# Patient Record
Sex: MALE | Race: WHITE | NOT HISPANIC OR LATINO | Employment: OTHER | URBAN - METROPOLITAN AREA
[De-identification: names, ages, dates, MRNs, and addresses within clinical notes are randomized per-mention and may not be internally consistent; named-entity substitution may affect disease eponyms.]

---

## 2017-02-16 PROBLEM — M79.602 LEFT ARM PAIN: Status: ACTIVE | Noted: 2017-02-16

## 2019-05-30 ENCOUNTER — HOSPITAL ENCOUNTER (EMERGENCY)
Facility: HOSPITAL | Age: 59
Discharge: HOME | End: 2019-05-30
Attending: SURGERY

## 2019-05-30 VITALS
SYSTOLIC BLOOD PRESSURE: 128 MMHG | DIASTOLIC BLOOD PRESSURE: 77 MMHG | TEMPERATURE: 99 F | BODY MASS INDEX: 20.24 KG/M2 | HEART RATE: 91 BPM | RESPIRATION RATE: 18 BRPM | WEIGHT: 141.13 LBS

## 2019-05-30 DIAGNOSIS — M54.9 BACK PAIN, UNSPECIFIED BACK LOCATION, UNSPECIFIED BACK PAIN LATERALITY, UNSPECIFIED CHRONICITY: ICD-10-CM

## 2019-05-30 DIAGNOSIS — F45.42 PAIN DISORDER ASSOCIATED WITH PSYCHOLOGICAL AND PHYSICAL FACTORS: Primary | ICD-10-CM

## 2019-05-30 PROCEDURE — 63600175 PHARM REV CODE 636 W HCPCS: Performed by: SURGERY

## 2019-05-30 PROCEDURE — 99284 EMERGENCY DEPT VISIT MOD MDM: CPT | Mod: 25

## 2019-05-30 PROCEDURE — 96372 THER/PROPH/DIAG INJ SC/IM: CPT | Mod: 59

## 2019-05-30 RX ORDER — MORPHINE SULFATE 2 MG/ML
2 INJECTION, SOLUTION INTRAMUSCULAR; INTRAVENOUS
Status: COMPLETED | OUTPATIENT
Start: 2019-05-30 | End: 2019-05-30

## 2019-05-30 RX ORDER — ONDANSETRON 2 MG/ML
4 INJECTION INTRAMUSCULAR; INTRAVENOUS
Status: COMPLETED | OUTPATIENT
Start: 2019-05-30 | End: 2019-05-30

## 2019-05-30 RX ORDER — CYCLOBENZAPRINE HCL 10 MG
10 TABLET ORAL 3 TIMES DAILY PRN
Qty: 10 TABLET | Refills: 0 | Status: SHIPPED | OUTPATIENT
Start: 2019-05-30 | End: 2019-06-04

## 2019-05-30 RX ORDER — IBUPROFEN 800 MG/1
800 TABLET ORAL EVERY 6 HOURS PRN
Qty: 20 TABLET | Refills: 0 | Status: SHIPPED | OUTPATIENT
Start: 2019-05-30 | End: 2019-09-09

## 2019-05-30 RX ADMIN — MORPHINE SULFATE 2 MG: 2 INJECTION, SOLUTION INTRAMUSCULAR; INTRAVENOUS at 09:05

## 2019-05-30 RX ADMIN — ONDANSETRON 4 MG: 2 INJECTION INTRAMUSCULAR; INTRAVENOUS at 09:05

## 2019-05-31 NOTE — ED TRIAGE NOTES
Arrives from home by private vehicle. Presents with complaints of back pain after a slip and fall on a shrimp boat 2 days ago.

## 2019-05-31 NOTE — ED PROVIDER NOTES
Chief Complaint  59 y.o. male with Back Pain    History of Present Illness  Isaiah Greene presents to the emergency room with back pain  Patient has back pain, denies any head trauma or loss of consciousness  Patient states he accidentally hit his lower back, history of lumbar fracture  Patient has normal neuro exam with no signs of cauda equina on exam  Patient has no leg weakness, patient has no actual bruising on evaluation  He has chronic pain on a daily basis, all x-rays negative for acute fracture    The history is provided by the patient   device was not used during this ER visit  Medical history: Prostate this  Surgeries: Eye surgery and facial reconstruction  No Known Allergies     I have reviewed all of this patient's past medical, surgical, family, and social   histories as well as active allergies and medications documented in the  electronic medical record    Review of Systems and Physical Exam      Review of Systems  -- Constitution - no fever, denies fatigue, no weakness, no chills  -- Eyes - no tearing or redness, no visual disturbance  -- Ear, Nose - no tinnitus or earache, no nasal congestion or discharge  -- Mouth,Throat - no sore throat, no toothache, normal voice, normal swallowing  -- Respiratory - denies cough and congestion, no shortness of breath, no CAMPBELL  -- Cardiovascular - denies chest pain, no palpitations, denies claudication  -- Gastrointestinal - denies abdominal pain, nausea, vomiting, or diarrhea  -- Genitourinary - no dysuria, denies flank pain, no hematuria, no STD risk  -- Musculoskeletal - back pain  -- Neurological - no headache, denies weakness or seizure; no LOC  -- Skin - denies pallor, rash, or changes in skin. no hives or welts noted    Vital Signs  His temperature is 98.8 °F (37.1 °C).   His blood pressure is 134/84 and his pulse is 106.   His respiration is 18.     Physical Exam  -- Nursing note and vitals reviewed  -- Constitutional:  Appears well-developed and well-nourished  -- Head: Atraumatic. Normocephalic. No obvious abnormality  -- Eyes: Pupils are equal and reactive to light. Normal conjunctiva and lids  -- Cardiac: Normal rate, regular rhythm and normal heart sounds  -- Pulmonary: Normal respiratory effort, breath sounds clear to auscultation  -- Abdominal: Soft, no tenderness. Normal bowel sounds. Normal liver edge  -- Musculoskeletal: Normal range of motion, no effusions. Joints stable   -- Neurological: No focal deficits. Showed good interaction with staff  -- Vascular: Posterior tibial, dorsalis pedis and radial pulses 2+ bilaterally      Emergency Room Course      L-spine x-ray   Degenerative change at L4-L5 and involving the left facet joint at L5-S1.     Additional Work up  -- Chest x-ray showed no infiltrate and showed no acute pathology     Medications Given  ondansetron injection 4 mg (4 mg Intramuscular Given 5/30/19 2103)   morphine injection 2 mg (2 mg Intramuscular Given 5/30/19 2103)     Diagnosis  -- Pain disorder associated with psychological and physical factors.   -- Back pain    Disposition and Plan  -- Disposition: home  -- Condition: stable  -- Follow-up: Patient to follow up with MD in 1-2 days.  -- I advised the patient that we have found no life threatening condition today  -- At this time, I believe the patient is clinically stable for discharge.   -- The patient acknowledges that close follow up with a MD is required   -- Patient agrees to comply with all instruction and direction given in the ER    This note is dictated on M*Modal word recognition program.  There are word recognition mistakes that are occasionally missed on review.         Miki Valdez MD  05/30/19 [id number]

## 2019-06-14 ENCOUNTER — HOSPITAL ENCOUNTER (EMERGENCY)
Facility: HOSPITAL | Age: 59
Discharge: HOME | End: 2019-06-14
Attending: SURGERY
Payer: MEDICAID

## 2019-06-14 VITALS
OXYGEN SATURATION: 100 % | SYSTOLIC BLOOD PRESSURE: 105 MMHG | TEMPERATURE: 97 F | DIASTOLIC BLOOD PRESSURE: 64 MMHG | BODY MASS INDEX: 21.42 KG/M2 | WEIGHT: 149.25 LBS | HEART RATE: 85 BPM | RESPIRATION RATE: 16 BRPM

## 2019-06-14 DIAGNOSIS — R10.9 ABDOMINAL PAIN: ICD-10-CM

## 2019-06-14 DIAGNOSIS — K29.70 GASTRITIS, PRESENCE OF BLEEDING UNSPECIFIED, UNSPECIFIED CHRONICITY, UNSPECIFIED GASTRITIS TYPE: Primary | ICD-10-CM

## 2019-06-14 LAB
ALBUMIN SERPL BCP-MCNC: 3.6 G/DL (ref 3.5–5.2)
ALP SERPL-CCNC: 109 U/L (ref 55–135)
ALT SERPL W/O P-5'-P-CCNC: 11 U/L (ref 10–44)
ANION GAP SERPL CALC-SCNC: 9 MMOL/L (ref 8–16)
AST SERPL-CCNC: 16 U/L (ref 10–40)
BASOPHILS # BLD AUTO: 0.06 K/UL (ref 0–0.2)
BASOPHILS NFR BLD: 0.4 % (ref 0–1.9)
BILIRUB SERPL-MCNC: 0.2 MG/DL (ref 0.1–1)
BILIRUB UR QL STRIP: NEGATIVE
BUN SERPL-MCNC: 9 MG/DL (ref 6–20)
CALCIUM SERPL-MCNC: 9.4 MG/DL (ref 8.7–10.5)
CHLORIDE SERPL-SCNC: 108 MMOL/L (ref 95–110)
CK MB SERPL-MCNC: 2.5 NG/ML (ref 0.1–6.5)
CK MB SERPL-MCNC: 3.4 NG/ML (ref 0.1–6.5)
CK MB SERPL-RTO: 3 % (ref 0–5)
CK MB SERPL-RTO: 3.5 % (ref 0–5)
CK SERPL-CCNC: 112 U/L (ref 20–200)
CK SERPL-CCNC: 112 U/L (ref 20–200)
CK SERPL-CCNC: 72 U/L (ref 20–200)
CK SERPL-CCNC: 72 U/L (ref 20–200)
CLARITY UR: CLEAR
CO2 SERPL-SCNC: 25 MMOL/L (ref 23–29)
COLOR UR: YELLOW
CREAT SERPL-MCNC: 0.7 MG/DL (ref 0.5–1.4)
DIFFERENTIAL METHOD: ABNORMAL
EOSINOPHIL # BLD AUTO: 1.4 K/UL (ref 0–0.5)
EOSINOPHIL NFR BLD: 8.4 % (ref 0–8)
ERYTHROCYTE [DISTWIDTH] IN BLOOD BY AUTOMATED COUNT: 14.9 % (ref 11.5–14.5)
EST. GFR  (AFRICAN AMERICAN): >60 ML/MIN/1.73 M^2
EST. GFR  (NON AFRICAN AMERICAN): >60 ML/MIN/1.73 M^2
GLUCOSE SERPL-MCNC: 114 MG/DL (ref 70–110)
GLUCOSE UR QL STRIP: NEGATIVE
HCT VFR BLD AUTO: 35.4 % (ref 40–54)
HGB BLD-MCNC: 11.4 G/DL (ref 14–18)
HGB UR QL STRIP: NEGATIVE
KETONES UR QL STRIP: NEGATIVE
LEUKOCYTE ESTERASE UR QL STRIP: NEGATIVE
LIPASE SERPL-CCNC: 51 U/L (ref 4–60)
LYMPHOCYTES # BLD AUTO: 7.8 K/UL (ref 1–4.8)
LYMPHOCYTES NFR BLD: 46.2 % (ref 18–48)
MCH RBC QN AUTO: 30.4 PG (ref 27–31)
MCHC RBC AUTO-ENTMCNC: 32.2 G/DL (ref 32–36)
MCV RBC AUTO: 94 FL (ref 82–98)
MONOCYTES # BLD AUTO: 1 K/UL (ref 0.3–1)
MONOCYTES NFR BLD: 5.8 % (ref 4–15)
NEUTROPHILS # BLD AUTO: 6.6 K/UL (ref 1.8–7.7)
NEUTROPHILS NFR BLD: 39.2 % (ref 38–73)
NITRITE UR QL STRIP: NEGATIVE
PH UR STRIP: 7 [PH] (ref 5–8)
PLATELET # BLD AUTO: 569 K/UL (ref 150–350)
PMV BLD AUTO: 8.7 FL (ref 9.2–12.9)
POTASSIUM SERPL-SCNC: 4.6 MMOL/L (ref 3.5–5.1)
PROT SERPL-MCNC: 6.9 G/DL (ref 6–8.4)
PROT UR QL STRIP: NEGATIVE
RBC # BLD AUTO: 3.75 M/UL (ref 4.6–6.2)
SODIUM SERPL-SCNC: 142 MMOL/L (ref 136–145)
SP GR UR STRIP: 1.02 (ref 1–1.03)
TROPONIN I SERPL DL<=0.01 NG/ML-MCNC: <0.006 NG/ML (ref 0–0.03)
TROPONIN I SERPL DL<=0.01 NG/ML-MCNC: <0.006 NG/ML (ref 0–0.03)
URN SPEC COLLECT METH UR: NORMAL
UROBILINOGEN UR STRIP-ACNC: NEGATIVE EU/DL
WBC # BLD AUTO: 16.85 K/UL (ref 3.9–12.7)

## 2019-06-14 PROCEDURE — 86677 HELICOBACTER PYLORI ANTIBODY: CPT

## 2019-06-14 PROCEDURE — 84484 ASSAY OF TROPONIN QUANT: CPT

## 2019-06-14 PROCEDURE — 80053 COMPREHEN METABOLIC PANEL: CPT

## 2019-06-14 PROCEDURE — C9113 INJ PANTOPRAZOLE SODIUM, VIA: HCPCS | Performed by: SURGERY

## 2019-06-14 PROCEDURE — 81003 URINALYSIS AUTO W/O SCOPE: CPT

## 2019-06-14 PROCEDURE — 96375 TX/PRO/DX INJ NEW DRUG ADDON: CPT

## 2019-06-14 PROCEDURE — 85025 COMPLETE CBC W/AUTO DIFF WBC: CPT

## 2019-06-14 PROCEDURE — 25500020 PHARM REV CODE 255: Performed by: SURGERY

## 2019-06-14 PROCEDURE — 82553 CREATINE MB FRACTION: CPT

## 2019-06-14 PROCEDURE — 99285 EMERGENCY DEPT VISIT HI MDM: CPT | Mod: 25

## 2019-06-14 PROCEDURE — 93010 EKG 12-LEAD: ICD-10-PCS | Mod: ,,, | Performed by: INTERNAL MEDICINE

## 2019-06-14 PROCEDURE — 93010 ELECTROCARDIOGRAM REPORT: CPT | Mod: ,,, | Performed by: INTERNAL MEDICINE

## 2019-06-14 PROCEDURE — 96376 TX/PRO/DX INJ SAME DRUG ADON: CPT

## 2019-06-14 PROCEDURE — 96361 HYDRATE IV INFUSION ADD-ON: CPT

## 2019-06-14 PROCEDURE — 36415 COLL VENOUS BLD VENIPUNCTURE: CPT

## 2019-06-14 PROCEDURE — 96374 THER/PROPH/DIAG INJ IV PUSH: CPT

## 2019-06-14 PROCEDURE — 83690 ASSAY OF LIPASE: CPT

## 2019-06-14 PROCEDURE — 82550 ASSAY OF CK (CPK): CPT

## 2019-06-14 PROCEDURE — 93005 ELECTROCARDIOGRAM TRACING: CPT

## 2019-06-14 PROCEDURE — 25000003 PHARM REV CODE 250: Performed by: SURGERY

## 2019-06-14 PROCEDURE — 63600175 PHARM REV CODE 636 W HCPCS: Performed by: SURGERY

## 2019-06-14 RX ORDER — ONDANSETRON 4 MG/1
4 TABLET, ORALLY DISINTEGRATING ORAL EVERY 8 HOURS PRN
Qty: 20 TABLET | Refills: 0 | Status: SHIPPED | OUTPATIENT
Start: 2019-06-14 | End: 2019-09-09

## 2019-06-14 RX ORDER — ONDANSETRON 2 MG/ML
4 INJECTION INTRAMUSCULAR; INTRAVENOUS
Status: COMPLETED | OUTPATIENT
Start: 2019-06-14 | End: 2019-06-14

## 2019-06-14 RX ORDER — DICYCLOMINE HYDROCHLORIDE 20 MG/1
20 TABLET ORAL 4 TIMES DAILY PRN
Qty: 15 TABLET | Refills: 0 | Status: SHIPPED | OUTPATIENT
Start: 2019-06-14 | End: 2019-07-14

## 2019-06-14 RX ORDER — PANTOPRAZOLE SODIUM 40 MG/1
40 TABLET, DELAYED RELEASE ORAL DAILY
Qty: 30 TABLET | Refills: 0 | Status: SHIPPED | OUTPATIENT
Start: 2019-06-14 | End: 2019-09-09

## 2019-06-14 RX ORDER — SODIUM CHLORIDE 9 MG/ML
1000 INJECTION, SOLUTION INTRAVENOUS
Status: COMPLETED | OUTPATIENT
Start: 2019-06-14 | End: 2019-06-14

## 2019-06-14 RX ORDER — MORPHINE SULFATE 2 MG/ML
2 INJECTION, SOLUTION INTRAMUSCULAR; INTRAVENOUS
Status: COMPLETED | OUTPATIENT
Start: 2019-06-14 | End: 2019-06-14

## 2019-06-14 RX ORDER — SUCRALFATE 1 G/1
1 TABLET ORAL 4 TIMES DAILY
Qty: 120 TABLET | Refills: 0 | Status: SHIPPED | OUTPATIENT
Start: 2019-06-14 | End: 2019-07-14

## 2019-06-14 RX ORDER — HYDROMORPHONE HYDROCHLORIDE 1 MG/ML
1 INJECTION, SOLUTION INTRAMUSCULAR; INTRAVENOUS; SUBCUTANEOUS
Status: COMPLETED | OUTPATIENT
Start: 2019-06-14 | End: 2019-06-14

## 2019-06-14 RX ORDER — KETOROLAC TROMETHAMINE 30 MG/ML
30 INJECTION, SOLUTION INTRAMUSCULAR; INTRAVENOUS ONCE
Status: COMPLETED | OUTPATIENT
Start: 2019-06-14 | End: 2019-06-14

## 2019-06-14 RX ORDER — PANTOPRAZOLE SODIUM 40 MG/10ML
40 INJECTION, POWDER, LYOPHILIZED, FOR SOLUTION INTRAVENOUS
Status: COMPLETED | OUTPATIENT
Start: 2019-06-14 | End: 2019-06-14

## 2019-06-14 RX ADMIN — LIDOCAINE HYDROCHLORIDE 50 ML: 20 SOLUTION ORAL; TOPICAL at 04:06

## 2019-06-14 RX ADMIN — ONDANSETRON 4 MG: 2 INJECTION INTRAMUSCULAR; INTRAVENOUS at 04:06

## 2019-06-14 RX ADMIN — HYDROMORPHONE HYDROCHLORIDE 1 MG: 1 INJECTION, SOLUTION INTRAMUSCULAR; INTRAVENOUS; SUBCUTANEOUS at 04:06

## 2019-06-14 RX ADMIN — PANTOPRAZOLE SODIUM 40 MG: 40 INJECTION, POWDER, LYOPHILIZED, FOR SOLUTION INTRAVENOUS at 06:06

## 2019-06-14 RX ADMIN — SODIUM CHLORIDE 1000 ML: 0.9 INJECTION, SOLUTION INTRAVENOUS at 06:06

## 2019-06-14 RX ADMIN — IOHEXOL 100 ML: 350 INJECTION, SOLUTION INTRAVENOUS at 07:06

## 2019-06-14 RX ADMIN — SODIUM CHLORIDE 1000 ML: 0.9 INJECTION, SOLUTION INTRAVENOUS at 04:06

## 2019-06-14 RX ADMIN — IOHEXOL 50 ML: 350 INJECTION, SOLUTION INTRAVENOUS at 07:06

## 2019-06-14 RX ADMIN — KETOROLAC TROMETHAMINE 30 MG: 30 INJECTION, SOLUTION INTRAMUSCULAR; INTRAVENOUS at 04:06

## 2019-06-14 RX ADMIN — MORPHINE SULFATE 2 MG: 2 INJECTION, SOLUTION INTRAMUSCULAR; INTRAVENOUS at 06:06

## 2019-06-14 RX ADMIN — ONDANSETRON 4 MG: 2 INJECTION INTRAMUSCULAR; INTRAVENOUS at 06:06

## 2019-06-14 NOTE — ED NOTES
Report taken from KAJAL Livingston. Pt resting comfortably on ED stretcher. NADN. AAOx3. Respirations even and unlabored. Bed locked in lowest position. Call bell within reach. Awaiting MD orders.   n/a

## 2019-06-14 NOTE — ED TRIAGE NOTES
Patient reports mid abd pain 10/10. He reports this problem has been on going but has come to a climax tonight.

## 2019-06-14 NOTE — ED PROVIDER NOTES
[facility]                                                 Chief Complaint  59 y.o. male with Abdominal Pain    History of Present Illness  Isaiah Greene presents to the emergency room with epigastric pain  Patient awoke this early morning with epigastric pain, denies nausea vomiting  Patient on exam has a soft flat abdomen no signs of peritonitis or rebound now  Pt has no fever, no weight loss, denies any diarrhea, no lower abdomen pain  Patient states he has had episodes of epigastric pain over last couple months  Patient currently has no signs of acute abdomen, stable vital signs in the ER    The history is provided by the patient   device was not used during this ER visit  Medical history: Prostate this  Surgeries: Eye surgery and facial reconstruction  No Known Allergies     I have reviewed all of this patient's past medical, surgical, family, and social   histories as well as active allergies and medications documented in the  electronic medical record    Review of Systems and Physical Exam      Review of Systems  -- Constitution - no fever, denies fatigue, no weakness, no chills  -- Eyes - no tearing or redness, no visual disturbance  -- Ear, Nose - no tinnitus or earache, no nasal congestion or discharge  -- Mouth,Throat - no sore throat, no toothache, normal voice, normal swallowing  -- Respiratory - denies cough and congestion, no shortness of breath, no CAMPBELL  -- Cardiovascular - denies chest pain, no palpitations, denies claudication  -- Gastrointestinal - epigastric abdominal pain, no nausea, vomiting, or diarrhea  -- Genitourinary - no dysuria, denies flank pain, no hematuria, no STD risk  -- Musculoskeletal - denies back pain, negative for trauma or injury  -- Neurological - no headache, denies weakness or seizure; no LOC  -- Skin - denies pallor, rash, or changes in skin. no hives or welts noted  -- Psychiatric - Denies SI or HI, no psychosis or  fractured thought noted     Vital Signs  His oral temperature is 97 °F (36.1 °C).   His blood pressure is 130/79 and his pulse is 81.   His respiration is 16 and oxygen saturation is 100%.     Physical Exam  -- Nursing note and vitals reviewed  -- Constitutional: Appears well-developed and well-nourished  -- Head: Atraumatic. Normocephalic. No obvious abnormality  -- Eyes: Pupils are equal and reactive to light. Normal conjunctiva and lids  -- Cardiac: Normal rate, regular rhythm and normal heart sounds  -- Pulmonary: Normal respiratory effort, breath sounds clear to auscultation  -- Abdominal: Soft, no tenderness. Normal bowel sounds. Normal liver edge  -- Musculoskeletal: Normal range of motion, no effusions. Joints stable   -- Neurological: No focal deficits. Showed good interaction with staff  -- Vascular: Posterior tibial, dorsalis pedis and radial pulses 2+ bilaterally      Emergency Room Course      Lab Results     K 4.6      CO2 25   BUN 9   CREATININE 0.7    (H)   ALKPHOS 109   AST 16   ALT 11   BILITOT 0.2   ALBUMIN 3.6   PROT 6.9   WBC 16.85 (H)   HGB 11.4 (L)   HCT 35.4 (L)    (H)   CPK 72   CPK 72   CPKMB 2.5   TROPONINI <0.006     Urinalysis  -- Urinalysis performed during this ER visit showed no signs of infection      EKG  -- The EKG findings today were without concerning findings from baseline  -- The troponin drawn in the ER today was within normal limits  -- The 2nd troponin drawn in the ER today was within normal limits      Radiology  -- The CT of the abdomen and pelvis was negative for acute pathology     Additional Work up  -- H pylori antibody titer is currently pending    Medications Given  ketorolac injection 30 mg (30 mg Intravenous Given 6/14/19 0415)   ondansetron injection 4 mg (4 mg Intravenous Given 6/14/19 0415)   0.9%  NaCl infusion (0 mLs Intravenous Stopped 6/14/19 0515)   GI cocktail (mylanta 30 mL, lidocaine 2 % viscous 10 mL, dicyclomine 10 mL) 50 mL     HYDROmorphone injection 1 mg (1 mg Intravenous Given 6/14/19 0448)   0.9%  NaCl infusion (0 mLs Intravenous Stopped 6/14/19 0700)   pantoprazole injection 40 mg (40 mg Intravenous Given 6/14/19 0606)   morphine injection 2 mg (2 mg Intravenous Given 6/14/19 0607)   ondansetron injection 4 mg (4 mg Intravenous Given 6/14/19 0607)   iohexol (OMNIPAQUE 350) injection 50 mL (50 mLs Oral Given 6/14/19 0733)   iohexol (OMNIPAQUE 350) injection 75 mL (100 mLs Intravenous Given 6/14/19 0733)     MDM  Number of Diagnoses or Management Options  Abdominal pain: new, needed workup  Gastritis, presence of bleeding unspecified, unspecified chronicity, unspecified gastritis type: new, needed workup     Amount and/or Complexity of Data Reviewed  Clinical lab tests: ordered and reviewed  Tests in the radiology section of CPT®: ordered and reviewed  Tests in the medicine section of CPT®: reviewed and ordered    Risk of Complications, Morbidity, and/or Mortality  Presenting problems: moderate  Diagnostic procedures: moderate  Management options: moderate       ED Physician Management  -- Diagnosis management comments: 59 y.o. male with abdominal pain  -- patient had epigastric abdominal pain after eating or drinking tonight  -- patient had [place] [address], CT abdomen pelvis was negative  -- as a precaution, patient is stable EKG & 2-troponin sets in the ER today  -- patient has been counseled on a bland diet, H pylori titer pending now  -- patient counseled that he needs to follow up with GI for evaluation  -- asymptomatic at this time, will return with any recurrence of symptoms    Diagnosis  -- Gastritis    Disposition and Plan  -- Disposition: home  -- Condition: stable  -- Follow-up: Patient to follow up with MD in 1-2 days.  -- I advised the patient that we have found no life threatening condition today  -- At this time, I believe the patient is clinically stable for discharge.   -- The patient acknowledges that close  follow up with a MD is required   -- Patient agrees to comply with all instruction and direction given in the ER    This note is dictated on M*Modal word recognition program.  There are word recognition mistakes that are occasionally missed on review.         Miki Valdez MD  06/14/19 0965

## 2019-06-15 LAB — H PYLORI IGG SERPL QL IA: POSITIVE

## 2019-09-09 PROBLEM — K31.1 GASTRIC OUTLET OBSTRUCTION: Status: ACTIVE | Noted: 2019-09-09

## 2019-09-10 PROBLEM — E63.9 INADEQUATE DIETARY ENERGY INTAKE: Status: ACTIVE | Noted: 2019-09-10

## 2019-09-11 PROBLEM — K25.3 ACUTE GASTRIC ULCER: Status: ACTIVE | Noted: 2019-09-11

## 2019-09-14 PROBLEM — C16.9 MALIGNANT NEOPLASM OF STOMACH: Status: ACTIVE | Noted: 2019-09-14

## 2019-09-14 PROBLEM — E43 SEVERE MALNUTRITION: Status: ACTIVE | Noted: 2019-09-14

## 2019-09-15 PROBLEM — C80.1 SIGNET RING CELL ADENOCARCINOMA: Status: ACTIVE | Noted: 2019-09-15

## 2019-09-15 PROBLEM — K25.3 ACUTE GASTRIC ULCER: Status: RESOLVED | Noted: 2019-09-11 | Resolved: 2019-09-15

## 2019-09-15 PROBLEM — R63.4 WEIGHT LOSS, UNINTENTIONAL: Status: ACTIVE | Noted: 2019-09-15

## 2019-09-16 ENCOUNTER — HOSPITAL ENCOUNTER (INPATIENT)
Facility: HOSPITAL | Age: 59
LOS: 7 days | Discharge: HOME HEALTH | DRG: 326 | End: 2019-09-23
Attending: INTERNAL MEDICINE | Admitting: INTERNAL MEDICINE
Payer: MEDICAID

## 2019-09-16 DIAGNOSIS — C80.1 SIGNET RING CELL ADENOCARCINOMA: ICD-10-CM

## 2019-09-16 DIAGNOSIS — C80.1 SIGNET RING CELL ADENOCARCINOMA: Primary | ICD-10-CM

## 2019-09-16 DIAGNOSIS — R07.9 CHEST PAIN: ICD-10-CM

## 2019-09-16 DIAGNOSIS — K31.1 GASTRIC OUTLET OBSTRUCTION: ICD-10-CM

## 2019-09-16 PROBLEM — J43.9 EMPHYSEMA LUNG: Status: ACTIVE | Noted: 2019-09-16

## 2019-09-16 PROBLEM — D72.829 LEUKOCYTOSIS: Status: ACTIVE | Noted: 2019-09-16

## 2019-09-16 PROBLEM — I74.5 ILIAC ARTERY OCCLUSION, RIGHT: Status: ACTIVE | Noted: 2019-09-16

## 2019-09-16 PROBLEM — A04.8 H. PYLORI INFECTION: Status: ACTIVE | Noted: 2019-09-16

## 2019-09-16 PROBLEM — Z71.6 TOBACCO ABUSE COUNSELING: Status: ACTIVE | Noted: 2019-09-16

## 2019-09-16 LAB
ALBUMIN SERPL BCP-MCNC: 2.3 G/DL (ref 3.5–5.2)
ALP SERPL-CCNC: 69 U/L (ref 55–135)
ALT SERPL W/O P-5'-P-CCNC: 10 U/L (ref 10–44)
ANION GAP SERPL CALC-SCNC: 5 MMOL/L (ref 8–16)
AST SERPL-CCNC: 20 U/L (ref 10–40)
BASOPHILS # BLD AUTO: 0.03 K/UL (ref 0–0.2)
BASOPHILS NFR BLD: 0.3 % (ref 0–1.9)
BILIRUB SERPL-MCNC: 0.3 MG/DL (ref 0.1–1)
BILIRUB UR QL STRIP: NEGATIVE
BUN SERPL-MCNC: 3 MG/DL (ref 6–20)
CALCIUM SERPL-MCNC: 7.7 MG/DL (ref 8.7–10.5)
CHLORIDE SERPL-SCNC: 112 MMOL/L (ref 95–110)
CLARITY UR REFRACT.AUTO: CLEAR
CO2 SERPL-SCNC: 22 MMOL/L (ref 23–29)
COLOR UR AUTO: NORMAL
CREAT SERPL-MCNC: 0.6 MG/DL (ref 0.5–1.4)
DIFFERENTIAL METHOD: ABNORMAL
EOSINOPHIL # BLD AUTO: 0.6 K/UL (ref 0–0.5)
EOSINOPHIL NFR BLD: 5.6 % (ref 0–8)
ERYTHROCYTE [DISTWIDTH] IN BLOOD BY AUTOMATED COUNT: 14 % (ref 11.5–14.5)
EST. GFR  (AFRICAN AMERICAN): >60 ML/MIN/1.73 M^2
EST. GFR  (NON AFRICAN AMERICAN): >60 ML/MIN/1.73 M^2
ESTIMATED AVG GLUCOSE: 114 MG/DL (ref 68–131)
GLUCOSE SERPL-MCNC: 84 MG/DL (ref 70–110)
GLUCOSE UR QL STRIP: NEGATIVE
HBA1C MFR BLD HPLC: 5.6 % (ref 4–5.6)
HCT VFR BLD AUTO: 27.1 % (ref 40–54)
HGB BLD-MCNC: 8.2 G/DL (ref 14–18)
HGB UR QL STRIP: NEGATIVE
IMM GRANULOCYTES # BLD AUTO: 0.04 K/UL (ref 0–0.04)
IMM GRANULOCYTES NFR BLD AUTO: 0.4 % (ref 0–0.5)
KETONES UR QL STRIP: NEGATIVE
LEUKOCYTE ESTERASE UR QL STRIP: NEGATIVE
LIPASE SERPL-CCNC: 7 U/L (ref 4–60)
LYMPHOCYTES # BLD AUTO: 5.6 K/UL (ref 1–4.8)
LYMPHOCYTES NFR BLD: 49.1 % (ref 18–48)
MAGNESIUM SERPL-MCNC: 1.7 MG/DL (ref 1.6–2.6)
MCH RBC QN AUTO: 28.5 PG (ref 27–31)
MCHC RBC AUTO-ENTMCNC: 30.3 G/DL (ref 32–36)
MCV RBC AUTO: 94 FL (ref 82–98)
MONOCYTES # BLD AUTO: 0.6 K/UL (ref 0.3–1)
MONOCYTES NFR BLD: 5.4 % (ref 4–15)
NEUTROPHILS # BLD AUTO: 4.5 K/UL (ref 1.8–7.7)
NEUTROPHILS NFR BLD: 39.2 % (ref 38–73)
NITRITE UR QL STRIP: NEGATIVE
NRBC BLD-RTO: 0 /100 WBC
PH UR STRIP: 7 [PH] (ref 5–8)
PHOSPHATE SERPL-MCNC: 1.7 MG/DL (ref 2.7–4.5)
PLATELET # BLD AUTO: 348 K/UL (ref 150–350)
PMV BLD AUTO: 10.4 FL (ref 9.2–12.9)
POTASSIUM SERPL-SCNC: 4.7 MMOL/L (ref 3.5–5.1)
PROT SERPL-MCNC: 5.6 G/DL (ref 6–8.4)
PROT UR QL STRIP: NEGATIVE
RBC # BLD AUTO: 2.88 M/UL (ref 4.6–6.2)
SODIUM SERPL-SCNC: 139 MMOL/L (ref 136–145)
SP GR UR STRIP: 1.01 (ref 1–1.03)
URN SPEC COLLECT METH UR: NORMAL
WBC # BLD AUTO: 11.35 K/UL (ref 3.9–12.7)

## 2019-09-16 PROCEDURE — 83036 HEMOGLOBIN GLYCOSYLATED A1C: CPT

## 2019-09-16 PROCEDURE — 11000001 HC ACUTE MED/SURG PRIVATE ROOM

## 2019-09-16 PROCEDURE — 25000003 PHARM REV CODE 250: Performed by: STUDENT IN AN ORGANIZED HEALTH CARE EDUCATION/TRAINING PROGRAM

## 2019-09-16 PROCEDURE — 84100 ASSAY OF PHOSPHORUS: CPT

## 2019-09-16 PROCEDURE — 99223 1ST HOSP IP/OBS HIGH 75: CPT | Mod: ,,, | Performed by: HOSPITALIST

## 2019-09-16 PROCEDURE — 99223 PR INITIAL HOSPITAL CARE,LEVL III: ICD-10-PCS | Mod: ,,, | Performed by: HOSPITALIST

## 2019-09-16 PROCEDURE — 36415 COLL VENOUS BLD VENIPUNCTURE: CPT

## 2019-09-16 PROCEDURE — 81003 URINALYSIS AUTO W/O SCOPE: CPT

## 2019-09-16 PROCEDURE — 80053 COMPREHEN METABOLIC PANEL: CPT

## 2019-09-16 PROCEDURE — 85025 COMPLETE CBC W/AUTO DIFF WBC: CPT

## 2019-09-16 PROCEDURE — C9113 INJ PANTOPRAZOLE SODIUM, VIA: HCPCS | Performed by: STUDENT IN AN ORGANIZED HEALTH CARE EDUCATION/TRAINING PROGRAM

## 2019-09-16 PROCEDURE — 83690 ASSAY OF LIPASE: CPT

## 2019-09-16 PROCEDURE — 63600175 PHARM REV CODE 636 W HCPCS: Performed by: STUDENT IN AN ORGANIZED HEALTH CARE EDUCATION/TRAINING PROGRAM

## 2019-09-16 PROCEDURE — 83735 ASSAY OF MAGNESIUM: CPT

## 2019-09-16 RX ORDER — PANTOPRAZOLE SODIUM 40 MG/10ML
40 INJECTION, POWDER, LYOPHILIZED, FOR SOLUTION INTRAVENOUS 2 TIMES DAILY
Status: DISCONTINUED | OUTPATIENT
Start: 2019-09-17 | End: 2019-09-23 | Stop reason: HOSPADM

## 2019-09-16 RX ORDER — PROMETHAZINE HYDROCHLORIDE 25 MG/1
25 TABLET ORAL EVERY 6 HOURS PRN
Status: DISCONTINUED | OUTPATIENT
Start: 2019-09-16 | End: 2019-09-19

## 2019-09-16 RX ORDER — GLUCAGON 1 MG
1 KIT INJECTION
Status: DISCONTINUED | OUTPATIENT
Start: 2019-09-16 | End: 2019-09-23 | Stop reason: HOSPADM

## 2019-09-16 RX ORDER — ACETAMINOPHEN 325 MG/1
650 TABLET ORAL EVERY 4 HOURS PRN
Status: DISCONTINUED | OUTPATIENT
Start: 2019-09-16 | End: 2019-09-19

## 2019-09-16 RX ORDER — IBUPROFEN 200 MG
1 TABLET ORAL DAILY
Status: DISCONTINUED | OUTPATIENT
Start: 2019-09-17 | End: 2019-09-17

## 2019-09-16 RX ORDER — SODIUM CHLORIDE 0.9 % (FLUSH) 0.9 %
10 SYRINGE (ML) INJECTION
Status: DISCONTINUED | OUTPATIENT
Start: 2019-09-16 | End: 2019-09-23 | Stop reason: HOSPADM

## 2019-09-16 RX ORDER — PANTOPRAZOLE SODIUM 40 MG/10ML
40 INJECTION, POWDER, LYOPHILIZED, FOR SOLUTION INTRAVENOUS DAILY
Status: DISCONTINUED | OUTPATIENT
Start: 2019-09-16 | End: 2019-09-16

## 2019-09-16 RX ORDER — IBUPROFEN 200 MG
24 TABLET ORAL
Status: DISCONTINUED | OUTPATIENT
Start: 2019-09-16 | End: 2019-09-23 | Stop reason: HOSPADM

## 2019-09-16 RX ORDER — ACETAMINOPHEN 325 MG/1
650 TABLET ORAL EVERY 8 HOURS PRN
Status: DISCONTINUED | OUTPATIENT
Start: 2019-09-16 | End: 2019-09-19

## 2019-09-16 RX ORDER — ONDANSETRON 8 MG/1
8 TABLET, ORALLY DISINTEGRATING ORAL EVERY 8 HOURS PRN
Status: DISCONTINUED | OUTPATIENT
Start: 2019-09-16 | End: 2019-09-19

## 2019-09-16 RX ORDER — IBUPROFEN 200 MG
16 TABLET ORAL
Status: DISCONTINUED | OUTPATIENT
Start: 2019-09-16 | End: 2019-09-23 | Stop reason: HOSPADM

## 2019-09-16 RX ADMIN — PANTOPRAZOLE SODIUM 40 MG: 40 INJECTION, POWDER, LYOPHILIZED, FOR SOLUTION INTRAVENOUS at 05:09

## 2019-09-16 RX ADMIN — ALUMINUM HYDROXIDE, MAGNESIUM HYDROXIDE, AND SIMETHICONE 50 ML: 200; 200; 20 SUSPENSION ORAL at 10:09

## 2019-09-16 NOTE — SUBJECTIVE & OBJECTIVE
Past Medical History:   Diagnosis Date    Chronic gastritis     Diverticulosis     Positive H. 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       Past Surgical History:   Procedure Laterality Date    EGD (ESOPHAGOGASTRODUODENOSCOPY) N/A 9/10/2019    Performed by Ok Diaz MD at [facility]    EYE SURGERY      FACIAL RECONSTRUCTION SURGERY         Review of patient's allergies indicates:  No Known Allergies    Current Facility-Administered Medications on File Prior to Encounter   Medication    [DISCONTINUED] dextrose 5 % and 0.9 % NaCl with KCl 20 mEq infusion    [DISCONTINUED] HYDROmorphone injection 0.5 mg    [DISCONTINUED] HYDROmorphone injection 1 mg    [DISCONTINUED] metoclopramide HCl injection 10 mg    [DISCONTINUED] nicotine 14 mg/24 hr 1 patch    [DISCONTINUED] ondansetron injection 8 mg    [DISCONTINUED] pantoprazole injection 40 mg    [DISCONTINUED] zolpidem tablet 10 mg     Current Outpatient Medications on File Prior to Encounter   Medication Sig    nicotine (NICODERM CQ) 14 mg/24 hr Place 1 patch onto the skin once daily.    pantoprazole (PROTONIX) 40 MG tablet Take 1 tablet (40 mg total) by mouth once daily.     Family History     Problem Relation (Age of Onset)    Cancer Father        Tobacco Use    Smoking status: Current Every Day Smoker     Packs/day: 1.00     Years: 40.00     Pack years: 40.00     Types: Cigarettes    Smokeless tobacco: Never Used   Substance and Sexual Activity    Alcohol use: Yes    Drug use: No    Sexual activity: Not on file     Review of Systems   Constitutional: Positive for unexpected weight change (40lb over 2 months). Negative for appetite change and fever.   HENT: Positive for sinus pressure. Negative for trouble swallowing.    Eyes: Negative for visual disturbance.   Respiratory: Positive for shortness of breath (With activity). Negative for cough, chest tightness, wheezing and stridor.    Cardiovascular: Negative  for chest pain, palpitations and leg swelling.   Gastrointestinal: Positive for abdominal distention, abdominal pain, diarrhea (Loose, watery) and nausea (Prior. Resolved). Negative for blood in stool and vomiting.   Genitourinary: Negative for difficulty urinating and dysuria.   Musculoskeletal: Negative for arthralgias and myalgias.   Skin: Positive for rash (On right side of lower lip).   Allergic/Immunologic: Negative for environmental allergies and food allergies.   Neurological: Negative for weakness and headaches.   Hematological: Does not bruise/bleed easily.   Psychiatric/Behavioral: Negative for confusion.     Objective:     Vital Signs (Most Recent):  Temp: 97 °F (36.1 °C) (09/16/19 1500)  Pulse: 89 (09/16/19 1500)  Resp: 16 (09/16/19 1500)  BP: (!) 142/76 (09/16/19 1500)  SpO2: 98 % (09/16/19 1500) Vital Signs (24h Range):  Temp:  [97 °F (36.1 °C)-98.5 °F (36.9 °C)] 97 °F (36.1 °C)  Pulse:  [] 89  Resp:  [16-18] 16  SpO2:  [97 %-98 %] 98 %  BP: (130-142)/(70-79) 142/76     Weight: 62.6 kg (138 lb)  Body mass index is 19.8 kg/m².    Physical Exam   Constitutional: He is oriented to person, place, and time.   Malnourished   HENT:   Head: Normocephalic and atraumatic.   Right Ear: Hearing normal.   Left Ear: Hearing normal.   Eyes: Conjunctivae, EOM and lids are normal.   Neck: Trachea normal, full passive range of motion without pain and phonation normal. No JVD present.   Cardiovascular: Normal rate, regular rhythm, S1 normal, S2 normal, normal heart sounds and normal pulses. Exam reveals no gallop, no S3, no S4 and no friction rub.   No murmur heard.  Pulses:       Radial pulses are 2+ on the right side, and 2+ on the left side.   Pulmonary/Chest: Effort normal and breath sounds normal. No stridor. No respiratory distress. He has no decreased breath sounds. He has no wheezes. He has no rhonchi. He has no rales.   Abdominal: Soft. Normal appearance and bowel sounds are normal. He exhibits distension  (Very mild). There is tenderness in the epigastric area, periumbilical area and suprapubic area.   Musculoskeletal: Normal range of motion.   Lymphadenopathy:     He has no cervical adenopathy.   Neurological: He is alert and oriented to person, place, and time. He has normal strength.   Skin: Skin is warm, dry and intact. Rash (R lower lip) noted. He is not diaphoretic. No pallor.   Psychiatric: He has a normal mood and affect. His speech is normal and behavior is normal. Judgment and thought content normal. Cognition and memory are normal. He is attentive.   Nursing note and vitals reviewed.        CRANIAL NERVES     CN III, IV, VI   Extraocular motions are normal.        Significant Labs:   Recent Lab Results       09/16/19  1615        Estimated Avg Glucose 114     Hemoglobin A1C External 5.6  Comment:  ADA Screening Guidelines:  5.7-6.4%  Consistent with prediabetes  >or=6.5%  Consistent with diabetes  High levels of fetal hemoglobin interfere with the HbA1C  assay. Heterozygous hemoglobin variants (HbS, HgC, etc)do  not significantly interfere with this assay.   However, presence of multiple variants may affect accuracy.       Lipase 7     Magnesium 1.7     Phosphorus 1.7           Significant Imaging: I have reviewed all pertinent imaging results/findings within the past 24 hours.

## 2019-09-16 NOTE — ASSESSMENT & PLAN NOTE
5d hx of constipation with nausea but no vomiting  9/10 abdominal pain. Relieved temporarily with Protonix PO  CT Abd demonstrated gastric obstruction  Pt treated with protonix and clear liquid diet, which resulted in loose stools and passing gas  No gross blood observed in stool, but fecal occult positive  EGD revealed bleeding ulcer with inflammation. Biopsy of ulcer revealed signet ring carcinoma and H pylori  CT Chest did not reveal metastatic lesions  Continued reporting of 9/10 abdominal pain, without nausea. Abdomen is tender only over midline. Very mild distension reported by patient. Abdomen soft with normoactive bowel sounds. Continue to pass flatus  [place] [facility] [place]    Plan:  - Lipase d/t leukocytosis and epigastric abdominal pain  - Trend CBC, CMP  - Consult AES for staging of tumor. Appreciate recs  - Will undergo EUS 9/17  - Consult Surgical Oncology for evaluation of surgical candidacy  - IV Protonix 40mg BID per rec from recent EGD and AES  - CT Abdomen Pelvis with oral contrast  - Diet: clear liquid following CT, but must be NPO after midnight per AES for EUS  - Can consider treatment of H pylori with triple therapy starting 9/17, but will hold until AES clears use  - Can consider treatment of abdominal pain with GI cocktail following CT Abd Pelvis  - Hold NSAIDs and Opioids

## 2019-09-16 NOTE — HPI
58 y/o man chronic tobacco abuse, chronic gastritis, diverticulosis, and recently diagnosed signet ring cell adenocarcinoma 9/10/19.    Hospitalized 9/9-12 with abd pain and weight loss as well as history of nausea/vomiting about a month ago. Associated 25 pound weight loss over the past three months. Claims he has largely been able to tolerate foods, but has had minimal appetite. CT 9/9 with marked gastric distention and proximal duodenal wall thickening, concerning for GOO but tolerating clears/ soft diet. EGD 9/10/19 with Grade D reflux esophagitis, gastric ulcer biopsied, stenosis at pylorus and normal duodenum.     Readmitted 9/14 with severe chest and epigastric pain with intolerance of diet. AES consulted for staging, plan for EUS 9/18.     New ascites on CT chest 9/14 concerning. Also severe emphysema.    Repeat CT 9/17 with improvement of gastric distention as well as small vol ascites.     (+) prior H.pylori infection, stains from EGD 9/10 (+)    Surg Onc consulted to assess oncologic and/or palliative surgical candidacy.

## 2019-09-16 NOTE — HPI
58yo M that presents from OSH with gastric outlet obstruction and signet ring carcinoma on biopsy. Over the last two months pt began experiencing abdominal pain. He was found to have an inflamed ulcer and was sent home on a 1 month course of Protonix. Following completion of Protonix regimen his symptoms quickly returned and he went back to the OSH. They once again treated him with Protonix. Prior to filling his script after discharge he experienced severe abdominal pain and returned to the OSH. At this point he received an EGD which showed a bleeding ulcer and a biopsy was taken. The biopsy returned positive for signet ring carcinoma and H pylori. He received a chest CT at this time which demonstrated emphysematic changes, but no metastatic lesions. He was transferred to [facility] for further evaluation of the signet ring carcinoma. Upon arrival pt endorsed 9/10 abdominal pain. He stated he had been having loose watery stools without blood for the past 3-4 days after having no stools for 5 days prior. He also endorsed nausea without vomiting a few days ago but none currently. He endorsed 40lb unexpected weight-loss with no change in appetite over the past two months. He attributed this to the clear liquid diet that OS had him on. He endorses 40 pack-year history and was a previous drinker of 6 beers a day, but he quit a year ago. He has no family history of cancer.

## 2019-09-16 NOTE — CONSULTS
[facility]  Advanced Endoscopy Service  Consult Note    Patient Name: Isaiah Greene  MRN: [id number]  Admission Date: 9/16/2019  Hospital Length of Stay: 0 days  Code Status: Full Code   Attending Provider: Rose Watson MD   Consulting Provider: Venkat Novak MD  Primary Care Physician: Primary Doctor No  Principal Problem:Gastric outlet obstruction    Inpatient consult to Advanced Endoscopy Service (AES)  Consult performed by: Venkat Novak MD  Consult ordered by: Elliot Bright MD        Subjective:     HPI: Isaiah Greene is a 59 y.o. 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 AES consulted for EUS for staging.    For last 3 months, patient reports worsening early satiety, vomiting and PO intolerance. Notes severe heartburn when he eats. Weight loss of 40# in last 3-4 months. Has loose stools recently 1-2X per day, no blood in stool or black stools. Has never had colonoscopy before.  Presented to [place] 9/10. EGD 9/10 with grade D esophagitis, 40mm non-bleeding gastric antral ulcer and gastric stenosis at pylorus. Ulcer bx with signet ring adenocarcinoma. CT chest 9/14/19 with no metastatic disease in chest, but notable small volume ascites.    Smokes 1ppd, last 3 wks ago. EtOH 6 pack per day until 1 year ago (quit due to stomach burning with drinking). Father had unknown cancer.      Past Medical History:   Diagnosis Date    Chronic gastritis     Diverticulosis     Positive H. 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       Past Surgical History:   Procedure Laterality Date    EGD (ESOPHAGOGASTRODUODENOSCOPY) N/A 9/10/2019    Performed by [facility] ENDO    EYE SURGERY      FACIAL RECONSTRUCTION SURGERY         Family History    Problem Relation Age of Onset    Cancer Father        Social History     Socioeconomic History    Marital status: Single     Spouse name: Not on file    Number of children: Not on file    Years of education: Not on file    Highest education level: Not on file   Occupational History    Not on file   Social Needs    Financial resource strain: Not on file    Food insecurity:     Worry: Not on file     Inability: Not on file    Transportation needs:     Medical: Not on file     Non-medical: Not on file   Tobacco Use    Smoking status: Current Every Day Smoker     Packs/day: 1.00     Years: 40.00     Pack years: 40.00     Types: Cigarettes    Smokeless tobacco: Never Used   Substance and Sexual Activity    Alcohol use: Yes    Drug use: No    Sexual activity: Not on file   Lifestyle    Physical activity:     Days per week: Not on file     Minutes per session: Not on file    Stress: Not on file   Relationships    Social connections:     Talks on phone: Not on file     Gets together: Not on file     Attends Yarsani service: Not on file     Active member of club or organization: Not on file     Attends meetings of clubs or organizations: Not on file     Relationship status: Not on file   Other Topics Concern    Not on file   Social History Narrative    Not on file       Current Facility-Administered Medications on File Prior to Encounter   Medication Dose Route Frequency Provider Last Rate Last Dose    [DISCONTINUED] dextrose 5 % and 0.9 % NaCl with KCl 20 mEq infusion   Intravenous Continuous Josse Howard MD   Stopped at 09/16/19 1242    [DISCONTINUED] HYDROmorphone injection 0.5 mg  0.5 mg Intravenous Q4H PRN David Jackson NP        [DISCONTINUED] HYDROmorphone injection 1 mg  1 mg Intravenous Q2H PRN Josse Howard MD   1 mg at 09/16/19 1245    [DISCONTINUED] metoclopramide HCl injection 10 mg  10 mg Intravenous Q6H PRN David Jackson NP        [DISCONTINUED] nicotine 14 mg/24 hr 1  patch  1 patch Transdermal Daily David Jackson NP   1 patch at 09/16/19 1046    [DISCONTINUED] ondansetron injection 8 mg  8 mg Intravenous Q8H PRN David McintoshJACQUELINE pulido        [DISCONTINUED] pantoprazole injection 40 mg  40 mg Intravenous Daily David Jackson NP   40 mg at 09/16/19 1047    [DISCONTINUED] zolpidem tablet 10 mg  10 mg Oral Nightly PRN MAEVE Euceda JACQUELINE Oconnor   10 mg at 09/15/19 1940     Current Outpatient Medications on File Prior to Encounter   Medication Sig Dispense Refill    nicotine (NICODERM CQ) 14 mg/24 hr Place 1 patch onto the skin once daily.  0    pantoprazole (PROTONIX) 40 MG tablet Take 1 tablet (40 mg total) by mouth once daily. 30 tablet 0       Review of patient's allergies indicates:  No Known Allergies    Review of Systems:   Constitutional: no fever, chills  Eyes: no visual changes   ENT: no sore throat or dysphagia  Respiratory: no cough or shortness of breath   Cardiovascular: no chest pain or palpitations   Gastrointestinal: as per HPI  Hematologic/Lymphatic: no easy bruising or lymphadenopathy   Musculoskeletal: no arthralgias or myalgias   Neurological: no change in mental status  Behavioral/Psych: no change in mood    Objective:     Vitals:    09/16/19 1500   BP: (!) 142/76   Pulse: 89   Resp: 16   Temp: 97 °F (36.1 °C)       General: Alert and Oriented, no distress  HEENT: Normocephalic, Atraumatic. No scleral icterus.  Resp: Good air entry bilaterally, no adventitious sounds  Cardiac: S1 and S2 normal  Abdomen: Normoactive bowel sounds. Non-distended. Normal tympany. Soft. Non-tender. No peritoneal signs.  Extremities: No peripheral edema.   Neurologic: No gross neurological Deficits  Psych: Calm, cooperative. Normal mood and affect.    Significant Labs:  Recent Labs   Lab 09/14/19  0149 09/14/19  0850 09/15/19  1207   HGB 9.5* 10.6* 8.9*       Lab Results   Component Value Date    WBC 15.10 (H) 09/15/2019    HGB 8.9 (L) 09/15/2019    HCT 27.5 (L) 09/15/2019    MCV 88  09/15/2019     (H) 09/15/2019       Lab Results   Component Value Date     09/15/2019    K 3.7 09/15/2019     09/15/2019    CO2 25 09/15/2019    BUN 9 09/15/2019    CREATININE 0.60 (L) 09/15/2019    CALCIUM 7.3 (L) 09/15/2019    ANIONGAP 9 06/14/2019    ESTGFRAFRICA >60 09/15/2019    EGFRNONAA >60 09/15/2019       Lab Results   Component Value Date    ALT 20 09/15/2019    AST 21 09/15/2019    ALKPHOS 62 09/15/2019    BILITOT 0.3 09/15/2019       Lab Results   Component Value Date    INR 1.1 02/16/2017       Significant Imaging:  Reviewed pertinent radiology findings.       Assessment/Plan:     Isaiah Greene is a 59 y.o. 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 Transferred from [place] for further staging and management.    Problem List:  1. Signet ring gastric adenocarcinoma  2. Gastric outlet obstruction    Plan:  1. EUS tomorrow  2. Clear liquids today (do not give more than this given known gastric outlet obstruction). NPO at midnight.  3. IV PPI bid for grade D esophagitis  4. Agree with Surg Onc consult  5. Will eventually need treatment of H pylori, but currently with poor PO tolerance    Thank you for involving us in the care of Isaiah Greene. Please call with any additional questions, concerns or changes in the patient's clinical status.    Venkat Novak MD  Gastroenterology Fellow PGY IV   [facility]

## 2019-09-17 PROBLEM — E44.0 MODERATE PROTEIN-CALORIE MALNUTRITION: Status: ACTIVE | Noted: 2019-09-10

## 2019-09-17 PROBLEM — E43 SEVERE PROTEIN-CALORIE MALNUTRITION: Status: ACTIVE | Noted: 2019-09-10

## 2019-09-17 PROBLEM — D50.9 IRON DEFICIENCY ANEMIA: Status: ACTIVE | Noted: 2019-09-17

## 2019-09-17 PROBLEM — E87.6 HYPOKALEMIA: Status: ACTIVE | Noted: 2019-09-17

## 2019-09-17 LAB
ABO + RH BLD: NORMAL
ALBUMIN SERPL BCP-MCNC: 2.3 G/DL (ref 3.5–5.2)
ALP SERPL-CCNC: 75 U/L (ref 55–135)
ALT SERPL W/O P-5'-P-CCNC: 7 U/L (ref 10–44)
ANION GAP SERPL CALC-SCNC: 7 MMOL/L (ref 8–16)
ANISOCYTOSIS BLD QL SMEAR: SLIGHT
AST SERPL-CCNC: 14 U/L (ref 10–40)
BASOPHILS # BLD AUTO: 0.03 K/UL (ref 0–0.2)
BASOPHILS NFR BLD: 0.2 % (ref 0–1.9)
BILIRUB SERPL-MCNC: 0.4 MG/DL (ref 0.1–1)
BLD GP AB SCN CELLS X3 SERPL QL: NORMAL
BUN SERPL-MCNC: 2 MG/DL (ref 6–20)
CALCIUM SERPL-MCNC: 7.8 MG/DL (ref 8.7–10.5)
CHLORIDE SERPL-SCNC: 109 MMOL/L (ref 95–110)
CO2 SERPL-SCNC: 24 MMOL/L (ref 23–29)
CREAT SERPL-MCNC: 0.6 MG/DL (ref 0.5–1.4)
DIFFERENTIAL METHOD: ABNORMAL
EOSINOPHIL # BLD AUTO: 0.4 K/UL (ref 0–0.5)
EOSINOPHIL NFR BLD: 2.8 % (ref 0–8)
ERYTHROCYTE [DISTWIDTH] IN BLOOD BY AUTOMATED COUNT: 14 % (ref 11.5–14.5)
EST. GFR  (AFRICAN AMERICAN): >60 ML/MIN/1.73 M^2
EST. GFR  (NON AFRICAN AMERICAN): >60 ML/MIN/1.73 M^2
GLUCOSE SERPL-MCNC: 96 MG/DL (ref 70–110)
HCT VFR BLD AUTO: 30.3 % (ref 40–54)
HGB BLD-MCNC: 9.2 G/DL (ref 14–18)
IMM GRANULOCYTES # BLD AUTO: 0.04 K/UL (ref 0–0.04)
IMM GRANULOCYTES NFR BLD AUTO: 0.3 % (ref 0–0.5)
LYMPHOCYTES # BLD AUTO: 7.2 K/UL (ref 1–4.8)
LYMPHOCYTES NFR BLD: 52.1 % (ref 18–48)
MCH RBC QN AUTO: 28 PG (ref 27–31)
MCHC RBC AUTO-ENTMCNC: 30.4 G/DL (ref 32–36)
MCV RBC AUTO: 92 FL (ref 82–98)
MONOCYTES # BLD AUTO: 0.7 K/UL (ref 0.3–1)
MONOCYTES NFR BLD: 5.3 % (ref 4–15)
NEUTROPHILS # BLD AUTO: 5.4 K/UL (ref 1.8–7.7)
NEUTROPHILS NFR BLD: 39.3 % (ref 38–73)
NRBC BLD-RTO: 0 /100 WBC
PLATELET # BLD AUTO: 422 K/UL (ref 150–350)
PLATELET BLD QL SMEAR: ABNORMAL
PMV BLD AUTO: 9.6 FL (ref 9.2–12.9)
POTASSIUM SERPL-SCNC: 3.4 MMOL/L (ref 3.5–5.1)
PROT SERPL-MCNC: 5.8 G/DL (ref 6–8.4)
RBC # BLD AUTO: 3.29 M/UL (ref 4.6–6.2)
SODIUM SERPL-SCNC: 140 MMOL/L (ref 136–145)
WBC # BLD AUTO: 13.77 K/UL (ref 3.9–12.7)

## 2019-09-17 PROCEDURE — 85025 COMPLETE CBC W/AUTO DIFF WBC: CPT

## 2019-09-17 PROCEDURE — 93010 EKG 12-LEAD: ICD-10-PCS | Mod: ,,, | Performed by: INTERNAL MEDICINE

## 2019-09-17 PROCEDURE — 25000003 PHARM REV CODE 250: Performed by: STUDENT IN AN ORGANIZED HEALTH CARE EDUCATION/TRAINING PROGRAM

## 2019-09-17 PROCEDURE — 63600175 PHARM REV CODE 636 W HCPCS: Performed by: STUDENT IN AN ORGANIZED HEALTH CARE EDUCATION/TRAINING PROGRAM

## 2019-09-17 PROCEDURE — 80053 COMPREHEN METABOLIC PANEL: CPT

## 2019-09-17 PROCEDURE — 93005 ELECTROCARDIOGRAM TRACING: CPT

## 2019-09-17 PROCEDURE — 36415 COLL VENOUS BLD VENIPUNCTURE: CPT

## 2019-09-17 PROCEDURE — 25500020 PHARM REV CODE 255: Performed by: HOSPITALIST

## 2019-09-17 PROCEDURE — 86850 RBC ANTIBODY SCREEN: CPT

## 2019-09-17 PROCEDURE — 11000001 HC ACUTE MED/SURG PRIVATE ROOM

## 2019-09-17 PROCEDURE — S4991 NICOTINE PATCH NONLEGEND: HCPCS | Performed by: STUDENT IN AN ORGANIZED HEALTH CARE EDUCATION/TRAINING PROGRAM

## 2019-09-17 PROCEDURE — 99232 SBSQ HOSP IP/OBS MODERATE 35: CPT | Mod: ,,, | Performed by: HOSPITALIST

## 2019-09-17 PROCEDURE — 93010 ELECTROCARDIOGRAM REPORT: CPT | Mod: ,,, | Performed by: INTERNAL MEDICINE

## 2019-09-17 PROCEDURE — C9113 INJ PANTOPRAZOLE SODIUM, VIA: HCPCS | Performed by: STUDENT IN AN ORGANIZED HEALTH CARE EDUCATION/TRAINING PROGRAM

## 2019-09-17 PROCEDURE — 99232 PR SUBSEQUENT HOSPITAL CARE,LEVL II: ICD-10-PCS | Mod: ,,, | Performed by: HOSPITALIST

## 2019-09-17 PROCEDURE — 86920 COMPATIBILITY TEST SPIN: CPT

## 2019-09-17 RX ORDER — OXYCODONE HYDROCHLORIDE 10 MG/1
10 TABLET ORAL EVERY 6 HOURS PRN
Status: DISCONTINUED | OUTPATIENT
Start: 2019-09-17 | End: 2019-09-18

## 2019-09-17 RX ORDER — POTASSIUM CHLORIDE 20 MEQ/1
40 TABLET, EXTENDED RELEASE ORAL ONCE
Status: COMPLETED | OUTPATIENT
Start: 2019-09-17 | End: 2019-09-17

## 2019-09-17 RX ORDER — HYDROCODONE BITARTRATE AND ACETAMINOPHEN 10; 325 MG/1; MG/1
1 TABLET ORAL EVERY 4 HOURS PRN
Status: DISCONTINUED | OUTPATIENT
Start: 2019-09-17 | End: 2019-09-17

## 2019-09-17 RX ORDER — IBUPROFEN 200 MG
1 TABLET ORAL DAILY
Status: DISCONTINUED | OUTPATIENT
Start: 2019-09-18 | End: 2019-09-23 | Stop reason: HOSPADM

## 2019-09-17 RX ORDER — HYDROMORPHONE HYDROCHLORIDE 1 MG/ML
0.5 INJECTION, SOLUTION INTRAMUSCULAR; INTRAVENOUS; SUBCUTANEOUS
Status: DISCONTINUED | OUTPATIENT
Start: 2019-09-17 | End: 2019-09-19

## 2019-09-17 RX ORDER — FERROUS SULFATE 325(65) MG
325 TABLET, DELAYED RELEASE (ENTERIC COATED) ORAL DAILY
Status: DISCONTINUED | OUTPATIENT
Start: 2019-09-17 | End: 2019-09-19

## 2019-09-17 RX ADMIN — IOHEXOL 75 ML: 350 INJECTION, SOLUTION INTRAVENOUS at 10:09

## 2019-09-17 RX ADMIN — HYDROMORPHONE HYDROCHLORIDE 0.5 MG: 1 INJECTION, SOLUTION INTRAMUSCULAR; INTRAVENOUS; SUBCUTANEOUS at 06:09

## 2019-09-17 RX ADMIN — HYDROCODONE BITARTRATE AND ACETAMINOPHEN 1 TABLET: 10; 325 TABLET ORAL at 02:09

## 2019-09-17 RX ADMIN — PANTOPRAZOLE SODIUM 40 MG: 40 INJECTION, POWDER, FOR SOLUTION INTRAVENOUS at 09:09

## 2019-09-17 RX ADMIN — FERROUS SULFATE TAB EC 325 MG (65 MG FE EQUIVALENT) 325 MG: 325 (65 FE) TABLET DELAYED RESPONSE at 02:09

## 2019-09-17 RX ADMIN — POTASSIUM CHLORIDE 40 MEQ: 20 TABLET, EXTENDED RELEASE ORAL at 02:09

## 2019-09-17 RX ADMIN — ONDANSETRON 8 MG: 8 TABLET, ORALLY DISINTEGRATING ORAL at 10:09

## 2019-09-17 RX ADMIN — Medication 1 PATCH: at 08:09

## 2019-09-17 RX ADMIN — OXYCODONE HYDROCHLORIDE 10 MG: 10 TABLET ORAL at 05:09

## 2019-09-17 RX ADMIN — HYDROMORPHONE HYDROCHLORIDE 0.5 MG: 1 INJECTION, SOLUTION INTRAMUSCULAR; INTRAVENOUS; SUBCUTANEOUS at 09:09

## 2019-09-17 NOTE — H&P
[facility] Hospital Medicine  History & Physical    Patient Name: Isaiah Greene  MRN: [id number]  Admission Date: 9/16/2019  Attending Physician: Rose Watson MD   Primary Care Provider: Primary Doctor [facility] Medicine Team: [facility] 4 Elliot Bright MD     Patient information was obtained from patient, past medical records and ER records.     Subjective:     Principal Problem:Gastric outlet obstruction    Chief Complaint:   Chief Complaint   Patient presents with    Abdominal Pain        HPI: 60yo M that presents from [facility] with gastric outlet obstruction and signet ring carcinoma on biopsy. Over the last two months pt began experiencing abdominal pain. He was found to have an inflamed ulcer and was sent home on a 1 month course of Protonix. Following completion of Protonix regimen his symptoms quickly returned and he went back to the OSH. They once again treated him with Protonix. Prior to filling his script after discharge he experienced severe abdominal pain and returned to the OSH. At this point he received an EGD which showed a bleeding ulcer and a biopsy was taken. The biopsy returned positive for signet ring carcinoma and H pylori. He received a chest CT at this time which demonstrated emphysematic changes, but no metastatic lesions. He was transferred to [facility] for further evaluation of the signet ring carcinoma. Upon arrival pt endorsed 9/10 abdominal pain. He stated he had been having loose watery stools without blood for the past 3-4 days after having no stools for 5 days prior. He also endorsed nausea without vomiting a few days ago but none currently. He endorsed 40lb unexpected weight-loss with no change in appetite over the past two months. He attributed this to the clear liquid diet that [facility] had him on. He endorses 40 pack-year history and was a previous drinker of 6 beers a day, but he quit a year ago. He has no family history of cancer.    Past Medical  History:   Diagnosis Date    Chronic gastritis     Diverticulosis     Positive H. 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       Past Surgical History:   Procedure Laterality Date    EGD (ESOPHAGOGASTRODUODENOSCOPY) N/A 9/10/2019    Performed by Ok Diaz MD at [facility] ENDO    EYE SURGERY      FACIAL RECONSTRUCTION SURGERY         Review of patient's allergies indicates:  No Known Allergies    Current Facility-Administered Medications on File Prior to Encounter   Medication    [DISCONTINUED] dextrose 5 % and 0.9 % NaCl with KCl 20 mEq infusion    [DISCONTINUED] HYDROmorphone injection 0.5 mg    [DISCONTINUED] HYDROmorphone injection 1 mg    [DISCONTINUED] metoclopramide HCl injection 10 mg    [DISCONTINUED] nicotine 14 mg/24 hr 1 patch    [DISCONTINUED] ondansetron injection 8 mg    [DISCONTINUED] pantoprazole injection 40 mg    [DISCONTINUED] zolpidem tablet 10 mg     Current Outpatient Medications on File Prior to Encounter   Medication Sig    nicotine (NICODERM CQ) 14 mg/24 hr Place 1 patch onto the skin once daily.    pantoprazole (PROTONIX) 40 MG tablet Take 1 tablet (40 mg total) by mouth once daily.     Family History     Problem Relation (Age of Onset)    Cancer Father        Tobacco Use    Smoking status: Current Every Day Smoker     Packs/day: 1.00     Years: 40.00     Pack years: 40.00     Types: Cigarettes    Smokeless tobacco: Never Used   Substance and Sexual Activity    Alcohol use: Yes    Drug use: No    Sexual activity: Not on file     Review of Systems   Constitutional: Positive for unexpected weight change (40lb over 2 months). Negative for appetite change and fever.   HENT: Positive for sinus pressure. Negative for trouble swallowing.    Eyes: Negative for visual disturbance.   Respiratory: Positive for shortness of breath (With activity). Negative for cough, chest tightness, wheezing and stridor.    Cardiovascular: Negative for chest  pain, palpitations and leg swelling.   Gastrointestinal: Positive for abdominal distention, abdominal pain, diarrhea (Loose, watery) and nausea (Prior. Resolved). Negative for blood in stool and vomiting.   Genitourinary: Negative for difficulty urinating and dysuria.   Musculoskeletal: Negative for arthralgias and myalgias.   Skin: Positive for rash (On right side of lower lip).   Allergic/Immunologic: Negative for environmental allergies and food allergies.   Neurological: Negative for weakness and headaches.   Hematological: Does not bruise/bleed easily.   Psychiatric/Behavioral: Negative for confusion.     Objective:     Vital Signs (Most Recent):  Temp: 97 °F (36.1 °C) (09/16/19 1500)  Pulse: 89 (09/16/19 1500)  Resp: 16 (09/16/19 1500)  BP: (!) 142/76 (09/16/19 1500)  SpO2: 98 % (09/16/19 1500) Vital Signs (24h Range):  Temp:  [97 °F (36.1 °C)-98.5 °F (36.9 °C)] 97 °F (36.1 °C)  Pulse:  [] 89  Resp:  [16-18] 16  SpO2:  [97 %-98 %] 98 %  BP: (130-142)/(70-79) 142/76     Weight: 62.6 kg (138 lb)  Body mass index is 19.8 kg/m².    Physical Exam   Constitutional: He is oriented to person, place, and time.   Malnourished   HENT:   Head: Normocephalic and atraumatic.   Right Ear: Hearing normal.   Left Ear: Hearing normal.   Eyes: Conjunctivae, EOM and lids are normal.   Neck: Trachea normal, full passive range of motion without pain and phonation normal. No JVD present.   Cardiovascular: Normal rate, regular rhythm, S1 normal, S2 normal, normal heart sounds and normal pulses. Exam reveals no gallop, no S3, no S4 and no friction rub.   No murmur heard.  Pulses:       Radial pulses are 2+ on the right side, and 2+ on the left side.   Pulmonary/Chest: Effort normal and breath sounds normal. No stridor. No respiratory distress. He has no decreased breath sounds. He has no wheezes. He has no rhonchi. He has no rales.   Abdominal: Soft. Normal appearance and bowel sounds are normal. He exhibits distension (Very  mild). There is tenderness in the epigastric area, periumbilical area and suprapubic area.   Musculoskeletal: Normal range of motion.   Lymphadenopathy:     He has no cervical adenopathy.   Neurological: He is alert and oriented to person, place, and time. He has normal strength.   Skin: Skin is warm, dry and intact. Rash (R lower lip) noted. He is not diaphoretic. No pallor.   Psychiatric: He has a normal mood and affect. His speech is normal and behavior is normal. Judgment and thought content normal. Cognition and memory are normal. He is attentive.   Nursing note and vitals reviewed.        CRANIAL NERVES     CN III, IV, VI   Extraocular motions are normal.        Significant Labs:   Recent Lab Results       09/16/19  1615        Estimated Avg Glucose 114     Hemoglobin A1C External 5.6  Comment:  ADA Screening Guidelines:  5.7-6.4%  Consistent with prediabetes  >or=6.5%  Consistent with diabetes  High levels of fetal hemoglobin interfere with the HbA1C  assay. Heterozygous hemoglobin variants (HbS, HgC, etc)do  not significantly interfere with this assay.   However, presence of multiple variants may affect accuracy.       Lipase 7     Magnesium 1.7     Phosphorus 1.7           Significant Imaging: I have reviewed all pertinent imaging results/findings within the past 24 hours.    Assessment/Plan:     * Gastric outlet obstruction  5d hx of constipation with nausea but no vomiting  9/10 abdominal pain. Relieved temporarily with Protonix PO  CT Abd demonstrated gastric obstruction  Pt treated with protonix and clear liquid diet, which resulted in loose stools and passing gas  No gross blood observed in stool, but fecal occult positive  EGD revealed bleeding ulcer with inflammation. Biopsy of ulcer revealed signet ring carcinoma and H pylori  CT Chest did not reveal metastatic lesions  Continued reporting of 9/10 abdominal pain, without nausea. Abdomen is tender only over midline. Very mild distension reported by  patient. Abdomen soft with normoactive bowel sounds. Continue to pass flatus  [place] [facility] [place]    Plan:  - Lipase d/t leukocytosis and epigastric abdominal pain  - Trend CBC, CMP  - Consult AES for staging of tumor. Appreciate recs  - Will undergo EUS 9/17  - Consult Surgical Oncology for evaluation of surgical candidacy  - IV Protonix 40mg BID per rec from recent EGD and AES  - CT Abdomen Pelvis with oral contrast  - Diet: clear liquid following CT, but must be NPO after midnight per AES for EUS  - Can consider treatment of H pylori with triple therapy starting 9/17, but will hold until AES clears use  - Can consider treatment of abdominal pain with GI cocktail following CT Abd Pelvis  - Hold NSAIDs and Opioids    Signet ring cell adenocarcinoma of stomach  Per EGD with biopsy  Family history of cancer in father, but undetermined what type or when  Hx of smoking, etoh. Previously worked laying floor tiles  Pt informed of diagnosis, but not fully accepting of it  Chest CT does not demonstrate metastatic lesions    See Gastric Outlet Obstruction    Emphysema lung  Noted on Chest Ct  Pt reports CAMPBELL, but no SOB at rest  40 pack-year    Plan:  - Nicotine patch  - Can consider nasal cannula PRN, but will hold for now given pt not reporting SOB    Iliac artery occlusion, right  Specifically, occlusion of the R common iliac as noted on abdominal CT. There appears to be reconstitution of flow to the right external iliac artery  Denies sxs of claudication  Doppler of LE was negative for VTE    Plan:  - SCD  - Progressive mobility protocol    H. pylori infection  Noted on EGD biopsy    Plan:  - Consider triple therapy following EUS on 9/17/19  - Appreciate AES recs    Leukocytosis  WBC 15.1 at OSH as of 9/15  Repeat CBC demonstrated WBC of 11.35 on 9/16 at [facility]  Given abdominal pain and leukocytosis, there is mild suspicion for pancreatitis  Afebrile  Other than leukocytosis, no other SIRS  criteria met    Plan:  - Lipase  - CT Abd Pelvis  - CXR    Tobacco abuse counseling  40 pack-year history  Pt willing to consider cessation of smoking    Plan:  - Nicotine patch  - Tobacco cessation information packets        VTE Risk Mitigation (From admission, onward)        Ordered     Place sequential compression device  Until discontinued      09/16/19 1547     IP VTE HIGH RISK PATIENT  Once      09/16/19 1547             Elliot Bright MD  Department of Hospital Medicine   [facility]

## 2019-09-17 NOTE — ASSESSMENT & PLAN NOTE
Specifically, occlusion of the R common iliac as noted on abdominal CT. There appears to be reconstitution of flow to the right external iliac artery  Denies sxs of claudication  Doppler of LE was negative for VTE    Plan:  - SCD  - Progressive mobility protocol

## 2019-09-17 NOTE — ASSESSMENT & PLAN NOTE
Noted on EGD biopsy    Plan:  - Consider triple therapy following EUS on 9/17/19  - Appreciate AES recs

## 2019-09-17 NOTE — ASSESSMENT & PLAN NOTE
60 yo M with newly diagnosed signet ring cell CA of the distal stomach associated with significant weight loss, small volume ascites, and possible partial gastric outlet obstruction.     - EUS scheduled for 9/18 - will follow up results.   - Patient currently not having intractable N/V indicative of GOO; would not pursue palliative bypass in the immediate future.   - Patient likely locally advanced and will require neoadjuvant vs definitve chemo; therefore will plan to place a port.  - Ascites concerning for peritoneal disease; will possibly complete a diagnostic laparoscopy at the time of port placement to assess for mets.  - Will discuss further with staff

## 2019-09-17 NOTE — ASSESSMENT & PLAN NOTE
40 pack-year history  Pt willing to consider cessation of smoking  Greater than 3 minutes of smoking cessation discussed with patient    Plan:  - Nicotine patch  - Tobacco cessation information packets

## 2019-09-17 NOTE — ASSESSMENT & PLAN NOTE
WBC 15.1 at OSH as of 9/15  Repeat CBC demonstrated WBC of 11.35 on 9/16 at [facility]  Given abdominal pain and leukocytosis, there is mild suspicion for pancreatitis  Afebrile  Other than leukocytosis, no other SIRS criteria met    Plan:  - Lipase  - CT Abd Pelvis  - CXR

## 2019-09-17 NOTE — ASSESSMENT & PLAN NOTE
Noted on EGD biopsy    Plan:  - Consider triple therapy when pt can complete course without interruption  - Appreciate AES recs

## 2019-09-17 NOTE — NURSING
This nurse received call from MD Dr little that EKG ordered yesterday has not been completed and to have done today.  Spoke with Kennedy in EKG and she will come up to floor to complete order.

## 2019-09-17 NOTE — PLAN OF CARE
Problem: Adult Inpatient Plan of Care  Goal: Plan of Care Review  Patient AAOX4, call light and belongings in reach, siderails up x2.  Pain complaint of pain this afternoon, hydrocodone administered.  Tolerating clear liquids and as per previous note patient did have a regular diet downstairs in cafeteria today, MD aware, no c/o of n/v.  IV site c/d/i, saline locked.  Patient remains free from falls and safety maintained this shift.

## 2019-09-17 NOTE — ASSESSMENT & PLAN NOTE
WBC 15.1 at OSH as of 9/15  Repeat CBC demonstrated WBC of 11.35 on 9/16 at [facility]  Given abdominal pain and leukocytosis, there is mild suspicion for pancreatitis  Afebrile  Other than leukocytosis, no other SIRS criteria met    Resolved

## 2019-09-17 NOTE — ASSESSMENT & PLAN NOTE
Noted on Chest Ct  Pt reports CAMPBELL, but no SOB at rest  40 pack-year    Plan:  - Nicotine patch  - Can consider nasal cannula PRN, but will hold for now given pt not reporting SOB

## 2019-09-17 NOTE — ASSESSMENT & PLAN NOTE
Per EGD with biopsy  Family history of cancer in father, but undetermined what type or when  Hx of smoking, etoh. Previously worked laying floor tiles  Chest CT does not demonstrate metastatic lesions    See Gastric Outlet Obstruction

## 2019-09-17 NOTE — ASSESSMENT & PLAN NOTE
40 pack-year history  Pt willing to consider cessation of smoking    Plan:  - Nicotine patch  - Tobacco cessation information packets

## 2019-09-17 NOTE — CONSULTS
[facility]  General Surgery  Consult Note    Patient Name: Isaiah Greene  MRN: [id number]  Code Status: Full Code  Admission Date: 9/16/2019  Hospital Length of Stay: 1 days  Attending Physician: Brandy Borges MD  Primary Care Provider: Primary Doctor No    Patient information was obtained from patient and past medical records.     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    Hospitalized 9/9-12 with abd pain and weight loss as well as history of nausea/vomiting about a month ago. Associated 25 pound weight loss over the past three months.  Claims he has largely been able to tolerate foods, but has had minimal appetite. CT 9/9 with marked gastric distention and proximal duodenal wall thickening, concerning for GOO but tolerating clears/ soft diet. EGD 9/10/19 with Grade D reflux esophagitis, gastric ulcer biopsied, stenosis at pylorus and normal duodenum.     Readmitted 9/14 with severe chest and epigastric pain with intolerance of diet. AES consulted for staging, plan for EUS 9/18.     New ascites on CT chest 9/14 concerning. Also severe emphysema.    Repeat CT 9/17 with improvement of gastric distention as well as small vol ascites.     (+) prior H.pylori infection, stains from EGD 9/10 (+)    Surg Onc consulted to assess oncologic and/or palliative surgical candidacy.         No current facility-administered medications on file prior to encounter.      Current Outpatient Medications on File Prior to Encounter   Medication Sig    nicotine (NICODERM CQ) 14 mg/24 hr Place 1 patch onto the skin once daily.    pantoprazole (PROTONIX) 40 MG tablet Take 1 tablet (40 mg total)  by mouth once daily.       Review of patient's allergies indicates:  No Known Allergies    Past Medical History:   Diagnosis Date    Chronic gastritis     Diverticulosis     Positive H. 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     Past Surgical History:   Procedure Laterality Date    EGD (ESOPHAGOGASTRODUODENOSCOPY) N/A 9/10/2019    Performed [facility] at [facility]    EYE SURGERY      FACIAL RECONSTRUCTION SURGERY       Family History     Problem Relation (Age of Onset)    Cancer Father        Tobacco Use    Smoking status: Current Every Day Smoker     Packs/day: 1.00     Years: 40.00     Pack years: 40.00     Types: Cigarettes    Smokeless tobacco: Never Used   Substance and Sexual Activity    Alcohol use: Yes    Drug use: No    Sexual activity: Not on file     Review of Systems   Constitutional: Positive for activity change, appetite change and unexpected weight change. Negative for chills and fever.   HENT: Negative.    Eyes: Negative.    Respiratory: Negative.    Cardiovascular: Negative.    Gastrointestinal: Positive for abdominal pain and diarrhea. Negative for abdominal distention, nausea and vomiting.   Genitourinary: Negative.    Musculoskeletal: Negative.    Skin: Negative.    Neurological: Positive for weakness.   Hematological: Negative.      Objective:     Vital Signs (Most Recent):  Temp: 96.1 °F (35.6 °C) (09/17/19 1500)  Pulse: 74 (09/17/19 1500)  Resp: 18 (09/17/19 1500)  BP: 137/81 (09/17/19 1500)  SpO2: (!) 92 % (09/17/19 1500) Vital Signs (24h Range):  Temp:  [96.1 °F (35.6 °C)-98.1 °F (36.7 °C)] 96.1 °F (35.6 °C)  Pulse:  [73-87] 74  Resp:  [17-18] 18  SpO2:  [92 %-100 %] 92 %  BP: (122-137)/(71-81) 137/81     Weight: 62.6 kg (138 lb)  Body mass index is 19.8 kg/m².    Physical Exam   Constitutional: He appears cachectic. He appears ill.   HENT:   Head: Normocephalic and atraumatic.   Bilateral temporal wasting    Eyes: Conjunctivae are  normal. No scleral icterus.   Neck: Normal range of motion. Neck supple.   Cardiovascular: Normal rate and regular rhythm.   Pulmonary/Chest: Effort normal. No respiratory distress.   Abdominal: Soft. He exhibits no distension. There is tenderness (epigastric). There is no rebound and no guarding.   Musculoskeletal: He exhibits no edema.   Neurological: He is alert.   Skin: Skin is warm and dry. Capillary refill takes less than 2 seconds.   Psychiatric: He has a normal mood and affect. His behavior is normal.       Significant Labs:  CBC:   Recent Labs   Lab 09/17/19  0658   WBC 13.77*   RBC 3.29*   HGB 9.2*   HCT 30.3*   *   MCV 92   MCH 28.0   MCHC 30.4*     CMP:   Recent Labs   Lab 09/17/19  0658   GLU 96   CALCIUM 7.8*   ALBUMIN 2.3*   PROT 5.8*      K 3.4*   CO2 24      BUN 2*   CREATININE 0.6   ALKPHOS 75   ALT 7*   AST 14   BILITOT 0.4     Coagulation: No results for input(s): LABPROT, INR, APTT in the last 168 hours.  Microbiology Results (last 7 days)     ** No results found for the last 168 hours. **          Significant Diagnostics:  As above.     Assessment/Plan:     Signet ring cell adenocarcinoma of stomach  60 yo M with newly diagnosed signet ring cell CA of the distal stomach, locally advanced with partial gastric outlet obstruction (tolerated CLD, no emesis) with significant weight loss, small volume ascites    - no need for EUS, locally advanced gastric cancer  - will require neoadjuvant vs definitive chemo; recommend med onc consult for inpatient discussion of this  - no evidence of metastatic disease on CT chest, ascites concerning for peritoneal disease; to OR for diagnostic laparoscopy, feeding jejunostomy tube, port placement and possible GJ bypass dependent on intra-operative findings 9/19/19.   - discussed the above plan with patient, who is in agreement. Consent to be obtained. Case requested. Type and screen from 9/17. 2u prepared.      VTE Risk Mitigation (From  admission, onward)        Ordered     Place sequential compression device  Until discontinued      09/16/19 1547     IP VTE HIGH RISK PATIENT  Once      09/16/19 1547        Thank you for your consult. I will follow-up with patient. Please contact us if you have any additional questions.    Cecilia Oden MD  General Surgery  [facility]

## 2019-09-17 NOTE — ASSESSMENT & PLAN NOTE
Per EGD with biopsy  Family history of cancer in father, but undetermined what type or when  Hx of smoking, etoh. Previously worked laying floor tiles  Pt informed of diagnosis, but not fully accepting of it  Chest CT does not demonstrate metastatic lesions    See Gastric Outlet Obstruction

## 2019-09-17 NOTE — HOSPITAL COURSE
Arrived via transfer from OSH on 9/16. Pt kept on clear liquid diet upon arrival with NPO after midnight per AES for possible EUS the next day. Pt reported abdominal pain but due to fear of exacerbating obstruction, opioids for pain management was held until CT was performed. Pain was treated with GI cocktail which provided some relief. CT AP with contrast was not performed that night and EUS was delayed pending CT AP with contrast and Surgical Onc recs. On 9/17 pt reported full, solid stool overnight. CT AP with contrast showed resolution of obstruction and pt was started back on a regular diet as tolerated. Pt found to be mildly hypokalemic on morning of 9/17 and was given KCl tabs. Started on iron tabs for iron deficiency anemia. Plan for surgery with surgical oncology 9/19/19

## 2019-09-17 NOTE — PLAN OF CARE
09/17/19 1043   Discharge Assessment   Assessment Type Discharge Planning Assessment   Confirmed/corrected address and phone number on facesheet? Yes   Assessment information obtained from? Patient   Expected Length of Stay (days) 3   Communicated expected length of stay with patient/caregiver yes   Prior to hospitilization cognitive status: Alert/Oriented   Prior to hospitalization functional status: Independent   Current cognitive status: Alert/Oriented   Current Functional Status: Independent   Facility Arrived From: [facility]    Lives With other (see comments)   Able to Return to Prior Arrangements yes   Is patient able to care for self after discharge? Yes   Who are your caregiver(s) and their phone number(s)? Chente Almanzar-[phone]   Patient's perception of discharge disposition home or selfcare   Readmission Within the Last 30 Days planned readmission   If yes, most recent facility name: [facility]    Patient currently being followed by outpatient case management? No   Patient currently receives any other outside agency services? No   Equipment Currently Used at Home none   Do you have any problems affording any of your prescribed medications? No   Is the patient taking medications as prescribed? yes   Does the patient have transportation home? Yes   Transportation Anticipated family or friend will provide   Does the patient receive services at the [facility]? No   Discharge Plan A Home with family   Discharge Plan B Home with family   DME Needed Upon Discharge  none   Patient/Family in Agreement with Plan yes   Readmission Questionnaire   At the time of your discharge, did someone talk to you about what your health problems were? Yes   At the time of discharge, did someone talk to you about what to watch out for regarding worsening of your health problem? Yes   At the time of discharge, did someone talk to you about what to do if you experienced worsening of your health  problem? Yes   At the time of discharge, did someone talk to you about which medication to take when you left the hospital and which ones to stop taking? Yes   At the time of discharge, did someone talk to you about when and where to follow up with a doctor after you left the hospital? Yes

## 2019-09-17 NOTE — NURSING
Paged Med team 4, patient asking if he can eat, had CT completed, patient aggravated as he has not had food since yesterday.     1206pm spoke with Stan with IM 4, will discuss with [facility] and call this nurse back.     1210 Dr Pierce called back VTO Clear Liquid diet.     1215pm Dr Pierce called to notify this nurse that he witnessed patient down stairs in cafeteria eating solid foods.   OC David Alford notified.

## 2019-09-17 NOTE — ASSESSMENT & PLAN NOTE
K 4.7 on admission 9/16 9/17: K 3.4  Pt asx    Plan:  - Replenish K as needed  - KCl: 10mEq needed to raise K by approximately 0.1  - Trend CMP

## 2019-09-17 NOTE — PROGRESS NOTES
[facility] Hospital Medicine  Progress Note    Patient Name: Isaiah Greene  MRN: [id number]  Patient Class: IP- Inpatient   Admission Date: 9/16/2019  Length of Stay: 1 days  Attending Physician: Brandy Borges MD  Primary Care Provider: Primary [facility] Medicine Team: 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 Over the last two months pt began experiencing abdominal pain. He was found to have an inflamed ulcer and was sent home on a 1 month course of Protonix. Following completion of Protonix regimen his symptoms quickly returned and he went back to the OSH. They once again treated him with Protonix. Prior to filling his script after discharge he experienced severe abdominal pain and returned to the OSH. At this point he received an EGD which showed a bleeding ulcer and a biopsy was taken. The biopsy returned positive for signet ring carcinoma and H pylori. He received a chest CT at this time which demonstrated emphysematic changes, but no metastatic lesions. He was transferred to [facility] for further evaluation of the signet ring carcinoma. Upon arrival pt endorsed 9/10 abdominal pain. He stated he had been having loose watery stools without blood for the past 3-4 days after having no stools for 5 days prior. He also endorsed nausea without vomiting a few days ago but none currently. He endorsed 40lb unexpected weight-loss with no change in appetite over the past two months. He attributed this to the clear liquid diet that [facility] had him on. He endorses 40 pack-year history and was a previous drinker of 6 beers a day, but he quit a year ago. He has no family history of cancer.    Overview/Hospital Course:  Arrived via transfer from [facility] on 9/16. Pt kept on clear liquid diet upon arrival with NPO after midnight  per AES for possible EUS the next day. Pt reported abdominal pain but due to fear of exacerbating obstruction, opioids for pain management was held until CT was performed. Pain was treated with GI cocktail which provided some relief. CT AP with contrast was not performed that night and EUS was delayed pending CT AP with contrast and Surgical Onc recs. On 9/17 pt reported full, solid stool overnight. CT AP with contrast showed resolution of obstruction and pt was started back on a regular diet as tolerated. Pt found to be mildly hypokalemic on morning of 9/17 and was given KCl tabs. Started on iron tabs for iron deficiency anemia.    Interval History: NAONE. Pt had solid, full stool overnight. Pt has a tendency to leave his room whenever he wants and roam the hospital campus grounds. EUS held pending Surgical Onc recs.    Past Medical History:   Diagnosis Date    Chronic gastritis     Diverticulosis     Positive H. 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      Current Outpatient Medications on File Prior to Encounter   Medication Sig    nicotine (NICODERM CQ) 14 mg/24 hr Place 1 patch onto the skin once daily.    pantoprazole (PROTONIX) 40 MG tablet Take 1 tablet (40 mg total) by mouth once daily.     Family History     Problem Relation (Age of Onset)    Cancer Father        Tobacco Use    Smoking status: Current Every Day Smoker     Packs/day: 1.00     Years: 40.00     Pack years: 40.00     Types: Cigarettes    Smokeless tobacco: Never Used   Substance and Sexual Activity    Alcohol use: Yes    Drug use: No    Sexual activity:  Not on file     Review of Systems   Constitutional: Positive for unexpected weight change (40lb over 2 months). Negative for appetite change and fever.   HENT: Positive for sinus pressure. Negative for trouble swallowing.    Eyes: Negative for visual disturbance.   Respiratory: Positive for shortness of breath (With activity). Negative for cough, chest tightness, wheezing and stridor.    Cardiovascular: Negative for chest pain, palpitations and leg swelling.   Gastrointestinal: Positive for abdominal distention, abdominal pain, diarrhea (Loose, watery. Resolved) and nausea (Prior. Resolved). Negative for blood in stool and vomiting.   Genitourinary: Negative for difficulty urinating and dysuria.   Musculoskeletal: Negative for arthralgias and myalgias.   Skin: Positive for rash (On right side of lower lip).   Allergic/Immunologic: Negative for environmental allergies and food allergies.   Neurological: Negative for weakness and headaches.   Hematological: Does not bruise/bleed easily.   Psychiatric/Behavioral: Negative for confusion.     Objective:     Vital Signs (Most Recent):  Temp: 96.1 °F (35.6 °C) (09/17/19 1500)  Pulse: 74 (09/17/19 1500)  Resp: 18 (09/17/19 1500)  BP: 137/81 (09/17/19 1500)  SpO2: (!) 92 % (09/17/19 1500) Vital Signs (24h Range):  Temp:  [96.1 °F (35.6 °C)-98.1 °F (36.7 °C)] 96.1 °F (35.6 °C)  Pulse:  [73-87] 74  Resp:  [17-18] 18  SpO2:  [92 %-100 %] 92 %  BP: (122-137)/(71-81) 137/81     Weight: 62.6 kg (138 lb)  Body mass index is 19.8 kg/m².    Physical Exam   Constitutional: He is oriented to person, place, and time.   Malnourished   HENT:   Head: Normocephalic and atraumatic.   Right Ear: Hearing normal.   Left Ear: Hearing normal.   Eyes: Conjunctivae, EOM and lids are normal.   Neck: Trachea normal, full passive range of motion without pain and phonation normal. No JVD present.   Cardiovascular: Normal rate, S1 normal, S2 normal, normal heart sounds and normal pulses. A regularly  irregular rhythm present. Exam reveals no gallop, no S3, no S4 and no friction rub.   No murmur heard.  Pulses:       Radial pulses are 2+ on the right side, and 2+ on the left side.   Extra beat heard with every third beat   Pulmonary/Chest: Effort normal and breath sounds normal. No stridor. No respiratory distress. He has no decreased breath sounds. He has no wheezes. He has no rhonchi. He has no rales.   Abdominal: Soft. Normal appearance and bowel sounds are normal. He exhibits no distension. There is tenderness in the epigastric area, periumbilical area and suprapubic area.   Musculoskeletal: Normal range of motion.   Lymphadenopathy:     He has no cervical adenopathy.   Neurological: He is alert and oriented to person, place, and time. He has normal strength.   Skin: Skin is warm, dry and intact. Rash (R lower lip) noted. He is not diaphoretic. No pallor.   Psychiatric: He has a normal mood and affect. His speech is normal and behavior is normal. Judgment and thought content normal. Cognition and memory are normal. He is attentive.   Nursing note and vitals reviewed.        CRANIAL NERVES     CN III, IV, VI   Extraocular motions are normal.        Significant Labs:   Recent Lab Results       09/17/19  0658   09/17/19  0031   09/16/19  1938   09/16/19  1615        Albumin 2.3     2.3     Alkaline Phosphatase 75     69     ALT 7     10     Anion Gap 7     5     Aniso Slight           Appearance, UA     Clear       AST 14     20     Baso # 0.03     0.03     Basophil% 0.2     0.3     Bilirubin (UA)     Negative       BILIRUBIN TOTAL 0.4  Comment:  For infants and newborns, interpretation of results should be based  on gestational age, weight and in agreement with clinical  observations.  Premature Infant recommended reference ranges:  Up to 24 hours.............<8.0 mg/dL  Up to 48 hours............<12.0 mg/dL  3-5 days..................<15.0 mg/dL  6-29 days.................<15.0 mg/dL       0.3  Comment:  For  infants and newborns, interpretation of results should be based  on gestational age, weight and in agreement with clinical  observations.  Premature Infant recommended reference ranges:  Up to 24 hours.............<8.0 mg/dL  Up to 48 hours............<12.0 mg/dL  3-5 days..................<15.0 mg/dL  6-29 days.................<15.0 mg/dL       BUN, Bld 2     3     Calcium 7.8     7.7     Chloride 109     112     CO2 24     22     Color, UA     Straw       Creatinine 0.6     0.6     Differential Method Automated     Automated     eGFR if  >60.0     >60.0     eGFR if non  >60.0  Comment:  Calculation used to obtain the estimated glomerular filtration  rate (eGFR) is the CKD-EPI equation.        >60.0  Comment:  Calculation used to obtain the estimated glomerular filtration  rate (eGFR) is the CKD-EPI equation.        Eos # 0.4     0.6     Eosinophil% 2.8     5.6     Estimated Avg Glucose       114     Glucose 96     84     Glucose, UA     Negative       Gran # (ANC) 5.4     4.5     Gran% 39.3     39.2     Group & Rh   A POS         Hematocrit 30.3     27.1     Hemoglobin 9.2     8.2     Hemoglobin A1C External       5.6  Comment:  ADA Screening Guidelines:  5.7-6.4%  Consistent with prediabetes  >or=6.5%  Consistent with diabetes  High levels of fetal hemoglobin interfere with the HbA1C  assay. Heterozygous hemoglobin variants (HbS, HgC, etc)do  not significantly interfere with this assay.   However, presence of multiple variants may affect accuracy.       Immature Grans (Abs) 0.04  Comment:  Mild elevation in immature granulocytes is non specific and   can be seen in a variety of conditions including stress response,   acute inflammation, trauma and pregnancy. Correlation with other   laboratory and clinical findings is essential.       0.04  Comment:  Mild elevation in immature granulocytes is non specific and   can be seen in a variety of conditions including stress response,    acute inflammation, trauma and pregnancy. Correlation with other   laboratory and clinical findings is essential.       Immature Granulocytes 0.3     0.4     INDIRECT ANSON   NEG         Ketones, UA     Negative       Leukocytes, UA     Negative       Lipase       7     Lymph # 7.2     5.6     Lymph% 52.1     49.1     Magnesium       1.7     MCH 28.0     28.5     MCHC 30.4     30.3     MCV 92     94     Mono # 0.7     0.6     Mono% 5.3     5.4     MPV 9.6     10.4     NITRITE UA     Negative       nRBC 0     0     Occult Blood UA     Negative       pH, UA     7.0       Phosphorus       1.7     Platelet Estimate Appears normal           Platelets 422     348     Potassium 3.4     4.7     PROTEIN TOTAL 5.8     5.6     Protein, UA     Negative  Comment:  Recommend a 24 hour urine protein or a urine   protein/creatinine ratio if globulin induced proteinuria is  clinically suspected.         RBC 3.29     2.88     RDW 14.0     14.0     Sodium 140     139     Specific [place], UA     1.010       Specimen UA     Urine, Clean Catch       WBC 13.77     11.35           Significant Imaging: I have reviewed all pertinent imaging results/findings within the past 24 hours.      Assessment/Plan:      * Gastric outlet obstruction  5d hx of constipation with nausea but no vomiting  9/10 abdominal pain. Relieved temporarily with Protonix PO  CT Abd demonstrated gastric obstruction  Pt treated with protonix and clear liquid diet, which resulted in loose stools and passing gas  No gross blood observed in stool, but fecal occult positive  EGD revealed bleeding ulcer with inflammation. Biopsy of ulcer revealed signet ring carcinoma and H pylori  CT Chest did not reveal metastatic lesions  Continued reporting of 9/10 abdominal pain, without nausea. Abdomen is tender only over midline. Very mild distension reported by patient. Abdomen soft with normoactive bowel sounds. Continue to pass flatus  Transfer to [facility] for further workup of  carcinoma  Lipase 7  CT AP with contrast: There is scattered peritoneal fluid and diffuse anasarca.  Multiple fluid-filled loops of bowel predominantly small bowel as well as a paucity of feces in colon. No convincing bowel dilatation to indicate a focal obstruction.      Plan:  - Trend CBC, CMP  - Consult AES for staging of tumor. Appreciate recs  - Will hold EUS pending Surgical Onc recs  - Consult Surgical Oncology for evaluation of surgical candidacy  - IV Protonix 40mg BID per rec from recent EGD and AES  - Diet: d/t resolution of obstruction on CT and pt pursuing full diet on his own despite clear liquid diet, will allow full diet as tolerated  - Will hold on treatment of H pylori with triple therapy. Surg Onc and AES may need pt NPO at some point during treatment and it is preferable to wait until treatment can be completed without interruption  - Pain management with GI cocktails and oxycodone PRN  - Hold NSAIDs    Signet ring cell adenocarcinoma of stomach  Per EGD with biopsy  Family history of cancer in father, but undetermined what type or when  Hx of smoking, etoh. Previously worked laying floor tiles  Chest CT does not demonstrate metastatic lesions    See Gastric Outlet Obstruction    Emphysema lung  Noted on Chest Ct  Pt reports CAMPBELL, but no SOB at rest  40 pack-year    Plan:  - Nicotine patch  - Can consider nasal cannula PRN, but will hold for now given pt not reporting SOB    Iliac artery occlusion, right  Specifically, occlusion of the R common iliac as noted on abdominal CT. There appears to be reconstitution of flow to the right external iliac artery  Denies sxs of claudication  Doppler of LE was negative for VTE    Plan:  - SCD  - Progressive mobility protocol    H. pylori infection  Noted on EGD biopsy    Plan:  - Consider triple therapy when pt can complete course without interruption  - Appreciate AES recs    Leukocytosis  WBC 15.1 at OSH as of 9/15  Repeat CBC demonstrated WBC of 11.35 [place] [facility]  Given abdominal pain and leukocytosis, there is mild suspicion for pancreatitis  Afebrile  Other than leukocytosis, no other SIRS criteria met    Resolved    Tobacco abuse counseling  40 pack-year history  Pt willing to consider cessation of smoking  Greater than 3 minutes of smoking cessation discussed with patient    Plan:  - Nicotine patch  - Tobacco cessation information packets      Hypokalemia  K 4.7 on admission 9/16 9/17: K 3.4  Pt asx    Plan:  - Replenish K as needed  - KCl: 10mEq needed to raise K by approximately 0.1  - Trend CMP    Iron deficiency anemia  Hgb 8.2 on admission 9/16  Iron studies with Iron level 10 at OSH  Likely d/t bleeding ulcer and cancer  Consent obtained for blood products  Type and screen performed    Plan:  - Trend CBC  - Maintain hgb >7  - Administer 1U pRBC as needed for hgb<7  - Iron tablets qd      VTE Risk Mitigation (From admission, onward)        Ordered     Place sequential compression device  Until discontinued      09/16/19 1547     IP VTE HIGH RISK PATIENT  Once      09/16/19 2547                Elliot Bright MD  Department of Hospital Medicine   [facility]

## 2019-09-17 NOTE — ASSESSMENT & PLAN NOTE
Hgb 8.2 on admission 9/16  Iron studies with Iron level 10 at OSH  Likely d/t bleeding ulcer and cancer  Consent obtained for blood products  Type and screen performed    Plan:  - Trend CBC  - Maintain hgb >7  - Administer 1U pRBC as needed for hgb<7  - Iron tablets qd

## 2019-09-17 NOTE — NURSING
Patient has not returned to floor yet.     1310 patient returned to floor discussed concern regarding eating and what current diet was.  Patient needs continued reinforcement.

## 2019-09-17 NOTE — SUBJECTIVE & OBJECTIVE
No current facility-administered medications on file prior to encounter.      Current Outpatient Medications on File Prior to Encounter   Medication Sig    nicotine (NICODERM CQ) 14 mg/24 hr Place 1 patch onto the skin once daily.    pantoprazole (PROTONIX) 40 MG tablet Take 1 tablet (40 mg total) by mouth once daily.       Review of patient's allergies indicates:  No Known Allergies    Past Medical History:   Diagnosis Date    Chronic gastritis     Diverticulosis     Positive H. 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     Past Surgical History:   Procedure Laterality Date    EGD (ESOPHAGOGASTRODUODENOSCOPY) N/A 9/10/2019    [facility] Ok [facility] END[facility]    EYE SURGERY      FACIAL RECONSTRUCTION SURGERY       Family History     Problem Relation (Age of Onset)    Cancer Father        Tobacco Use    Smoking status: Current Every Day Smoker     Packs/day: 1.00     Years: 40.00     Pack years: 40.00     Types: Cigarettes    Smokeless tobacco: Never Used   Substance and Sexual Activity    Alcohol use: Yes    Drug use: No    Sexual activity: Not on file     Review of Systems   Constitutional: Positive for activity change, appetite change and unexpected weight change. Negative for chills and fever.   HENT: Negative.    Eyes: Negative.    Respiratory: Negative.    Cardiovascular: Negative.    Gastrointestinal: Positive for abdominal pain and diarrhea. Negative for abdominal distention, nausea and vomiting.   Genitourinary: Negative.    Musculoskeletal: Negative.    Skin: Negative.    Neurological: Positive for weakness.   Hematological: Negative.      Objective:     Vital Signs (Most Recent):  Temp: 96.1 °F (35.6 °C) (09/17/19 1500)  Pulse: 74 (09/17/19 1500)  Resp: 18 (09/17/19 1500)  BP: 137/81 (09/17/19 1500)  SpO2: (!) 92 % (09/17/19 1500) Vital Signs (24h Range):  Temp:  [96.1 °F (35.6 °C)-98.1 °F (36.7 °C)] 96.1 °F (35.6 °C)  Pulse:  [73-87] 74  Resp:   [17-18] 18  SpO2:  [92 %-100 %] 92 %  BP: (122-137)/(71-81) 137/81     Weight: 62.6 kg (138 lb)  Body mass index is 19.8 kg/m².    Physical Exam   Constitutional: He appears cachectic. He appears ill.   HENT:   Head: Normocephalic and atraumatic.   Bilateral temporal wasting    Eyes: Conjunctivae are normal. No scleral icterus.   Neck: Normal range of motion. Neck supple.   Cardiovascular: Normal rate and regular rhythm.   Pulmonary/Chest: Effort normal. No respiratory distress.   Abdominal: Soft. He exhibits no distension. There is tenderness (epigastric). There is no rebound and no guarding.   Musculoskeletal: He exhibits no edema.   Neurological: He is alert.   Skin: Skin is warm and dry. Capillary refill takes less than 2 seconds.   Psychiatric: He has a normal mood and affect. His behavior is normal.       Significant Labs:  CBC:   Recent Labs   Lab 09/17/19  0658   WBC 13.77*   RBC 3.29*   HGB 9.2*   HCT 30.3*   *   MCV 92   MCH 28.0   MCHC 30.4*     CMP:   Recent Labs   Lab 09/17/19  0658   GLU 96   CALCIUM 7.8*   ALBUMIN 2.3*   PROT 5.8*      K 3.4*   CO2 24      BUN 2*   CREATININE 0.6   ALKPHOS 75   ALT 7*   AST 14   BILITOT 0.4     Coagulation: No results for input(s): LABPROT, INR, APTT in the last 168 hours.  Microbiology Results (last 7 days)     ** No results found for the last 168 hours. **          Significant Diagnostics:  As above.

## 2019-09-17 NOTE — SUBJECTIVE & OBJECTIVE
Interval History: NAONE. Pt had solid, full stool overnight. Pt has a tendency to leave his room whenever he wants and roam the hospital campus grounds. EUS held pending Surgical Onc recs.    Past Medical History:   Diagnosis Date    Chronic gastritis     Diverticulosis     Positive H. 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      Current Outpatient Medications on File Prior to Encounter   Medication Sig    nicotine (NICODERM CQ) 14 mg/24 hr Place 1 patch onto the skin once daily.    pantoprazole (PROTONIX) 40 MG tablet Take 1 tablet (40 mg total) by mouth once daily.     Family History     Problem Relation (Age of Onset)    Cancer Father        Tobacco Use    Smoking status: Current Every Day Smoker     Packs/day: 1.00     Years: 40.00     Pack years: 40.00     Types: Cigarettes    Smokeless tobacco: Never Used   Substance and Sexual Activity    Alcohol use: Yes    Drug use: No    Sexual activity: Not on file     Review of Systems   Constitutional: Positive for unexpected weight change (40lb over 2 months). Negative for appetite change and fever.   HENT: Positive for sinus pressure. Negative for trouble swallowing.    Eyes: Negative for visual disturbance.   Respiratory: Positive for shortness of breath (With activity). Negative for cough, chest tightness, wheezing and stridor.    Cardiovascular: Negative for chest pain, palpitations and leg swelling.   Gastrointestinal: Positive for abdominal distention, abdominal pain, diarrhea (Loose, watery. Resolved) and nausea (Prior. Resolved). Negative for blood in stool and vomiting.    Genitourinary: Negative for difficulty urinating and dysuria.   Musculoskeletal: Negative for arthralgias and myalgias.   Skin: Positive for rash (On right side of lower lip).   Allergic/Immunologic: Negative for environmental allergies and food allergies.   Neurological: Negative for weakness and headaches.   Hematological: Does not bruise/bleed easily.   Psychiatric/Behavioral: Negative for confusion.     Objective:     Vital Signs (Most Recent):  Temp: 96.1 °F (35.6 °C) (09/17/19 1500)  Pulse: 74 (09/17/19 1500)  Resp: 18 (09/17/19 1500)  BP: 137/81 (09/17/19 1500)  SpO2: (!) 92 % (09/17/19 1500) Vital Signs (24h Range):  Temp:  [96.1 °F (35.6 °C)-98.1 °F (36.7 °C)] 96.1 °F (35.6 °C)  Pulse:  [73-87] 74  Resp:  [17-18] 18  SpO2:  [92 %-100 %] 92 %  BP: (122-137)/(71-81) 137/81     Weight: 62.6 kg (138 lb)  Body mass index is 19.8 kg/m².    Physical Exam   Constitutional: He is oriented to person, place, and time.   Malnourished   HENT:   Head: Normocephalic and atraumatic.   Right Ear: Hearing normal.   Left Ear: Hearing normal.   Eyes: Conjunctivae, EOM and lids are normal.   Neck: Trachea normal, full passive range of motion without pain and phonation normal. No JVD present.   Cardiovascular: Normal rate, S1 normal, S2 normal, normal heart sounds and normal pulses. A regularly irregular rhythm present. Exam reveals no gallop, no S3, no S4 and no friction rub.   No murmur heard.  Pulses:       Radial pulses are 2+ on the right side, and 2+ on the left side.   Extra beat heard with every third beat   Pulmonary/Chest: Effort normal and breath sounds normal. No stridor. No respiratory distress. He has no decreased breath sounds. He has no wheezes. He has no rhonchi. He has no rales.   Abdominal: Soft. Normal appearance and bowel sounds are normal. He exhibits no distension. There is tenderness in the epigastric area, periumbilical area and suprapubic area.   Musculoskeletal: Normal range of motion.    Lymphadenopathy:     He has no cervical adenopathy.   Neurological: He is alert and oriented to person, place, and time. He has normal strength.   Skin: Skin is warm, dry and intact. Rash (R lower lip) noted. He is not diaphoretic. No pallor.   Psychiatric: He has a normal mood and affect. His speech is normal and behavior is normal. Judgment and thought content normal. Cognition and memory are normal. He is attentive.   Nursing note and vitals reviewed.        CRANIAL NERVES     CN III, IV, VI   Extraocular motions are normal.        Significant Labs:   Recent Lab Results       09/17/19  0658   09/17/19  0031   09/16/19  1938   09/16/19  1615        Albumin 2.3     2.3     Alkaline Phosphatase 75     69     ALT 7     10     Anion Gap 7     5     Aniso Slight           Appearance, UA     Clear       AST 14     20     Baso # 0.03     0.03     Basophil% 0.2     0.3     Bilirubin (UA)     Negative       BILIRUBIN TOTAL 0.4  Comment:  For infants and newborns, interpretation of results should be based  on gestational age, weight and in agreement with clinical  observations.  Premature Infant recommended reference ranges:  Up to 24 hours.............<8.0 mg/dL  Up to 48 hours............<12.0 mg/dL  3-5 days..................<15.0 mg/dL  6-29 days.................<15.0 mg/dL       0.3  Comment:  For infants and newborns, interpretation of results should be based  on gestational age, weight and in agreement with clinical  observations.  Premature Infant recommended reference ranges:  Up to 24 hours.............<8.0 mg/dL  Up to 48 hours............<12.0 mg/dL  3-5 days..................<15.0 mg/dL  6-29 days.................<15.0 mg/dL       BUN, Bld 2     3     Calcium 7.8     7.7     Chloride 109     112     CO2 24     22     Color, UA     Straw       Creatinine 0.6     0.6     Differential Method Automated     Automated     eGFR if  >60.0     >60.0     eGFR if non African American  >60.0  Comment:  Calculation used to obtain the estimated glomerular filtration  rate (eGFR) is the CKD-EPI equation.        >60.0  Comment:  Calculation used to obtain the estimated glomerular filtration  rate (eGFR) is the CKD-EPI equation.        Eos # 0.4     0.6     Eosinophil% 2.8     5.6     Estimated Avg Glucose       114     Glucose 96     84     Glucose, UA     Negative       Gran # (ANC) 5.4     4.5     Gran% 39.3     39.2     Group & Rh   A POS         Hematocrit 30.3     27.1     Hemoglobin 9.2     8.2     Hemoglobin A1C External       5.6  Comment:  ADA Screening Guidelines:  5.7-6.4%  Consistent with prediabetes  >or=6.5%  Consistent with diabetes  High levels of fetal hemoglobin interfere with the HbA1C  assay. Heterozygous hemoglobin variants (HbS, HgC, etc)do  not significantly interfere with this assay.   However, presence of multiple variants may affect accuracy.       Immature Grans (Abs) 0.04  Comment:  Mild elevation in immature granulocytes is non specific and   can be seen in a variety of conditions including stress response,   acute inflammation, trauma and pregnancy. Correlation with other   laboratory and clinical findings is essential.       0.04  Comment:  Mild elevation in immature granulocytes is non specific and   can be seen in a variety of conditions including stress response,   acute inflammation, trauma and pregnancy. Correlation with other   laboratory and clinical findings is essential.       Immature Granulocytes 0.3     0.4     INDIRECT ANSON   NEG         Ketones, UA     Negative       Leukocytes, UA     Negative       Lipase       7     Lymph # 7.2     5.6     Lymph% 52.1     49.1     Magnesium       1.7     MCH 28.0     28.5     MCHC 30.4     30.3     MCV 92     94     Mono # 0.7     0.6     Mono% 5.3     5.4     MPV 9.6     10.4     NITRITE UA     Negative       nRBC 0     0     Occult Blood UA     Negative       pH, UA     7.0       Phosphorus       1.7     Platelet  Estimate Appears normal           Platelets 422     348     Potassium 3.4     4.7     PROTEIN TOTAL 5.8     5.6     Protein, UA     Negative  Comment:  Recommend a 24 hour urine protein or a urine   protein/creatinine ratio if globulin induced proteinuria is  clinically suspected.         RBC 3.29     2.88     RDW 14.0     14.0     Sodium 140     139     Specific [place], UA     1.010       Specimen UA     Urine, Clean Catch       WBC 13.77     11.35           Significant Imaging: I have reviewed all pertinent imaging results/findings within the past 24 hours.

## 2019-09-17 NOTE — ASSESSMENT & PLAN NOTE
5d hx of constipation with nausea but no vomiting  9/10 abdominal pain. Relieved temporarily with Protonix PO  CT Abd demonstrated gastric obstruction  Pt treated with protonix and clear liquid diet, which resulted in loose stools and passing gas  No gross blood observed in stool, but fecal occult positive  EGD revealed bleeding ulcer with inflammation. Biopsy of ulcer revealed signet ring carcinoma and H pylori  CT Chest did not reveal metastatic lesions  Continued reporting of 9/10 abdominal pain, without nausea. Abdomen is tender only over midline. Very mild distension reported by patient. Abdomen soft with normoactive bowel sounds. Continue to pass flatus  Transfer to [facility] [place] further workup of carcinoma  Lipase 7  CT AP with contrast: There is scattered peritoneal fluid and diffuse anasarca.  Multiple fluid-filled loops of bowel predominantly small bowel as well as a paucity of feces in colon. No convincing bowel dilatation to indicate a focal obstruction.      Plan:  - Trend CBC, CMP  - Consult AES for staging of tumor. Appreciate recs  - Will hold EUS pending Surgical Onc recs  - Consult Surgical Oncology for evaluation of surgical candidacy  - IV Protonix 40mg BID per rec from recent EGD and AES  - Diet: d/t resolution of obstruction on CT and pt pursuing full diet on his own despite clear liquid diet, will allow full diet as tolerated  - Will hold on treatment of H pylori with triple therapy. Surg Onc and AES may need pt NPO at some point during treatment and it is preferable to wait until treatment can be completed without interruption  - Pain management with GI cocktails and oxycodone PRN  - Hold NSAIDs

## 2019-09-18 ENCOUNTER — ANESTHESIA EVENT (OUTPATIENT)
Dept: SURGERY | Facility: HOSPITAL | Age: 59
DRG: 326 | End: 2019-09-18
Payer: MEDICAID

## 2019-09-18 PROBLEM — D72.829 LEUKOCYTOSIS: Status: RESOLVED | Noted: 2019-09-16 | Resolved: 2019-09-18

## 2019-09-18 PROBLEM — E83.39 HYPOPHOSPHATEMIA: Status: ACTIVE | Noted: 2019-09-18

## 2019-09-18 LAB
ANION GAP SERPL CALC-SCNC: 10 MMOL/L (ref 8–16)
ANISOCYTOSIS BLD QL SMEAR: SLIGHT
BASOPHILS # BLD AUTO: 0.03 K/UL (ref 0–0.2)
BASOPHILS NFR BLD: 0.3 % (ref 0–1.9)
BNP SERPL-MCNC: 82 PG/ML (ref 0–99)
BUN SERPL-MCNC: 3 MG/DL (ref 6–20)
BURR CELLS BLD QL SMEAR: ABNORMAL
CALCIUM SERPL-MCNC: 7.8 MG/DL (ref 8.7–10.5)
CHLORIDE SERPL-SCNC: 108 MMOL/L (ref 95–110)
CO2 SERPL-SCNC: 19 MMOL/L (ref 23–29)
CREAT SERPL-MCNC: 0.6 MG/DL (ref 0.5–1.4)
DIFFERENTIAL METHOD: ABNORMAL
EOSINOPHIL # BLD AUTO: 0.5 K/UL (ref 0–0.5)
EOSINOPHIL NFR BLD: 4.2 % (ref 0–8)
ERYTHROCYTE [DISTWIDTH] IN BLOOD BY AUTOMATED COUNT: 13.9 % (ref 11.5–14.5)
EST. GFR  (AFRICAN AMERICAN): >60 ML/MIN/1.73 M^2
EST. GFR  (NON AFRICAN AMERICAN): >60 ML/MIN/1.73 M^2
GLUCOSE SERPL-MCNC: 93 MG/DL (ref 70–110)
HCT VFR BLD AUTO: 26.1 % (ref 40–54)
HGB BLD-MCNC: 8.4 G/DL (ref 14–18)
HYPOCHROMIA BLD QL SMEAR: ABNORMAL
IMM GRANULOCYTES # BLD AUTO: 0.02 K/UL (ref 0–0.04)
IMM GRANULOCYTES NFR BLD AUTO: 0.2 % (ref 0–0.5)
LYMPHOCYTES # BLD AUTO: 6.8 K/UL (ref 1–4.8)
LYMPHOCYTES NFR BLD: 58.6 % (ref 18–48)
MCH RBC QN AUTO: 27.9 PG (ref 27–31)
MCHC RBC AUTO-ENTMCNC: 32.2 G/DL (ref 32–36)
MCV RBC AUTO: 87 FL (ref 82–98)
MONOCYTES # BLD AUTO: 0.7 K/UL (ref 0.3–1)
MONOCYTES NFR BLD: 6.2 % (ref 4–15)
NEUTROPHILS # BLD AUTO: 3.5 K/UL (ref 1.8–7.7)
NEUTROPHILS NFR BLD: 30.5 % (ref 38–73)
NRBC BLD-RTO: 0 /100 WBC
OVALOCYTES BLD QL SMEAR: ABNORMAL
PHOSPHATE SERPL-MCNC: 1.9 MG/DL (ref 2.7–4.5)
PLATELET # BLD AUTO: 397 K/UL (ref 150–350)
PLATELET BLD QL SMEAR: ABNORMAL
PMV BLD AUTO: 9.5 FL (ref 9.2–12.9)
POIKILOCYTOSIS BLD QL SMEAR: SLIGHT
POLYCHROMASIA BLD QL SMEAR: ABNORMAL
POTASSIUM SERPL-SCNC: 3.9 MMOL/L (ref 3.5–5.1)
PREALB SERPL-MCNC: 12 MG/DL (ref 20–43)
RBC # BLD AUTO: 3.01 M/UL (ref 4.6–6.2)
SODIUM SERPL-SCNC: 137 MMOL/L (ref 136–145)
WBC # BLD AUTO: 11.54 K/UL (ref 3.9–12.7)

## 2019-09-18 PROCEDURE — 63600175 PHARM REV CODE 636 W HCPCS: Performed by: STUDENT IN AN ORGANIZED HEALTH CARE EDUCATION/TRAINING PROGRAM

## 2019-09-18 PROCEDURE — S4991 NICOTINE PATCH NONLEGEND: HCPCS | Performed by: STUDENT IN AN ORGANIZED HEALTH CARE EDUCATION/TRAINING PROGRAM

## 2019-09-18 PROCEDURE — 63600175 PHARM REV CODE 636 W HCPCS: Performed by: HOSPITALIST

## 2019-09-18 PROCEDURE — 99231 SBSQ HOSP IP/OBS SF/LOW 25: CPT | Mod: ,,, | Performed by: HOSPITALIST

## 2019-09-18 PROCEDURE — 99233 PR SUBSEQUENT HOSPITAL CARE,LEVL III: ICD-10-PCS | Mod: ,,, | Performed by: SURGERY

## 2019-09-18 PROCEDURE — 99233 SBSQ HOSP IP/OBS HIGH 50: CPT | Mod: ,,, | Performed by: SURGERY

## 2019-09-18 PROCEDURE — 85025 COMPLETE CBC W/AUTO DIFF WBC: CPT

## 2019-09-18 PROCEDURE — C9113 INJ PANTOPRAZOLE SODIUM, VIA: HCPCS | Performed by: STUDENT IN AN ORGANIZED HEALTH CARE EDUCATION/TRAINING PROGRAM

## 2019-09-18 PROCEDURE — 80048 BASIC METABOLIC PNL TOTAL CA: CPT

## 2019-09-18 PROCEDURE — 83880 ASSAY OF NATRIURETIC PEPTIDE: CPT

## 2019-09-18 PROCEDURE — 11000001 HC ACUTE MED/SURG PRIVATE ROOM

## 2019-09-18 PROCEDURE — 84134 ASSAY OF PREALBUMIN: CPT

## 2019-09-18 PROCEDURE — 84100 ASSAY OF PHOSPHORUS: CPT

## 2019-09-18 PROCEDURE — 25000003 PHARM REV CODE 250: Performed by: STUDENT IN AN ORGANIZED HEALTH CARE EDUCATION/TRAINING PROGRAM

## 2019-09-18 PROCEDURE — 36415 COLL VENOUS BLD VENIPUNCTURE: CPT

## 2019-09-18 PROCEDURE — 99231 PR SUBSEQUENT HOSPITAL CARE,LEVL I: ICD-10-PCS | Mod: ,,, | Performed by: HOSPITALIST

## 2019-09-18 RX ORDER — HYDROCODONE BITARTRATE AND ACETAMINOPHEN 500; 5 MG/1; MG/1
TABLET ORAL
Status: DISCONTINUED | OUTPATIENT
Start: 2019-09-18 | End: 2019-09-23 | Stop reason: HOSPADM

## 2019-09-18 RX ORDER — RAMELTEON 8 MG/1
8 TABLET ORAL NIGHTLY PRN
Status: DISCONTINUED | OUTPATIENT
Start: 2019-09-18 | End: 2019-09-19

## 2019-09-18 RX ORDER — OXYCODONE HYDROCHLORIDE 5 MG/1
15 TABLET ORAL EVERY 6 HOURS PRN
Status: DISCONTINUED | OUTPATIENT
Start: 2019-09-18 | End: 2019-09-19

## 2019-09-18 RX ORDER — SODIUM,POTASSIUM PHOSPHATES 280-250MG
2 POWDER IN PACKET (EA) ORAL EVERY 4 HOURS
Status: DISCONTINUED | OUTPATIENT
Start: 2019-09-18 | End: 2019-09-19

## 2019-09-18 RX ORDER — SODIUM CHLORIDE 9 MG/ML
INJECTION, SOLUTION INTRAVENOUS CONTINUOUS
Status: ACTIVE | OUTPATIENT
Start: 2019-09-18 | End: 2019-09-19

## 2019-09-18 RX ORDER — OXYCODONE HYDROCHLORIDE 10 MG/1
10 TABLET ORAL EVERY 6 HOURS PRN
Status: DISCONTINUED | OUTPATIENT
Start: 2019-09-18 | End: 2019-09-18

## 2019-09-18 RX ADMIN — RAMELTEON 8 MG: 8 TABLET ORAL at 10:09

## 2019-09-18 RX ADMIN — POTASSIUM & SODIUM PHOSPHATES POWDER PACK 280-160-250 MG 2 PACKET: 280-160-250 PACK at 04:09

## 2019-09-18 RX ADMIN — HYDROMORPHONE HYDROCHLORIDE 0.5 MG: 1 INJECTION, SOLUTION INTRAMUSCULAR; INTRAVENOUS; SUBCUTANEOUS at 03:09

## 2019-09-18 RX ADMIN — HYDROMORPHONE HYDROCHLORIDE 0.5 MG: 1 INJECTION, SOLUTION INTRAMUSCULAR; INTRAVENOUS; SUBCUTANEOUS at 07:09

## 2019-09-18 RX ADMIN — PANTOPRAZOLE SODIUM 40 MG: 40 INJECTION, POWDER, FOR SOLUTION INTRAVENOUS at 09:09

## 2019-09-18 RX ADMIN — OXYCODONE HYDROCHLORIDE 15 MG: 10 TABLET ORAL at 02:09

## 2019-09-18 RX ADMIN — OXYCODONE HYDROCHLORIDE 15 MG: 10 TABLET ORAL at 09:09

## 2019-09-18 RX ADMIN — HYDROMORPHONE HYDROCHLORIDE 0.5 MG: 1 INJECTION, SOLUTION INTRAMUSCULAR; INTRAVENOUS; SUBCUTANEOUS at 10:09

## 2019-09-18 RX ADMIN — PANTOPRAZOLE SODIUM 40 MG: 40 INJECTION, POWDER, FOR SOLUTION INTRAVENOUS at 10:09

## 2019-09-18 RX ADMIN — NICOTINE 1 PATCH: 21 PATCH, EXTENDED RELEASE TRANSDERMAL at 08:09

## 2019-09-18 RX ADMIN — POTASSIUM & SODIUM PHOSPHATES POWDER PACK 280-160-250 MG 2 PACKET: 280-160-250 PACK at 01:09

## 2019-09-18 RX ADMIN — SODIUM CHLORIDE: 0.9 INJECTION, SOLUTION INTRAVENOUS at 11:09

## 2019-09-18 RX ADMIN — OXYCODONE HYDROCHLORIDE 10 MG: 10 TABLET ORAL at 12:09

## 2019-09-18 RX ADMIN — HYDROMORPHONE HYDROCHLORIDE 0.5 MG: 1 INJECTION, SOLUTION INTRAMUSCULAR; INTRAVENOUS; SUBCUTANEOUS at 02:09

## 2019-09-18 RX ADMIN — POTASSIUM & SODIUM PHOSPHATES POWDER PACK 280-160-250 MG 2 PACKET: 280-160-250 PACK at 09:09

## 2019-09-18 RX ADMIN — OXYCODONE HYDROCHLORIDE 10 MG: 10 TABLET ORAL at 08:09

## 2019-09-18 RX ADMIN — SODIUM CHLORIDE: 0.9 INJECTION, SOLUTION INTRAVENOUS at 01:09

## 2019-09-18 RX ADMIN — HYDROMORPHONE HYDROCHLORIDE 0.5 MG: 1 INJECTION, SOLUTION INTRAMUSCULAR; INTRAVENOUS; SUBCUTANEOUS at 11:09

## 2019-09-18 RX ADMIN — HYDROMORPHONE HYDROCHLORIDE 0.5 MG: 1 INJECTION, SOLUTION INTRAMUSCULAR; INTRAVENOUS; SUBCUTANEOUS at 05:09

## 2019-09-18 NOTE — NURSING
[place] Med Team 4, patient states doctors came in and told him he was not having EUS today and the plan was for surgery but not today.  Patient asking about diet order.     1053am Dr Pierce called back will discuss with team and call this nurse back.    1116am spoke with Dr Pierce patient is to remain NPO with small sips of clear liquids to wet mouth only as patient is scheduled for surgery tomorrow.  This nurse had detailed conversation with patient about remaining NPO with small sips of clear liquids, patient continued to ask for boost drinks, again this nurse explained that is not a clear liquid.  Patient did verbalized understanding, will continue to discuss with patient NPO status throughout this shift.

## 2019-09-18 NOTE — PHARMACY MED REC
"Admission Medication Reconciliation - Pharmacy Consult Note    The home medication history was taken by Ramona Flaherty, Pharmacy Technician.  Based on information gathered and subsequent review by the clinical pharmacist, the items below may need attention.     You may go to "Admission" then "Reconcile Home Medications" tabs to review and/or act upon these items.     [facility]       Please address this information as you see fit.  Feel free to contact us if you have any questions or require assistance.    Anyi Tai, PharmD, BCPS, Internal Medicine Clinical Pharmacy Specialist  EXT [phone]                    .    .          "
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
## 2019-09-18 NOTE — PLAN OF CARE
Problem: Adult Inpatient Plan of Care  Goal: Plan of Care Review  Patient AAOX4, call light and belongings in reach, siderails up x2, patient ambulates in room independently.  Bilateral leg edema noted +2 this shift, legs elevated on pillows. MD ordered which resulted as BNP 82 and patient pending for bilateral lower extremity ultrasound.   Patient NPO except for medications.  IV infusing with NS and site c/d/i.  Patient states improved pain management this shift with new regimen of oxycodone 15mg and dilaudid 0.5mg.  No complaint of n/v this shift.  Patient has been educated continuously on importance of not eating foods as he is scheduled for surgery in the am, patient has verbalized understanding. Patient remains free from falls and safety maintained this shift.

## 2019-09-19 ENCOUNTER — ANESTHESIA (OUTPATIENT)
Dept: SURGERY | Facility: HOSPITAL | Age: 59
DRG: 326 | End: 2019-09-19
Payer: MEDICAID

## 2019-09-19 PROBLEM — E83.39 HYPOPHOSPHATEMIA: Status: RESOLVED | Noted: 2019-09-18 | Resolved: 2019-09-19

## 2019-09-19 PROBLEM — E87.6 HYPOKALEMIA: Status: RESOLVED | Noted: 2019-09-17 | Resolved: 2019-09-19

## 2019-09-19 LAB
ANION GAP SERPL CALC-SCNC: 6 MMOL/L (ref 8–16)
ANISOCYTOSIS BLD QL SMEAR: SLIGHT
BASOPHILS # BLD AUTO: 0.05 K/UL (ref 0–0.2)
BASOPHILS NFR BLD: 0.5 % (ref 0–1.9)
BUN SERPL-MCNC: 3 MG/DL (ref 6–20)
CALCIUM SERPL-MCNC: 7.6 MG/DL (ref 8.7–10.5)
CHLORIDE SERPL-SCNC: 107 MMOL/L (ref 95–110)
CO2 SERPL-SCNC: 24 MMOL/L (ref 23–29)
CREAT SERPL-MCNC: 0.6 MG/DL (ref 0.5–1.4)
DIFFERENTIAL METHOD: ABNORMAL
EOSINOPHIL # BLD AUTO: 0.8 K/UL (ref 0–0.5)
EOSINOPHIL NFR BLD: 7.5 % (ref 0–8)
ERYTHROCYTE [DISTWIDTH] IN BLOOD BY AUTOMATED COUNT: 14.2 % (ref 11.5–14.5)
EST. GFR  (AFRICAN AMERICAN): >60 ML/MIN/1.73 M^2
EST. GFR  (NON AFRICAN AMERICAN): >60 ML/MIN/1.73 M^2
GLUCOSE SERPL-MCNC: 90 MG/DL (ref 70–110)
HCT VFR BLD AUTO: 27.2 % (ref 40–54)
HGB BLD-MCNC: 8.6 G/DL (ref 14–18)
HYPOCHROMIA BLD QL SMEAR: ABNORMAL
IMM GRANULOCYTES # BLD AUTO: 0.02 K/UL (ref 0–0.04)
IMM GRANULOCYTES NFR BLD AUTO: 0.2 % (ref 0–0.5)
LYMPHOCYTES # BLD AUTO: 6.6 K/UL (ref 1–4.8)
LYMPHOCYTES NFR BLD: 60.3 % (ref 18–48)
MCH RBC QN AUTO: 27.5 PG (ref 27–31)
MCHC RBC AUTO-ENTMCNC: 31.6 G/DL (ref 32–36)
MCV RBC AUTO: 87 FL (ref 82–98)
MONOCYTES # BLD AUTO: 0.6 K/UL (ref 0.3–1)
MONOCYTES NFR BLD: 5.7 % (ref 4–15)
NEUTROPHILS # BLD AUTO: 2.8 K/UL (ref 1.8–7.7)
NEUTROPHILS NFR BLD: 25.8 % (ref 38–73)
NRBC BLD-RTO: 0 /100 WBC
OVALOCYTES BLD QL SMEAR: ABNORMAL
PHOSPHATE SERPL-MCNC: 3.5 MG/DL (ref 2.7–4.5)
PLATELET # BLD AUTO: 416 K/UL (ref 150–350)
PMV BLD AUTO: 8.9 FL (ref 9.2–12.9)
POIKILOCYTOSIS BLD QL SMEAR: SLIGHT
POLYCHROMASIA BLD QL SMEAR: ABNORMAL
POTASSIUM SERPL-SCNC: 3.6 MMOL/L (ref 3.5–5.1)
RBC # BLD AUTO: 3.13 M/UL (ref 4.6–6.2)
SODIUM SERPL-SCNC: 137 MMOL/L (ref 136–145)
WBC # BLD AUTO: 10.9 K/UL (ref 3.9–12.7)

## 2019-09-19 PROCEDURE — C1788 PORT, INDWELLING, IMP: HCPCS | Performed by: SURGERY

## 2019-09-19 PROCEDURE — 12000002 HC ACUTE/MED SURGE SEMI-PRIVATE ROOM

## 2019-09-19 PROCEDURE — 80048 BASIC METABOLIC PNL TOTAL CA: CPT

## 2019-09-19 PROCEDURE — 88112 CYTOLOGY SPECIMEN- MEDICAL CYTOLOGY (FLUID/WASH/BRUSH): ICD-10-PCS | Mod: 26,,, | Performed by: PATHOLOGY

## 2019-09-19 PROCEDURE — S4991 NICOTINE PATCH NONLEGEND: HCPCS | Performed by: STUDENT IN AN ORGANIZED HEALTH CARE EDUCATION/TRAINING PROGRAM

## 2019-09-19 PROCEDURE — 63600175 PHARM REV CODE 636 W HCPCS: Performed by: NURSE ANESTHETIST, CERTIFIED REGISTERED

## 2019-09-19 PROCEDURE — 63600175 PHARM REV CODE 636 W HCPCS: Performed by: ANESTHESIOLOGY

## 2019-09-19 PROCEDURE — D9220A PRA ANESTHESIA: Mod: ANES,,, | Performed by: ANESTHESIOLOGY

## 2019-09-19 PROCEDURE — C9113 INJ PANTOPRAZOLE SODIUM, VIA: HCPCS | Performed by: STUDENT IN AN ORGANIZED HEALTH CARE EDUCATION/TRAINING PROGRAM

## 2019-09-19 PROCEDURE — 25000003 PHARM REV CODE 250: Performed by: STUDENT IN AN ORGANIZED HEALTH CARE EDUCATION/TRAINING PROGRAM

## 2019-09-19 PROCEDURE — 44015 INSERT NEEDLE CATH BOWEL: CPT | Mod: ,,, | Performed by: SURGERY

## 2019-09-19 PROCEDURE — D9220A PRA ANESTHESIA: ICD-10-PCS | Mod: CRNA,,, | Performed by: NURSE ANESTHETIST, CERTIFIED REGISTERED

## 2019-09-19 PROCEDURE — 88305 TISSUE EXAM BY PATHOLOGIST: CPT | Performed by: PATHOLOGY

## 2019-09-19 PROCEDURE — S0020 INJECTION, BUPIVICAINE HYDRO: HCPCS | Performed by: SURGERY

## 2019-09-19 PROCEDURE — 88112 CYTOPATH CELL ENHANCE TECH: CPT | Mod: 26,,, | Performed by: PATHOLOGY

## 2019-09-19 PROCEDURE — 25000003 PHARM REV CODE 250: Performed by: SURGERY

## 2019-09-19 PROCEDURE — 44015 PR INSERT TUBE-BOWEL,ENTERAL ALIMENT: ICD-10-PCS | Mod: ,,, | Performed by: SURGERY

## 2019-09-19 PROCEDURE — 25000003 PHARM REV CODE 250: Performed by: NURSE ANESTHETIST, CERTIFIED REGISTERED

## 2019-09-19 PROCEDURE — 88342 IMHCHEM/IMCYTCHM 1ST ANTB: CPT | Performed by: PATHOLOGY

## 2019-09-19 PROCEDURE — 36000709 HC OR TIME LEV III EA ADD 15 MIN: Performed by: SURGERY

## 2019-09-19 PROCEDURE — 36000708 HC OR TIME LEV III 1ST 15 MIN: Performed by: SURGERY

## 2019-09-19 PROCEDURE — 63600175 PHARM REV CODE 636 W HCPCS: Performed by: STUDENT IN AN ORGANIZED HEALTH CARE EDUCATION/TRAINING PROGRAM

## 2019-09-19 PROCEDURE — 88342 CYTOLOGY SPECIMEN- MEDICAL CYTOLOGY (FLUID/WASH/BRUSH): ICD-10-PCS | Mod: 26,,, | Performed by: PATHOLOGY

## 2019-09-19 PROCEDURE — 99231 SBSQ HOSP IP/OBS SF/LOW 25: CPT | Mod: 57,,, | Performed by: SURGERY

## 2019-09-19 PROCEDURE — 43820 PR GASTROJEJUNOSTOMY: ICD-10-PCS | Mod: ,,, | Performed by: SURGERY

## 2019-09-19 PROCEDURE — 84100 ASSAY OF PHOSPHORUS: CPT

## 2019-09-19 PROCEDURE — 99231 PR SUBSEQUENT HOSPITAL CARE,LEVL I: ICD-10-PCS | Mod: 57,,, | Performed by: SURGERY

## 2019-09-19 PROCEDURE — 88305 CYTOLOGY SPECIMEN- MEDICAL CYTOLOGY (FLUID/WASH/BRUSH): ICD-10-PCS | Mod: 26,,, | Performed by: PATHOLOGY

## 2019-09-19 PROCEDURE — 88341 IMHCHEM/IMCYTCHM EA ADD ANTB: CPT | Mod: 26,,, | Performed by: PATHOLOGY

## 2019-09-19 PROCEDURE — 27201423 OPTIME MED/SURG SUP & DEVICES STERILE SUPPLY: Performed by: SURGERY

## 2019-09-19 PROCEDURE — 63600175 PHARM REV CODE 636 W HCPCS: Performed by: SURGERY

## 2019-09-19 PROCEDURE — 88341 CYTOLOGY SPECIMEN- MEDICAL CYTOLOGY (FLUID/WASH/BRUSH): ICD-10-PCS | Mod: 26,,, | Performed by: PATHOLOGY

## 2019-09-19 PROCEDURE — 43820 GASTROJEJUNOSTOMY WO VAGOTMY: CPT | Mod: ,,, | Performed by: SURGERY

## 2019-09-19 PROCEDURE — 94761 N-INVAS EAR/PLS OXIMETRY MLT: CPT

## 2019-09-19 PROCEDURE — 37000008 HC ANESTHESIA 1ST 15 MINUTES: Performed by: SURGERY

## 2019-09-19 PROCEDURE — 88342 IMHCHEM/IMCYTCHM 1ST ANTB: CPT | Mod: 26,,, | Performed by: PATHOLOGY

## 2019-09-19 PROCEDURE — 88305 TISSUE EXAM BY PATHOLOGIST: CPT | Mod: 26,,, | Performed by: PATHOLOGY

## 2019-09-19 PROCEDURE — 37000009 HC ANESTHESIA EA ADD 15 MINS: Performed by: SURGERY

## 2019-09-19 PROCEDURE — 85025 COMPLETE CBC W/AUTO DIFF WBC: CPT

## 2019-09-19 PROCEDURE — 27800903 OPTIME MED/SURG SUP & DEVICES OTHER IMPLANTS: Performed by: SURGERY

## 2019-09-19 PROCEDURE — 36415 COLL VENOUS BLD VENIPUNCTURE: CPT

## 2019-09-19 PROCEDURE — D9220A PRA ANESTHESIA: Mod: CRNA,,, | Performed by: NURSE ANESTHETIST, CERTIFIED REGISTERED

## 2019-09-19 PROCEDURE — D9220A PRA ANESTHESIA: ICD-10-PCS | Mod: ANES,,, | Performed by: ANESTHESIOLOGY

## 2019-09-19 PROCEDURE — 71000033 HC RECOVERY, INTIAL HOUR: Performed by: SURGERY

## 2019-09-19 DEVICE — TUBE BLLN MIC JENUNAL FEED 12F: Type: IMPLANTABLE DEVICE | Site: ABDOMEN | Status: FUNCTIONAL

## 2019-09-19 DEVICE — KIT POWERPORT SINGLE 8FR: Type: IMPLANTABLE DEVICE | Site: CHEST  WALL | Status: FUNCTIONAL

## 2019-09-19 RX ORDER — ROCURONIUM BROMIDE 10 MG/ML
INJECTION, SOLUTION INTRAVENOUS
Status: DISCONTINUED | OUTPATIENT
Start: 2019-09-19 | End: 2019-09-19

## 2019-09-19 RX ORDER — HYDROMORPHONE HYDROCHLORIDE 1 MG/ML
INJECTION, SOLUTION INTRAMUSCULAR; INTRAVENOUS; SUBCUTANEOUS
Status: DISPENSED
Start: 2019-09-19 | End: 2019-09-20

## 2019-09-19 RX ORDER — SODIUM CHLORIDE, SODIUM LACTATE, POTASSIUM CHLORIDE, CALCIUM CHLORIDE 600; 310; 30; 20 MG/100ML; MG/100ML; MG/100ML; MG/100ML
INJECTION, SOLUTION INTRAVENOUS CONTINUOUS
Status: DISCONTINUED | OUTPATIENT
Start: 2019-09-19 | End: 2019-09-22

## 2019-09-19 RX ORDER — ONDANSETRON 2 MG/ML
4 INJECTION INTRAMUSCULAR; INTRAVENOUS EVERY 8 HOURS PRN
Status: DISCONTINUED | OUTPATIENT
Start: 2019-09-19 | End: 2019-09-23 | Stop reason: HOSPADM

## 2019-09-19 RX ORDER — HEPARIN SODIUM (PORCINE) LOCK FLUSH IV SOLN 100 UNIT/ML 100 UNIT/ML
SOLUTION INTRAVENOUS
Status: DISCONTINUED | OUTPATIENT
Start: 2019-09-19 | End: 2019-09-19 | Stop reason: HOSPADM

## 2019-09-19 RX ORDER — HYDROMORPHONE HYDROCHLORIDE 1 MG/ML
0.2 INJECTION, SOLUTION INTRAMUSCULAR; INTRAVENOUS; SUBCUTANEOUS EVERY 5 MIN PRN
Status: DISCONTINUED | OUTPATIENT
Start: 2019-09-19 | End: 2019-09-19 | Stop reason: HOSPADM

## 2019-09-19 RX ORDER — SODIUM CHLORIDE 9 MG/ML
INJECTION, SOLUTION INTRAVENOUS CONTINUOUS
Status: DISCONTINUED | OUTPATIENT
Start: 2019-09-19 | End: 2019-09-19

## 2019-09-19 RX ORDER — ENOXAPARIN SODIUM 100 MG/ML
40 INJECTION SUBCUTANEOUS EVERY 24 HOURS
Status: DISCONTINUED | OUTPATIENT
Start: 2019-09-20 | End: 2019-09-23 | Stop reason: HOSPADM

## 2019-09-19 RX ORDER — HYDROMORPHONE HCL IN 0.9% NACL 6 MG/30 ML
PATIENT CONTROLLED ANALGESIA SYRINGE INTRAVENOUS
Status: DISPENSED
Start: 2019-09-19 | End: 2019-09-20

## 2019-09-19 RX ORDER — LEVALBUTEROL INHALATION SOLUTION 0.63 MG/3ML
0.63 SOLUTION RESPIRATORY (INHALATION)
Status: DISPENSED | OUTPATIENT
Start: 2019-09-20 | End: 2019-09-22

## 2019-09-19 RX ORDER — MIDAZOLAM HYDROCHLORIDE 1 MG/ML
INJECTION, SOLUTION INTRAMUSCULAR; INTRAVENOUS
Status: DISCONTINUED | OUTPATIENT
Start: 2019-09-19 | End: 2019-09-19

## 2019-09-19 RX ORDER — HYDROMORPHONE HYDROCHLORIDE 2 MG/ML
INJECTION, SOLUTION INTRAMUSCULAR; INTRAVENOUS; SUBCUTANEOUS
Status: DISCONTINUED | OUTPATIENT
Start: 2019-09-19 | End: 2019-09-19

## 2019-09-19 RX ORDER — SODIUM CHLORIDE 9 MG/ML
INJECTION, SOLUTION INTRAVENOUS CONTINUOUS PRN
Status: DISCONTINUED | OUTPATIENT
Start: 2019-09-19 | End: 2019-09-19

## 2019-09-19 RX ORDER — PROPOFOL 10 MG/ML
VIAL (ML) INTRAVENOUS
Status: DISCONTINUED | OUTPATIENT
Start: 2019-09-19 | End: 2019-09-19

## 2019-09-19 RX ORDER — LIDOCAINE HCL/PF 100 MG/5ML
SYRINGE (ML) INTRAVENOUS
Status: DISCONTINUED | OUTPATIENT
Start: 2019-09-19 | End: 2019-09-19

## 2019-09-19 RX ORDER — FENTANYL CITRATE 50 UG/ML
25 INJECTION, SOLUTION INTRAMUSCULAR; INTRAVENOUS EVERY 5 MIN PRN
Status: DISCONTINUED | OUTPATIENT
Start: 2019-09-19 | End: 2019-09-19 | Stop reason: HOSPADM

## 2019-09-19 RX ORDER — HYDROMORPHONE HCL IN 0.9% NACL 6 MG/30 ML
PATIENT CONTROLLED ANALGESIA SYRINGE INTRAVENOUS CONTINUOUS
Status: DISCONTINUED | OUTPATIENT
Start: 2019-09-19 | End: 2019-09-21

## 2019-09-19 RX ORDER — FENTANYL CITRATE 50 UG/ML
INJECTION, SOLUTION INTRAMUSCULAR; INTRAVENOUS
Status: DISCONTINUED | OUTPATIENT
Start: 2019-09-19 | End: 2019-09-19

## 2019-09-19 RX ORDER — ACETAMINOPHEN 10 MG/ML
INJECTION, SOLUTION INTRAVENOUS
Status: DISCONTINUED | OUTPATIENT
Start: 2019-09-19 | End: 2019-09-19

## 2019-09-19 RX ORDER — DIPHENHYDRAMINE HYDROCHLORIDE 50 MG/ML
25 INJECTION INTRAMUSCULAR; INTRAVENOUS EVERY 6 HOURS PRN
Status: DISCONTINUED | OUTPATIENT
Start: 2019-09-19 | End: 2019-09-19 | Stop reason: HOSPADM

## 2019-09-19 RX ORDER — SUCCINYLCHOLINE CHLORIDE 20 MG/ML
INJECTION INTRAMUSCULAR; INTRAVENOUS
Status: DISCONTINUED | OUTPATIENT
Start: 2019-09-19 | End: 2019-09-19

## 2019-09-19 RX ORDER — NALOXONE HCL 0.4 MG/ML
0.02 VIAL (ML) INJECTION
Status: DISCONTINUED | OUTPATIENT
Start: 2019-09-19 | End: 2019-09-21

## 2019-09-19 RX ORDER — ONDANSETRON 2 MG/ML
INJECTION INTRAMUSCULAR; INTRAVENOUS
Status: DISCONTINUED | OUTPATIENT
Start: 2019-09-19 | End: 2019-09-19

## 2019-09-19 RX ORDER — PHENYLEPHRINE HYDROCHLORIDE 10 MG/ML
INJECTION INTRAVENOUS
Status: DISCONTINUED | OUTPATIENT
Start: 2019-09-19 | End: 2019-09-19

## 2019-09-19 RX ORDER — ONDANSETRON 2 MG/ML
4 INJECTION INTRAMUSCULAR; INTRAVENOUS DAILY PRN
Status: DISCONTINUED | OUTPATIENT
Start: 2019-09-19 | End: 2019-09-19 | Stop reason: HOSPADM

## 2019-09-19 RX ORDER — SODIUM CHLORIDE 0.9 % (FLUSH) 0.9 %
10 SYRINGE (ML) INJECTION
Status: DISCONTINUED | OUTPATIENT
Start: 2019-09-19 | End: 2019-09-23 | Stop reason: HOSPADM

## 2019-09-19 RX ORDER — BUPIVACAINE HYDROCHLORIDE 5 MG/ML
INJECTION, SOLUTION EPIDURAL; INTRACAUDAL
Status: DISCONTINUED | OUTPATIENT
Start: 2019-09-19 | End: 2019-09-19 | Stop reason: HOSPADM

## 2019-09-19 RX ADMIN — HYDROMORPHONE HYDROCHLORIDE 0.5 MG: 1 INJECTION, SOLUTION INTRAMUSCULAR; INTRAVENOUS; SUBCUTANEOUS at 11:09

## 2019-09-19 RX ADMIN — FENTANYL CITRATE 50 MCG: 50 INJECTION, SOLUTION INTRAMUSCULAR; INTRAVENOUS at 05:09

## 2019-09-19 RX ADMIN — MIDAZOLAM HYDROCHLORIDE 2 MG: 1 INJECTION, SOLUTION INTRAMUSCULAR; INTRAVENOUS at 03:09

## 2019-09-19 RX ADMIN — PANTOPRAZOLE SODIUM 40 MG: 40 INJECTION, POWDER, FOR SOLUTION INTRAVENOUS at 09:09

## 2019-09-19 RX ADMIN — FENTANYL CITRATE 25 MCG: 50 INJECTION, SOLUTION INTRAMUSCULAR; INTRAVENOUS at 03:09

## 2019-09-19 RX ADMIN — HYDROMORPHONE HYDROCHLORIDE 0.5 MG: 1 INJECTION, SOLUTION INTRAMUSCULAR; INTRAVENOUS; SUBCUTANEOUS at 04:09

## 2019-09-19 RX ADMIN — SODIUM CHLORIDE, SODIUM LACTATE, POTASSIUM CHLORIDE, AND CALCIUM CHLORIDE: 600; 310; 30; 20 INJECTION, SOLUTION INTRAVENOUS at 06:09

## 2019-09-19 RX ADMIN — SUCCINYLCHOLINE CHLORIDE 200 MG: 20 INJECTION, SOLUTION INTRAMUSCULAR; INTRAVENOUS at 03:09

## 2019-09-19 RX ADMIN — HYDROMORPHONE HYDROCHLORIDE 0.2 MG: 1 INJECTION, SOLUTION INTRAMUSCULAR; INTRAVENOUS; SUBCUTANEOUS at 07:09

## 2019-09-19 RX ADMIN — FENTANYL CITRATE 50 MCG: 50 INJECTION, SOLUTION INTRAMUSCULAR; INTRAVENOUS at 04:09

## 2019-09-19 RX ADMIN — HYDROMORPHONE HYDROCHLORIDE 0.5 MG: 1 INJECTION, SOLUTION INTRAMUSCULAR; INTRAVENOUS; SUBCUTANEOUS at 07:09

## 2019-09-19 RX ADMIN — SODIUM CHLORIDE: 0.9 INJECTION, SOLUTION INTRAVENOUS at 03:09

## 2019-09-19 RX ADMIN — PHENYLEPHRINE HYDROCHLORIDE 50 MCG: 10 INJECTION INTRAVENOUS at 04:09

## 2019-09-19 RX ADMIN — OXYCODONE HYDROCHLORIDE 15 MG: 10 TABLET ORAL at 02:09

## 2019-09-19 RX ADMIN — HYDROMORPHONE HYDROCHLORIDE 0.2 MG: 1 INJECTION, SOLUTION INTRAMUSCULAR; INTRAVENOUS; SUBCUTANEOUS at 06:09

## 2019-09-19 RX ADMIN — FENTANYL CITRATE 50 MCG: 50 INJECTION, SOLUTION INTRAMUSCULAR; INTRAVENOUS at 03:09

## 2019-09-19 RX ADMIN — FERROUS SULFATE TAB EC 325 MG (65 MG FE EQUIVALENT) 325 MG: 325 (65 FE) TABLET DELAYED RESPONSE at 09:09

## 2019-09-19 RX ADMIN — PROPOFOL 200 MG: 10 INJECTION, EMULSION INTRAVENOUS at 03:09

## 2019-09-19 RX ADMIN — OXYCODONE HYDROCHLORIDE 15 MG: 10 TABLET ORAL at 09:09

## 2019-09-19 RX ADMIN — SODIUM CHLORIDE: 0.9 INJECTION, SOLUTION INTRAVENOUS at 11:09

## 2019-09-19 RX ADMIN — NICOTINE 1 PATCH: 21 PATCH, EXTENDED RELEASE TRANSDERMAL at 09:09

## 2019-09-19 RX ADMIN — ROCURONIUM BROMIDE 10 MG: 10 INJECTION, SOLUTION INTRAVENOUS at 05:09

## 2019-09-19 RX ADMIN — ONDANSETRON 4 MG: 2 INJECTION INTRAMUSCULAR; INTRAVENOUS at 06:09

## 2019-09-19 RX ADMIN — PANTOPRAZOLE SODIUM 40 MG: 40 INJECTION, POWDER, FOR SOLUTION INTRAVENOUS at 10:09

## 2019-09-19 RX ADMIN — Medication: at 06:09

## 2019-09-19 RX ADMIN — ROCURONIUM BROMIDE 10 MG: 10 INJECTION, SOLUTION INTRAVENOUS at 03:09

## 2019-09-19 RX ADMIN — HYDROMORPHONE HYDROCHLORIDE 0.5 MG: 2 INJECTION, SOLUTION INTRAMUSCULAR; INTRAVENOUS; SUBCUTANEOUS at 06:09

## 2019-09-19 RX ADMIN — LIDOCAINE HYDROCHLORIDE 80 MG: 20 INJECTION, SOLUTION INTRAVENOUS at 03:09

## 2019-09-19 RX ADMIN — FENTANYL CITRATE 25 MCG: 50 INJECTION, SOLUTION INTRAMUSCULAR; INTRAVENOUS at 04:09

## 2019-09-19 RX ADMIN — HYDROMORPHONE HYDROCHLORIDE 1 MG: 2 INJECTION, SOLUTION INTRAMUSCULAR; INTRAVENOUS; SUBCUTANEOUS at 06:09

## 2019-09-19 RX ADMIN — SUGAMMADEX 200 MG: 100 INJECTION, SOLUTION INTRAVENOUS at 06:09

## 2019-09-19 RX ADMIN — ACETAMINOPHEN 1000 MG: 10 INJECTION, SOLUTION INTRAVENOUS at 04:09

## 2019-09-19 RX ADMIN — SODIUM CHLORIDE, SODIUM GLUCONATE, SODIUM ACETATE, POTASSIUM CHLORIDE, MAGNESIUM CHLORIDE, SODIUM PHOSPHATE, DIBASIC, AND POTASSIUM PHOSPHATE: .53; .5; .37; .037; .03; .012; .00082 INJECTION, SOLUTION INTRAVENOUS at 04:09

## 2019-09-19 RX ADMIN — ROCURONIUM BROMIDE 40 MG: 10 INJECTION, SOLUTION INTRAVENOUS at 04:09

## 2019-09-19 RX ADMIN — DEXTROSE 2 G: 50 INJECTION, SOLUTION INTRAVENOUS at 04:09

## 2019-09-19 RX ADMIN — HYDROMORPHONE HYDROCHLORIDE 0.5 MG: 1 INJECTION, SOLUTION INTRAMUSCULAR; INTRAVENOUS; SUBCUTANEOUS at 03:09

## 2019-09-19 NOTE — SUBJECTIVE & OBJECTIVE
Interval History: NAONE. Surgical Onc recs for surgery 9/19/19. Pt agreeable to NPO starting 9/18    Past Medical History:   Diagnosis Date    Chronic gastritis     Diverticulosis     Positive H. 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      Current Outpatient Medications on File Prior to Encounter   Medication Sig    [DISCONTINUED] nicotine (NICODERM CQ) 14 mg/24 hr Place 1 patch onto the skin once daily.    [DISCONTINUED] pantoprazole (PROTONIX) 40 MG tablet Take 1 tablet (40 mg total) by mouth once daily.     Family History     Problem Relation (Age of Onset)    Cancer Father        Tobacco Use    Smoking status: Current Every Day Smoker     Packs/day: 1.00     Years: 40.00     Pack years: 40.00     Types: Cigarettes    Smokeless tobacco: Never Used   Substance and Sexual Activity    Alcohol use: Yes    Drug use: No    Sexual activity: Not on file     Review of Systems   Constitutional: Positive for unexpected weight change (40lb over 2 months). Negative for appetite change and fever.   HENT: Positive for sinus pressure. Negative for trouble swallowing.    Eyes: Negative for visual disturbance.   Respiratory: Positive for shortness of breath (With activity). Negative for cough, chest tightness, wheezing and stridor.    Cardiovascular: Negative for chest pain, palpitations and leg swelling.   Gastrointestinal: Positive for abdominal distention, abdominal pain, diarrhea (Loose, watery. Resolved) and nausea (Prior. Resolved). Negative for blood in stool and vomiting.   Genitourinary: Negative for difficulty urinating and  dysuria.   Musculoskeletal: Negative for arthralgias and myalgias.   Skin: Positive for rash (On right side of lower lip).   Allergic/Immunologic: Negative for environmental allergies and food allergies.   Neurological: Negative for weakness and headaches.   Hematological: Does not bruise/bleed easily.   Psychiatric/Behavioral: Negative for confusion.     Objective:     Vital Signs (Most Recent):  Temp: 98 °F (36.7 °C) (09/18/19 1559)  Pulse: 103 (09/18/19 1559)  Resp: 18 (09/18/19 1559)  BP: (!) 140/84 (09/18/19 1559)  SpO2: (!) 92 % (09/18/19 1559) Vital Signs (24h Range):  Temp:  [96.7 °F (35.9 °C)-98.7 °F (37.1 °C)] 98 °F (36.7 °C)  Pulse:  [] 103  Resp:  [16-20] 18  SpO2:  [92 %-99 %] 92 %  BP: (124-144)/(72-84) 140/84     Weight: 62.6 kg (138 lb)  Body mass index is 19.8 kg/m².    Physical Exam   Constitutional: He is oriented to person, place, and time.   Malnourished   HENT:   Head: Normocephalic and atraumatic.   Right Ear: Hearing normal.   Left Ear: Hearing normal.   Eyes: Conjunctivae, EOM and lids are normal.   Neck: Trachea normal, full passive range of motion without pain and phonation normal. No JVD present.   Cardiovascular: Normal rate, regular rhythm, S1 normal, S2 normal, normal heart sounds and normal pulses. Exam reveals no gallop, no S3, no S4 and no friction rub.   No murmur heard.  Pulses:       Radial pulses are 2+ on the right side, and 2+ on the left side.   Frequent PAC/PVC   Pulmonary/Chest: Effort normal and breath sounds normal. No stridor. No respiratory distress. He has no decreased breath sounds. He has no wheezes. He has no rhonchi. He has no rales.   Abdominal: Soft. Normal appearance and bowel sounds are normal. He exhibits no distension. There is tenderness in the epigastric area, periumbilical area and suprapubic area.   Musculoskeletal: Normal range of motion.   Lymphadenopathy:     He has no cervical adenopathy.   Neurological: He is alert and oriented to person,  place, and time. He has normal strength.   Skin: Skin is warm, dry and intact. Rash (R lower lip) noted. He is not diaphoretic. No pallor.   Psychiatric: He has a normal mood and affect. His speech is normal and behavior is normal. Judgment and thought content normal. Cognition and memory are normal. He is attentive.   Nursing note and vitals reviewed.        CRANIAL NERVES     CN III, IV, VI   Extraocular motions are normal.        Significant Labs:   Recent Lab Results       09/18/19  1539   09/18/19  1322   09/18/19  0429        Anion Gap     10     Aniso     Slight     Baso #     0.03     Basophil%     0.3     BNP 82  Comment:  Values of less than 100 pg/ml are consistent with non-CHF populations.         BUN, Bld     3     [place] Cells     Occasional     Calcium     7.8     Chloride     108     CO2     19     Creatinine     0.6     Differential Method     Automated     eGFR if      >60.0     eGFR if non      >60.0  Comment:  Calculation used to obtain the estimated glomerular filtration  rate (eGFR) is the CKD-EPI equation.        Eos #     0.5     Eosinophil%     4.2     Glucose     93     Gran # (ANC)     3.5     Gran%     30.5     Hematocrit     26.1     Hemoglobin     8.4     Hypo     Occasional     Immature Grans (Abs)     0.02  Comment:  Mild elevation in immature granulocytes is non specific and   can be seen in a variety of conditions including stress response,   acute inflammation, trauma and pregnancy. Correlation with other   laboratory and clinical findings is essential.       Immature Granulocytes     0.2     Lymph #     6.8     Lymph%     58.6     MCH     27.9     MCHC     32.2     MCV     87     Mono #     0.7     Mono%     6.2     MPV     9.5     nRBC     0     Ovalocytes     Occasional     Phosphorus     1.9     Platelet Estimate     Appears normal     Platelets     397     Poik     Slight     Poly     Occasional     Potassium     3.9     Prealbumin   12       RBC      3.01     RDW     13.9     Sodium     137     WBC     11.54           Significant Imaging: I have reviewed all pertinent imaging results/findings within the past 24 hours.

## 2019-09-19 NOTE — ASSESSMENT & PLAN NOTE
Per EGD with biopsy  Family history of cancer in father, but undetermined what type or when  Hx of smoking, etoh. Previously worked laying floor tiles  Chest CT does not demonstrate metastatic lesions  Abd CT demonstrates ascites    See Gastric Outlet Obstruction

## 2019-09-19 NOTE — NURSING
Pt c/o of 10/10 pain. PRN dilaudid given at 2347, PRN oxycodone not due until 0308. Pt states pain regime was discussed with team earlier, and is wanting to be given medication now and for nurse to call physician. On call physician for Med team 4 paged. Awaiting response.

## 2019-09-19 NOTE — ASSESSMENT & PLAN NOTE
5d hx of constipation with nausea but no vomiting  9/10 abdominal pain. Relieved temporarily with Protonix PO  CT Abd demonstrated gastric obstruction  Pt treated with protonix and clear liquid diet, which resulted in loose stools and passing gas  No gross blood observed in stool, but fecal occult positive  EGD revealed bleeding ulcer with inflammation. Biopsy of ulcer revealed signet ring carcinoma and H pylori  CT Chest did not reveal metastatic lesions  Continued reporting of 9/10 abdominal pain, without nausea. Abdomen is tender only over midline. Very mild distension reported by patient. Abdomen soft with normoactive bowel sounds. Continue to pass flatus  Transfer to [facility] for further workup of carcinoma  Lipase 7  CT AP with contrast: There is scattered peritoneal fluid and diffuse anasarca.  Multiple fluid-filled loops of bowel predominantly small bowel as well as a paucity of feces in colon. No convincing bowel dilatation to indicate a focal obstruction.      Plan:  - Trend CBC, CMP  - Consult AES for staging of tumor. Appreciate recs  - Will hold EUS per Surgical Onc recs  - Surgical Oncology recommends PORT placement tomorrow with diagnostic laparoscopy, jejunostomy feeding tube, possible gastrojejunal bypass vs decompressive gastrostomy tube  - IV Protonix 40mg BID per rec from recent EGD and AES  - Diet: NPO with okay for sips of clear liquids to wet throat.  - Will hold on treatment of H pylori with triple therapy. Surg Onc and AES may need pt NPO at some point during treatment and it is preferable to wait until treatment can be completed without interruption  - Pain management with oxycodone PRN, dilaudid for breakthrough pain  - Hold NSAIDs

## 2019-09-19 NOTE — PROGRESS NOTES
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    Hospitalized 9/9-12 with abd pain and weight loss as well as history of nausea/vomiting about a month ago. Associated 25 pound weight loss over the past three months.  Claims he has largely been able to tolerate foods, but has had minimal appetite. CT 9/9 with marked gastric distention and proximal duodenal wall thickening, concerning for GOO but tolerating clears/ soft diet. EGD 9/10/19 with Grade D reflux esophagitis, gastric ulcer biopsied, stenosis at pylorus and normal duodenum.     Readmitted 9/14 with severe chest and epigastric pain with intolerance of diet. AES consulted for staging, plan for EUS 9/18.     New ascites on CT chest 9/14 concerning. Also severe emphysema.    Repeat CT 9/17 with improvement of gastric distention as well as small vol ascites.     (+) prior H.pylori infection, stains from EGD 9/10 (+)    Surg Onc consulted to assess oncologic and/or palliative surgical candidacy.       Post-Op Info:  Procedure(s) (LRB):  ULTRASOUND, UPPER GI TRACT, ENDOSCOPIC (N/A)   2 Days Post-Op     Interval History:  No acute events, to OR.     Medications:  Continuous Infusions:  Scheduled Meds:   ferrous sulfate  325 mg Oral Daily    nicotine  1 patch Transdermal Daily    pantoprazole  40 mg Intravenous BID     PRN Meds:sodium chloride, acetaminophen, acetaminophen, Dextrose 10% Bolus, Dextrose 10% Bolus, glucagon (human recombinant), glucose, glucose, HYDROmorphone, iohexol, ondansetron, oxyCODONE, promethazine, ramelteon, sodium chloride 0.9%     Review of patient's allergies indicates:  No Known Allergies     Objective:     Vital Signs (Most Recent):  Temp: 98 °F (36.7 °C) (09/19/19 0900)  Pulse: 92 (09/19/19 0900)  Resp: 16 (09/19/19 0900)  BP: (!) 123/90  (09/19/19 0900)  SpO2: 99 % (09/19/19 0900) Vital Signs (24h Range):  Temp:  [96.7 °F (35.9 °C)-98 °F (36.7 °C)] 98 °F (36.7 °C)  Pulse:  [] 92  Resp:  [16-18] 16  SpO2:  [92 %-100 %] 99 %  BP: (115-161)/(67-94) 123/90     Weight: 62.6 kg (138 lb)  Body mass index is 19.8 kg/m².    Intake/Output - Last 3 Shifts       09/17 0700 - 09/18 0659 09/18 0700 - 09/19 0659 09/19 0700 - 09/20 0659    P.O. 0 200     I.V. (mL/kg)  443.3 (7.1)     Total Intake(mL/kg) 0 (0) 643.3 (10.3)     Urine (mL/kg/hr) 700 (0.5) 400 (0.3)     Total Output 700 400     Net -700 +243.3                Physical Exam   Constitutional: He is oriented to person, place, and time. He appears cachectic. No distress.   HENT:   Head: Normocephalic and atraumatic.   Eyes: No scleral icterus.   Cardiovascular: Normal rate and regular rhythm.   Pulmonary/Chest: Effort normal. No respiratory distress.   Abdominal: Soft. He exhibits no distension.   Musculoskeletal: He exhibits no edema.   Neurological: He is alert and oriented to person, place, and time.   Skin: Skin is warm. Capillary refill takes less than 2 seconds.     Significant Labs:  CBC:   Recent Labs   Lab 09/19/19 0433   WBC 10.90   RBC 3.13*   HGB 8.6*   HCT 27.2*   *   MCV 87   MCH 27.5   MCHC 31.6*     CMP:   Recent Labs   Lab 09/17/19  0658  09/19/19 0433   GLU 96   < > 90   CALCIUM 7.8*   < > 7.6*   ALBUMIN 2.3*  --   --    PROT 5.8*  --   --       < > 137   K 3.4*   < > 3.6   CO2 24   < > 24      < > 107   BUN 2*   < > 3*   CREATININE 0.6   < > 0.6   ALKPHOS 75  --   --    ALT 7*  --   --    AST 14  --   --    BILITOT 0.4  --   --     < > = values in this interval not displayed.     Coagulation: No results for input(s): LABPROT, INR, APTT in the last 168 hours.    Significant Diagnostics:  I have reviewed all pertinent imaging results/findings within the past 24 hours.    Assessment/Plan:     Signet ring cell adenocarcinoma of stomach  58 yo M with newly diagnosed  signet ring cell CA of the distal stomach associated with significant weight loss, small volume ascites, and possible partial gastric outlet obstruction.     - will require neoadjuvant vs definitive chemo; needs med onc consult   - no evidence of metastatic disease on CT chest, ascites concerning for peritoneal disease; to OR for diagnostic laparoscopy, feeding jejunostomy tube, port placement and possible GJ bypass dependent on intra-operative findings  - NPO        Cecilia Oden MD  General Surgery  [facility]

## 2019-09-19 NOTE — BRIEF OP NOTE
[facility]  Brief Operative Note    SUMMARY     Surgery Date: 9/19/2019     Surgeon(s) and Role:     * Josse Saravia MD - Primary     * Jon Cates MD - Resident - Assisting      Pre-op Diagnosis:  Signet ring cell adenocarcinoma [C80.1]    Post-op Diagnosis:  Post-Op Diagnosis Codes:     * Signet ring cell adenocarcinoma [C80.1]    Procedure(s) (LRB):  INSERTION, VENOUS ACCESS PORT (Right)  LAPAROSCOPY, DIAGNOSTIC (N/A)  INSERTION, JEJUNOSTOMY TUBE, LAPAROSCOPIC, possible open (N/A)  GASTROJEJUNOSTOMY, possible G-tube (N/A)    Anesthesia: General    Description of Procedure: Right IJ port placement; diagnostic laparoscopy converted to open gastrojejunostomy bypass; feeding jejunostomy placement.     Description of the findings of the procedure: Right IJ port placed; diagnostic lap revealed large gastric tumor as well as small volume ascites which was collected and sent for cytometry; otherwise no obvious metastatic disease; open gastrojejunostomy performed; feeding jejunostomy tube placed.     Estimated Blood Loss: * No values recorded between 9/19/2019  4:40 PM and 9/19/2019  6:24 PM *         Specimens:   Specimen (12h ago, onward)    None

## 2019-09-19 NOTE — NURSING
"Pt returned to unit.. Pt stated, "I was outside smoking". Pt notified to remain on unit until surgery.  "

## 2019-09-19 NOTE — OP NOTE
[facility]  Surgery Department  Operative Note       Date of Procedure: 9/19/2019     Procedure: Procedure(s) (LRB):  INSERTION, VENOUS ACCESS PORT (Right)  LAPAROSCOPY, DIAGNOSTIC (N/A)  INSERTION, JEJUNOSTOMY TUBE, LAPAROSCOPIC, possible open (N/A)  GASTROJEJUNOSTOMY, possible G-tube (N/A)     Surgeon(s) and Role:     * Josse Saravia MD - Primary     * Jon Cates MD - Resident - Assisting    Pre-Operative Diagnosis: Signet ring cell adenocarcinoma [C80.1]  1. Gastric outlet obstruction  2. Severe malnutrition (weight loss 20% over 6 months, Albumin < 2.5, temporal wasting)    Pre-Operative Variables:  Preoperative diagnosis: gastric cancer with signet ring histology  Going into surgery, the lesion was believed to be locally advanced, unresectable  Chemotherapy within 90 Days: N  Radiation Therapy within 90 Days: N  Preoperative Gastroenteric Obstruction: Y    Post-Operative Diagnosis:   1. Same    Anesthesia: General    Operative Findings:   1. No evidence of macroscopic distant intraperitoneal metastases   2. Abdominal ascites sent for cytology  3. Locally advanced mass lesion in the distal anterior stomach adherent to the anterior surface of the liver, with enlarged gastrocolic lymph nodes    Procedures:  1. Right internal jugular port placement under fluoroscopic guidance  2. Diagnostic laparoscopy with washings/cytology  3. Open gastrojejunostomy bypass  4. Open jejunostomy feeding tube placement    Estimated Blood Loss (EBL):  20 mL           Indications:  Isaiah Greene presents for the above procedures.  Risks and benefits were reviewed including bleeding, infection, damage to local structures, cardiovascular and pulmonary complications, anastomotic leak, need for additional procedures, death, and imponderables.  He understands and gave informed consent to proceed.    Details:  The patient was transported to the operating room and satisfactory anesthesia established.   Preoperative antibiotics were administered.  The patient was placed in the supine position and extremities were padded and protected throughout.  An appropriate timeout was performed.    Local anesthesia was infiltrated into the skin and an 18g needle was used to access to the right IJ vein under US guidance. A wire was fed easily and viewed to be in good position on fluoroscopy. A pocket was made inferiorly and the catheter tunneled to the wire insertion site. The port was secured with vicryl sutures into the pocket. The catheter was measured and cut. A dilator and sheet were then inserted over the wire under fluoroscopy. The dilator and wire were removed and the catheter inserted through the sheath. A final fluoro shot was performed and the catheter was viewed to be in correct position without kinks. This image was saved in the chart. The port was aspirated easily and flushed with saline and hep locked. The incision was closed in layers with vicryl and monocryl sutures and dressed with Dermabond. A post procedure chest film will be performed.    A stab incision was made in the left upper quadrant and peritoneal access was obtained with the Optiview 5mm trocar. CO2 insufflation was initiated and the abdomen was thoroughly examined. He had some murky ascites in the RUQ which was aspirated and sent for histology. There was evidence of the primary lesion, a locally advanced anterior distal gastric cancer that was adherent to the umbilical fissure of the liver. I did not see any other evidence of peritoneal disease. He did have some enlarged lymph nodes in the omentum in the gastrocolic ligament but these were clearly nodes and not peritoneal implants.    Without gross evidence of metastatic disease I decided it was best to go ahead and bypass him, place a jejunostomy tube and treat him with neoadjuvant intent therapy, pending his cytology results. A small midline incision was made, the ligament of treitz identified,  and the proximal jejunum was run about 40cm to its most mobile portion. The lesser sac was entered through the omentum and the posterior stomach was exposed. The loop of jejunum was brought up without tension and a loop gastrojejunostomy was created with a 60mm stapler, closing the common enterotomy with vicryl suture. The anastomosis was examined and was hemostatic.    The jejunum was then run about 20 cm distally and a 3-0 silk pursestring was applied. A 12 Irish jejunostomy tube was brought in through the abdomen, an enterotomy made, and the tube was fed distally.  The pursestring was tied down and a short Witzel tunnel was created with silk suture. This was then tacked to the abdominal wall also with silk suture. The tube flushed easily and was secured with a nylon stitch to the skin.    The fascia was closed using a 0 non-looped PDS in deep bite, shallow advance fashion.  The wound was irrigated and skin closed with Monocryl and dermabond.      All needle, lap, and sponge counts were reported as correct. I communicated the intraoperative findings with the family following the procedure.     Implants:   Implant Name Type Inv. Item Serial No.  Lot No. LRB No. Used   KIT POWERPORT SINGLE 8FR - [id number]  KIT POWERPORT SINGLE 8FR  [employer] [place] [id number] Right 1       Specimens:   Specimen (12h ago, onward)    None                  Condition: Good    Disposition: PACU - hemodynamically stable.    Attestation: I was present and scrubbed for the entire procedure.

## 2019-09-19 NOTE — PLAN OF CARE
Patient taken for port placement and diagnostic laparoscopy this afternoon.     Large antral tumor and ascites encountered which was aspirated and sent for flow cytometry; however no other findings consistent with metastatic/peritoneal disease.    Decision made to proceed with gastrojejunostomy bypass and jejunostomy tube placement.     Surgical Oncology service will assume primary care of patient for postoperative management.    Jon Cates MD  General Surgery PGY-4  Pager: [phone]

## 2019-09-19 NOTE — TRANSFER OF CARE
"Anesthesia Transfer of Care Note    Patient: Isaiah Greene    Procedure(s) Performed: Procedure(s) (LRB):  INSERTION, VENOUS ACCESS PORT (Right)  LAPAROSCOPY, DIAGNOSTIC (N/A)  INSERTION, JEJUNOSTOMY TUBE, LAPAROSCOPIC, possible open (N/A)  GASTROJEJUNOSTOMY, possible G-tube (N/A)    Patient location: PACU    Anesthesia Type: general    Transport from OR: Transported from OR on 6-10 L/min O2 by face mask with adequate spontaneous ventilation    Post pain: adequate analgesia    Post assessment: no apparent anesthetic complications and tolerated procedure well    Post vital signs: stable    Level of consciousness: awake and responds to stimulation    Nausea/Vomiting: no nausea/vomiting    Complications: none    Transfer of care protocol was followed      Last vitals:   Visit Vitals  /83   Pulse 100   Temp 36.6 °C (97.9 °F) (Temporal)   Resp 14   Ht 5' 10" (1.778 m)   Wt 62.6 kg (138 lb)   SpO2 100%   BMI 19.80 kg/m²     "

## 2019-09-19 NOTE — SUBJECTIVE & OBJECTIVE
Interval History:  No acute events, to OR.     Medications:  Continuous Infusions:  Scheduled Meds:   ferrous sulfate  325 mg Oral Daily    nicotine  1 patch Transdermal Daily    pantoprazole  40 mg Intravenous BID     PRN Meds:sodium chloride, acetaminophen, acetaminophen, Dextrose 10% Bolus, Dextrose 10% Bolus, glucagon (human recombinant), glucose, glucose, HYDROmorphone, iohexol, ondansetron, oxyCODONE, promethazine, ramelteon, sodium chloride 0.9%     Review of patient's allergies indicates:  No Known Allergies     Objective:     Vital Signs (Most Recent):  Temp: 98 °F (36.7 °C) (09/19/19 0900)  Pulse: 92 (09/19/19 0900)  Resp: 16 (09/19/19 0900)  BP: (!) 123/90 (09/19/19 0900)  SpO2: 99 % (09/19/19 0900) Vital Signs (24h Range):  Temp:  [96.7 °F (35.9 °C)-98 °F (36.7 °C)] 98 °F (36.7 °C)  Pulse:  [] 92  Resp:  [16-18] 16  SpO2:  [92 %-100 %] 99 %  BP: (115-161)/(67-94) 123/90     Weight: 62.6 kg (138 lb)  Body mass index is 19.8 kg/m².    Intake/Output - Last 3 Shifts       09/17 0700 - 09/18 0659 09/18 0700 - 09/19 0659 09/19 0700 - 09/20 0659    P.O. 0 200     I.V. (mL/kg)  443.3 (7.1)     Total Intake(mL/kg) 0 (0) 643.3 (10.3)     Urine (mL/kg/hr) 700 (0.5) 400 (0.3)     Total Output 700 400     Net -700 +243.3                Physical Exam   Constitutional: He is oriented to person, place, and time. He appears cachectic. No distress.   HENT:   Head: Normocephalic and atraumatic.   Eyes: No scleral icterus.   Cardiovascular: Normal rate and regular rhythm.   Pulmonary/Chest: Effort normal. No respiratory distress.   Abdominal: Soft. He exhibits no distension.   Musculoskeletal: He exhibits no edema.   Neurological: He is alert and oriented to person, place, and time.   Skin: Skin is warm. Capillary refill takes less than 2 seconds.     Significant Labs:  CBC:   Recent Labs   Lab 09/19/19  0433   WBC 10.90   RBC 3.13*   HGB 8.6*   HCT 27.2*   *   MCV 87   MCH 27.5   MCHC 31.6*     CMP:    Recent Labs   Lab 09/17/19  0658  09/19/19  0433   GLU 96   < > 90   CALCIUM 7.8*   < > 7.6*   ALBUMIN 2.3*  --   --    PROT 5.8*  --   --       < > 137   K 3.4*   < > 3.6   CO2 24   < > 24      < > 107   BUN 2*   < > 3*   CREATININE 0.6   < > 0.6   ALKPHOS 75  --   --    ALT 7*  --   --    AST 14  --   --    BILITOT 0.4  --   --     < > = values in this interval not displayed.     Coagulation: No results for input(s): LABPROT, INR, APTT in the last 168 hours.    Significant Diagnostics:  I have reviewed all pertinent imaging results/findings within the past 24 hours.

## 2019-09-19 NOTE — NURSING
"Pt has been off unit since approx 0645. Pt refused to say where he was going. Pt said he was "going for a walk" and has not returned. Hospital  notified and pt has been paged on overhead  To return to the fifth floor. Med team will be notified as well  "

## 2019-09-19 NOTE — PROGRESS NOTES
[facility] Hospital Medicine  Progress Note    Patient Name: Isaiah Greene  MRN: [id number]  Patient Class: IP- Inpatient   Admission Date: 9/16/2019  Length of Stay: 2 days  Attending Physician: Brandy Borges MD  Primary Care Provider: Primary Doctor 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 Over the last two months pt began experiencing abdominal pain. He was found to have an inflamed ulcer and was sent home on a 1 month course of Protonix. Following completion of Protonix regimen his symptoms quickly returned and he went back to the OSH. They once again treated him with Protonix. Prior to filling his script after discharge he experienced severe abdominal pain and returned to the OSH. At this point he received an EGD which showed a bleeding ulcer and a biopsy was taken. The biopsy returned positive for signet ring carcinoma and H pylori. He received a chest CT at this time which demonstrated emphysematic changes, but no metastatic lesions. He was transferred to [facility] for further evaluation of the signet ring carcinoma. Upon arrival pt endorsed 9/10 abdominal pain. He stated he had been having loose watery stools without blood for the past 3-4 days after having no stools for 5 days prior. He also endorsed nausea without vomiting a few days ago but none currently. He endorsed 40lb unexpected weight-loss with no change in appetite over the past two months. He attributed this to the clear liquid diet that [facility] had him on. He endorses 40 pack-year history and was a previous drinker of 6 beers a day, but he quit a year ago. He has no family history of cancer.    Overview/Hospital Course:  Arrived via transfer from [facility] on 9/16. Pt kept on clear liquid diet upon arrival with NPO after midnight  per AES for possible EUS the next day. Pt reported abdominal pain but due to fear of exacerbating obstruction, opioids for pain management was held until CT was performed. Pain was treated with GI cocktail which provided some relief. CT AP with contrast was not performed that night and EUS was delayed pending CT AP with contrast and Surgical Onc recs. On 9/17 pt reported full, solid stool overnight. CT AP with contrast showed resolution of obstruction and pt was started back on a regular diet as tolerated. Pt found to be mildly hypokalemic on morning of 9/17 and was given KCl tabs. Started on iron tabs for iron deficiency anemia. Plan for surgery with surgical oncology 9/19/19    Interval History: NAONE. Surgical Onc recs for surgery 9/19/19. Pt agreeable to NPO starting 9/18    Past Medical History:   Diagnosis Date    Chronic gastritis     Diverticulosis     Positive H. 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      Current Outpatient Medications on File Prior to Encounter   Medication Sig    [DISCONTINUED] nicotine (NICODERM CQ) 14 mg/24 hr Place 1 patch onto the skin once daily.    [DISCONTINUED] pantoprazole (PROTONIX) 40 MG tablet Take 1 tablet (40 mg total) by mouth once daily.     Family History     Problem Relation (Age of Onset)    Cancer Father        Tobacco Use    Smoking status: Current Every Day Smoker     Packs/day: 1.00     Years: 40.00     Pack years: 40.00     Types: Cigarettes    Smokeless tobacco: Never Used   Substance and Sexual Activity    Alcohol use: Yes    Drug use: No    Sexual activity: Not on file      Review of Systems   Constitutional: Positive for unexpected weight change (40lb over 2 months). Negative for appetite change and fever.   HENT: Positive for sinus pressure. Negative for trouble swallowing.    Eyes: Negative for visual disturbance.   Respiratory: Positive for shortness of breath (With activity). Negative for cough, chest tightness, wheezing and stridor.    Cardiovascular: Negative for chest pain, palpitations and leg swelling.   Gastrointestinal: Positive for abdominal distention, abdominal pain, diarrhea (Loose, watery. Resolved) and nausea (Prior. Resolved). Negative for blood in stool and vomiting.   Genitourinary: Negative for difficulty urinating and dysuria.   Musculoskeletal: Negative for arthralgias and myalgias.   Skin: Positive for rash (On right side of lower lip).   Allergic/Immunologic: Negative for environmental allergies and food allergies.   Neurological: Negative for weakness and headaches.   Hematological: Does not bruise/bleed easily.   Psychiatric/Behavioral: Negative for confusion.     Objective:     Vital Signs (Most Recent):  Temp: 98 °F (36.7 °C) (09/18/19 1559)  Pulse: 103 (09/18/19 1559)  Resp: 18 (09/18/19 1559)  BP: (!) 140/84 (09/18/19 1559)  SpO2: (!) 92 % (09/18/19 1559) Vital Signs (24h Range):  Temp:  [96.7 °F (35.9 °C)-98.7 °F (37.1 °C)] 98 °F (36.7 °C)  Pulse:  [] 103  Resp:  [16-20] 18  SpO2:  [92 %-99 %] 92 %  BP: (124-144)/(72-84) 140/84     Weight: 62.6 kg (138 lb)  Body mass index is 19.8 kg/m².    Physical Exam   Constitutional: He is oriented to person, place, and time.   Malnourished   HENT:   Head: Normocephalic and atraumatic.   Right Ear: Hearing normal.   Left Ear: Hearing normal.   Eyes: Conjunctivae, EOM and lids are normal.   Neck: Trachea normal, full passive range of motion without pain and phonation normal. No JVD present.   Cardiovascular: Normal rate, regular rhythm, S1 normal, S2 normal, normal heart sounds and normal pulses. Exam  reveals no gallop, no S3, no S4 and no friction rub.   No murmur heard.  Pulses:       Radial pulses are 2+ on the right side, and 2+ on the left side.   Frequent PAC/PVC   Pulmonary/Chest: Effort normal and breath sounds normal. No stridor. No respiratory distress. He has no decreased breath sounds. He has no wheezes. He has no rhonchi. He has no rales.   Abdominal: Soft. Normal appearance and bowel sounds are normal. He exhibits no distension. There is tenderness in the epigastric area, periumbilical area and suprapubic area.   Musculoskeletal: Normal range of motion.   Lymphadenopathy:     He has no cervical adenopathy.   Neurological: He is alert and oriented to person, place, and time. He has normal strength.   Skin: Skin is warm, dry and intact. Rash (R lower lip) noted. He is not diaphoretic. No pallor.   Psychiatric: He has a normal mood and affect. His speech is normal and behavior is normal. Judgment and thought content normal. Cognition and memory are normal. He is attentive.   Nursing note and vitals reviewed.        CRANIAL NERVES     CN III, IV, VI   Extraocular motions are normal.        Significant Labs:   Recent Lab Results       09/18/19  1539   09/18/19  1322   09/18/19  0429        Anion Gap     10     Aniso     Slight     Baso #     0.03     Basophil%     0.3     BNP 82  Comment:  Values of less than 100 pg/ml are consistent with non-CHF populations.         BUN, Bld     3     Elroy Cells     Occasional     Calcium     7.8     Chloride     108     CO2     19     Creatinine     0.6     Differential Method     Automated     eGFR if      >60.0     eGFR if non      >60.0  Comment:  Calculation used to obtain the estimated glomerular filtration  rate (eGFR) is the CKD-EPI equation.        Eos #     0.5     Eosinophil%     4.2     Glucose     93     Gran # (ANC)     3.5     Gran%     30.5     Hematocrit     26.1     Hemoglobin     8.4     Hypo     Occasional     Immature  Grans (Abs)     0.02  Comment:  Mild elevation in immature granulocytes is non specific and   can be seen in a variety of conditions including stress response,   acute inflammation, trauma and pregnancy. Correlation with other   laboratory and clinical findings is essential.       Immature Granulocytes     0.2     Lymph #     6.8     Lymph%     58.6     MCH     27.9     MCHC     32.2     MCV     87     Mono #     0.7     Mono%     6.2     MPV     9.5     nRBC     0     Ovalocytes     Occasional     Phosphorus     1.9     Platelet Estimate     Appears normal     Platelets     397     Poik     Slight     Poly     Occasional     Potassium     3.9     Prealbumin   12       RBC     3.01     RDW     13.9     Sodium     137     WBC     11.54           Significant Imaging: I have reviewed all pertinent imaging results/findings within the past 24 hours.      Assessment/Plan:      * Gastric outlet obstruction  5d hx of constipation with nausea but no vomiting  9/10 abdominal pain. Relieved temporarily with Protonix PO  CT Abd demonstrated gastric obstruction  Pt treated with protonix and clear liquid diet, which resulted in loose stools and passing gas  No gross blood observed in stool, but fecal occult positive  EGD revealed bleeding ulcer with inflammation. Biopsy of ulcer revealed signet ring carcinoma and H pylori  CT Chest did not reveal metastatic lesions  Continued reporting of 9/10 abdominal pain, without nausea. Abdomen is tender only over midline. Very mild distension reported by patient. Abdomen soft with normoactive bowel sounds. Continue to pass flatus  Transfer to [facility] for further workup of carcinoma  Lipase 7  CT AP with contrast: There is scattered peritoneal fluid and diffuse anasarca.  Multiple fluid-filled loops of bowel predominantly small bowel as well as a paucity of feces in colon. No convincing bowel dilatation to indicate a focal obstruction.      Plan:  - Trend CBC, CMP  - Consult AES for staging of  tumor. Appreciate recs  - Will hold EUS per Surgical Onc recs  - Surgical Oncology recommends PORT placement tomorrow with diagnostic laparoscopy, jejunostomy feeding tube, possible gastrojejunal bypass vs decompressive gastrostomy tube  - IV Protonix 40mg BID per rec from recent EGD and AES  - Diet: NPO with okay for sips of clear liquids to wet throat.  - Will hold on treatment of H pylori with triple therapy. Surg Onc and AES may need pt NPO at some point during treatment and it is preferable to wait until treatment can be completed without interruption  - Pain management with oxycodone PRN, dilaudid for breakthrough pain  - Hold NSAIDs    Signet ring cell adenocarcinoma of stomach  Per EGD with biopsy  Family history of cancer in father, but undetermined what type or when  Hx of smoking, etoh. Previously worked laying floor tiles  Chest CT does not demonstrate metastatic lesions  Abd CT demonstrates ascites    See Gastric Outlet Obstruction    Emphysema lung  Noted on Chest Ct  Pt reports CAMPBELL, but no SOB at rest  40 pack-year    Plan:  - Nicotine patch  - Can consider nasal cannula PRN, but will hold for now given pt not reporting SOB    Iliac artery occlusion, right  Specifically, occlusion of the R common iliac as noted on abdominal CT. There appears to be reconstitution of flow to the right external iliac artery  Denies sxs of claudication  Doppler of LE was negative for VTE    Plan:  - SCD  - Progressive mobility protocol    H. pylori infection  Noted on EGD biopsy    Plan:  - Consider triple therapy when pt can complete course without interruption  - Appreciate AES recs    Tobacco abuse counseling  40 pack-year history  Pt willing to consider cessation of smoking  Greater than 3 minutes of smoking cessation discussed with patient    Plan:  - Nicotine patch  - Tobacco cessation information packets      Hypokalemia  K 4.7 on admission 9/16 9/17: K 3.4  Pt asx    Plan:  - Replenish K as needed  - KCl: 10mEq  needed to raise K by approximately 0.1  - Trend CMP    Iron deficiency anemia  Hgb 8.2 on admission 9/16  Iron studies with Iron level 10 at OSH  Likely d/t bleeding ulcer and cancer  Consent obtained for blood products  Type and screen performed    Plan:  - Trend CBC  - Maintain hgb >7  - Administer 1U pRBC as needed for hgb<7  - Iron tablets qd    Hypophosphatemia  Phos 1.9 on 9/18/19  Asx    Plan:  - Replace Phos  - Trend Phos qd    Intractable generalized abdominal pain          VTE Risk Mitigation (From admission, onward)        Ordered     Place sequential compression device  Until discontinued      09/16/19 1547     IP VTE HIGH RISK PATIENT  Once      09/16/19 8198                Elliot Bright MD  Department of Hospital Medicine   [facility]

## 2019-09-19 NOTE — NURSING
"Pt extrememly agitated at the moment, with complaints of pain and being "starved to death." Pt cussing and was reported to be throwing shoes. Pain medication given. Pt requesting to be disconnected from IV fluids to "go for a walk." Nurse told pt that IVF needed to stay connected to pt to keep him hydrated since he has been NPO, pt stated "if it's not disconnected, he will rip it out." IVF disconnected. Night shift, and day shift nurse asked pt if he would be going off unit, and pt did not confirm or deny only said "i'm just going for a walk." pt was seen angrily walking in halls toward elevator.   "

## 2019-09-19 NOTE — ASSESSMENT & PLAN NOTE
60 yo M with newly diagnosed signet ring cell CA of the distal stomach associated with significant weight loss, small volume ascites, and possible partial gastric outlet obstruction.     - will require neoadjuvant vs definitive chemo; needs med onc consult   - no evidence of metastatic disease on CT chest, ascites concerning for peritoneal disease; to OR for diagnostic laparoscopy, feeding jejunostomy tube, port placement and possible GJ bypass dependent on intra-operative findings  - NPO

## 2019-09-19 NOTE — PLAN OF CARE
Problem: Adult Inpatient Plan of Care  Goal: Plan of Care Review  Outcome: Ongoing (interventions implemented as appropriate)  Pt AAOx4. VS as charted. Q2 rounding for pt care and safety. Pt c/o of 9-10/10 pain throughout night, PRN medication given as ordered. Re-educated pt on pain regime and orders. No falls/injury reported this shift. No reports of NV. SCD in place. Bilateral lower extremities elevated. Pt remained NPO, with small sips of water for meds. Safety precautions maintained - bed in low position, call light in reach, side rails up x2.

## 2019-09-20 LAB
ANION GAP SERPL CALC-SCNC: 8 MMOL/L (ref 8–16)
ANISOCYTOSIS BLD QL SMEAR: SLIGHT
BASOPHILS # BLD AUTO: 0.06 K/UL (ref 0–0.2)
BASOPHILS NFR BLD: 0.4 % (ref 0–1.9)
BUN SERPL-MCNC: 4 MG/DL (ref 6–20)
BURR CELLS BLD QL SMEAR: ABNORMAL
CALCIUM SERPL-MCNC: 8 MG/DL (ref 8.7–10.5)
CHLORIDE SERPL-SCNC: 103 MMOL/L (ref 95–110)
CO2 SERPL-SCNC: 25 MMOL/L (ref 23–29)
CREAT SERPL-MCNC: 0.6 MG/DL (ref 0.5–1.4)
DIFFERENTIAL METHOD: ABNORMAL
EOSINOPHIL # BLD AUTO: 0.2 K/UL (ref 0–0.5)
EOSINOPHIL NFR BLD: 1 % (ref 0–8)
ERYTHROCYTE [DISTWIDTH] IN BLOOD BY AUTOMATED COUNT: 14.2 % (ref 11.5–14.5)
EST. GFR  (AFRICAN AMERICAN): >60 ML/MIN/1.73 M^2
EST. GFR  (NON AFRICAN AMERICAN): >60 ML/MIN/1.73 M^2
GLUCOSE SERPL-MCNC: 83 MG/DL (ref 70–110)
HCT VFR BLD AUTO: 32.8 % (ref 40–54)
HGB BLD-MCNC: 9.8 G/DL (ref 14–18)
HYPOCHROMIA BLD QL SMEAR: ABNORMAL
IMM GRANULOCYTES # BLD AUTO: 0.05 K/UL (ref 0–0.04)
IMM GRANULOCYTES NFR BLD AUTO: 0.3 % (ref 0–0.5)
LYMPHOCYTES # BLD AUTO: 7.2 K/UL (ref 1–4.8)
LYMPHOCYTES NFR BLD: 46.3 % (ref 18–48)
MAGNESIUM SERPL-MCNC: 2 MG/DL (ref 1.6–2.6)
MCH RBC QN AUTO: 27.5 PG (ref 27–31)
MCHC RBC AUTO-ENTMCNC: 29.9 G/DL (ref 32–36)
MCV RBC AUTO: 92 FL (ref 82–98)
MONOCYTES # BLD AUTO: 0.6 K/UL (ref 0.3–1)
MONOCYTES NFR BLD: 3.6 % (ref 4–15)
NEUTROPHILS # BLD AUTO: 7.5 K/UL (ref 1.8–7.7)
NEUTROPHILS NFR BLD: 48.4 % (ref 38–73)
NRBC BLD-RTO: 0 /100 WBC
OVALOCYTES BLD QL SMEAR: ABNORMAL
PHOSPHATE SERPL-MCNC: 3 MG/DL (ref 2.7–4.5)
PLATELET # BLD AUTO: 500 K/UL (ref 150–350)
PLATELET BLD QL SMEAR: ABNORMAL
PMV BLD AUTO: 9.3 FL (ref 9.2–12.9)
POIKILOCYTOSIS BLD QL SMEAR: SLIGHT
POLYCHROMASIA BLD QL SMEAR: ABNORMAL
POTASSIUM SERPL-SCNC: 3.9 MMOL/L (ref 3.5–5.1)
PREALB SERPL-MCNC: 10 MG/DL (ref 20–43)
RBC # BLD AUTO: 3.57 M/UL (ref 4.6–6.2)
SMUDGE CELLS BLD QL SMEAR: PRESENT
SODIUM SERPL-SCNC: 136 MMOL/L (ref 136–145)
TOXIC GRANULES BLD QL SMEAR: PRESENT
WBC # BLD AUTO: 15.45 K/UL (ref 3.9–12.7)

## 2019-09-20 PROCEDURE — S4991 NICOTINE PATCH NONLEGEND: HCPCS | Performed by: STUDENT IN AN ORGANIZED HEALTH CARE EDUCATION/TRAINING PROGRAM

## 2019-09-20 PROCEDURE — 94640 AIRWAY INHALATION TREATMENT: CPT

## 2019-09-20 PROCEDURE — 63600175 PHARM REV CODE 636 W HCPCS: Performed by: SURGERY

## 2019-09-20 PROCEDURE — 85025 COMPLETE CBC W/AUTO DIFF WBC: CPT

## 2019-09-20 PROCEDURE — C9113 INJ PANTOPRAZOLE SODIUM, VIA: HCPCS | Performed by: STUDENT IN AN ORGANIZED HEALTH CARE EDUCATION/TRAINING PROGRAM

## 2019-09-20 PROCEDURE — 80048 BASIC METABOLIC PNL TOTAL CA: CPT

## 2019-09-20 PROCEDURE — 84134 ASSAY OF PREALBUMIN: CPT

## 2019-09-20 PROCEDURE — 36415 COLL VENOUS BLD VENIPUNCTURE: CPT

## 2019-09-20 PROCEDURE — 94761 N-INVAS EAR/PLS OXIMETRY MLT: CPT

## 2019-09-20 PROCEDURE — 94664 DEMO&/EVAL PT USE INHALER: CPT

## 2019-09-20 PROCEDURE — 27000646 HC AEROBIKA DEVICE

## 2019-09-20 PROCEDURE — 12000002 HC ACUTE/MED SURGE SEMI-PRIVATE ROOM

## 2019-09-20 PROCEDURE — 25000003 PHARM REV CODE 250: Performed by: STUDENT IN AN ORGANIZED HEALTH CARE EDUCATION/TRAINING PROGRAM

## 2019-09-20 PROCEDURE — 99900035 HC TECH TIME PER 15 MIN (STAT)

## 2019-09-20 PROCEDURE — 63600175 PHARM REV CODE 636 W HCPCS: Performed by: STUDENT IN AN ORGANIZED HEALTH CARE EDUCATION/TRAINING PROGRAM

## 2019-09-20 PROCEDURE — 97802 MEDICAL NUTRITION INDIV IN: CPT

## 2019-09-20 PROCEDURE — 83735 ASSAY OF MAGNESIUM: CPT

## 2019-09-20 PROCEDURE — 25000242 PHARM REV CODE 250 ALT 637 W/ HCPCS: Performed by: STUDENT IN AN ORGANIZED HEALTH CARE EDUCATION/TRAINING PROGRAM

## 2019-09-20 PROCEDURE — 63600175 PHARM REV CODE 636 W HCPCS: Performed by: NURSE PRACTITIONER

## 2019-09-20 PROCEDURE — 84100 ASSAY OF PHOSPHORUS: CPT

## 2019-09-20 PROCEDURE — 94799 UNLISTED PULMONARY SVC/PX: CPT

## 2019-09-20 RX ORDER — KETOROLAC TROMETHAMINE 15 MG/ML
15 INJECTION, SOLUTION INTRAMUSCULAR; INTRAVENOUS EVERY 8 HOURS
Status: DISPENSED | OUTPATIENT
Start: 2019-09-20 | End: 2019-09-23

## 2019-09-20 RX ORDER — POTASSIUM CHLORIDE 7.45 MG/ML
10 INJECTION INTRAVENOUS ONCE
Status: COMPLETED | OUTPATIENT
Start: 2019-09-20 | End: 2019-09-20

## 2019-09-20 RX ORDER — KETOROLAC TROMETHAMINE 15 MG/ML
15 INJECTION, SOLUTION INTRAMUSCULAR; INTRAVENOUS ONCE
Status: COMPLETED | OUTPATIENT
Start: 2019-09-20 | End: 2019-09-20

## 2019-09-20 RX ADMIN — LEVALBUTEROL HYDROCHLORIDE 0.63 MG: 0.63 SOLUTION RESPIRATORY (INHALATION) at 08:09

## 2019-09-20 RX ADMIN — KETOROLAC TROMETHAMINE 15 MG: 15 INJECTION, SOLUTION INTRAMUSCULAR; INTRAVENOUS at 07:09

## 2019-09-20 RX ADMIN — PANTOPRAZOLE SODIUM 40 MG: 40 INJECTION, POWDER, FOR SOLUTION INTRAVENOUS at 08:09

## 2019-09-20 RX ADMIN — Medication: at 03:09

## 2019-09-20 RX ADMIN — NICOTINE 1 PATCH: 21 PATCH, EXTENDED RELEASE TRANSDERMAL at 08:09

## 2019-09-20 RX ADMIN — LEVALBUTEROL HYDROCHLORIDE 0.63 MG: 0.63 SOLUTION RESPIRATORY (INHALATION) at 03:09

## 2019-09-20 RX ADMIN — Medication: at 09:09

## 2019-09-20 RX ADMIN — POTASSIUM CHLORIDE 10 MEQ: 7.46 INJECTION, SOLUTION INTRAVENOUS at 12:09

## 2019-09-20 RX ADMIN — Medication: at 04:09

## 2019-09-20 RX ADMIN — KETOROLAC TROMETHAMINE 15 MG: 15 INJECTION, SOLUTION INTRAMUSCULAR; INTRAVENOUS at 02:09

## 2019-09-20 RX ADMIN — PANTOPRAZOLE SODIUM 40 MG: 40 INJECTION, POWDER, FOR SOLUTION INTRAVENOUS at 09:09

## 2019-09-20 RX ADMIN — SODIUM CHLORIDE, SODIUM LACTATE, POTASSIUM CHLORIDE, AND CALCIUM CHLORIDE 1000 ML: .6; .31; .03; .02 INJECTION, SOLUTION INTRAVENOUS at 07:09

## 2019-09-20 RX ADMIN — ENOXAPARIN SODIUM 40 MG: 100 INJECTION SUBCUTANEOUS at 04:09

## 2019-09-20 NOTE — PLAN OF CARE
Problem: Adult Inpatient Plan of Care  Goal: Plan of Care Review  Outcome: Ongoing (interventions implemented as appropriate)  PT resting in bed. IV Clean, dry, intact.. fall precautions maintained no falls noted, call light in reach, bed locked, non skid socks on while out of bed pt instructed to call for assistance, skin integrity maintained pt independent with positioning, c/o pain managed with prn meds, no other complaints or concerns, will cont to follow careplan

## 2019-09-20 NOTE — PROGRESS NOTES
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    Hospitalized 9/9-12 with abd pain and weight loss as well as history of nausea/vomiting about a month ago. Associated 25 pound weight loss over the past three months.  Claims he has largely been able to tolerate foods, but has had minimal appetite. CT 9/9 with marked gastric distention and proximal duodenal wall thickening, concerning for GOO but tolerating clears/ soft diet. EGD 9/10/19 with Grade D reflux esophagitis, gastric ulcer biopsied, stenosis at pylorus and normal duodenum.     Readmitted 9/14 with severe chest and epigastric pain with intolerance of diet. AES consulted for staging, plan for EUS 9/18.     New ascites on CT chest 9/14 concerning. Also severe emphysema.    Repeat CT 9/17 with improvement of gastric distention as well as small vol ascites.     (+) prior H.pylori infection, stains from EGD 9/10 (+)    Surg Onc consulted to assess oncologic and/or palliative surgical candidacy.         Post-Op Info:  Procedure(s) (LRB):  INSERTION, VENOUS ACCESS PORT (Right)  LAPAROSCOPY, DIAGNOSTIC (N/A)  INSERTION, JEJUNOSTOMY TUBE, LAPAROSCOPIC, possible open (N/A)  GASTROJEJUNOSTOMY, possible G-tube (N/A)   1 Day Post-Op     Interval History:  No acute events.  Pain poorly controlled.   Otherwise doing well.     Medications:  Continuous Infusions:   hydromorphone in 0.9 % NaCl 6 mg/30 ml      lactated ringers 125 mL/hr at 09/19/19 1845     Scheduled Meds:   enoxaparin  40 mg Subcutaneous Daily    lactated ringers  1,000 mL Intravenous Once    levalbuterol  0.63 mg Nebulization Q6H WAKE    nicotine  1 patch Transdermal Daily    pantoprazole  40 mg Intravenous BID     PRN Meds:sodium chloride, Dextrose 10% Bolus, Dextrose 10% Bolus, glucagon (human  recombinant), glucose, glucose, iohexol, naloxone, ondansetron, promethazine (PHENERGAN) IVPB, sodium chloride 0.9%, sodium chloride 0.9%     Review of patient's allergies indicates:  No Known Allergies     Objective:     Vital Signs (Most Recent):  Temp: 98.1 °F (36.7 °C) (09/20/19 0820)  Pulse: 77 (09/20/19 0825)  Resp: (!) 24 (09/20/19 0825)  BP: (!) 160/84 (09/20/19 0820)  SpO2: 98 % (09/20/19 0825) Vital Signs (24h Range):  Temp:  [96.2 °F (35.7 °C)-98.5 °F (36.9 °C)] 98.1 °F (36.7 °C)  Pulse:  [] 77  Resp:  [10-24] 24  SpO2:  [95 %-100 %] 98 %  BP: (114-165)/(73-97) 160/84     Weight: 62.6 kg (138 lb)  Body mass index is 19.8 kg/m².    Intake/Output - Last 3 Shifts       09/18 0700 - 09/19 0659 09/19 0700 - 09/20 0659 09/20 0700 - 09/21 0659    P.O. 200      I.V. (mL/kg) 443.3 (7.1) 1288 (20.6)     Total Intake(mL/kg) 643.3 (10.3) 1288 (20.6)     Urine (mL/kg/hr) 400 (0.3) 2400 (1.6)     Drains  5     Total Output 400 2405     Net +243.3 -1117                Physical Exam   Constitutional: He is oriented to person, place, and time. He appears cachectic. No distress.   HENT:   Head: Normocephalic and atraumatic  NGT in place, minimal output.   Eyes: No scleral icterus.   Cardiovascular: Normal rate and regular rhythm.   Pulmonary/Chest: Effort normal. No respiratory distress.   Abdominal: Soft. He exhibits no distension. Upper midline incision, 1 port sites cdi, J tube   Musculoskeletal: He exhibits no edema.   Neurological: He is alert and oriented to person, place, and time.   Skin: Skin is warm. Capillary refill takes less than 2 seconds.     Significant Labs:   CBC:   Recent Labs   Lab 09/20/19 0414   WBC 15.45*   RBC 3.57*   HGB 9.8*   HCT 32.8*   *   MCV 92   MCH 27.5   MCHC 29.9*     CMP:   Recent Labs   Lab 09/17/19  0658  09/20/19 0414   GLU 96   < > 83   CALCIUM 7.8*   < > 8.0*   ALBUMIN 2.3*  --   --    PROT 5.8*  --   --       < > 136   K 3.4*   < > 3.9   CO2 24   < > 25   CL  109   < > 103   BUN 2*   < > 4*   CREATININE 0.6   < > 0.6   ALKPHOS 75  --   --    ALT 7*  --   --    AST 14  --   --    BILITOT 0.4  --   --     < > = values in this interval not displayed.     Coagulation: No results for input(s): LABPROT, INR, APTT in the last 168 hours.    Significant Diagnostics:  I have reviewed all pertinent imaging results/findings within the past 24 hours.    Assessment/Plan:     * Gastric outlet obstruction  60 y/o male with locally advanced gastric cancer s/p right IJ port placement; diagnostic laparoscopy converted to open gastrojejunostomy bypass; feeding jejunostomy placement 9/19.     - 1L bolus  - d/c NGT  - add toradol, increase PCA  - CLD and TF tomorrow  - encourage ambulation, IS  - daily labs    Dispo: Possibly Monday    Signet ring cell adenocarcinoma of stomach  60 yo M with newly diagnosed signet ring cell CA of the distal stomach associated with significant weight loss, small volume ascites, and possible partial gastric outlet obstruction.     - will require neoadjuvant vs definitive chemo  - no evidence of metastatic disease on CT chest and dx laparoscopy      Cecilia Oden MD  General Surgery  [facility]

## 2019-09-20 NOTE — PLAN OF CARE
09/20/19 1217   Post-Acute Status   Post-Acute Authorization Home Health/Hospice   Home Health/Hospice Status Referrals Sent     SW sent referral to You.       Floresita Bates LMSW  [facility]  [id number]

## 2019-09-20 NOTE — PT/OT/SLP PROGRESS
Physical Therapy      Patient Name:  Isaiah Greene   MRN:  [id number]    Patient not seen today secondary to pain. He stated that his pain level was too high and he could not tolerate activity. RN notified. Will follow-up again tomorrow.    Karon العراقي, PT

## 2019-09-20 NOTE — SUBJECTIVE & OBJECTIVE
Interval History:  No acute events.  Pain poorly controlled.   Otherwise doing well.     Medications:  Continuous Infusions:   hydromorphone in 0.9 % NaCl 6 mg/30 ml      lactated ringers 125 mL/hr at 09/19/19 1845     Scheduled Meds:   enoxaparin  40 mg Subcutaneous Daily    lactated ringers  1,000 mL Intravenous Once    levalbuterol  0.63 mg Nebulization Q6H WAKE    nicotine  1 patch Transdermal Daily    pantoprazole  40 mg Intravenous BID     PRN Meds:sodium chloride, Dextrose 10% Bolus, Dextrose 10% Bolus, glucagon (human recombinant), glucose, glucose, iohexol, naloxone, ondansetron, promethazine (PHENERGAN) IVPB, sodium chloride 0.9%, sodium chloride 0.9%     Review of patient's allergies indicates:  No Known Allergies     Objective:     Vital Signs (Most Recent):  Temp: 98.1 °F (36.7 °C) (09/20/19 0820)  Pulse: 77 (09/20/19 0825)  Resp: (!) 24 (09/20/19 0825)  BP: (!) 160/84 (09/20/19 0820)  SpO2: 98 % (09/20/19 0825) Vital Signs (24h Range):  Temp:  [96.2 °F (35.7 °C)-98.5 °F (36.9 °C)] 98.1 °F (36.7 °C)  Pulse:  [] 77  Resp:  [10-24] 24  SpO2:  [95 %-100 %] 98 %  BP: (114-165)/(73-97) 160/84     Weight: 62.6 kg (138 lb)  Body mass index is 19.8 kg/m².    Intake/Output - Last 3 Shifts       09/18 0700 - 09/19 0659 09/19 0700 - 09/20 0659 09/20 0700 - 09/21 0659    P.O. 200      I.V. (mL/kg) 443.3 (7.1) 1288 (20.6)     Total Intake(mL/kg) 643.3 (10.3) 1288 (20.6)     Urine (mL/kg/hr) 400 (0.3) 2400 (1.6)     Drains  5     Total Output 400 2405     Net +243.3 -1117                Physical Exam   Constitutional: He is oriented to person, place, and time. He appears cachectic. No distress.   HENT:   Head: Normocephalic and atraumatic.   Eyes: No scleral icterus.   Cardiovascular: Normal rate and regular rhythm.   Pulmonary/Chest: Effort normal. No respiratory distress.   Abdominal: Soft. He exhibits no distension.   Musculoskeletal: He exhibits no edema.   Neurological: He is alert and oriented to  person, place, and time.   Skin: Skin is warm. Capillary refill takes less than 2 seconds.     Significant Labs:  CBC:   Recent Labs   Lab 09/20/19  0414   WBC 15.45*   RBC 3.57*   HGB 9.8*   HCT 32.8*   *   MCV 92   MCH 27.5   MCHC 29.9*     CMP:   Recent Labs   Lab 09/17/19  0658  09/20/19  0414   GLU 96   < > 83   CALCIUM 7.8*   < > 8.0*   ALBUMIN 2.3*  --   --    PROT 5.8*  --   --       < > 136   K 3.4*   < > 3.9   CO2 24   < > 25      < > 103   BUN 2*   < > 4*   CREATININE 0.6   < > 0.6   ALKPHOS 75  --   --    ALT 7*  --   --    AST 14  --   --    BILITOT 0.4  --   --     < > = values in this interval not displayed.     Coagulation: No results for input(s): LABPROT, INR, APTT in the last 168 hours.    Significant Diagnostics:  I have reviewed all pertinent imaging results/findings within the past 24 hours.

## 2019-09-20 NOTE — NURSING TRANSFER
Nursing Transfer Note      9/19/2019     Transfer To: [address]    Transfer via stretcher    Transfer with iv pump, pca pump    Transported by PCT    Medicines sent: ivf, pca dilaudid    Chart send with patient: Yes    Notified: n/a    Patient reassessed at: 9/19/19 @ 3196

## 2019-09-20 NOTE — PLAN OF CARE
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 My clinical findings that support the need for the home health skilled services and home bound status are the following:  Weakness/numbness causing balance and gait disturbance due to Surgery making it taxing to leave home.    Allergies:Review of patient's allergies indicates:  No Known Allergies    Diet: clear liquids on 9/21/2019    Activities: activity as tolerated    Nursing:   SN to complete comprehensive assessment including routine vital signs. Instruct on disease process and s/s of complications to report to MD. Review/verify medication list sent home with the patient at time of discharge  and instruct patient/caregiver as needed. Frequency may be adjusted depending on start of care date.    Notify MD if SBP > 160 or < 90; DBP > 90 or < 50; HR > 120 or < 50; Temp > 101    CONSULTS:    Physical Therapy  to evaluate and treat. Evaluate for home safety and equipment needs; Establish/upgrade home exercise program. Perform / instruct on therapeutic exercises, gait training, transfer training, and Range of Motion.  Occupational Therapy to evaluate and treat. Evaluate home environment for safety and equipment needs. Perform/Instruct on transfers, ADL training, ROM, and therapeutic exercises.   to evaluate for community resources/long-range planning.  Aide to provide assistance with personal care, ADLs, and vital signs.    Medications: Review discharge medications with patient and family and provide education.      There are no discharge medications for this patient.        Disciplines requested: Nursing Services to provide:   Other: S/P right IJ port placement; diagnostic laparoscopy converted to open gastrojejunostomy bypass; feeding jejunostomy placement 9/19.     Flush jejunostomy tube with 20cc water Q 8 hours     Free water flushes of 200cc via jejunostomy tube TID while awake and free water infusion @ 30cc/hour while tube feeds are infusing.      Monitor abdomen for redness, oozing from staple site, abdominal distension, or pain not controlled by pain medication.     TUBE FEEDS via jejunostomy tube: Isosource 1.5 (or equivalent) @ 20cc/hour (9/21/2019) from 2p-8a.  Increase by 10cc every day at 2pm to a goal of 70cc/hour.    Vitals signs daily. Call MD with Temperature > 101, SBP > 180, HR < 50.   Physician to follow patient's care (the person listed here will be responsible for signing ongoing orders): Other: Dr. MAEVE Saravia, Dr. LINA Morillo [phone] office [phone] fax Requested Start of Care Date: 9/20/2019    I certify that this patient is confined to his home and needs intermittent skilled nursing care, physical therapy and occupational therapy.        Electronically Signed  _________________________________  Silke Dennison NP  09/20/2019

## 2019-09-20 NOTE — ASSESSMENT & PLAN NOTE
Malnutrition in the context of Acute Illness/Injury    Related to (etiology):  New diagnosis gastric cancer, inadequate energy intake    Signs and Symptoms (as evidenced by):  Energy Intake: <50% of estimated energy requirement for 2 months  Body Fat Depletion: moderate and severe depletion of orbitals and triceps   Muscle Mass Depletion: moderate and severe depletion of temples, clavicle region, scapular region, interosseous muscle and lower extremities   Weight Loss: 13% x 2 months    Interventions/Recommendations (treatment strategy):  Collaboration of nutrition care with other providers    Nutrition Diagnosis Status:  New

## 2019-09-20 NOTE — SUBJECTIVE & OBJECTIVE
Interval History:   Overnight has experienced pain, controlled with medications.     Review of Systems   Unable to perform ROS: Other   Patient had left room and was out of the floor, then went for Surgery     Objective:     Vital Signs (Most Recent):  Temp: 97.9 °F (36.6 °C) (09/19/19 1830)  Pulse: 100 (09/19/19 1830)  Resp: 14 (09/19/19 1830)  BP: 139/83 (09/19/19 1830)  SpO2: 100 % (09/19/19 1858) Vital Signs (24h Range):  Temp:  [96.7 °F (35.9 °C)-98 °F (36.7 °C)] 97.9 °F (36.6 °C)  Pulse:  [] 100  Resp:  [14-18] 14  SpO2:  [95 %-100 %] 100 %  BP: (114-161)/(67-94) 139/83     Weight: 62.6 kg (138 lb)  Body mass index is 19.8 kg/m².    Intake/Output Summary (Last 24 hours) at 9/19/2019 1921  Last data filed at 9/19/2019 1624  Gross per 24 hour   Intake 1000 ml   Output --   Net 1000 ml      Physical Exam   Was unable to see him as he was off the floor and then went to surgery.    Significant Labs:   CBC:   Recent Labs   Lab 09/18/19 0429 09/19/19  0433   WBC 11.54 10.90   HGB 8.4* 8.6*   HCT 26.1* 27.2*   * 416*     CMP:   Recent Labs   Lab 09/18/19 0429 09/19/19  0433    137   K 3.9 3.6    107   CO2 19* 24   GLU 93 90   BUN 3* 3*   CREATININE 0.6 0.6   CALCIUM 7.8* 7.6*   ANIONGAP 10 6*   EGFRNONAA >60.0 >60.0       Significant Imaging: I have reviewed and interpreted all pertinent imaging results/findings within the past 24 hours.

## 2019-09-20 NOTE — PROGRESS NOTES
[facility] Hospital Medicine  Progress Note    Patient Name: Isaiah Greene  MRN: [id number]  Patient Class: IP- Inpatient   Admission Date: 9/16/2019  Length of Stay: 3 days  Attending Physician: Josse Saravia MD  Primary Care Provider: Primary Doctor [facility] Medicine Team: [facility] HOSP MED 4 Heidi Landis MD    Subjective:     Principal Problem:Gastric outlet obstruction        HPI:  58yo M that presents from OSH with gastric outlet obstruction and signet ring carcinoma on biopsy. Over the last two months pt began experiencing abdominal pain. He was found to have an inflamed ulcer and was sent home on a 1 month course of Protonix. Following completion of Protonix regimen his symptoms quickly returned and he went back to the OSH. They once again treated him with Protonix. Prior to filling his script after discharge he experienced severe abdominal pain and returned to the OSH. At this point he received an EGD which showed a bleeding ulcer and a biopsy was taken. The biopsy returned positive for signet ring carcinoma and H pylori. He received a chest CT at this time which demonstrated emphysematic changes, but no metastatic lesions. He was transferred to [facility] for further evaluation of the signet ring carcinoma. Upon arrival pt endorsed 9/10 abdominal pain. He stated he had been having loose watery stools without blood for the past 3-4 days after having no stools for 5 days prior. He also endorsed nausea without vomiting a few days ago but none currently. He endorsed 40lb unexpected weight-loss with no change in appetite over the past two months. He attributed this to the clear liquid diet that [facility] had him on. He endorses 40 pack-year history and was a previous drinker of 6 beers a day, but he quit a year ago. He has no family history of cancer.    Overview/Hospital Course:  Arrived via transfer from OS on 9/16. Pt kept on clear liquid diet upon arrival with NPO after  midnight per AES for possible EUS the next day. Pt reported abdominal pain but due to fear of exacerbating obstruction, opioids for pain management was held until CT was performed. Pain was treated with GI cocktail which provided some relief. CT AP with contrast was not performed that night and EUS was delayed pending CT AP with contrast and Surgical Onc recs. On 9/17 pt reported full, solid stool overnight. CT AP with contrast showed resolution of obstruction and pt was started back on a regular diet as tolerated. Pt found to be mildly hypokalemic on morning of 9/17 and was given KCl tabs. Started on iron tabs for iron deficiency anemia. Plan for surgery with surgical oncology 9/19/19    Interval History:   Overnight has experienced pain, controlled with medications.     Review of Systems   Unable to perform ROS: Other   Patient had left room and was out of the floor, then went for Surgery     Objective:     Vital Signs (Most Recent):  Temp: 97.9 °F (36.6 °C) (09/19/19 1830)  Pulse: 100 (09/19/19 1830)  Resp: 14 (09/19/19 1830)  BP: 139/83 (09/19/19 1830)  SpO2: 100 % (09/19/19 1858) Vital Signs (24h Range):  Temp:  [96.7 °F (35.9 °C)-98 °F (36.7 °C)] 97.9 °F (36.6 °C)  Pulse:  [] 100  Resp:  [14-18] 14  SpO2:  [95 %-100 %] 100 %  BP: (114-161)/(67-94) 139/83     Weight: 62.6 kg (138 lb)  Body mass index is 19.8 kg/m².    Intake/Output Summary (Last 24 hours) at 9/19/2019 1921  Last data filed at 9/19/2019 1624  Gross per 24 hour   Intake 1000 ml   Output --   Net 1000 ml      Physical Exam   Was unable to see him as he was off the floor and then went to surgery.    Significant Labs:   CBC:   Recent Labs   Lab 09/18/19 0429 09/19/19 0433   WBC 11.54 10.90   HGB 8.4* 8.6*   HCT 26.1* 27.2*   * 416*     CMP:   Recent Labs   Lab 09/18/19 0429 09/19/19  0433    137   K 3.9 3.6    107   CO2 19* 24   GLU 93 90   BUN 3* 3*   CREATININE 0.6 0.6   CALCIUM 7.8* 7.6*   ANIONGAP 10 6*   EGFRNONAA  >60.0 >60.0       Significant Imaging: I have reviewed and interpreted all pertinent imaging results/findings within the past 24 hours.      Assessment/Plan:      * Gastric outlet obstruction  5d hx of constipation with nausea but no vomiting  9/10 abdominal pain. Relieved temporarily with Protonix PO  CT Abd demonstrated gastric obstruction  Pt treated with protonix and clear liquid diet, which resulted in loose stools and passing gas  No gross blood observed in stool, but fecal occult positive  EGD revealed bleeding ulcer with inflammation. Biopsy of ulcer revealed signet ring carcinoma and H pylori  CT Chest did not reveal metastatic lesions  Continued reporting of 9/10 abdominal pain, without nausea. Abdomen is tender only over midline. Very mild distension reported by patient. Abdomen soft with normoactive bowel sounds. Continue to pass flatus  Transfer to C for further workup of carcinoma  Lipase 7  CT AP with contrast: There is scattered peritoneal fluid and diffuse anasarca.  Multiple fluid-filled loops of bowel predominantly small bowel as well as a paucity of feces in colon. No convincing bowel dilatation to indicate a focal obstruction.      Plan:  - Consult AES for staging of tumor. Appreciate recs  - Surgical Oncology recommends PORT placement tomorrow with diagnostic laparoscopy  - IV Protonix 40mg BID per rec from recent EGD and AES  - Diet: NPO with okay for sips of clear liquids to wet throat.  - Will hold on treatment of H pylori with triple therapy. Surg Onc and AES may need pt NPO at some point during treatment and it is preferable to wait until treatment can be completed without interruption  - Pain management with oxycodone PRN, dilaudid for breakthrough pain  - Hold NSAIDs    Iron deficiency anemia  Hgb 8.2 on admission 9/16  Iron studies with Iron level 10 at OSH  Likely d/t bleeding ulcer and cancer  Consent obtained for blood products  Type and screen performed    Plan:  - Trend CBC  -  Maintain hgb >7  - Administer 1U pRBC as needed for hgb<7  - Iron tablets qd    H. pylori infection  Noted on EGD biopsy    Plan:  - Consider triple therapy when pt can complete course without interruption  - Appreciate AES recs    Tobacco abuse counseling  40 pack-year history  Pt willing to consider cessation of smoking  Greater than 3 minutes of smoking cessation discussed with patient    Plan:  - Nicotine patch  - Tobacco cessation information packets      Iliac artery occlusion, right  Specifically, occlusion of the R common iliac as noted on abdominal CT. There appears to be reconstitution of flow to the right external iliac artery  Denies sxs of claudication  Doppler of LE was negative for VTE    Plan:  - SCD  - Progressive mobility protocol    Emphysema lung  Noted on Chest Ct  Pt reports CAMPBELL, but no SOB at rest  40 pack-year    Plan:  - Levobuterol  - Nicotine patch      Signet ring cell adenocarcinoma of stomach  Per EGD with biopsy  Family history of cancer in father, but undetermined what type or when  Hx of smoking, etoh. Previously worked laying floor tiles  Chest CT does not demonstrate metastatic lesions  Abd CT demonstrates ascites    See Gastric Outlet Obstruction    Severe protein-calorie malnutrition  - Nutrition consult            VTE Risk Mitigation (From admission, onward)        Ordered     enoxaparin injection 40 mg  Daily      09/19/19 1834     IP VTE HIGH RISK PATIENT  Once      09/19/19 1834     Place sequential compression device  Until discontinued      09/16/19 8054                Heidi Landis MD  Department of Hospital Medicine   [facility]

## 2019-09-20 NOTE — CONSULTS
"  [facility]  Adult Nutrition  Consult Note    SUMMARY     Recommendations    Recommendation/Intervention:   1. Once able, initiate clear liquids and advance diet as tolerated.   2. If TF warranted, recommend Isosource 1.5 at 55mL/hr.    -Initiate at 10mL/hr and advance as tolerated by 10mL/hr q2-4hrs until goal rate.    -Will provide 1980kcal, 90g protein, and 1008mL fluid.     -If nocturnal TF desired, recommend Isosource 1.5 at 80mL/hr X 12hrs.    -Will provide 1440kcal, 65g protein, and 730mL fluid. Will monitor.     Goals: Pt to receive nutrition by RD follow-up.   Nutrition Goal Status: new  Communication of RD Recs: reviewed with RN    Reason for Assessment    Reason For Assessment: consult  Diagnosis: other (see comments)(new gastric outlet obstruction and signet ring carcinoma)  Relevant Medical History: chronic gastritis  General Information Comments: Pt NPO at this time. Pt had J-tube placed yesterday. RN reports likely to have some gut rest for a few days. Pt reports wt loss of approx 20-25lb over last 2 months. Pt reports poor PO intake, would eat and vomit or have diarrhea immediately. NFPE completed, pt with wasting noted. Pt meets criteria for acute severe malnutrition at this time.   Nutrition Discharge Planning: Unclear at this time.     Nutrition/Diet History    Spiritual, Cultural Beliefs, Catholic Practices, Values that Affect Care: no  Factors Affecting Nutritional Intake: NPO, altered gastrointestinal function    Anthropometrics    Temp: 98.1 °F (36.7 °C)  Height: 5' 10" (177.8 cm)  Height (inches): 70 in  Weight Method: Bed Scale  Weight: 62.6 kg (138 lb)  Weight (lb): 138 lb  Ideal Body Weight (IBW), Male: 166 lb  % Ideal Body Weight, Male (lb): 83.13   BMI (Calculated): 19.8  BMI Grade: 18.5-24.9 - normal  Weight Loss: unintentional  Usual Body Weight (UBW), k.8 kg(13% wt loss over 2mos)  Weight Change Amount: 20 lb  % Usual Body Weight: 87.36   "   Lab/Procedures/Meds    Pertinent Labs Reviewed: reviewed  Pertinent Labs Comments: BUN 4, Ca 8  Pertinent Medications Reviewed: reviewed    Estimated/Assessed Needs    Weight Used For Calorie Calculations: 62.6 kg (138 lb 0.1 oz)  Energy Calorie Requirements (kcal): 1984-2267  Energy Need Method: Kcal/kg(35-40)  Protein Requirements: 75-87g (1.2-1.4g/kg)  Weight Used For Protein Calculations: 62.6 kg (138 lb 0.1 oz)  Fluid Requirements (mL): Per MD  RDA Method (mL): 1984     Nutrition Prescription Ordered    Current Diet Order: NPO    Evaluation of Received Nutrient/Fluid Intake    I/O: -2.5L since admit  Comments: LBM 9/17  % Intake of Estimated Energy Needs: 0 - 25 %  % Meal Intake: NPO    Nutrition Risk    Level of Risk/Frequency of Follow-up: (2X/week)     Assessment and Plan    Severe protein-calorie malnutrition  Malnutrition in the context of Acute Illness/Injury    Related to (etiology):  New diagnosis gastric cancer, inadequate energy intake    Signs and Symptoms (as evidenced by):  Energy Intake: <50% of estimated energy requirement for 2 months  Body Fat Depletion: moderate and severe depletion of orbitals and triceps   Muscle Mass Depletion: moderate and severe depletion of temples, clavicle region, scapular region, interosseous muscle and lower extremities   Weight Loss: 13% x 2 months    Interventions/Recommendations (treatment strategy):  Collaboration of nutrition care with other providers    Nutrition Diagnosis Status:  New       Monitor and Evaluation    Food and Nutrient Intake: energy intake, enteral nutrition intake  Food and Nutrient Adminstration: diet order, enteral and parenteral nutrition administration  Anthropometric Measurements: weight, weight change  Biochemical Data, Medical Tests and Procedures: other (specify)(All labs)  Nutrition-Focused Physical Findings: overall appearance     Malnutrition Assessment    Weight Loss (Malnutrition): greater than 5% in 1 month(13% in 2  mos)  Energy Intake (Malnutrition): less than 75% for greater than or equal to 1 month   Orbital Region (Subcutaneous Fat Loss): severe depletion  Upper Arm Region (Subcutaneous Fat Loss): severe depletion   [place] Region (Muscle Loss): moderate depletion  Clavicle Bone Region (Muscle Loss): severe depletion  Clavicle and Acromion Bone Region (Muscle Loss): severe depletion  Dorsal Hand (Muscle Loss): moderate depletion  Patellar Region (Muscle Loss): moderate depletion  Anterior Thigh Region (Muscle Loss): moderate depletion     Nutrition Follow-Up    RD Follow-up?: Yes

## 2019-09-20 NOTE — ASSESSMENT & PLAN NOTE
Noted on Chest Ct  Pt reports CAMPBELL, but no SOB at rest  40 pack-year    Plan:  - Levobuterol  - Nicotine patch

## 2019-09-20 NOTE — ASSESSMENT & PLAN NOTE
60 y/o male with locally advanced gastric cancer s/p right IJ port placement; diagnostic laparoscopy converted to open gastrojejunostomy bypass; feeding jejunostomy placement 9/19.     - 1L bolus  - d/c NGT  - add toradol, increase PCA  - CLD and TF tomorrow  - encourage ambulation, IS  - daily labs    Dispo: Possibly Monday

## 2019-09-20 NOTE — ASSESSMENT & PLAN NOTE
5d hx of constipation with nausea but no vomiting  9/10 abdominal pain. Relieved temporarily with Protonix PO  CT Abd demonstrated gastric obstruction  Pt treated with protonix and clear liquid diet, which resulted in loose stools and passing gas  No gross blood observed in stool, but fecal occult positive  EGD revealed bleeding ulcer with inflammation. Biopsy of ulcer revealed signet ring carcinoma and H pylori  CT Chest did not reveal metastatic lesions  Continued reporting of 9/10 abdominal pain, without nausea. Abdomen is tender only over midline. Very mild distension reported by patient. Abdomen soft with normoactive bowel sounds. Continue to pass flatus  Transfer to [facility] for further workup of carcinoma  Lipase 7  CT AP with contrast: There is scattered peritoneal fluid and diffuse anasarca.  Multiple fluid-filled loops of bowel predominantly small bowel as well as a paucity of feces in colon. No convincing bowel dilatation to indicate a focal obstruction.      Plan:  - Consult AES for staging of tumor. Appreciate recs  - Surgical Oncology recommends PORT placement tomorrow with diagnostic laparoscopy  - IV Protonix 40mg BID per rec from recent EGD and AES  - Diet: NPO with okay for sips of clear liquids to wet throat.  - Will hold on treatment of H pylori with triple therapy. Surg Onc and AES may need pt NPO at some point during treatment and it is preferable to wait until treatment can be completed without interruption  - Pain management with oxycodone PRN, dilaudid for breakthrough pain  - Hold NSAIDs

## 2019-09-20 NOTE — PT/OT/SLP PROGRESS
Occupational Therapy      Patient Name:  Isaiah Greene   MRN:  [id number]    Patient not seen today secondary to pain. Pt stated that his pain level was too high and he could not tolerate activity. RN notified. Will follow-up again tomorrow.      Kae Jesus OT  9/20/2019

## 2019-09-20 NOTE — PLAN OF CARE
SW put in request to [place] ([facility] office) to meet with pt to discuss disability options.    Floresita Bates, CONG  [facility]  [id number]

## 2019-09-20 NOTE — ANESTHESIA POSTPROCEDURE EVALUATION
Anesthesia Post Evaluation    Patient: Isaiah Greene    Procedure(s) Performed: Procedure(s) (LRB):  INSERTION, VENOUS ACCESS PORT (Right)  LAPAROSCOPY, DIAGNOSTIC (N/A)  INSERTION, JEJUNOSTOMY TUBE, LAPAROSCOPIC, possible open (N/A)  GASTROJEJUNOSTOMY, possible G-tube (N/A)    Final Anesthesia Type: general  Patient location during evaluation: PACU  Patient participation: Yes- Able to Participate  Level of consciousness: awake and alert  Post-procedure vital signs: reviewed and stable  Pain management: adequate  Airway patency: patent  PONV status at discharge: No PONV  Anesthetic complications: no      Cardiovascular status: hemodynamically stable  Respiratory status: unassisted  Hydration status: euvolemic  Follow-up not needed.          Vitals Value Taken Time   /84 9/20/2019  8:20 AM   Temp 36.7 °C (98.1 °F) 9/20/2019  8:20 AM   Pulse 77 9/20/2019  8:25 AM   Resp 24 9/20/2019  8:25 AM   SpO2 98 % 9/20/2019  8:25 AM         Event Time     Out of Recovery 09/19/2019 19:15:00          Pain/Chris Score: Pain Rating Prior to Med Admin: 8 (9/20/2019  9:03 AM)  Chris Score: 9 (9/19/2019  7:15 PM)

## 2019-09-20 NOTE — PLAN OF CARE
POD1 J tube, ex lap and port. Referral for tube feeds sent to You. TERESA 9/23.     09/20/19 6733   Discharge Reassessment   Assessment Type Discharge Planning Reassessment   Discharge Plan A Home with family;Other  (tube feeds)   Discharge Plan B Home with family;Home Health;Other  (tube feeds)   Anticipated Discharge Disposition Home   Post-Acute Status   Post-Acute Authorization Medications   Medication Status Pending Bedside Delivery  (needs teaching and ins auth)   Discharge Delays (!) Diet Not Ready for Discharge  (POD1 Jtube - advancing feeds)

## 2019-09-20 NOTE — ASSESSMENT & PLAN NOTE
60 yo M with newly diagnosed signet ring cell CA of the distal stomach associated with significant weight loss, small volume ascites, and partial gastric outlet obstruction.     - will require neoadjuvant vs definitive chemo  - no evidence of metastatic disease on CT chest and dx laparoscopy

## 2019-09-20 NOTE — PLAN OF CARE
Problem: Adult Inpatient Plan of Care  Goal: Plan of Care Review  Outcome: Ongoing (interventions implemented as appropriate)  Recommendations    Recommendation/Intervention:   1. Once able, initiate clear liquids and advance diet as tolerated.   2. If TF warranted, recommend Isosource 1.5 at 55mL/hr.    -Initiate at 10mL/hr and advance as tolerated by 10mL/hr q2-4hrs until goal rate.    -Will provide 1980kcal, 90g protein, and 1008mL fluid.     -If nocturnal TF desired, recommend Isosource 1.5 at 80mL/hr X 12hrs.    -Will provide 1440kcal, 65g protein, and 730mL fluid. Will monitor.     Goals: Pt to receive nutrition by RD follow-up.

## 2019-09-21 LAB
ANION GAP SERPL CALC-SCNC: 8 MMOL/L (ref 8–16)
ANISOCYTOSIS BLD QL SMEAR: SLIGHT
BASOPHILS # BLD AUTO: 0.02 K/UL (ref 0–0.2)
BASOPHILS NFR BLD: 0.1 % (ref 0–1.9)
BLD PROD TYP BPU: NORMAL
BLD PROD TYP BPU: NORMAL
BLOOD UNIT EXPIRATION DATE: NORMAL
BLOOD UNIT EXPIRATION DATE: NORMAL
BLOOD UNIT TYPE CODE: 6200
BLOOD UNIT TYPE CODE: 6200
BLOOD UNIT TYPE: NORMAL
BLOOD UNIT TYPE: NORMAL
BUN SERPL-MCNC: 5 MG/DL (ref 6–20)
CALCIUM SERPL-MCNC: 7.9 MG/DL (ref 8.7–10.5)
CHLORIDE SERPL-SCNC: 104 MMOL/L (ref 95–110)
CO2 SERPL-SCNC: 29 MMOL/L (ref 23–29)
CODING SYSTEM: NORMAL
CODING SYSTEM: NORMAL
CREAT SERPL-MCNC: 0.6 MG/DL (ref 0.5–1.4)
DIFFERENTIAL METHOD: ABNORMAL
DISPENSE STATUS: NORMAL
DISPENSE STATUS: NORMAL
EOSINOPHIL # BLD AUTO: 0.6 K/UL (ref 0–0.5)
EOSINOPHIL NFR BLD: 3.9 % (ref 0–8)
ERYTHROCYTE [DISTWIDTH] IN BLOOD BY AUTOMATED COUNT: 14.3 % (ref 11.5–14.5)
EST. GFR  (AFRICAN AMERICAN): >60 ML/MIN/1.73 M^2
EST. GFR  (NON AFRICAN AMERICAN): >60 ML/MIN/1.73 M^2
GLUCOSE SERPL-MCNC: 110 MG/DL (ref 70–110)
HCT VFR BLD AUTO: 32.1 % (ref 40–54)
HGB BLD-MCNC: 9.7 G/DL (ref 14–18)
HYPOCHROMIA BLD QL SMEAR: ABNORMAL
IMM GRANULOCYTES # BLD AUTO: 0.04 K/UL (ref 0–0.04)
IMM GRANULOCYTES NFR BLD AUTO: 0.3 % (ref 0–0.5)
LYMPHOCYTES # BLD AUTO: 7.5 K/UL (ref 1–4.8)
LYMPHOCYTES NFR BLD: 52.8 % (ref 18–48)
MAGNESIUM SERPL-MCNC: 2 MG/DL (ref 1.6–2.6)
MCH RBC QN AUTO: 28 PG (ref 27–31)
MCHC RBC AUTO-ENTMCNC: 30.2 G/DL (ref 32–36)
MCV RBC AUTO: 93 FL (ref 82–98)
MONOCYTES # BLD AUTO: 0.6 K/UL (ref 0.3–1)
MONOCYTES NFR BLD: 4.4 % (ref 4–15)
NEUTROPHILS # BLD AUTO: 5.5 K/UL (ref 1.8–7.7)
NEUTROPHILS NFR BLD: 38.5 % (ref 38–73)
NRBC BLD-RTO: 0 /100 WBC
NUM UNITS TRANS PACKED RBC: NORMAL
NUM UNITS TRANS PACKED RBC: NORMAL
OVALOCYTES BLD QL SMEAR: ABNORMAL
PHOSPHATE SERPL-MCNC: 2.6 MG/DL (ref 2.7–4.5)
PLATELET # BLD AUTO: 498 K/UL (ref 150–350)
PLATELET BLD QL SMEAR: ABNORMAL
PMV BLD AUTO: 9.3 FL (ref 9.2–12.9)
POIKILOCYTOSIS BLD QL SMEAR: SLIGHT
POLYCHROMASIA BLD QL SMEAR: ABNORMAL
POTASSIUM SERPL-SCNC: 4.6 MMOL/L (ref 3.5–5.1)
RBC # BLD AUTO: 3.46 M/UL (ref 4.6–6.2)
SODIUM SERPL-SCNC: 141 MMOL/L (ref 136–145)
WBC # BLD AUTO: 14.19 K/UL (ref 3.9–12.7)

## 2019-09-21 PROCEDURE — 63600175 PHARM REV CODE 636 W HCPCS: Performed by: STUDENT IN AN ORGANIZED HEALTH CARE EDUCATION/TRAINING PROGRAM

## 2019-09-21 PROCEDURE — 25000003 PHARM REV CODE 250: Performed by: STUDENT IN AN ORGANIZED HEALTH CARE EDUCATION/TRAINING PROGRAM

## 2019-09-21 PROCEDURE — 94664 DEMO&/EVAL PT USE INHALER: CPT

## 2019-09-21 PROCEDURE — C9113 INJ PANTOPRAZOLE SODIUM, VIA: HCPCS | Performed by: STUDENT IN AN ORGANIZED HEALTH CARE EDUCATION/TRAINING PROGRAM

## 2019-09-21 PROCEDURE — 99900035 HC TECH TIME PER 15 MIN (STAT)

## 2019-09-21 PROCEDURE — 94761 N-INVAS EAR/PLS OXIMETRY MLT: CPT

## 2019-09-21 PROCEDURE — 94640 AIRWAY INHALATION TREATMENT: CPT

## 2019-09-21 PROCEDURE — 12000002 HC ACUTE/MED SURGE SEMI-PRIVATE ROOM

## 2019-09-21 PROCEDURE — 84100 ASSAY OF PHOSPHORUS: CPT

## 2019-09-21 PROCEDURE — 25000242 PHARM REV CODE 250 ALT 637 W/ HCPCS: Performed by: STUDENT IN AN ORGANIZED HEALTH CARE EDUCATION/TRAINING PROGRAM

## 2019-09-21 PROCEDURE — 85025 COMPLETE CBC W/AUTO DIFF WBC: CPT

## 2019-09-21 PROCEDURE — 80048 BASIC METABOLIC PNL TOTAL CA: CPT

## 2019-09-21 PROCEDURE — 83735 ASSAY OF MAGNESIUM: CPT

## 2019-09-21 PROCEDURE — S4991 NICOTINE PATCH NONLEGEND: HCPCS | Performed by: STUDENT IN AN ORGANIZED HEALTH CARE EDUCATION/TRAINING PROGRAM

## 2019-09-21 PROCEDURE — 36415 COLL VENOUS BLD VENIPUNCTURE: CPT

## 2019-09-21 PROCEDURE — 99900038 HC OT GENERIC THERAPY SCREENING (STAT)

## 2019-09-21 RX ORDER — HYDROCODONE BITARTRATE AND ACETAMINOPHEN 7.5; 325 MG/15ML; MG/15ML
10 SOLUTION ORAL EVERY 4 HOURS PRN
Status: DISCONTINUED | OUTPATIENT
Start: 2019-09-21 | End: 2019-09-22

## 2019-09-21 RX ORDER — HYDROMORPHONE HYDROCHLORIDE 1 MG/ML
0.5 INJECTION, SOLUTION INTRAMUSCULAR; INTRAVENOUS; SUBCUTANEOUS
Status: DISCONTINUED | OUTPATIENT
Start: 2019-09-21 | End: 2019-09-23 | Stop reason: HOSPADM

## 2019-09-21 RX ORDER — HYDROCODONE BITARTRATE AND ACETAMINOPHEN 7.5; 325 MG/15ML; MG/15ML
20 SOLUTION ORAL EVERY 4 HOURS PRN
Status: DISCONTINUED | OUTPATIENT
Start: 2019-09-21 | End: 2019-09-22

## 2019-09-21 RX ADMIN — KETOROLAC TROMETHAMINE 15 MG: 15 INJECTION, SOLUTION INTRAMUSCULAR; INTRAVENOUS at 03:09

## 2019-09-21 RX ADMIN — HYDROMORPHONE HYDROCHLORIDE 0.5 MG: 1 INJECTION, SOLUTION INTRAMUSCULAR; INTRAVENOUS; SUBCUTANEOUS at 10:09

## 2019-09-21 RX ADMIN — PANTOPRAZOLE SODIUM 40 MG: 40 INJECTION, POWDER, FOR SOLUTION INTRAVENOUS at 10:09

## 2019-09-21 RX ADMIN — HYDROCODONE BITARTRATE AND ACETAMINOPHEN 20 ML: 7.5; 325 SOLUTION ORAL at 04:09

## 2019-09-21 RX ADMIN — Medication: at 01:09

## 2019-09-21 RX ADMIN — LEVALBUTEROL HYDROCHLORIDE 0.63 MG: 0.63 SOLUTION RESPIRATORY (INHALATION) at 08:09

## 2019-09-21 RX ADMIN — HYDROMORPHONE HYDROCHLORIDE 0.5 MG: 1 INJECTION, SOLUTION INTRAMUSCULAR; INTRAVENOUS; SUBCUTANEOUS at 05:09

## 2019-09-21 RX ADMIN — KETOROLAC TROMETHAMINE 15 MG: 15 INJECTION, SOLUTION INTRAMUSCULAR; INTRAVENOUS at 06:09

## 2019-09-21 RX ADMIN — NICOTINE 1 PATCH: 21 PATCH, EXTENDED RELEASE TRANSDERMAL at 09:09

## 2019-09-21 RX ADMIN — PANTOPRAZOLE SODIUM 40 MG: 40 INJECTION, POWDER, FOR SOLUTION INTRAVENOUS at 08:09

## 2019-09-21 RX ADMIN — HYDROCODONE BITARTRATE AND ACETAMINOPHEN 20 ML: 7.5; 325 SOLUTION ORAL at 08:09

## 2019-09-21 RX ADMIN — KETOROLAC TROMETHAMINE 15 MG: 15 INJECTION, SOLUTION INTRAMUSCULAR; INTRAVENOUS at 11:09

## 2019-09-21 RX ADMIN — ENOXAPARIN SODIUM 40 MG: 100 INJECTION SUBCUTANEOUS at 04:09

## 2019-09-21 NOTE — CARE UPDATE
pt not using Ecto2, the monitor not in the room, and pt is using medication from the PCA. Nurse was notified that the pt not on ecto2.

## 2019-09-21 NOTE — ASSESSMENT & PLAN NOTE
58 y/o male with locally advanced gastric cancer s/p right IJ port placement; diagnostic laparoscopy converted to open gastrojejunostomy bypass; feeding jejunostomy placement 9/19.     - Pain: toradol, PCA  - CLD and TF today  - encourage ambulation, IS  - daily labs    Dispo: Possibly Monday

## 2019-09-21 NOTE — PT/OT/SLP PROGRESS
Physical Therapy  Screen      Patient Name:  Isaiah Greene   MRN:  [id number]    PT orders acknowledged. Pt observed walking in hallway without deficits. Spoke with pt who reported he has no concerns currently with his mobility. Orders discontinued.       Gloria Gustafson, PT, DPT  9/21/2019

## 2019-09-21 NOTE — SUBJECTIVE & OBJECTIVE
Interval History: NAEON. Pain well controlled. Ambulating without difficulty. UOP good. No other concerns.    Medications:  Continuous Infusions:   hydromorphone in 0.9 % NaCl 6 mg/30 ml      lactated ringers 125 mL/hr at 09/19/19 1845     Scheduled Meds:   enoxaparin  40 mg Subcutaneous Daily    ketorolac  15 mg Intravenous Q8H    levalbuterol  0.63 mg Nebulization Q6H WAKE    nicotine  1 patch Transdermal Daily    pantoprazole  40 mg Intravenous BID    sodium phosphate IVPB  20.01 mmol Intravenous Once     PRN Meds:sodium chloride, Dextrose 10% Bolus, Dextrose 10% Bolus, glucagon (human recombinant), glucose, glucose, iohexol, naloxone, ondansetron, promethazine (PHENERGAN) IVPB, sodium chloride 0.9%, sodium chloride 0.9%     Review of patient's allergies indicates:  No Known Allergies     Objective:     Vital Signs (Most Recent):  Temp: 97.3 °F (36.3 °C) (09/21/19 0827)  Pulse: 90 (09/21/19 0827)  Resp: 16 (09/21/19 0827)  BP: (!) 141/80 (09/21/19 0827)  SpO2: 99 % (09/21/19 0827) Vital Signs (24h Range):  Temp:  [97.1 °F (36.2 °C)-99.5 °F (37.5 °C)] 97.3 °F (36.3 °C)  Pulse:  [78-96] 90  Resp:  [16-18] 16  SpO2:  [96 %-99 %] 99 %  BP: (119-168)/(73-92) 141/80     Weight: 62.6 kg (138 lb)  Body mass index is 19.8 kg/m².    Intake/Output - Last 3 Shifts       09/19 0700 - 09/20 0659 09/20 0700 - 09/21 0659 09/21 0700 - 09/22 0659    P.O.       I.V. (mL/kg) 1288 (20.6)      Total Intake(mL/kg) 1288 (20.6)      Urine (mL/kg/hr) 2400 (1.6) 900 (0.6)     Drains 5      Total Output 2405 900     Net -1117 900                Physical Exam   Constitutional: He is oriented to person, place, and time. He appears cachectic. No distress.   HENT:   Head: Normocephalic and atraumatic.   Eyes: No scleral icterus.   Cardiovascular: Normal rate and intact distal pulses.   Pulmonary/Chest: Effort normal. No respiratory distress.   Abdominal: Soft. He exhibits no distension.   J-tube in place.   Musculoskeletal: He exhibits  no edema.   Neurological: He is alert and oriented to person, place, and time.   Skin: Skin is warm. Capillary refill takes less than 2 seconds.     Significant Labs:  CBC:   Recent Labs   Lab 09/21/19  0337   WBC 14.19*   RBC 3.46*   HGB 9.7*   HCT 32.1*   *   MCV 93   MCH 28.0   MCHC 30.2*     CMP:   Recent Labs   Lab 09/17/19  0658  09/21/19 0337   GLU 96   < > 110   CALCIUM 7.8*   < > 7.9*   ALBUMIN 2.3*  --   --    PROT 5.8*  --   --       < > 141   K 3.4*   < > 4.6   CO2 24   < > 29      < > 104   BUN 2*   < > 5*   CREATININE 0.6   < > 0.6   ALKPHOS 75  --   --    ALT 7*  --   --    AST 14  --   --    BILITOT 0.4  --   --     < > = values in this interval not displayed.     Coagulation: No results for input(s): LABPROT, INR, APTT in the last 168 hours.    Significant Diagnostics:  I have reviewed all pertinent imaging results/findings within the past 24 hours.

## 2019-09-21 NOTE — PT/OT/SLP PROGRESS
Occupational Therapy      Patient Name:  Isaiah Greene   MRN:  [id number]    Per discussion with PT, pt was seen ambulating hallways with mod (I). Pt has no concerns with fx'l mobility or ADLs at this time. Pt to be d/c at this time. Please re-consult occupational therapy services for further evaluation if warranted.    Gina Joshi OT  9/21/2019

## 2019-09-22 LAB
ANION GAP SERPL CALC-SCNC: 7 MMOL/L (ref 8–16)
ANISOCYTOSIS BLD QL SMEAR: SLIGHT
BASO STIPL BLD QL SMEAR: ABNORMAL
BASOPHILS # BLD AUTO: 0.05 K/UL (ref 0–0.2)
BASOPHILS NFR BLD: 0.4 % (ref 0–1.9)
BUN SERPL-MCNC: 4 MG/DL (ref 6–20)
CALCIUM SERPL-MCNC: 7.7 MG/DL (ref 8.7–10.5)
CHLORIDE SERPL-SCNC: 107 MMOL/L (ref 95–110)
CO2 SERPL-SCNC: 24 MMOL/L (ref 23–29)
CREAT SERPL-MCNC: 0.6 MG/DL (ref 0.5–1.4)
DIFFERENTIAL METHOD: ABNORMAL
EOSINOPHIL # BLD AUTO: 1.3 K/UL (ref 0–0.5)
EOSINOPHIL NFR BLD: 11.3 % (ref 0–8)
ERYTHROCYTE [DISTWIDTH] IN BLOOD BY AUTOMATED COUNT: 14.2 % (ref 11.5–14.5)
EST. GFR  (AFRICAN AMERICAN): >60 ML/MIN/1.73 M^2
EST. GFR  (NON AFRICAN AMERICAN): >60 ML/MIN/1.73 M^2
GLUCOSE SERPL-MCNC: 98 MG/DL (ref 70–110)
HCT VFR BLD AUTO: 26.3 % (ref 40–54)
HGB BLD-MCNC: 8 G/DL (ref 14–18)
IMM GRANULOCYTES # BLD AUTO: 0.02 K/UL (ref 0–0.04)
IMM GRANULOCYTES NFR BLD AUTO: 0.2 % (ref 0–0.5)
LYMPHOCYTES # BLD AUTO: 6.8 K/UL (ref 1–4.8)
LYMPHOCYTES NFR BLD: 57.3 % (ref 18–48)
MAGNESIUM SERPL-MCNC: 1.9 MG/DL (ref 1.6–2.6)
MCH RBC QN AUTO: 27.5 PG (ref 27–31)
MCHC RBC AUTO-ENTMCNC: 30.4 G/DL (ref 32–36)
MCV RBC AUTO: 90 FL (ref 82–98)
MONOCYTES # BLD AUTO: 0.6 K/UL (ref 0.3–1)
MONOCYTES NFR BLD: 5 % (ref 4–15)
NEUTROPHILS # BLD AUTO: 3.1 K/UL (ref 1.8–7.7)
NEUTROPHILS NFR BLD: 25.8 % (ref 38–73)
NRBC BLD-RTO: 0 /100 WBC
OVALOCYTES BLD QL SMEAR: ABNORMAL
PHOSPHATE SERPL-MCNC: 2.7 MG/DL (ref 2.7–4.5)
PLATELET # BLD AUTO: 453 K/UL (ref 150–350)
PMV BLD AUTO: 8.9 FL (ref 9.2–12.9)
POIKILOCYTOSIS BLD QL SMEAR: SLIGHT
POLYCHROMASIA BLD QL SMEAR: ABNORMAL
POTASSIUM SERPL-SCNC: 3.6 MMOL/L (ref 3.5–5.1)
RBC # BLD AUTO: 2.91 M/UL (ref 4.6–6.2)
SODIUM SERPL-SCNC: 138 MMOL/L (ref 136–145)
WBC # BLD AUTO: 11.8 K/UL (ref 3.9–12.7)

## 2019-09-22 PROCEDURE — 25000242 PHARM REV CODE 250 ALT 637 W/ HCPCS: Performed by: STUDENT IN AN ORGANIZED HEALTH CARE EDUCATION/TRAINING PROGRAM

## 2019-09-22 PROCEDURE — 63600175 PHARM REV CODE 636 W HCPCS: Performed by: STUDENT IN AN ORGANIZED HEALTH CARE EDUCATION/TRAINING PROGRAM

## 2019-09-22 PROCEDURE — 25000003 PHARM REV CODE 250: Performed by: STUDENT IN AN ORGANIZED HEALTH CARE EDUCATION/TRAINING PROGRAM

## 2019-09-22 PROCEDURE — 84100 ASSAY OF PHOSPHORUS: CPT

## 2019-09-22 PROCEDURE — 99900035 HC TECH TIME PER 15 MIN (STAT)

## 2019-09-22 PROCEDURE — 12000002 HC ACUTE/MED SURGE SEMI-PRIVATE ROOM

## 2019-09-22 PROCEDURE — 94664 DEMO&/EVAL PT USE INHALER: CPT

## 2019-09-22 PROCEDURE — 80048 BASIC METABOLIC PNL TOTAL CA: CPT

## 2019-09-22 PROCEDURE — 83735 ASSAY OF MAGNESIUM: CPT

## 2019-09-22 PROCEDURE — 94640 AIRWAY INHALATION TREATMENT: CPT

## 2019-09-22 PROCEDURE — 36415 COLL VENOUS BLD VENIPUNCTURE: CPT

## 2019-09-22 PROCEDURE — C9113 INJ PANTOPRAZOLE SODIUM, VIA: HCPCS | Performed by: STUDENT IN AN ORGANIZED HEALTH CARE EDUCATION/TRAINING PROGRAM

## 2019-09-22 PROCEDURE — 85025 COMPLETE CBC W/AUTO DIFF WBC: CPT

## 2019-09-22 PROCEDURE — S4991 NICOTINE PATCH NONLEGEND: HCPCS | Performed by: STUDENT IN AN ORGANIZED HEALTH CARE EDUCATION/TRAINING PROGRAM

## 2019-09-22 PROCEDURE — 94761 N-INVAS EAR/PLS OXIMETRY MLT: CPT

## 2019-09-22 RX ORDER — NAPROXEN SODIUM 220 MG/1
81 TABLET, FILM COATED ORAL DAILY
Status: DISCONTINUED | OUTPATIENT
Start: 2019-09-22 | End: 2019-09-23 | Stop reason: HOSPADM

## 2019-09-22 RX ORDER — HYDROCODONE BITARTRATE AND ACETAMINOPHEN 5; 325 MG/1; MG/1
1 TABLET ORAL EVERY 6 HOURS PRN
Status: DISCONTINUED | OUTPATIENT
Start: 2019-09-22 | End: 2019-09-23 | Stop reason: HOSPADM

## 2019-09-22 RX ORDER — LEVALBUTEROL INHALATION SOLUTION 0.63 MG/3ML
0.63 SOLUTION RESPIRATORY (INHALATION)
Status: COMPLETED | OUTPATIENT
Start: 2019-09-22 | End: 2019-09-22

## 2019-09-22 RX ORDER — HYDROCODONE BITARTRATE AND ACETAMINOPHEN 7.5; 325 MG/1; MG/1
1 TABLET ORAL EVERY 6 HOURS PRN
Status: DISCONTINUED | OUTPATIENT
Start: 2019-09-22 | End: 2019-09-23 | Stop reason: HOSPADM

## 2019-09-22 RX ADMIN — LEVALBUTEROL HYDROCHLORIDE 0.63 MG: 0.63 SOLUTION RESPIRATORY (INHALATION) at 07:09

## 2019-09-22 RX ADMIN — ASPIRIN 81 MG CHEWABLE TABLET 81 MG: 81 TABLET CHEWABLE at 07:09

## 2019-09-22 RX ADMIN — KETOROLAC TROMETHAMINE 15 MG: 15 INJECTION, SOLUTION INTRAMUSCULAR; INTRAVENOUS at 03:09

## 2019-09-22 RX ADMIN — HYDROMORPHONE HYDROCHLORIDE 0.5 MG: 1 INJECTION, SOLUTION INTRAMUSCULAR; INTRAVENOUS; SUBCUTANEOUS at 01:09

## 2019-09-22 RX ADMIN — HYDROCODONE BITARTRATE AND ACETAMINOPHEN 20 ML: 7.5; 325 SOLUTION ORAL at 07:09

## 2019-09-22 RX ADMIN — HYDROCODONE BITARTRATE AND ACETAMINOPHEN 20 ML: 7.5; 325 SOLUTION ORAL at 12:09

## 2019-09-22 RX ADMIN — HYDROCODONE BITARTRATE AND ACETAMINOPHEN 1 TABLET: 7.5; 325 TABLET ORAL at 08:09

## 2019-09-22 RX ADMIN — ENOXAPARIN SODIUM 40 MG: 100 INJECTION SUBCUTANEOUS at 04:09

## 2019-09-22 RX ADMIN — HYDROCODONE BITARTRATE AND ACETAMINOPHEN 20 ML: 7.5; 325 SOLUTION ORAL at 04:09

## 2019-09-22 RX ADMIN — HYDROMORPHONE HYDROCHLORIDE 0.5 MG: 1 INJECTION, SOLUTION INTRAMUSCULAR; INTRAVENOUS; SUBCUTANEOUS at 09:09

## 2019-09-22 RX ADMIN — HYDROMORPHONE HYDROCHLORIDE 0.5 MG: 1 INJECTION, SOLUTION INTRAMUSCULAR; INTRAVENOUS; SUBCUTANEOUS at 05:09

## 2019-09-22 RX ADMIN — PANTOPRAZOLE SODIUM 40 MG: 40 INJECTION, POWDER, FOR SOLUTION INTRAVENOUS at 07:09

## 2019-09-22 RX ADMIN — KETOROLAC TROMETHAMINE 15 MG: 15 INJECTION, SOLUTION INTRAMUSCULAR; INTRAVENOUS at 10:09

## 2019-09-22 RX ADMIN — PANTOPRAZOLE SODIUM 40 MG: 40 INJECTION, POWDER, FOR SOLUTION INTRAVENOUS at 08:09

## 2019-09-22 RX ADMIN — ONDANSETRON 4 MG: 2 INJECTION INTRAMUSCULAR; INTRAVENOUS at 01:09

## 2019-09-22 RX ADMIN — HYDROCODONE BITARTRATE AND ACETAMINOPHEN 20 ML: 7.5; 325 SOLUTION ORAL at 03:09

## 2019-09-22 RX ADMIN — KETOROLAC TROMETHAMINE 15 MG: 15 INJECTION, SOLUTION INTRAMUSCULAR; INTRAVENOUS at 08:09

## 2019-09-22 RX ADMIN — NICOTINE 1 PATCH: 21 PATCH, EXTENDED RELEASE TRANSDERMAL at 09:09

## 2019-09-22 NOTE — ASSESSMENT & PLAN NOTE
58 y/o male with locally advanced gastric cancer s/p right IJ port placement; diagnostic laparoscopy converted to open gastrojejunostomy bypass; feeding jejunostomy placement 9/19.     - Pain: toradol, PCA  - Diet: CLD, advance diet  - TF @ 30   - encourage ambulation, IS  - ASA 81  - daily labs    Dispo: Possibly Monday

## 2019-09-22 NOTE — SUBJECTIVE & OBJECTIVE
Interval History: NAEON. Pain well controlled. Ambulating without difficulty. UOP good.Tolerating clears without N/V, tube feeds running through J tube. No other concerns.    Medications:  Continuous Infusions:   lactated ringers 125 mL/hr at 09/19/19 1845     Scheduled Meds:   aspirin  81 mg Oral Daily    enoxaparin  40 mg Subcutaneous Daily    ketorolac  15 mg Intravenous Q8H    nicotine  1 patch Transdermal Daily    pantoprazole  40 mg Intravenous BID    sodium phosphate IVPB  20.01 mmol Intravenous Once     PRN Meds:sodium chloride, Dextrose 10% Bolus, Dextrose 10% Bolus, glucagon (human recombinant), glucose, glucose, hydrocodone-apap 7.5-325 MG/15 ML, hydrocodone-apap 7.5-325 MG/15 ML, HYDROmorphone, iohexol, ondansetron, promethazine (PHENERGAN) IVPB, sodium chloride 0.9%, sodium chloride 0.9%     Review of patient's allergies indicates:  No Known Allergies     Objective:     Vital Signs (Most Recent):  Temp: 97.5 °F (36.4 °C) (09/22/19 0744)  Pulse: 80 (09/22/19 0744)  Resp: 18 (09/22/19 0744)  BP: 129/78 (09/22/19 0744)  SpO2: 99 % (09/22/19 0744) Vital Signs (24h Range):  Temp:  [96.1 °F (35.6 °C)-98.9 °F (37.2 °C)] 97.5 °F (36.4 °C)  Pulse:  [72-87] 80  Resp:  [16-18] 18  SpO2:  [93 %-100 %] 99 %  BP: (125-149)/(75-82) 129/78     Weight: 62.6 kg (138 lb)  Body mass index is 19.8 kg/m².    Intake/Output - Last 3 Shifts       09/20 0700 - 09/21 0659 09/21 0700 - 09/22 0659 09/22 0700 - 09/23 0659    I.V. (mL/kg)       NG/GT   380    Total Intake(mL/kg)   380 (6.1)    Urine (mL/kg/hr) 900 (0.6)      Drains       Total Output 900      Net -900  +380               Physical Exam   Constitutional: He is oriented to person, place, and time. He appears cachectic. No distress.   HENT:   Head: Normocephalic and atraumatic.   Eyes: No scleral icterus.   Cardiovascular: Normal rate and intact distal pulses.   Pulmonary/Chest: Effort normal. No respiratory distress.   Abdominal: Soft. He exhibits no distension.    J-tube in place.   Musculoskeletal: He exhibits no edema.   Neurological: He is alert and oriented to person, place, and time.   Skin: Skin is warm. Capillary refill takes less than 2 seconds.     Significant Labs:  CBC:   Recent Labs   Lab 09/22/19  0343   WBC 11.80   RBC 2.91*   HGB 8.0*   HCT 26.3*   *   MCV 90   MCH 27.5   MCHC 30.4*     CMP:   Recent Labs   Lab 09/17/19  0658  09/22/19 0343   GLU 96   < > 98   CALCIUM 7.8*   < > 7.7*   ALBUMIN 2.3*  --   --    PROT 5.8*  --   --       < > 138   K 3.4*   < > 3.6   CO2 24   < > 24      < > 107   BUN 2*   < > 4*   CREATININE 0.6   < > 0.6   ALKPHOS 75  --   --    ALT 7*  --   --    AST 14  --   --    BILITOT 0.4  --   --     < > = values in this interval not displayed.     Coagulation: No results for input(s): LABPROT, INR, APTT in the last 168 hours.    Significant Diagnostics:  I have reviewed all pertinent imaging results/findings within the past 24 hours.

## 2019-09-22 NOTE — PROGRESS NOTES
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    Hospitalized 9/9-12 with abd pain and weight loss as well as history of nausea/vomiting about a month ago. Associated 25 pound weight loss over the past three months.  Claims he has largely been able to tolerate foods, but has had minimal appetite. CT 9/9 with marked gastric distention and proximal duodenal wall thickening, concerning for GOO but tolerating clears/ soft diet. EGD 9/10/19 with Grade D reflux esophagitis, gastric ulcer biopsied, stenosis at pylorus and normal duodenum.     Readmitted 9/14 with severe chest and epigastric pain with intolerance of diet. AES consulted for staging, plan for EUS 9/18.     New ascites on CT chest 9/14 concerning. Also severe emphysema.    Repeat CT 9/17 with improvement of gastric distention as well as small vol ascites.     (+) prior H.pylori infection, stains from EGD 9/10 (+)    Surg Onc consulted to assess oncologic and/or palliative surgical candidacy.         Post-Op Info:  Procedure(s) (LRB):  INSERTION, VENOUS ACCESS PORT (Right)  LAPAROSCOPY, DIAGNOSTIC (N/A)  INSERTION, JEJUNOSTOMY TUBE, LAPAROSCOPIC, possible open (N/A)  GASTROJEJUNOSTOMY, possible G-tube (N/A)   3 Days Post-Op     Interval History: NAEON. Pain well controlled. Ambulating without difficulty. UOP good.Tolerating clears without N/V, tube feeds running through J tube. No other concerns.    Medications:  Continuous Infusions:   lactated ringers 125 mL/hr at 09/19/19 5565     Scheduled Meds:   aspirin  81 mg Oral Daily    enoxaparin  40 mg Subcutaneous Daily    ketorolac  15 mg Intravenous Q8H    nicotine  1 patch Transdermal Daily    pantoprazole  40 mg Intravenous BID    sodium phosphate IVPB  20.01 mmol Intravenous Once     PRN Meds:sodium chloride,  Dextrose 10% Bolus, Dextrose 10% Bolus, glucagon (human recombinant), glucose, glucose, hydrocodone-apap 7.5-325 MG/15 ML, hydrocodone-apap 7.5-325 MG/15 ML, HYDROmorphone, iohexol, ondansetron, promethazine (PHENERGAN) IVPB, sodium chloride 0.9%, sodium chloride 0.9%     Review of patient's allergies indicates:  No Known Allergies     Objective:     Vital Signs (Most Recent):  Temp: 97.5 °F (36.4 °C) (09/22/19 0744)  Pulse: 80 (09/22/19 0744)  Resp: 18 (09/22/19 0744)  BP: 129/78 (09/22/19 0744)  SpO2: 99 % (09/22/19 0744) Vital Signs (24h Range):  Temp:  [96.1 °F (35.6 °C)-98.9 °F (37.2 °C)] 97.5 °F (36.4 °C)  Pulse:  [72-87] 80  Resp:  [16-18] 18  SpO2:  [93 %-100 %] 99 %  BP: (125-149)/(75-82) 129/78     Weight: 62.6 kg (138 lb)  Body mass index is 19.8 kg/m².    Intake/Output - Last 3 Shifts       09/20 0700 - 09/21 0659 09/21 0700 - 09/22 0659 09/22 0700 - 09/23 0659    I.V. (mL/kg)       NG/GT   380    Total Intake(mL/kg)   380 (6.1)    Urine (mL/kg/hr) 900 (0.6)      Drains       Total Output 900      Net -900  +380               Physical Exam   Constitutional: He is oriented to person, place, and time. He appears cachectic. No distress.   HENT:   Head: Normocephalic and atraumatic.   Eyes: No scleral icterus.   Cardiovascular: Normal rate and intact distal pulses.   Pulmonary/Chest: Effort normal. No respiratory distress.   Abdominal: Soft. He exhibits no distension.   J-tube in place.   Musculoskeletal: He exhibits no edema.   Neurological: He is alert and oriented to person, place, and time.   Skin: Skin is warm. Capillary refill takes less than 2 seconds.     Significant Labs:  CBC:   Recent Labs   Lab 09/22/19  0343   WBC 11.80   RBC 2.91*   HGB 8.0*   HCT 26.3*   *   MCV 90   MCH 27.5   MCHC 30.4*     CMP:   Recent Labs   Lab 09/17/19  0658  09/22/19  0343   GLU 96   < > 98   CALCIUM 7.8*   < > 7.7*   ALBUMIN 2.3*  --   --    PROT 5.8*  --   --       < > 138   K 3.4*   < > 3.6   CO2 24   <  > 24      < > 107   BUN 2*   < > 4*   CREATININE 0.6   < > 0.6   ALKPHOS 75  --   --    ALT 7*  --   --    AST 14  --   --    BILITOT 0.4  --   --     < > = values in this interval not displayed.     Coagulation: No results for input(s): LABPROT, INR, APTT in the last 168 hours.    Significant Diagnostics:  I have reviewed all pertinent imaging results/findings within the past 24 hours.    Assessment/Plan:     * Gastric outlet obstruction  58 y/o male with locally advanced gastric cancer s/p right IJ port placement; diagnostic laparoscopy converted to open gastrojejunostomy bypass; feeding jejunostomy placement 9/19.     - Pain: toradol, PCA  - Diet: CLD, advance diet  - TF @ 30   - encourage ambulation, IS  - ASA 81  - daily labs    Dispo: Possibly Monday    Signet ring cell adenocarcinoma of stomach  58 yo M with newly diagnosed signet ring cell CA of the distal stomach associated with significant weight loss, small volume ascites, and partial gastric outlet obstruction.     - will require neoadjuvant vs definitive chemo  - no evidence of metastatic disease on CT chest and dx laparoscopy          Ace Bazan MD  General Surgery  [facility]

## 2019-09-22 NOTE — PLAN OF CARE
Plan of care reviewed and updated. Pt AA+O. Pt's pain is managed with the medication ordered at this time. Pt's VS are as charted.  No falls this shift. Pt is oriented to room and call system. Will continue to montior.

## 2019-09-22 NOTE — NURSING
Patient was advanced to 40ml/hr per tube feeding last night. Patient consistently leaves floor so advancement has been made difficult.

## 2019-09-22 NOTE — NURSING
Pt received tube feeding at 30 cc/ hr from 8 am to 2 pm. Pt c/o of nausea. Requesting boost instead. He is consuming 100% of regular diet.

## 2019-09-23 ENCOUNTER — NURSE TRIAGE (OUTPATIENT)
Dept: ADMINISTRATIVE | Facility: CLINIC | Age: 59
End: 2019-09-23

## 2019-09-23 VITALS
BODY MASS INDEX: 19.76 KG/M2 | OXYGEN SATURATION: 96 % | SYSTOLIC BLOOD PRESSURE: 159 MMHG | WEIGHT: 138 LBS | RESPIRATION RATE: 18 BRPM | TEMPERATURE: 98 F | HEIGHT: 70 IN | HEART RATE: 94 BPM | DIASTOLIC BLOOD PRESSURE: 87 MMHG

## 2019-09-23 LAB
ANION GAP SERPL CALC-SCNC: 9 MMOL/L (ref 8–16)
ANISOCYTOSIS BLD QL SMEAR: SLIGHT
BASOPHILS # BLD AUTO: ABNORMAL K/UL (ref 0–0.2)
BASOPHILS NFR BLD: 0 % (ref 0–1.9)
BUN SERPL-MCNC: 8 MG/DL (ref 6–20)
CALCIUM SERPL-MCNC: 8.5 MG/DL (ref 8.7–10.5)
CHLORIDE SERPL-SCNC: 101 MMOL/L (ref 95–110)
CO2 SERPL-SCNC: 27 MMOL/L (ref 23–29)
CREAT SERPL-MCNC: 0.7 MG/DL (ref 0.5–1.4)
DIFFERENTIAL METHOD: ABNORMAL
EOSINOPHIL # BLD AUTO: ABNORMAL K/UL (ref 0–0.5)
EOSINOPHIL NFR BLD: 14 % (ref 0–8)
ERYTHROCYTE [DISTWIDTH] IN BLOOD BY AUTOMATED COUNT: 14.2 % (ref 11.5–14.5)
EST. GFR  (AFRICAN AMERICAN): >60 ML/MIN/1.73 M^2
EST. GFR  (NON AFRICAN AMERICAN): >60 ML/MIN/1.73 M^2
GLUCOSE SERPL-MCNC: 114 MG/DL (ref 70–110)
HCT VFR BLD AUTO: 32.8 % (ref 40–54)
HGB BLD-MCNC: 10.1 G/DL (ref 14–18)
HYPOCHROMIA BLD QL SMEAR: ABNORMAL
IMM GRANULOCYTES # BLD AUTO: ABNORMAL K/UL (ref 0–0.04)
IMM GRANULOCYTES NFR BLD AUTO: ABNORMAL % (ref 0–0.5)
LYMPHOCYTES # BLD AUTO: ABNORMAL K/UL (ref 1–4.8)
LYMPHOCYTES NFR BLD: 56 % (ref 18–48)
MAGNESIUM SERPL-MCNC: 2 MG/DL (ref 1.6–2.6)
MCH RBC QN AUTO: 28.1 PG (ref 27–31)
MCHC RBC AUTO-ENTMCNC: 30.8 G/DL (ref 32–36)
MCV RBC AUTO: 91 FL (ref 82–98)
MONOCYTES # BLD AUTO: ABNORMAL K/UL (ref 0.3–1)
MONOCYTES NFR BLD: 2 % (ref 4–15)
NEUTROPHILS NFR BLD: 28 % (ref 38–73)
NRBC BLD-RTO: 0 /100 WBC
PHOSPHATE SERPL-MCNC: 2.8 MG/DL (ref 2.7–4.5)
PLATELET # BLD AUTO: 575 K/UL (ref 150–350)
PLATELET BLD QL SMEAR: ABNORMAL
PMV BLD AUTO: 9.3 FL (ref 9.2–12.9)
POLYCHROMASIA BLD QL SMEAR: ABNORMAL
POTASSIUM SERPL-SCNC: 4.6 MMOL/L (ref 3.5–5.1)
RBC # BLD AUTO: 3.59 M/UL (ref 4.6–6.2)
SODIUM SERPL-SCNC: 137 MMOL/L (ref 136–145)
WBC # BLD AUTO: 14.2 K/UL (ref 3.9–12.7)

## 2019-09-23 PROCEDURE — S4991 NICOTINE PATCH NONLEGEND: HCPCS | Performed by: STUDENT IN AN ORGANIZED HEALTH CARE EDUCATION/TRAINING PROGRAM

## 2019-09-23 PROCEDURE — 63600175 PHARM REV CODE 636 W HCPCS: Performed by: STUDENT IN AN ORGANIZED HEALTH CARE EDUCATION/TRAINING PROGRAM

## 2019-09-23 PROCEDURE — 85025 COMPLETE CBC W/AUTO DIFF WBC: CPT

## 2019-09-23 PROCEDURE — 36415 COLL VENOUS BLD VENIPUNCTURE: CPT

## 2019-09-23 PROCEDURE — 25000003 PHARM REV CODE 250: Performed by: STUDENT IN AN ORGANIZED HEALTH CARE EDUCATION/TRAINING PROGRAM

## 2019-09-23 PROCEDURE — 83735 ASSAY OF MAGNESIUM: CPT

## 2019-09-23 PROCEDURE — 80048 BASIC METABOLIC PNL TOTAL CA: CPT

## 2019-09-23 PROCEDURE — 84100 ASSAY OF PHOSPHORUS: CPT

## 2019-09-23 PROCEDURE — C9113 INJ PANTOPRAZOLE SODIUM, VIA: HCPCS | Performed by: STUDENT IN AN ORGANIZED HEALTH CARE EDUCATION/TRAINING PROGRAM

## 2019-09-23 RX ORDER — IBUPROFEN 200 MG
1 TABLET ORAL DAILY
Qty: 7 PATCH | Refills: 3 | Status: ON HOLD | OUTPATIENT
Start: 2019-09-24 | End: 2019-10-04 | Stop reason: HOSPADM

## 2019-09-23 RX ORDER — NAPROXEN SODIUM 220 MG/1
81 TABLET, FILM COATED ORAL DAILY
Qty: 30 TABLET | Refills: 12 | Status: ON HOLD | OUTPATIENT
Start: 2019-09-24 | End: 2019-10-04 | Stop reason: HOSPADM

## 2019-09-23 RX ORDER — ONDANSETRON 4 MG/1
4 TABLET, FILM COATED ORAL EVERY 6 HOURS PRN
Status: DISCONTINUED | OUTPATIENT
Start: 2019-09-23 | End: 2019-09-23

## 2019-09-23 RX ORDER — ONDANSETRON 4 MG/1
4 TABLET, FILM COATED ORAL EVERY 6 HOURS PRN
Qty: 30 TABLET | Refills: 1 | Status: SHIPPED | OUTPATIENT
Start: 2019-09-23 | End: 2020-01-08 | Stop reason: SDUPTHER

## 2019-09-23 RX ORDER — ONDANSETRON 4 MG/1
4 TABLET, FILM COATED ORAL EVERY 6 HOURS PRN
Status: DISCONTINUED | OUTPATIENT
Start: 2019-09-23 | End: 2019-09-23 | Stop reason: HOSPADM

## 2019-09-23 RX ORDER — HYDROCODONE BITARTRATE AND ACETAMINOPHEN 5; 325 MG/1; MG/1
1 TABLET ORAL EVERY 6 HOURS PRN
Qty: 30 TABLET | Refills: 0 | Status: ON HOLD | OUTPATIENT
Start: 2019-09-23 | End: 2019-10-04 | Stop reason: HOSPADM

## 2019-09-23 RX ADMIN — HYDROCODONE BITARTRATE AND ACETAMINOPHEN 1 TABLET: 7.5; 325 TABLET ORAL at 05:09

## 2019-09-23 RX ADMIN — ONDANSETRON 4 MG: 2 INJECTION INTRAMUSCULAR; INTRAVENOUS at 08:09

## 2019-09-23 RX ADMIN — PANTOPRAZOLE SODIUM 40 MG: 40 INJECTION, POWDER, FOR SOLUTION INTRAVENOUS at 08:09

## 2019-09-23 RX ADMIN — ASPIRIN 81 MG CHEWABLE TABLET 81 MG: 81 TABLET CHEWABLE at 08:09

## 2019-09-23 RX ADMIN — HYDROMORPHONE HYDROCHLORIDE 0.5 MG: 1 INJECTION, SOLUTION INTRAMUSCULAR; INTRAVENOUS; SUBCUTANEOUS at 12:09

## 2019-09-23 RX ADMIN — NICOTINE 1 PATCH: 21 PATCH, EXTENDED RELEASE TRANSDERMAL at 09:09

## 2019-09-23 RX ADMIN — HYDROCODONE BITARTRATE AND ACETAMINOPHEN 1 TABLET: 7.5; 325 TABLET ORAL at 12:09

## 2019-09-23 RX ADMIN — KETOROLAC TROMETHAMINE 15 MG: 15 INJECTION, SOLUTION INTRAMUSCULAR; INTRAVENOUS at 05:09

## 2019-09-23 NOTE — SUBJECTIVE & OBJECTIVE
Interval History: Pt with N/V he associated with TF yesterday. He is otherwise tolerating PO diet. Otherwise, no changes.     Medications:  Continuous Infusions:  Scheduled Meds:   aspirin  81 mg Oral Daily    enoxaparin  40 mg Subcutaneous Daily    ketorolac  15 mg Intravenous Q8H    nicotine  1 patch Transdermal Daily    pantoprazole  40 mg Intravenous BID    sodium phosphate IVPB  20.01 mmol Intravenous Once     PRN Meds:sodium chloride, Dextrose 10% Bolus, Dextrose 10% Bolus, glucagon (human recombinant), glucose, glucose, HYDROcodone-acetaminophen, HYDROcodone-acetaminophen, HYDROmorphone, iohexol, ondansetron, promethazine (PHENERGAN) IVPB, sodium chloride 0.9%, sodium chloride 0.9%     Review of patient's allergies indicates:  No Known Allergies  Objective:     Vital Signs (Most Recent):  Temp: 97.2 °F (36.2 °C) (09/23/19 0830)  Pulse: 99 (09/23/19 0830)  Resp: 14 (09/23/19 0830)  BP: (!) 157/84 (09/23/19 0830)  SpO2: 98 % (09/23/19 0830) Vital Signs (24h Range):  Temp:  [96.6 °F (35.9 °C)-98.5 °F (36.9 °C)] 97.2 °F (36.2 °C)  Pulse:  [77-99] 99  Resp:  [14-18] 14  SpO2:  [97 %-99 %] 98 %  BP: (141-157)/(84-90) 157/84     Weight: 62.6 kg (138 lb)  Body mass index is 19.8 kg/m².    Intake/Output - Last 3 Shifts       09/21 0700 - 09/22 0659 09/22 0700 - 09/23 0659 09/23 0700 - 09/24 0659    P.O.  720     NG/GT  560     Total Intake(mL/kg)  1280 (20.4)     Urine (mL/kg/hr)  700 (0.5)     Total Output  700     Net  +580            Urine Occurrence  2 x           Physical Exam   Constitutional: He is oriented to person, place, and time. He appears cachectic. No distress.   HENT:   Head: Normocephalic and atraumatic.   Eyes: No scleral icterus.   Cardiovascular: Normal rate and intact distal pulses.   Pulmonary/Chest: Effort normal. No respiratory distress.   Abdominal: Soft. He exhibits no distension.   J-tube in place.   Musculoskeletal: He exhibits no edema.   Neurological: He is alert and oriented to  person, place, and time.   Skin: Skin is warm. Capillary refill takes less than 2 seconds.       Significant Labs:  CBC:   Recent Labs   Lab 09/23/19  0650   WBC 14.20*   RBC 3.59*   HGB 10.1*   HCT 32.8*   *   MCV 91   MCH 28.1   MCHC 30.8*     CMP:   Recent Labs   Lab 09/17/19  0658  09/23/19  0650   GLU 96   < > 114*   CALCIUM 7.8*   < > 8.5*   ALBUMIN 2.3*  --   --    PROT 5.8*  --   --       < > 137   K 3.4*   < > 4.6   CO2 24   < > 27      < > 101   BUN 2*   < > 8   CREATININE 0.6   < > 0.7   ALKPHOS 75  --   --    ALT 7*  --   --    AST 14  --   --    BILITOT 0.4  --   --     < > = values in this interval not displayed.       Significant Diagnostics:  N/A

## 2019-09-23 NOTE — ASSESSMENT & PLAN NOTE
58 y/o male with locally advanced gastric cancer s/p right IJ port placement; diagnostic laparoscopy converted to open gastrojejunostomy bypass; feeding jejunostomy placement 9/19.     - Pain: toradol, hycet  - Diet: Regular diet  - Hold TFs  - encourage ambulation, IS  - ASA 81  - daily labs    Dispo: Possibly Monday

## 2019-09-23 NOTE — PROGRESS NOTES
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    Hospitalized 9/9-12 with abd pain and weight loss as well as history of nausea/vomiting about a month ago. Associated 25 pound weight loss over the past three months.  Claims he has largely been able to tolerate foods, but has had minimal appetite. CT 9/9 with marked gastric distention and proximal duodenal wall thickening, concerning for GOO but tolerating clears/ soft diet. EGD 9/10/19 with Grade D reflux esophagitis, gastric ulcer biopsied, stenosis at pylorus and normal duodenum.     Readmitted 9/14 with severe chest and epigastric pain with intolerance of diet. AES consulted for staging, plan for EUS 9/18.     New ascites on CT chest 9/14 concerning. Also severe emphysema.    Repeat CT 9/17 with improvement of gastric distention as well as small vol ascites.     (+) prior H.pylori infection, stains from EGD 9/10 (+)    Surg Onc consulted to assess oncologic and/or palliative surgical candidacy.         Post-Op Info:  Procedure(s) (LRB):  INSERTION, VENOUS ACCESS PORT (Right)  LAPAROSCOPY, DIAGNOSTIC (N/A)  INSERTION, JEJUNOSTOMY TUBE, LAPAROSCOPIC, possible open (N/A)  GASTROJEJUNOSTOMY, possible G-tube (N/A)   4 Days Post-Op     Interval History: Pt with N/V he associated with TF yesterday. He is otherwise tolerating PO diet. Otherwise, no changes.     Medications:  Continuous Infusions:  Scheduled Meds:   aspirin  81 mg Oral Daily    enoxaparin  40 mg Subcutaneous Daily    ketorolac  15 mg Intravenous Q8H    nicotine  1 patch Transdermal Daily    pantoprazole  40 mg Intravenous BID    sodium phosphate IVPB  20.01 mmol Intravenous Once     PRN Meds:sodium chloride, Dextrose 10% Bolus, Dextrose 10% Bolus, glucagon (human recombinant), glucose, glucose,  HYDROcodone-acetaminophen, HYDROcodone-acetaminophen, HYDROmorphone, iohexol, ondansetron, promethazine (PHENERGAN) IVPB, sodium chloride 0.9%, sodium chloride 0.9%     Review of patient's allergies indicates:  No Known Allergies  Objective:     Vital Signs (Most Recent):  Temp: 97.2 °F (36.2 °C) (09/23/19 0830)  Pulse: 99 (09/23/19 0830)  Resp: 14 (09/23/19 0830)  BP: (!) 157/84 (09/23/19 0830)  SpO2: 98 % (09/23/19 0830) Vital Signs (24h Range):  Temp:  [96.6 °F (35.9 °C)-98.5 °F (36.9 °C)] 97.2 °F (36.2 °C)  Pulse:  [77-99] 99  Resp:  [14-18] 14  SpO2:  [97 %-99 %] 98 %  BP: (141-157)/(84-90) 157/84     Weight: 62.6 kg (138 lb)  Body mass index is 19.8 kg/m².    Intake/Output - Last 3 Shifts       09/21 0700 - 09/22 0659 09/22 0700 - 09/23 0659 09/23 0700 - 09/24 0659    P.O.  720     NG/GT  560     Total Intake(mL/kg)  1280 (20.4)     Urine (mL/kg/hr)  700 (0.5)     Total Output  700     Net  +580            Urine Occurrence  2 x           Physical Exam   Constitutional: He is oriented to person, place, and time. He appears cachectic. No distress.   HENT:   Head: Normocephalic and atraumatic.   Eyes: No scleral icterus.   Cardiovascular: Normal rate and intact distal pulses.   Pulmonary/Chest: Effort normal. No respiratory distress.   Abdominal: Soft. He exhibits no distension.   J-tube in place.   Musculoskeletal: He exhibits no edema.   Neurological: He is alert and oriented to person, place, and time.   Skin: Skin is warm. Capillary refill takes less than 2 seconds.       Significant Labs:  CBC:   Recent Labs   Lab 09/23/19  0650   WBC 14.20*   RBC 3.59*   HGB 10.1*   HCT 32.8*   *   MCV 91   MCH 28.1   MCHC 30.8*     CMP:   Recent Labs   Lab 09/17/19  0658  09/23/19  0650   GLU 96   < > 114*   CALCIUM 7.8*   < > 8.5*   ALBUMIN 2.3*  --   --    PROT 5.8*  --   --       < > 137   K 3.4*   < > 4.6   CO2 24   < > 27      < > 101   BUN 2*   < > 8   CREATININE 0.6   < > 0.7   ALKPHOS 75  --   --     ALT 7*  --   --    AST 14  --   --    BILITOT 0.4  --   --     < > = values in this interval not displayed.       Significant Diagnostics:  N/A    Assessment/Plan:     * Gastric outlet obstruction  58 y/o male with locally advanced gastric cancer s/p right IJ port placement; diagnostic laparoscopy converted to open gastrojejunostomy bypass; feeding jejunostomy placement 9/19.     - Pain: toradol, hycet  - Diet: Regular diet  - Hold TFs  - encourage ambulation, IS  - ASA 81  - daily labs    Dispo: Possibly Monday    Signet ring cell adenocarcinoma of stomach  58 yo M with newly diagnosed signet ring cell CA of the distal stomach associated with significant weight loss, small volume ascites, and partial gastric outlet obstruction.     - will require neoadjuvant vs definitive chemo  - no evidence of metastatic disease on CT chest and dx laparoscopy          Jon Cates MD  General Surgery  [facility]

## 2019-09-23 NOTE — DISCHARGE SUMMARY
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     Hospitalized 9/9-12 with abd pain and weight loss as well as history of nausea/vomiting about a month ago. Associated 25 pound weight loss over the past three months.  Claims he has largely been able to tolerate foods, but has had minimal appetite. CT 9/9 with marked gastric distention and proximal duodenal wall thickening, concerning for GOO but tolerating clears/ soft diet. EGD 9/10/19 with Grade D reflux esophagitis, gastric ulcer biopsied, stenosis at pylorus and normal duodenum.      Readmitted 9/14 with severe chest and epigastric pain with intolerance of diet. AES consulted for staging, plan for EUS 9/18.      New ascites on CT chest 9/14 concerning. Also severe emphysema.     Repeat CT 9/17 with improvement of gastric distention as well as small vol ascites.      (+) prior H.pylori infection, stains from EGD 9/10 (+)     Surg Onc consulted to assess oncologic and/or palliative surgical candidacy.    Procedure(s) (LRB):  INSERTION, VENOUS ACCESS PORT (Right)  LAPAROSCOPY, DIAGNOSTIC (N/A)  INSERTION, JEJUNOSTOMY TUBE, LAPAROSCOPIC, possible open (N/A)  GASTROJEJUNOSTOMY, possible G-tube (N/A)     Date of Procedure: 9/19/2019      Procedure: Procedure(s) (LRB):  INSERTION, VENOUS ACCESS PORT (Right)  LAPAROSCOPY, DIAGNOSTIC (N/A)  INSERTION, JEJUNOSTOMY TUBE, LAPAROSCOPIC, possible open (N/A)  GASTROJEJUNOSTOMY, possible G-tube (N/A)      Surgeon(s) and Role:     * Josse Saravia MD - Primary     * Jon  Igor Cates MD - Resident - Assisting     Pre-Operative Diagnosis: Signet ring cell adenocarcinoma [C80.1]  1. Gastric outlet obstruction  2. Severe malnutrition (weight loss 20% over 6 months, Albumin < 2.5, temporal wasting)     Pre-Operative Variables:  Preoperative diagnosis: gastric cancer with signet ring histology  Going into surgery, the lesion was believed to be locally advanced, unresectable  Chemotherapy within 90 Days: N  Radiation Therapy within 90 Days: N  Preoperative Gastroenteric Obstruction: Y     Post-Operative Diagnosis:   1. Same     Anesthesia: General     Operative Findings:    1. No evidence of macroscopic distant intraperitoneal metastases   2. Abdominal ascites sent for cytology  3. Locally advanced mass lesion in the distal anterior stomach adherent to the anterior surface of the liver, with enlarged gastrocolic lymph nodes     Procedures:  1. Right internal jugular port placement under fluoroscopic guidance  2. Diagnostic laparoscopy with washings/cytology  3. Open gastrojejunostomy bypass  4. Open jejunostomy feeding tube placement     Hospital Course:  Mr. Greene is a 60 y/o male with locally advanced gastric cancer s/p right IJ port placement; diagnostic laparoscopy converted to open gastrojejunostomy bypass; feeding jejunostomy placement 9/19/19.  Mr. Greene with N/V he associated with tube feeds yesterday. He is otherwise tolerating PO diet.  He is voiding and ambulating without difficulty.  Patient with no complaints of nausea, vomiting, chest pain or shortness of breath.  His vital signs stable. He is afebrile. He is positive for flatus and positive for bowel sounds.    Physical Exam   Constitutional: He is oriented to person, place, and time. He appears cachectic. No distress.   HENT:   Head: Normocephalic and atraumatic.   Eyes: No scleral icterus.   Cardiovascular: Normal rate and intact distal pulses.   Pulmonary/Chest: Effort normal. No respiratory distress.    Abdominal: Soft. He exhibits no distension.   J-tube in place.   Musculoskeletal: He exhibits no edema.   Neurological: He is alert and oriented to person, place, and time.   Skin: Skin is warm. Capillary refill takes less than 2 seconds.     Consults:   Consults (From admission, onward)        Status Ordering Provider     Inpatient consult to [facility] ([facility])  Once     Provider:  (Not yet assigned)    Completed DERIAN RODAS     Inpatient consult to Registered Dietitian/Nutritionist  Once     Provider:  (Not yet assigned)    Completed GEOVANNA FRIED     Inpatient consult to Surgical Oncology  Once     Provider:  (Not yet assigned)    Completed DERIAN RODAS          Significant Diagnostic Studies: Labs:   CMP   Recent Labs   Lab 09/22/19  0343 09/23/19  0650    137   K 3.6 4.6    101   CO2 24 27   GLU 98 114*   BUN 4* 8   CREATININE 0.6 0.7   CALCIUM 7.7* 8.5*   ANIONGAP 7* 9   ESTGFRAFRICA >60.0 >60.0   EGFRNONAA >60.0 >60.0    and CBC   Recent Labs   Lab 09/22/19  0343 09/23/19  0650   WBC 11.80 14.20*   HGB 8.0* 10.1*   HCT 26.3* 32.8*   * 575*       Pending Diagnostic Studies:     None        Final Active Diagnoses:    Diagnosis Date Noted POA    PRINCIPAL PROBLEM:  Gastric outlet obstruction [K31.1] 09/09/2019 Yes    Iron deficiency anemia [D50.9] 09/17/2019 Yes    Emphysema lung [J43.9] 09/16/2019 Yes    Iliac artery occlusion, 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 pylori infection [A04.8] 09/16/2019 Yes    Signet ring cell adenocarcinoma of stomach [C80.1] 09/15/2019 Yes    Weight loss, unintentional [R63.4] 09/15/2019 Yes    Severe protein-calorie malnutrition [E43] 09/10/2019 Yes    Intractable generalized abdominal pain [R10.84]  Yes      Problems Resolved During this Admission:    Diagnosis Date Noted Date Resolved POA    Hypophosphatemia [E83.39] 09/18/2019 09/19/2019 Yes    Hypokalemia  [E87.6] 09/17/2019 09/19/2019 Yes    Leukocytosis [D72.829] 09/16/2019 09/18/2019 Yes      Discharged Condition: good    Disposition: Home or Self Care    Follow Up:  Follow-up Information     Josse Saravia MD On 10/3/2019.    Specialties:  Surgical Oncology, General Surgery  Why:  SURGICAL FOLLOW UP with Dr Josse Saravia 10/3/2019 @ 8:30am in the [facility]  Contact information:  Tanya QUINTERO MAYE  [facility] [address]  [phone]                 Patient Instructions:      Diet Adult Regular     Lifting restrictions   Scheduling Instructions: No lifting > 10 pounds. May shower.  Flush jejunostomy tube with 20cc water every 8 hours.     No driving until:   Scheduling Instructions: No driving while on pain medication     Notify your health care provider if you experience any of the following:  temperature >100.4     Notify your health care provider if you experience any of the following:  persistent nausea and vomiting or diarrhea     Notify your health care provider if you experience any of the following:  severe uncontrolled pain     Notify your health care provider if you experience any of the following:  redness, tenderness, or signs of infection (pain, swelling, redness, odor or green/yellow discharge around incision site)     Notify your health care provider if you experience any of the following:  difficulty breathing or increased cough     Notify your health care provider if you experience any of the following:  severe persistent headache     Notify your health care provider if you experience any of the following:  worsening rash     Notify your health care provider if you experience any of the following:  persistent dizziness, light-headedness, or visual disturbances     Notify your health care provider if you experience any of the following:  increased confusion or weakness     Notify your health care provider if you experience any of the following:     No dressing needed     Activity as  tolerated     Medications:  Reconciled Home Medications:      Medication List      START taking these medications    aspirin 81 MG Chew  Chew and swallow 1 tablet (81 mg total) by mouth once daily. May take over the counter  Start taking on:  September 24, 2019     HYDROcodone-acetaminophen 5-325 mg per tablet  Commonly known as:  NORCO  Take 1 tablet by mouth every 6 (six) hours as needed for Pain.     nicotine 21 mg/24 hr  Commonly known as:  NICODERM CQ  Place 1 patch onto the skin once daily.  Start taking on:  September 24, 2019     ondansetron 4 MG tablet  Commonly known as:  ZOFRAN  Take 1 tablet (4 mg total) by mouth every 6 (six) hours as needed for Nausea.            Silke Dennison, NP  General Surgery  [facility]

## 2019-09-23 NOTE — PLAN OF CARE
"   09/23/19 1324   Post-Acute Status   Post-Acute Authorization HME   HME Status Patient/Family Changed Mind     SW was informed by You manley that pt was refusing to take equipment for tube feeds home. Pt stated, "I don't want to take that stuff."     Floresita Bates, CONG  [facility]  [phone]    "

## 2019-09-24 ENCOUNTER — TELEPHONE (OUTPATIENT)
Dept: SURGERY | Facility: CLINIC | Age: 59
End: 2019-09-24

## 2019-09-24 NOTE — PLAN OF CARE
Patient discharged home 9/23/2019 with no needs.  Patient declined tube feeds.    Future Appointments   Date Time Provider Department Center   10/3/2019  8:00 AM Josse Saravia MD [facility] Watson          09/24/19 1125   Final Note   Assessment Type Final Discharge Note   Anticipated Discharge Disposition Home   Hospital Follow Up  Appt(s) scheduled? Yes   Right Care Referral Info   Post Acute Recommendation No Care

## 2019-09-25 ENCOUNTER — HOSPITAL ENCOUNTER (INPATIENT)
Facility: HOSPITAL | Age: 59
LOS: 9 days | Discharge: LEFT BEFORE DISCHARGE | DRG: 326 | End: 2019-10-04
Attending: SURGERY | Admitting: SURGERY
Payer: MEDICAID

## 2019-09-25 DIAGNOSIS — K56.609 SMALL BOWEL OBSTRUCTION: ICD-10-CM

## 2019-09-25 DIAGNOSIS — E43 SEVERE MALNUTRITION: Primary | ICD-10-CM

## 2019-09-25 LAB
ALBUMIN SERPL BCP-MCNC: 3.3 G/DL (ref 3.5–5.2)
ALP SERPL-CCNC: 87 U/L (ref 55–135)
ALT SERPL W/O P-5'-P-CCNC: 9 U/L (ref 10–44)
ANION GAP SERPL CALC-SCNC: 16 MMOL/L (ref 8–16)
ANION GAP SERPL CALC-SCNC: 9 MMOL/L (ref 8–16)
ANISOCYTOSIS BLD QL SMEAR: SLIGHT
ANISOCYTOSIS BLD QL SMEAR: SLIGHT
AST SERPL-CCNC: 15 U/L (ref 10–40)
BASOPHILS # BLD AUTO: 0.04 K/UL (ref 0–0.2)
BASOPHILS # BLD AUTO: ABNORMAL K/UL (ref 0–0.2)
BASOPHILS NFR BLD: 0 % (ref 0–1.9)
BASOPHILS NFR BLD: 0.2 % (ref 0–1.9)
BILIRUB SERPL-MCNC: 0.6 MG/DL (ref 0.1–1)
BUN SERPL-MCNC: 25 MG/DL (ref 6–20)
BUN SERPL-MCNC: 34 MG/DL (ref 6–20)
CALCIUM SERPL-MCNC: 9.5 MG/DL (ref 8.7–10.5)
CALCIUM SERPL-MCNC: 9.6 MG/DL (ref 8.7–10.5)
CALCIUM SERPL-MCNC: 9.6 MG/DL (ref 8.7–10.5)
CHLORIDE SERPL-SCNC: 88 MMOL/L (ref 95–110)
CHLORIDE SERPL-SCNC: 89 MMOL/L (ref 95–110)
CO2 SERPL-SCNC: 33 MMOL/L (ref 23–29)
CO2 SERPL-SCNC: 39 MMOL/L (ref 23–29)
CREAT SERPL-MCNC: 0.9 MG/DL (ref 0.5–1.4)
CREAT SERPL-MCNC: 1.2 MG/DL (ref 0.5–1.4)
DACRYOCYTES BLD QL SMEAR: ABNORMAL
DIFFERENTIAL METHOD: ABNORMAL
DIFFERENTIAL METHOD: ABNORMAL
DOHLE BOD BLD QL SMEAR: PRESENT
EOSINOPHIL # BLD AUTO: 0 K/UL (ref 0–0.5)
EOSINOPHIL # BLD AUTO: ABNORMAL K/UL (ref 0–0.5)
EOSINOPHIL NFR BLD: 0 % (ref 0–8)
EOSINOPHIL NFR BLD: 0 % (ref 0–8)
ERYTHROCYTE [DISTWIDTH] IN BLOOD BY AUTOMATED COUNT: 14.4 % (ref 11.5–14.5)
ERYTHROCYTE [DISTWIDTH] IN BLOOD BY AUTOMATED COUNT: 14.5 % (ref 11.5–14.5)
EST. GFR  (AFRICAN AMERICAN): >60 ML/MIN/1.73 M^2
EST. GFR  (AFRICAN AMERICAN): >60 ML/MIN/1.73 M^2
EST. GFR  (NON AFRICAN AMERICAN): >60 ML/MIN/1.73 M^2
EST. GFR  (NON AFRICAN AMERICAN): >60 ML/MIN/1.73 M^2
GIANT PLATELETS BLD QL SMEAR: PRESENT
GLUCOSE SERPL-MCNC: 131 MG/DL (ref 70–110)
GLUCOSE SERPL-MCNC: 162 MG/DL (ref 70–110)
HCT VFR BLD AUTO: 33.4 % (ref 40–54)
HCT VFR BLD AUTO: 36.8 % (ref 40–54)
HGB BLD-MCNC: 10.3 G/DL (ref 14–18)
HGB BLD-MCNC: 11.1 G/DL (ref 14–18)
HYPOCHROMIA BLD QL SMEAR: ABNORMAL
HYPOCHROMIA BLD QL SMEAR: ABNORMAL
IMM GRANULOCYTES # BLD AUTO: 0.07 K/UL (ref 0–0.04)
IMM GRANULOCYTES # BLD AUTO: ABNORMAL K/UL (ref 0–0.04)
IMM GRANULOCYTES NFR BLD AUTO: 0.3 % (ref 0–0.5)
IMM GRANULOCYTES NFR BLD AUTO: ABNORMAL % (ref 0–0.5)
LYMPHOCYTES # BLD AUTO: 11.2 K/UL (ref 1–4.8)
LYMPHOCYTES # BLD AUTO: ABNORMAL K/UL (ref 1–4.8)
LYMPHOCYTES NFR BLD: 50.1 % (ref 18–48)
LYMPHOCYTES NFR BLD: 61 % (ref 18–48)
MAGNESIUM SERPL-MCNC: 2.1 MG/DL (ref 1.6–2.6)
MCH RBC QN AUTO: 27.1 PG (ref 27–31)
MCH RBC QN AUTO: 27.3 PG (ref 27–31)
MCHC RBC AUTO-ENTMCNC: 30.2 G/DL (ref 32–36)
MCHC RBC AUTO-ENTMCNC: 30.8 G/DL (ref 32–36)
MCV RBC AUTO: 89 FL (ref 82–98)
MCV RBC AUTO: 90 FL (ref 82–98)
MONOCYTES # BLD AUTO: 0.6 K/UL (ref 0.3–1)
MONOCYTES # BLD AUTO: ABNORMAL K/UL (ref 0.3–1)
MONOCYTES NFR BLD: 0 % (ref 4–15)
MONOCYTES NFR BLD: 2.6 % (ref 4–15)
NEUTROPHILS # BLD AUTO: 10.5 K/UL (ref 1.8–7.7)
NEUTROPHILS NFR BLD: 38 % (ref 38–73)
NEUTROPHILS NFR BLD: 46.8 % (ref 38–73)
NEUTS BAND NFR BLD MANUAL: 1 %
NRBC BLD-RTO: 0 /100 WBC
NRBC BLD-RTO: 0 /100 WBC
PHOSPHATE SERPL-MCNC: 3.8 MG/DL (ref 2.7–4.5)
PLATELET # BLD AUTO: 516 K/UL (ref 150–350)
PLATELET # BLD AUTO: 620 K/UL (ref 150–350)
PLATELET BLD QL SMEAR: ABNORMAL
PLATELET BLD QL SMEAR: ABNORMAL
PMV BLD AUTO: 8.6 FL (ref 9.2–12.9)
PMV BLD AUTO: 9.1 FL (ref 9.2–12.9)
POIKILOCYTOSIS BLD QL SMEAR: SLIGHT
POIKILOCYTOSIS BLD QL SMEAR: SLIGHT
POLYCHROMASIA BLD QL SMEAR: ABNORMAL
POTASSIUM SERPL-SCNC: 3.1 MMOL/L (ref 3.5–5.1)
POTASSIUM SERPL-SCNC: 3.7 MMOL/L (ref 3.5–5.1)
PROT SERPL-MCNC: 8.2 G/DL (ref 6–8.4)
RBC # BLD AUTO: 3.77 M/UL (ref 4.6–6.2)
RBC # BLD AUTO: 4.1 M/UL (ref 4.6–6.2)
SODIUM SERPL-SCNC: 137 MMOL/L (ref 136–145)
SODIUM SERPL-SCNC: 137 MMOL/L (ref 136–145)
TARGETS BLD QL SMEAR: ABNORMAL
TARGETS BLD QL SMEAR: ABNORMAL
TOXIC GRANULES BLD QL SMEAR: PRESENT
WBC # BLD AUTO: 18.13 K/UL (ref 3.9–12.7)
WBC # BLD AUTO: 22.33 K/UL (ref 3.9–12.7)

## 2019-09-25 PROCEDURE — 85027 COMPLETE CBC AUTOMATED: CPT

## 2019-09-25 PROCEDURE — 83735 ASSAY OF MAGNESIUM: CPT

## 2019-09-25 PROCEDURE — 85007 BL SMEAR W/DIFF WBC COUNT: CPT

## 2019-09-25 PROCEDURE — 80048 BASIC METABOLIC PNL TOTAL CA: CPT

## 2019-09-25 PROCEDURE — 36415 COLL VENOUS BLD VENIPUNCTURE: CPT

## 2019-09-25 PROCEDURE — 80053 COMPREHEN METABOLIC PANEL: CPT

## 2019-09-25 PROCEDURE — 25000003 PHARM REV CODE 250: Performed by: STUDENT IN AN ORGANIZED HEALTH CARE EDUCATION/TRAINING PROGRAM

## 2019-09-25 PROCEDURE — 85025 COMPLETE CBC W/AUTO DIFF WBC: CPT

## 2019-09-25 PROCEDURE — 63600175 PHARM REV CODE 636 W HCPCS: Performed by: NURSE PRACTITIONER

## 2019-09-25 PROCEDURE — 63600175 PHARM REV CODE 636 W HCPCS: Performed by: STUDENT IN AN ORGANIZED HEALTH CARE EDUCATION/TRAINING PROGRAM

## 2019-09-25 PROCEDURE — 97802 MEDICAL NUTRITION INDIV IN: CPT

## 2019-09-25 PROCEDURE — S4991 NICOTINE PATCH NONLEGEND: HCPCS | Performed by: STUDENT IN AN ORGANIZED HEALTH CARE EDUCATION/TRAINING PROGRAM

## 2019-09-25 PROCEDURE — 84100 ASSAY OF PHOSPHORUS: CPT

## 2019-09-25 PROCEDURE — 11000001 HC ACUTE MED/SURG PRIVATE ROOM

## 2019-09-25 RX ORDER — HYDROMORPHONE HYDROCHLORIDE 1 MG/ML
0.5 INJECTION, SOLUTION INTRAMUSCULAR; INTRAVENOUS; SUBCUTANEOUS
Status: DISCONTINUED | OUTPATIENT
Start: 2019-09-25 | End: 2019-09-30

## 2019-09-25 RX ORDER — OXYCODONE HYDROCHLORIDE 5 MG/1
5 TABLET ORAL EVERY 6 HOURS PRN
Status: DISCONTINUED | OUTPATIENT
Start: 2019-09-25 | End: 2019-09-25

## 2019-09-25 RX ORDER — LIDOCAINE HYDROCHLORIDE 10 MG/ML
1 INJECTION, SOLUTION EPIDURAL; INFILTRATION; INTRACAUDAL; PERINEURAL ONCE
Status: DISCONTINUED | OUTPATIENT
Start: 2019-09-25 | End: 2019-09-25

## 2019-09-25 RX ORDER — NAPROXEN SODIUM 220 MG/1
81 TABLET, FILM COATED ORAL DAILY
Status: DISCONTINUED | OUTPATIENT
Start: 2019-09-25 | End: 2019-09-25

## 2019-09-25 RX ORDER — HYDROMORPHONE HYDROCHLORIDE 1 MG/ML
0.5 INJECTION, SOLUTION INTRAMUSCULAR; INTRAVENOUS; SUBCUTANEOUS ONCE AS NEEDED
Status: COMPLETED | OUTPATIENT
Start: 2019-09-25 | End: 2019-09-25

## 2019-09-25 RX ORDER — OXYCODONE HYDROCHLORIDE 10 MG/1
10 TABLET ORAL EVERY 6 HOURS PRN
Status: DISCONTINUED | OUTPATIENT
Start: 2019-09-25 | End: 2019-09-25

## 2019-09-25 RX ORDER — ACETAMINOPHEN 325 MG/1
650 TABLET ORAL EVERY 8 HOURS PRN
Status: DISCONTINUED | OUTPATIENT
Start: 2019-09-25 | End: 2019-09-26

## 2019-09-25 RX ORDER — SODIUM CHLORIDE 0.9 % (FLUSH) 0.9 %
10 SYRINGE (ML) INJECTION
Status: DISCONTINUED | OUTPATIENT
Start: 2019-09-25 | End: 2019-10-04 | Stop reason: HOSPADM

## 2019-09-25 RX ORDER — IBUPROFEN 200 MG
1 TABLET ORAL DAILY
Status: DISCONTINUED | OUTPATIENT
Start: 2019-09-25 | End: 2019-10-04 | Stop reason: HOSPADM

## 2019-09-25 RX ORDER — SODIUM CHLORIDE 9 MG/ML
INJECTION, SOLUTION INTRAVENOUS CONTINUOUS
Status: DISCONTINUED | OUTPATIENT
Start: 2019-09-25 | End: 2019-09-25

## 2019-09-25 RX ORDER — POTASSIUM CHLORIDE 7.45 MG/ML
10 INJECTION INTRAVENOUS
Status: DISCONTINUED | OUTPATIENT
Start: 2019-09-25 | End: 2019-09-25

## 2019-09-25 RX ORDER — HYDROMORPHONE HYDROCHLORIDE 1 MG/ML
1 INJECTION, SOLUTION INTRAMUSCULAR; INTRAVENOUS; SUBCUTANEOUS
Status: DISCONTINUED | OUTPATIENT
Start: 2019-09-25 | End: 2019-09-30

## 2019-09-25 RX ORDER — HEPARIN SODIUM 5000 [USP'U]/ML
5000 INJECTION, SOLUTION INTRAVENOUS; SUBCUTANEOUS EVERY 8 HOURS
Status: DISCONTINUED | OUTPATIENT
Start: 2019-09-25 | End: 2019-09-26

## 2019-09-25 RX ORDER — POTASSIUM CHLORIDE 7.45 MG/ML
10 INJECTION INTRAVENOUS
Status: COMPLETED | OUTPATIENT
Start: 2019-09-25 | End: 2019-09-25

## 2019-09-25 RX ORDER — SODIUM CHLORIDE, SODIUM LACTATE, POTASSIUM CHLORIDE, CALCIUM CHLORIDE 600; 310; 30; 20 MG/100ML; MG/100ML; MG/100ML; MG/100ML
INJECTION, SOLUTION INTRAVENOUS CONTINUOUS
Status: DISCONTINUED | OUTPATIENT
Start: 2019-09-25 | End: 2019-09-30

## 2019-09-25 RX ORDER — ONDANSETRON 2 MG/ML
4 INJECTION INTRAMUSCULAR; INTRAVENOUS EVERY 6 HOURS PRN
Status: DISCONTINUED | OUTPATIENT
Start: 2019-09-25 | End: 2019-10-02

## 2019-09-25 RX ADMIN — OXYCODONE HYDROCHLORIDE 10 MG: 10 TABLET ORAL at 09:09

## 2019-09-25 RX ADMIN — POTASSIUM CHLORIDE 10 MEQ: 10 INJECTION, SOLUTION INTRAVENOUS at 02:09

## 2019-09-25 RX ADMIN — POTASSIUM CHLORIDE 10 MEQ: 10 INJECTION, SOLUTION INTRAVENOUS at 05:09

## 2019-09-25 RX ADMIN — ONDANSETRON 4 MG: 2 INJECTION INTRAMUSCULAR; INTRAVENOUS at 05:09

## 2019-09-25 RX ADMIN — POTASSIUM CHLORIDE 10 MEQ: 7.46 INJECTION, SOLUTION INTRAVENOUS at 08:09

## 2019-09-25 RX ADMIN — POTASSIUM CHLORIDE 10 MEQ: 10 INJECTION, SOLUTION INTRAVENOUS at 03:09

## 2019-09-25 RX ADMIN — ONDANSETRON 4 MG: 2 INJECTION INTRAMUSCULAR; INTRAVENOUS at 09:09

## 2019-09-25 RX ADMIN — HEPARIN SODIUM 5000 UNITS: 5000 INJECTION, SOLUTION INTRAVENOUS; SUBCUTANEOUS at 05:09

## 2019-09-25 RX ADMIN — HYDROMORPHONE HYDROCHLORIDE 0.5 MG: 1 INJECTION, SOLUTION INTRAMUSCULAR; INTRAVENOUS; SUBCUTANEOUS at 05:09

## 2019-09-25 RX ADMIN — POTASSIUM CHLORIDE 10 MEQ: 7.46 INJECTION, SOLUTION INTRAVENOUS at 10:09

## 2019-09-25 RX ADMIN — SODIUM CHLORIDE, SODIUM LACTATE, POTASSIUM CHLORIDE, AND CALCIUM CHLORIDE 1000 ML: .6; .31; .03; .02 INJECTION, SOLUTION INTRAVENOUS at 11:09

## 2019-09-25 RX ADMIN — HYDROMORPHONE HYDROCHLORIDE 1 MG: 1 INJECTION, SOLUTION INTRAMUSCULAR; INTRAVENOUS; SUBCUTANEOUS at 10:09

## 2019-09-25 RX ADMIN — HEPARIN SODIUM 5000 UNITS: 5000 INJECTION, SOLUTION INTRAVENOUS; SUBCUTANEOUS at 02:09

## 2019-09-25 RX ADMIN — HEPARIN SODIUM 5000 UNITS: 5000 INJECTION, SOLUTION INTRAVENOUS; SUBCUTANEOUS at 09:09

## 2019-09-25 RX ADMIN — NICOTINE 1 PATCH: 21 PATCH, EXTENDED RELEASE TRANSDERMAL at 09:09

## 2019-09-25 RX ADMIN — SODIUM CHLORIDE, SODIUM LACTATE, POTASSIUM CHLORIDE, AND CALCIUM CHLORIDE: 600; 310; 30; 20 INJECTION, SOLUTION INTRAVENOUS at 11:09

## 2019-09-25 RX ADMIN — SODIUM CHLORIDE, SODIUM LACTATE, POTASSIUM CHLORIDE, AND CALCIUM CHLORIDE 1000 ML: .6; .31; .03; .02 INJECTION, SOLUTION INTRAVENOUS at 01:09

## 2019-09-25 RX ADMIN — HYDROMORPHONE HYDROCHLORIDE 0.5 MG: 1 INJECTION, SOLUTION INTRAMUSCULAR; INTRAVENOUS; SUBCUTANEOUS at 02:09

## 2019-09-25 RX ADMIN — SODIUM CHLORIDE, SODIUM LACTATE, POTASSIUM CHLORIDE, AND CALCIUM CHLORIDE 1000 ML: .6; .31; .03; .02 INJECTION, SOLUTION INTRAVENOUS at 10:09

## 2019-09-25 RX ADMIN — SODIUM CHLORIDE: 0.9 INJECTION, SOLUTION INTRAVENOUS at 05:09

## 2019-09-25 RX ADMIN — POTASSIUM CHLORIDE 10 MEQ: 7.46 INJECTION, SOLUTION INTRAVENOUS at 07:09

## 2019-09-25 RX ADMIN — SODIUM CHLORIDE, SODIUM LACTATE, POTASSIUM CHLORIDE, AND CALCIUM CHLORIDE 1000 ML: .6; .31; .03; .02 INJECTION, SOLUTION INTRAVENOUS at 06:09

## 2019-09-25 RX ADMIN — HYDROMORPHONE HYDROCHLORIDE 1 MG: 1 INJECTION, SOLUTION INTRAMUSCULAR; INTRAVENOUS; SUBCUTANEOUS at 09:09

## 2019-09-25 RX ADMIN — SODIUM CHLORIDE: 0.9 INJECTION, SOLUTION INTRAVENOUS at 01:09

## 2019-09-25 NOTE — CARE UPDATE
"RAPID RESPONSE NURSE PROACTIVE ROUNDING NOTE     Time of Visit: 1750    Admit Date: 2019  LOS: 0  Code Status: Full Code   Date of Visit: 2019  : 1960  Age: 59 y.o.  Sex: male  Race: White  [facility]:   MRN: [id number]  Was the patient discharged from an ICU this admission? no   Was the patient discharged from a PACU within last 24 hours?  no  Did the patient receive conscious sedation/general anesthesia in last 24 hours?  no  Was the patient in the ED within the past 24 hours?  yes  Was the patient started on NIPPV within the past 24 hours?  no  Attending Physician: Josse Saravia MD  Primary Service: Networked reference to record PCT     ASSESSMENT     Diagnosis: <principal problem not specified>    Abnormal Vital Signs: /72   Pulse (!) 114   Temp 96.2 °F (35.7 °C)   Resp 18   Ht 5' 10" (1.778 m) Comment: per RN   Wt 56.1 kg (123 lb 10.8 oz)   SpO2 (!) 93%   BMI 17.75 kg/m²      Clinical Issues: Circulatory    Patient  has a past medical history of Chronic gastritis, Diverticulosis, Positive H. pylori titer, and Signet ring cell adenocarcinoma of stomach.    Pt laying in bed with NGT in place on low intermittent suction. Pt VSS at this time. Pt verbalized understanding of plan of care. Pt receiving fluid bolus per mar. No RRRN intervention needed at this time.      INTERVENTIONS/ RECOMMENDATIONS     Trend lab values, replace fluid and electrolytes per orders, infectious work up.      Discussed plan of care with RNEstefania.    PHYSICIAN ESCALATION     Yes/No  yes    Orders received and case discussed with Dr. Arguello.    Disposition: Remain in room 529A.    FOLLOW-UP     Call back the Rapid Response Nurse, Reji Kendrick RN at [phone] for additional questions or concerns.          "

## 2019-09-25 NOTE — CONSULTS
"  [facility]  Adult Nutrition  Consult Note    SUMMARY     Recommendations    Pt with severe malnutrition criteria.     1. As medically able, ADAT to Regular + Boost Plus with texture per SLP.  2. If TF warranted, recommend TF of Isosource 1.5 advancing as tolerated to goal rate of 55 mL/hr to provide 1980 kcal, 90 gm protein, and 1008 mL free fluid.    -If nocturnal desired: Isosource 1.5 goal rate of 75 mL/hr x 18 hrs to provide 2025 kcal, 92 gm protein, and 1031 free fluid.   3. RD following.     Goals: receive nutrition by RD follow-up  Nutrition Goal Status: new  Communication of RD Recs: (POC)    Reason for Assessment    Reason For Assessment: consult  Diagnosis: (SBO)  Relevant Medical History: hx of stomach cancer s/p J tube  per RD note from   Interdisciplinary Rounds: attended  General Information Comments: NPO, NG tube. Pt reports no TF at home and eating by mouth. Eating ice chips and drinking Boost with N/V PTA. Noted wt loss per chart review. NFPE completed (by SHIRLEY Souza). Pt severe muscle/fat wasting. Pt meets severe malnutrition criteria.   Nutrition Discharge Planning: unable to determine at this time    Nutrition Risk Screen    Nutrition Risk Screen: tube feeding or parenteral nutrition    Nutrition/Diet History    Spiritual, Cultural Beliefs, Yarsani Practices, Values that Affect Care: no  Factors Affecting Nutritional Intake: altered gastrointestinal function, NPO    Anthropometrics    Temp: 96.3 °F (35.7 °C)  Height Method: Stated  Height: 5' 10" (177.8 cm)(per RN )  Height (inches): 70 in  Weight Method: Bed Scale  Weight: 56.1 kg (123 lb 10.8 oz)  Weight (lb): 123.68 lb  Ideal Body Weight (IBW), Male: 166 lb  % Ideal Body Weight, Male (lb): 74.51 lb  BMI (Calculated): 17.8  BMI Grade: 17 - 18.4 protein-energy malnutrition grade I  Weight Loss: unintentional  Usual Body Weight (UBW), k.7 kg(per chart review 19)  % Usual Body Weight: 83.04  % " Weight Change From Usual Weight: -17.14 %     Lab/Procedures/Meds    Pertinent Labs Reviewed: reviewed  Pertinent Labs Comments: glucose 162, ALT 9  Pertinent Medications Reviewed: reviewed  Pertinent Medications Comments: IVF    Estimated/Assessed Needs    Weight Used For Calorie Calculations: 56.1 kg (123 lb 10.9 oz)  Energy Calorie Requirements (kcal): 1963 kcal/day  Energy Need Method: Kcal/kg(35)  Protein Requirements: 73-90 gm/day(1.3-1.6 gm/kg)  Weight Used For Protein Calculations: 56.1 kg (123 lb 10.9 oz)  Fluid Requirements (mL): 1 mL/kcal or per MD     RDA Method (mL): 1963     Nutrition Prescription Ordered    Current Diet Order: NPO    Evaluation of Received Nutrient/Fluid Intake    Comments: LBM 9/19  % Intake of Estimated Energy Needs: 0 - 25 %  % Meal Intake: NPO    Nutrition Risk    Level of Risk/Frequency of Follow-up: (f/u 2 x wk)     Assessment and Plan    Severe malnutrition  Malnutrition in the context of Chronic Illness/Injury    Related to (etiology):  Inadequate Energy Intake    Signs and Symptoms (as evidenced by):  Energy Intake: <50% of estimated energy requirement for > 5 days   Body Fat Depletion: mild and severe depletion of orbitals, triceps and thoracic and lumbar region   Muscle Mass Depletion: moderate and severe depletion of temples, clavicle region, interosseous muscle and lower extremities   Weight Loss: 17% x 3 months per chart review      Interventions:  Collaboration of nutrition care with other providers    Nutrition Diagnosis Status:  New             Monitor and Evaluation    Food and Nutrient Intake: energy intake  Food and Nutrient Adminstration: diet order, enteral and parenteral nutrition administration  Anthropometric Measurements: weight, weight change, body mass index  Biochemical Data, Medical Tests and Procedures: electrolyte and renal panel, gastrointestinal profile, glucose/endocrine profile, inflammatory profile, lipid profile  Nutrition-Focused Physical  Findings: overall appearance     Malnutrition Assessment  Malnutrition Type: chronic illness          Weight Loss (Malnutrition): (17% x 3 months)  Energy Intake (Malnutrition): less than or equal to 50% for greater than or equal to 5 days   Orbital Region (Subcutaneous Fat Loss): severe depletion  Upper Arm Region (Subcutaneous Fat Loss): severe depletion   Mandaeism Region (Muscle Loss): severe depletion  Clavicle Bone Region (Muscle Loss): severe depletion  Clavicle and Acromion Bone Region (Muscle Loss): moderate depletion  Dorsal Hand (Muscle Loss): moderate depletion  Patellar Region (Muscle Loss): moderate depletion  Anterior Thigh Region (Muscle Loss): severe depletion        NFPE completed by SHIRLEY Souza         Nutrition Follow-Up    RD Follow-up?: Yes

## 2019-09-25 NOTE — ASSESSMENT & PLAN NOTE
Malnutrition in the context of Chronic Illness/Injury    Related to (etiology):  Inadequate Energy Intake    Signs and Symptoms (as evidenced by):  Energy Intake: <50% of estimated energy requirement for > 5 days   Body Fat Depletion: mild and severe depletion of orbitals, triceps and thoracic and lumbar region   Muscle Mass Depletion: moderate and severe depletion of temples, clavicle region, interosseous muscle and lower extremities   Weight Loss: 17% x 3 months per chart review      Interventions:  Collaboration of nutrition care with other providers    Nutrition Diagnosis Status:  New

## 2019-09-25 NOTE — PLAN OF CARE
09/25/19 0919   Post-Acute Status   Post-Acute Authorization Other   Other Status No Post-Acute Service Needs   Discharge Delays (!) Diet Not Ready for Discharge         SW is following this Pt for DC planning needs. There are no identified needs at this time.      Floresita Bates, CONG  [facility]  [phone]

## 2019-09-25 NOTE — PLAN OF CARE
Recommendations    Pt with severe malnutrition criteria.     1. As medically able, ADAT to Regular + Boost Plus with texture per SLP.  2. If TF warranted, recommend TF of Isosource 1.5 advancing as tolerated to goal rate of 55 mL/hr to provide 1980 kcal, 90 gm protein, and 1008 mL free fluid.    -If nocturnal desired: Isosource 1.5 goal rate of 75 mL/hr x 18 hrs to provide 2025 kcal, 92 gm protein, and 1031 free fluid.   3. RD following.     Goals: receive nutrition by RD follow-up  Nutrition Goal Status: new

## 2019-09-25 NOTE — PLAN OF CARE
Patient is a 59 year old male re-admitted in transfer from [facility] with SBO.  Patient discharged home earlier in the week with no needs (patient refused tube feeds).  Patient is expected to discharge home with needs TBD +/- 9/30/2019.    Patient in room with girlfriend during visit.  Patient lives between his sister's mobile home and his girlfriend's camper, Jelly Moore, [address] [place], [place] [address] [address] ([phone]).  Jelly will drive patient home and obtain anything he needs after discharge. [facility] given to patient and/or family with understanding verbalized.  CM name and number written on white board in patient's room with request to call for any questions and concerns.  Will continue to follow for needs.    ADDENDUM TO ABOVE NOTE 9/26/2019:  Patient now states he will not go to [facility] requested this CM speak with his sister, Frances Harp ([phone]), to discuss discharge plan.  Patient stated he sometimes lives in his truck, discussed the need for patient to have housing as discharge with tube feeds can not be living in his truck.  Spoke with sister, Frances, stated she will call this CM back to discuss further discharge plans as she was at MD appointment.    Primary Doctor Misti HALLMAN - FERNANDO GALLEGOS - [address]  [address] [facility]  [place] [address]  Phone: [phone] Fax: [phone]    Extended Emergency Contact Information  Primary Emergency Contact: Frances Harp  Mobile Phone: [phone]  Relation: Sister  Secondary Emergency Contact: Jelly Moore  Mobile Phone: [phone]  Relation: Friend       09/25/19 1747   Discharge Assessment   Assessment Type Discharge Planning Assessment   Confirmed/corrected address and phone number on facesheet? Yes   Assessment information obtained from? Patient   Expected Length of Stay (days) 5   Communicated expected length of stay with patient/caregiver yes   Prior to hospitilization cognitive  status: Alert/Oriented   Prior to hospitalization functional status: Independent   Current cognitive status: Alert/Oriented   Current Functional Status: Independent   Lives With significant other   Able to Return to Prior Arrangements yes   Is patient able to care for self after discharge? Yes   Who are your caregiver(s) and their phone number(s)? girlfriend   Patient's perception of discharge disposition home or selfcare   Readmission Within the Last 30 Days other (see comments)  (SBO)   Equipment Currently Used at Home none   Do you have any problems affording any of your prescribed medications? No   Is the patient taking medications as prescribed? yes   Does the patient have transportation home? Yes   Transportation Anticipated family or friend will provide   Discharge Plan A Home with family   DME Needed Upon Discharge  feeding device   Patient/Family in Agreement with Plan yes

## 2019-09-25 NOTE — H&P
[facility]  General Surgery  History & Physical    Patient Name: Isaiah Greene  MRN: [id number]  Admission Date: 9/25/2019  Attending Physician: David Rivera MD   Primary Care Provider: Primary Doctor No    Patient information was obtained from patient and past medical records.     Subjective:     Chief Complaint/Reason for Admission: high grade small bowel obstruction    History of Present Illness:  Patient is a 59 y.o. male with history of newly diagnosed stomach cancer (9/13) s/p PEG and port 9/19 who presents as a transfer from [facility] with a small bowel obstruction. He initially presented with 3 days of nausea and vomiting which had onset shortly after discharge from his placement of the PEG on 9/19. He has not had a BM since before his surgery. At the OSH a CT was obtained which was concerning for high grade small bowel obstruction. An NGT was not placed as it as unknown exactly what surgeries were performed. His Cr was elevated to 1.8 at the OSH as well and he did receive fluid resuscitation there. He was then transferred here for higher level of care. He appeared clinically improved at the time of transfer.     No current facility-administered medications on file prior to encounter.      Current Outpatient Medications on File Prior to Encounter   Medication Sig    aspirin 81 MG Chew Chew and swallow 1 tablet (81 mg total) by mouth once daily. May take over the counter    HYDROcodone-acetaminophen (NORCO) 5-325 mg per tablet Take 1 tablet by mouth every 6 (six) hours as needed for Pain.    nicotine (NICODERM CQ) 21 mg/24 hr Place 1 patch onto the skin once daily.    ondansetron (ZOFRAN) 4 MG tablet Take 1 tablet (4 mg total) by mouth every 6 (six) hours as needed for Nausea.       Review of patient's allergies indicates:  No Known Allergies    Past Medical History:   Diagnosis Date    Chronic gastritis     Diverticulosis     Positive H. 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     Past Surgical History:   Procedure Laterality Date    DIAGNOSTIC LAPAROSCOPY N/A 9/19/2019    Procedure: LAPAROSCOPY, DIAGNOSTIC;  Surgeon: Josse Saravia MD;  Location: [facility] OR [address] [place] [address];  Service: General;  Laterality: N/A;    ENDOSCOPIC ULTRASOUND OF UPPER GASTROINTESTINAL TRACT N/A 9/17/2019    Procedure: ULTRASOUND, UPPER GI TRACT, ENDOSCOPIC;  Surgeon: Nino Randhawa MD;  Location: [address]);  Service: Endoscopy;  Laterality: N/A;    ESOPHAGOGASTRODUODENOSCOPY N/A 9/10/2019    Procedure: EGD (ESOPHAGOGASTRODUODENOSCOPY);  Surgeon: Ok Diaz MD;  Location: [facility];  Service: Endoscopy;  Laterality: N/A;    EYE SURGERY      FACIAL RECONSTRUCTION SURGERY      GASTROJEJUNOSTOMY N/A 9/19/2019    Procedure: GASTROJEJUNOSTOMY, possible G-tube;  Surgeon: Josse Saravia MD;  Location: [address];  Service: General;  Laterality: N/A;    INSERTION OF VENOUS ACCESS PORT Right 9/19/2019    Procedure: INSERTION, VENOUS ACCESS PORT;  Surgeon: Josse Saravia MD;  Location: [facility] OR [address] [place] [address];  Service: General;  Laterality: Right;    LAPAROSCOPIC INSERTION OF JEJUNOSTOMY TUBE N/A 9/19/2019    Procedure: INSERTION, JEJUNOSTOMY TUBE, LAPAROSCOPIC, possible open;  Surgeon: Josse Saravia MD;  Location: [address];  Service: General;  Laterality: N/A;     Family History     Problem Relation (Age of Onset)    Cancer Father        Tobacco Use    Smoking status: Current Every Day Smoker     Packs/day: 1.00     Years: 40.00     Pack years: 40.00     Types: Cigarettes    Smokeless tobacco: Never Used   Substance and Sexual Activity    Alcohol use: Not Currently     Comment: quit a year agao    Drug use: Yes     Types: Marijuana    Sexual activity: Not on file     Review of Systems   Constitutional: Positive for appetite change.   Eyes: Negative for pain.   Respiratory: Negative for cough and shortness of breath.     Cardiovascular: Negative for chest pain and palpitations.   Gastrointestinal: Positive for abdominal distention, nausea and vomiting. Negative for diarrhea.     Objective:     Vital Signs (Most Recent):  Temp: 97.5 °F (36.4 °C) (09/25/19 0357)  Pulse: 109 (09/25/19 0357)  Resp: 20 (09/25/19 0357)  BP: (!) 130/91 (09/25/19 0357)  SpO2: (!) 92 % (09/25/19 0357) Vital Signs (24h Range):  Temp:  [97.5 °F (36.4 °C)] 97.5 °F (36.4 °C)  Pulse:  [] 109  Resp:  [20] 20  SpO2:  [92 %-93 %] 92 %  BP: (130-138)/(90-92) 130/91     Weight: 56.1 kg (123 lb 10.8 oz)  Body mass index is 17.75 kg/m².    Physical Exam   Constitutional: No distress.   HENT:   Mouth/Throat: Oropharynx is clear and moist. No oropharyngeal exudate.   Eyes: EOM are normal. No scleral icterus.   Neck: Normal range of motion.   Cardiovascular: Normal rate and regular rhythm.   Pulmonary/Chest: Effort normal. No respiratory distress.   Abdominal: Soft. He exhibits distension (mildly). There is tenderness. There is guarding. There is no rebound.   GJ in place   Musculoskeletal: Normal range of motion.   Neurological: He is alert. No cranial nerve deficit. He exhibits normal muscle tone.   Skin: Skin is warm and dry. He is not diaphoretic.   Psychiatric: He has a normal mood and affect.       Significant Labs:  CBC:   Recent Labs   Lab 09/23/19  0650   WBC 14.20*   RBC 3.59*   HGB 10.1*   HCT 32.8*   *   MCV 91   MCH 28.1   MCHC 30.8*     CMP:   Recent Labs   Lab 09/23/19  0650   *   CALCIUM 8.5*      K 4.6   CO2 27      BUN 8   CREATININE 0.7     Coagulation: No results for input(s): LABPROT, INR, APTT in the last 168 hours.    Significant Diagnostics:  I have reviewed all pertinent imaging results/findings within the past 24 hours.     Assessment/Plan:     Active Diagnoses:    Diagnosis Date Noted POA    Small bowel obstruction [K56.609] 09/25/2019 Yes      Problems Resolved During this Admission:     VTE Risk Mitigation  (From admission, onward)    None        59 y.o. male with hx of stomach cancer s/p PEG now with small bowel obstruction.     - patient seen and examined, labs and imaging reviewed  - admit to general surgery under Dr. Rivera  - PERE[facility], [facility]  - AM labs  - PRN pain medication  - J tube to gravity  - home meds reconciled  - Attestation to follow      Nora Hudson MD  General Surgery  [facility]

## 2019-09-25 NOTE — NURSING
Patient arrived to the 5th floor via stretcher @ 03:43 and placed into room 529 B. Orientated to room, white board updated, call bell within reach and instructed on how to use, bed lowered to lowest position, side rails up x 2, IS and hibiclens @ bedside, SCDs applied and turned on, VS taken and stable, admit questions asked.

## 2019-09-26 LAB
ALBUMIN SERPL BCP-MCNC: 2.7 G/DL (ref 3.5–5.2)
ALP SERPL-CCNC: 78 U/L (ref 55–135)
ALT SERPL W/O P-5'-P-CCNC: 10 U/L (ref 10–44)
ANION GAP SERPL CALC-SCNC: 10 MMOL/L (ref 8–16)
ANION GAP SERPL CALC-SCNC: 11 MMOL/L (ref 8–16)
ANION GAP SERPL CALC-SCNC: 12 MMOL/L (ref 8–16)
ANION GAP SERPL CALC-SCNC: 14 MMOL/L (ref 8–16)
ANISOCYTOSIS BLD QL SMEAR: SLIGHT
ANISOCYTOSIS BLD QL SMEAR: SLIGHT
AST SERPL-CCNC: 22 U/L (ref 10–40)
BASO STIPL BLD QL SMEAR: ABNORMAL
BASOPHILS # BLD AUTO: 0.02 K/UL (ref 0–0.2)
BASOPHILS NFR BLD: 0 % (ref 0–1.9)
BASOPHILS NFR BLD: 0.1 % (ref 0–1.9)
BILIRUB SERPL-MCNC: 0.3 MG/DL (ref 0.1–1)
BUN SERPL-MCNC: 20 MG/DL (ref 6–20)
BUN SERPL-MCNC: 20 MG/DL (ref 6–20)
BUN SERPL-MCNC: 21 MG/DL (ref 6–20)
BUN SERPL-MCNC: 22 MG/DL (ref 6–20)
CALCIUM SERPL-MCNC: 9.2 MG/DL (ref 8.7–10.5)
CALCIUM SERPL-MCNC: 9.3 MG/DL (ref 8.7–10.5)
CALCIUM SERPL-MCNC: 9.4 MG/DL (ref 8.7–10.5)
CALCIUM SERPL-MCNC: 9.4 MG/DL (ref 8.7–10.5)
CHLORIDE SERPL-SCNC: 88 MMOL/L (ref 95–110)
CHLORIDE SERPL-SCNC: 90 MMOL/L (ref 95–110)
CHLORIDE SERPL-SCNC: 90 MMOL/L (ref 95–110)
CHLORIDE SERPL-SCNC: 92 MMOL/L (ref 95–110)
CO2 SERPL-SCNC: 32 MMOL/L (ref 23–29)
CO2 SERPL-SCNC: 35 MMOL/L (ref 23–29)
CO2 SERPL-SCNC: 36 MMOL/L (ref 23–29)
CO2 SERPL-SCNC: 38 MMOL/L (ref 23–29)
CREAT SERPL-MCNC: 0.7 MG/DL (ref 0.5–1.4)
CREAT SERPL-MCNC: 0.7 MG/DL (ref 0.5–1.4)
CREAT SERPL-MCNC: 0.8 MG/DL (ref 0.5–1.4)
CREAT SERPL-MCNC: 0.8 MG/DL (ref 0.5–1.4)
DIFFERENTIAL METHOD: ABNORMAL
DIFFERENTIAL METHOD: ABNORMAL
EOSINOPHIL # BLD AUTO: 0 K/UL (ref 0–0.5)
EOSINOPHIL NFR BLD: 0 % (ref 0–8)
EOSINOPHIL NFR BLD: 0 % (ref 0–8)
ERYTHROCYTE [DISTWIDTH] IN BLOOD BY AUTOMATED COUNT: 14.4 % (ref 11.5–14.5)
ERYTHROCYTE [DISTWIDTH] IN BLOOD BY AUTOMATED COUNT: 14.5 % (ref 11.5–14.5)
EST. GFR  (AFRICAN AMERICAN): >60 ML/MIN/1.73 M^2
EST. GFR  (NON AFRICAN AMERICAN): >60 ML/MIN/1.73 M^2
GLUCOSE SERPL-MCNC: 104 MG/DL (ref 70–110)
GLUCOSE SERPL-MCNC: 112 MG/DL (ref 70–110)
GLUCOSE SERPL-MCNC: 117 MG/DL (ref 70–110)
GLUCOSE SERPL-MCNC: 97 MG/DL (ref 70–110)
HCT VFR BLD AUTO: 30.5 % (ref 40–54)
HCT VFR BLD AUTO: 31.9 % (ref 40–54)
HGB BLD-MCNC: 9.5 G/DL (ref 14–18)
HGB BLD-MCNC: 9.9 G/DL (ref 14–18)
IMM GRANULOCYTES # BLD AUTO: 0.1 K/UL (ref 0–0.04)
IMM GRANULOCYTES # BLD AUTO: ABNORMAL K/UL (ref 0–0.04)
IMM GRANULOCYTES NFR BLD AUTO: 0.5 % (ref 0–0.5)
IMM GRANULOCYTES NFR BLD AUTO: ABNORMAL % (ref 0–0.5)
LYMPHOCYTES # BLD AUTO: 10.2 K/UL (ref 1–4.8)
LYMPHOCYTES NFR BLD: 51.7 % (ref 18–48)
LYMPHOCYTES NFR BLD: 53 % (ref 18–48)
MAGNESIUM SERPL-MCNC: 2.2 MG/DL (ref 1.6–2.6)
MCH RBC QN AUTO: 27.1 PG (ref 27–31)
MCH RBC QN AUTO: 27.5 PG (ref 27–31)
MCHC RBC AUTO-ENTMCNC: 31 G/DL (ref 32–36)
MCHC RBC AUTO-ENTMCNC: 31.1 G/DL (ref 32–36)
MCV RBC AUTO: 87 FL (ref 82–98)
MCV RBC AUTO: 89 FL (ref 82–98)
MONOCYTES # BLD AUTO: 0.6 K/UL (ref 0.3–1)
MONOCYTES NFR BLD: 3.2 % (ref 4–15)
MONOCYTES NFR BLD: 4 % (ref 4–15)
NEUTROPHILS # BLD AUTO: 8.8 K/UL (ref 1.8–7.7)
NEUTROPHILS NFR BLD: 43 % (ref 38–73)
NEUTROPHILS NFR BLD: 44.5 % (ref 38–73)
NRBC BLD-RTO: 0 /100 WBC
NRBC BLD-RTO: 0 /100 WBC
PHOSPHATE SERPL-MCNC: 2 MG/DL (ref 2.7–4.5)
PLATELET # BLD AUTO: 487 K/UL (ref 150–350)
PLATELET # BLD AUTO: 497 K/UL (ref 150–350)
PLATELET BLD QL SMEAR: ABNORMAL
PLATELET BLD QL SMEAR: ABNORMAL
PMV BLD AUTO: 8.5 FL (ref 9.2–12.9)
PMV BLD AUTO: 9.3 FL (ref 9.2–12.9)
POTASSIUM SERPL-SCNC: 2.7 MMOL/L (ref 3.5–5.1)
POTASSIUM SERPL-SCNC: 3.3 MMOL/L (ref 3.5–5.1)
POTASSIUM SERPL-SCNC: 3.5 MMOL/L (ref 3.5–5.1)
POTASSIUM SERPL-SCNC: 3.6 MMOL/L (ref 3.5–5.1)
PROT SERPL-MCNC: 7.2 G/DL (ref 6–8.4)
RBC # BLD AUTO: 3.51 M/UL (ref 4.6–6.2)
RBC # BLD AUTO: 3.6 M/UL (ref 4.6–6.2)
SODIUM SERPL-SCNC: 134 MMOL/L (ref 136–145)
SODIUM SERPL-SCNC: 135 MMOL/L (ref 136–145)
SODIUM SERPL-SCNC: 138 MMOL/L (ref 136–145)
SODIUM SERPL-SCNC: 141 MMOL/L (ref 136–145)
WBC # BLD AUTO: 18.71 K/UL (ref 3.9–12.7)
WBC # BLD AUTO: 19.68 K/UL (ref 3.9–12.7)

## 2019-09-26 PROCEDURE — 84100 ASSAY OF PHOSPHORUS: CPT

## 2019-09-26 PROCEDURE — 80048 BASIC METABOLIC PNL TOTAL CA: CPT

## 2019-09-26 PROCEDURE — 80048 BASIC METABOLIC PNL TOTAL CA: CPT | Mod: 91

## 2019-09-26 PROCEDURE — 63600175 PHARM REV CODE 636 W HCPCS: Performed by: NURSE PRACTITIONER

## 2019-09-26 PROCEDURE — 63600175 PHARM REV CODE 636 W HCPCS: Performed by: STUDENT IN AN ORGANIZED HEALTH CARE EDUCATION/TRAINING PROGRAM

## 2019-09-26 PROCEDURE — 25000003 PHARM REV CODE 250: Performed by: STUDENT IN AN ORGANIZED HEALTH CARE EDUCATION/TRAINING PROGRAM

## 2019-09-26 PROCEDURE — 85027 COMPLETE CBC AUTOMATED: CPT

## 2019-09-26 PROCEDURE — 83735 ASSAY OF MAGNESIUM: CPT

## 2019-09-26 PROCEDURE — 25000003 PHARM REV CODE 250: Performed by: NURSE PRACTITIONER

## 2019-09-26 PROCEDURE — 85025 COMPLETE CBC W/AUTO DIFF WBC: CPT

## 2019-09-26 PROCEDURE — 20600001 HC STEP DOWN PRIVATE ROOM

## 2019-09-26 PROCEDURE — 85007 BL SMEAR W/DIFF WBC COUNT: CPT

## 2019-09-26 PROCEDURE — 80053 COMPREHEN METABOLIC PANEL: CPT

## 2019-09-26 PROCEDURE — 36415 COLL VENOUS BLD VENIPUNCTURE: CPT

## 2019-09-26 PROCEDURE — S4991 NICOTINE PATCH NONLEGEND: HCPCS | Performed by: STUDENT IN AN ORGANIZED HEALTH CARE EDUCATION/TRAINING PROGRAM

## 2019-09-26 RX ORDER — POTASSIUM CHLORIDE 7.45 MG/ML
10 INJECTION INTRAVENOUS
Status: COMPLETED | OUTPATIENT
Start: 2019-09-26 | End: 2019-09-26

## 2019-09-26 RX ORDER — DIPHENHYDRAMINE HYDROCHLORIDE 50 MG/ML
6.25 INJECTION INTRAMUSCULAR; INTRAVENOUS ONCE AS NEEDED
Status: COMPLETED | OUTPATIENT
Start: 2019-09-26 | End: 2019-09-26

## 2019-09-26 RX ORDER — ENOXAPARIN SODIUM 100 MG/ML
40 INJECTION SUBCUTANEOUS EVERY 24 HOURS
Status: DISCONTINUED | OUTPATIENT
Start: 2019-09-26 | End: 2019-10-04 | Stop reason: HOSPADM

## 2019-09-26 RX ADMIN — SODIUM CHLORIDE, SODIUM LACTATE, POTASSIUM CHLORIDE, AND CALCIUM CHLORIDE: 600; 310; 30; 20 INJECTION, SOLUTION INTRAVENOUS at 04:09

## 2019-09-26 RX ADMIN — HYDROMORPHONE HYDROCHLORIDE 1 MG: 1 INJECTION, SOLUTION INTRAMUSCULAR; INTRAVENOUS; SUBCUTANEOUS at 06:09

## 2019-09-26 RX ADMIN — POTASSIUM CHLORIDE 10 MEQ: 7.46 INJECTION, SOLUTION INTRAVENOUS at 11:09

## 2019-09-26 RX ADMIN — HYDROMORPHONE HYDROCHLORIDE 1 MG: 1 INJECTION, SOLUTION INTRAMUSCULAR; INTRAVENOUS; SUBCUTANEOUS at 12:09

## 2019-09-26 RX ADMIN — HEPARIN SODIUM 5000 UNITS: 5000 INJECTION, SOLUTION INTRAVENOUS; SUBCUTANEOUS at 05:09

## 2019-09-26 RX ADMIN — POTASSIUM CHLORIDE 10 MEQ: 7.46 INJECTION, SOLUTION INTRAVENOUS at 08:09

## 2019-09-26 RX ADMIN — POTASSIUM CHLORIDE 10 MEQ: 7.46 INJECTION, SOLUTION INTRAVENOUS at 12:09

## 2019-09-26 RX ADMIN — POTASSIUM CHLORIDE 10 MEQ: 7.46 INJECTION, SOLUTION INTRAVENOUS at 02:09

## 2019-09-26 RX ADMIN — ENOXAPARIN SODIUM 40 MG: 100 INJECTION SUBCUTANEOUS at 04:09

## 2019-09-26 RX ADMIN — SODIUM CHLORIDE, SODIUM LACTATE, POTASSIUM CHLORIDE, AND CALCIUM CHLORIDE 1000 ML: .6; .31; .03; .02 INJECTION, SOLUTION INTRAVENOUS at 11:09

## 2019-09-26 RX ADMIN — DIPHENHYDRAMINE HYDROCHLORIDE 6.25 MG: 50 INJECTION, SOLUTION INTRAMUSCULAR; INTRAVENOUS at 09:09

## 2019-09-26 RX ADMIN — POTASSIUM CHLORIDE 10 MEQ: 7.46 INJECTION, SOLUTION INTRAVENOUS at 07:09

## 2019-09-26 RX ADMIN — NICOTINE 1 PATCH: 21 PATCH, EXTENDED RELEASE TRANSDERMAL at 08:09

## 2019-09-26 RX ADMIN — HYDROMORPHONE HYDROCHLORIDE 1 MG: 1 INJECTION, SOLUTION INTRAMUSCULAR; INTRAVENOUS; SUBCUTANEOUS at 09:09

## 2019-09-26 RX ADMIN — POTASSIUM CHLORIDE 10 MEQ: 7.46 INJECTION, SOLUTION INTRAVENOUS at 01:09

## 2019-09-26 RX ADMIN — HYDROMORPHONE HYDROCHLORIDE 1 MG: 1 INJECTION, SOLUTION INTRAMUSCULAR; INTRAVENOUS; SUBCUTANEOUS at 03:09

## 2019-09-26 RX ADMIN — SODIUM PHOSPHATE, MONOBASIC, MONOHYDRATE 30 MMOL: 276; 142 INJECTION, SOLUTION INTRAVENOUS at 01:09

## 2019-09-26 RX ADMIN — HYDROMORPHONE HYDROCHLORIDE 1 MG: 1 INJECTION, SOLUTION INTRAMUSCULAR; INTRAVENOUS; SUBCUTANEOUS at 04:09

## 2019-09-26 RX ADMIN — SODIUM CHLORIDE, SODIUM LACTATE, POTASSIUM CHLORIDE, AND CALCIUM CHLORIDE: 600; 310; 30; 20 INJECTION, SOLUTION INTRAVENOUS at 08:09

## 2019-09-26 NOTE — NURSING
Pt requesting ice chips. Spoke with md on call. No new orders received. Pt remains npo per md order.

## 2019-09-26 NOTE — PROGRESS NOTES
[facility]  General Surgery  Progress Note    Subjective:       Post-Op Info:  * No surgery found *         Interval History:   7.8 L, drinking water with large amount of ice chips.   Pain well controlled. No nausea or vomiting since NG tube placement.   Not passing gas, no bowel movements.     Medications:  Continuous Infusions:   lactated ringers 125 mL/hr at 09/25/19 2338     Scheduled Meds:   enoxaparin  40 mg Subcutaneous Daily    lactated ringers  1,000 mL Intravenous Once    nicotine  1 patch Transdermal Daily    potassium chloride in water  10 mEq Intravenous Q1H    sodium phosphate IVPB  30 mmol Intravenous Once     PRN Meds:HYDROmorphone, HYDROmorphone, ondansetron, promethazine (PHENERGAN) IVPB, sodium chloride 0.9%     Review of patient's allergies indicates:  No Known Allergies  Objective:     Vital Signs (Most Recent):  Temp: 98.8 °F (37.1 °C) (09/26/19 1140)  Pulse: 110 (09/26/19 1140)  Resp: 16 (09/26/19 0747)  BP: 131/81 (09/26/19 1140)  SpO2: 96 % (09/26/19 1140) Vital Signs (24h Range):  Temp:  [96.2 °F (35.7 °C)-98.8 °F (37.1 °C)] 98.8 °F (37.1 °C)  Pulse:  [] 110  Resp:  [16-20] 16  SpO2:  [93 %-97 %] 96 %  BP: (109-137)/(55-81) 131/81     Weight: 56.1 kg (123 lb 10.8 oz)  Body mass index is 17.75 kg/m².    Intake/Output - Last 3 Shifts       09/24 0700 - 09/25 0659 09/25 0700 - 09/26 0659 09/26 0700 - 09/27 0659    P.O.  150 0    I.V. (mL/kg)  400 (7.1) 1420.8 (25.3)    NG/GT  0     IV Piggyback  3300     Total Intake(mL/kg)  3850 (68.6) 1420.8 (25.3)    Urine (mL/kg/hr)  1750 (1.3)     Emesis/NG output  500 0    Drains  7950 250    Stool  0 0    Total Output  34718 250    Net  -6350 +1170.8           Stool Occurrence  0 x           Physical Exam   Constitutional: He is oriented to person, place, and time. No distress.   Cachectic appearing.     HENT:   Head: Normocephalic and atraumatic.   NG tube in place on LIWS with bilious output.   Eyes: Conjunctivae and  EOM are normal.   Neck: Normal range of motion. No tracheal deviation present.   Cardiovascular: Normal rate and intact distal pulses.   Pulmonary/Chest: Effort normal. No respiratory distress.   Abdominal: Soft. He exhibits no distension. There is tenderness (mildly tender to palpation). There is no guarding.   Musculoskeletal: Normal range of motion. He exhibits no edema.   Neurological: He is alert and oriented to person, place, and time.   Skin: He is not diaphoretic.   Vitals reviewed.      Significant Labs:  CBC:   Recent Labs   Lab 09/26/19  0453   WBC 18.71*   RBC 3.51*   HGB 9.5*   HCT 30.5*   *   MCV 87   MCH 27.1   MCHC 31.1*     CMP:   Recent Labs   Lab 09/26/19  0453   *   CALCIUM 9.2   ALBUMIN 2.7*   PROT 7.2      K 2.7*   CO2 36*   CL 90*   BUN 21*   CREATININE 0.8   ALKPHOS 78   ALT 10   AST 22   BILITOT 0.3       Significant Diagnostics:  I have reviewed all pertinent imaging results/findings within the past 24 hours.    Assessment/Plan:     Small bowel obstruction  58 yo M with gastric signet ring cell adenocarcinoma s/p GJ bypass with J tube and port-a-cath placement on 9/19/19. Readmitted for small bowel obstruction and intractable nausea and vomiting.     - Continue NGT to LIWS  - NPO, okay for 3-5 ice chips per hour  - Aggressive fluid resuscitation with LR, bolus as needed and continue mIVF  - Close monitoring of electrolytes given high output NGT, replace as needed. Check TID.   - Anti-emetics PRN  - Pain control          Cecilia Oden MD  General Surgery  [facility]

## 2019-09-26 NOTE — SUBJECTIVE & OBJECTIVE
Interval History: NAEON. VSS. Patient continues to have high output from NG tube. Non-compliant with NPO orders, difficult to assess true output volume. Pain well controlled. No nausea or vomiting since NG tube placement. Not passing gas, no bowel movements.     Medications:  Continuous Infusions:   lactated ringers 125 mL/hr at 09/25/19 2338     Scheduled Meds:   enoxaparin  40 mg Subcutaneous Daily    lactated ringers  1,000 mL Intravenous Once    nicotine  1 patch Transdermal Daily    potassium chloride in water  10 mEq Intravenous Q1H     PRN Meds:HYDROmorphone, HYDROmorphone, ondansetron, promethazine (PHENERGAN) IVPB, sodium chloride 0.9%     Review of patient's allergies indicates:  No Known Allergies  Objective:     Vital Signs (Most Recent):  Temp: 98.8 °F (37.1 °C) (09/26/19 1140)  Pulse: 110 (09/26/19 1140)  Resp: 16 (09/26/19 0747)  BP: 131/81 (09/26/19 1140)  SpO2: 96 % (09/26/19 1140) Vital Signs (24h Range):  Temp:  [96.2 °F (35.7 °C)-98.8 °F (37.1 °C)] 98.8 °F (37.1 °C)  Pulse:  [] 110  Resp:  [16-20] 16  SpO2:  [93 %-97 %] 96 %  BP: (109-137)/(55-81) 131/81     Weight: 56.1 kg (123 lb 10.8 oz)  Body mass index is 17.75 kg/m².    Intake/Output - Last 3 Shifts       09/24 0700 - 09/25 0659 09/25 0700 - 09/26 0659 09/26 0700 - 09/27 0659    P.O.  150 0    I.V. (mL/kg)  400 (7.1) 1420.8 (25.3)    NG/GT  0     IV Piggyback  3300     Total Intake(mL/kg)  3850 (68.6) 1420.8 (25.3)    Urine (mL/kg/hr)  1750 (1.3)     Emesis/NG output  500 0    Drains  7950 250    Stool  0 0    Total Output  09584 250    Net  -6350 +1170.8           Stool Occurrence  0 x           Physical Exam   Constitutional: He is oriented to person, place, and time. No distress.   Cachectic appearing.     HENT:   Head: Normocephalic and atraumatic.   NG tube in place on LIWS with bilious output.   Eyes: Conjunctivae and EOM are normal.   Neck: Normal range of motion. No tracheal deviation present.   Cardiovascular: Normal  rate and intact distal pulses.   Pulmonary/Chest: Effort normal. No respiratory distress.   Abdominal: Soft. He exhibits no distension. There is tenderness (mildly tender to palpation). There is no guarding.   Musculoskeletal: Normal range of motion. He exhibits no edema.   Neurological: He is alert and oriented to person, place, and time.   Skin: He is not diaphoretic.   Vitals reviewed.      Significant Labs:  CBC:   Recent Labs   Lab 09/26/19  0453   WBC 18.71*   RBC 3.51*   HGB 9.5*   HCT 30.5*   *   MCV 87   MCH 27.1   MCHC 31.1*     CMP:   Recent Labs   Lab 09/26/19  0453   *   CALCIUM 9.2   ALBUMIN 2.7*   PROT 7.2      K 2.7*   CO2 36*   CL 90*   BUN 21*   CREATININE 0.8   ALKPHOS 78   ALT 10   AST 22   BILITOT 0.3       Significant Diagnostics:  I have reviewed all pertinent imaging results/findings within the past 24 hours.

## 2019-09-26 NOTE — PROGRESS NOTES
[facility]  General Surgery  Progress Note    Subjective:       Post-Op Info:  * No surgery found *         Interval History: NAEON. VSS. Patient continues to have high output from NG tube. Non-compliant with NPO orders, difficult to assess true output volume. Pain well controlled. No nausea or vomiting since NG tube placement. Not passing gas, no bowel movements.     Medications:  Continuous Infusions:   lactated ringers 125 mL/hr at 09/25/19 2338     Scheduled Meds:   enoxaparin  40 mg Subcutaneous Daily    lactated ringers  1,000 mL Intravenous Once    nicotine  1 patch Transdermal Daily    potassium chloride in water  10 mEq Intravenous Q1H     PRN Meds:HYDROmorphone, HYDROmorphone, ondansetron, promethazine (PHENERGAN) IVPB, sodium chloride 0.9%     Review of patient's allergies indicates:  No Known Allergies  Objective:     Vital Signs (Most Recent):  Temp: 98.8 °F (37.1 °C) (09/26/19 1140)  Pulse: 110 (09/26/19 1140)  Resp: 16 (09/26/19 0747)  BP: 131/81 (09/26/19 1140)  SpO2: 96 % (09/26/19 1140) Vital Signs (24h Range):  Temp:  [96.2 °F (35.7 °C)-98.8 °F (37.1 °C)] 98.8 °F (37.1 °C)  Pulse:  [] 110  Resp:  [16-20] 16  SpO2:  [93 %-97 %] 96 %  BP: (109-137)/(55-81) 131/81     Weight: 56.1 kg (123 lb 10.8 oz)  Body mass index is 17.75 kg/m².    Intake/Output - Last 3 Shifts       09/24 0700 - 09/25 0659 09/25 0700 - 09/26 0659 09/26 0700 - 09/27 0659    P.O.  150 0    I.V. (mL/kg)  400 (7.1) 1420.8 (25.3)    NG/GT  0     IV Piggyback  3300     Total Intake(mL/kg)  3850 (68.6) 1420.8 (25.3)    Urine (mL/kg/hr)  1750 (1.3)     Emesis/NG output  500 0    Drains  7950 250    Stool  0 0    Total Output  16324 250    Net  -6350 +1170.8           Stool Occurrence  0 x           Physical Exam   Constitutional: He is oriented to person, place, and time. No distress.   Cachectic appearing.     HENT:   Head: Normocephalic and atraumatic.   NG tube in place on LIWS with bilious output.    Eyes: Conjunctivae and EOM are normal.   Neck: Normal range of motion. No tracheal deviation present.   Cardiovascular: Normal rate and intact distal pulses.   Pulmonary/Chest: Effort normal. No respiratory distress.   Abdominal: Soft. He exhibits no distension. There is tenderness (mildly tender to palpation). There is no guarding.   Musculoskeletal: Normal range of motion. He exhibits no edema.   Neurological: He is alert and oriented to person, place, and time.   Skin: He is not diaphoretic.   Vitals reviewed.      Significant Labs:  CBC:   Recent Labs   Lab 09/26/19  0453   WBC 18.71*   RBC 3.51*   HGB 9.5*   HCT 30.5*   *   MCV 87   MCH 27.1   MCHC 31.1*     CMP:   Recent Labs   Lab 09/26/19  0453   *   CALCIUM 9.2   ALBUMIN 2.7*   PROT 7.2      K 2.7*   CO2 36*   CL 90*   BUN 21*   CREATININE 0.8   ALKPHOS 78   ALT 10   AST 22   BILITOT 0.3       Significant Diagnostics:  I have reviewed all pertinent imaging results/findings within the past 24 hours.    Assessment/Plan:     Small bowel obstruction  58 yo M with gastric signet ring cell adenocarcinoma s/p GJ bypass with J tube and port-a-cath placement on 9/19/19. Readmitted for small bowel obstruction and intractable nausea and vomiting.     - Continue NGT to LIWS  - NPO, okay for 3-5 ice chips per hour  - Aggressive fluid resuscitation with LR, bolus as needed and continue mIVF  - Close monitoring of electrolytes given high output NGT, replace as needed  - Anti-emetics PRN  - Pain control          Lynda Arguello MD  General Surgery  [facility]

## 2019-09-26 NOTE — ASSESSMENT & PLAN NOTE
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 Readmitted for small bowel obstruction and intractable nausea and vomiting.     - Continue NGT to LIWS  - NPO, okay for 3-5 ice chips per hour  - Aggressive fluid resuscitation with LR, bolus as needed and continue mIVF  - Close monitoring of electrolytes given high output NGT, replace as needed. Check TID.   - Anti-emetics PRN  - Pain control

## 2019-09-26 NOTE — ASSESSMENT & PLAN NOTE
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 Readmitted for small bowel obstruction and intractable nausea and vomiting.     - Continue NGT to LIWS  - NPO, okay for 3-5 ice chips per hour  - Aggressive fluid resuscitation with LR, bolus as needed and continue mIVF  - Close monitoring of electrolytes given high output NGT, replace as needed  - Anti-emetics PRN  - Pain control

## 2019-09-26 NOTE — SUBJECTIVE & OBJECTIVE
Interval History:   7.8 L, drinking water with large amount of ice chips.   Pain well controlled. No nausea or vomiting since NG tube placement.   Not passing gas, no bowel movements.     Medications:  Continuous Infusions:   lactated ringers 125 mL/hr at 09/25/19 2338     Scheduled Meds:   enoxaparin  40 mg Subcutaneous Daily    lactated ringers  1,000 mL Intravenous Once    nicotine  1 patch Transdermal Daily    potassium chloride in water  10 mEq Intravenous Q1H    sodium phosphate IVPB  30 mmol Intravenous Once     PRN Meds:HYDROmorphone, HYDROmorphone, ondansetron, promethazine (PHENERGAN) IVPB, sodium chloride 0.9%     Review of patient's allergies indicates:  No Known Allergies  Objective:     Vital Signs (Most Recent):  Temp: 98.8 °F (37.1 °C) (09/26/19 1140)  Pulse: 110 (09/26/19 1140)  Resp: 16 (09/26/19 0747)  BP: 131/81 (09/26/19 1140)  SpO2: 96 % (09/26/19 1140) Vital Signs (24h Range):  Temp:  [96.2 °F (35.7 °C)-98.8 °F (37.1 °C)] 98.8 °F (37.1 °C)  Pulse:  [] 110  Resp:  [16-20] 16  SpO2:  [93 %-97 %] 96 %  BP: (109-137)/(55-81) 131/81     Weight: 56.1 kg (123 lb 10.8 oz)  Body mass index is 17.75 kg/m².    Intake/Output - Last 3 Shifts       09/24 0700 - 09/25 0659 09/25 0700 - 09/26 0659 09/26 0700 - 09/27 0659    P.O.  150 0    I.V. (mL/kg)  400 (7.1) 1420.8 (25.3)    NG/GT  0     IV Piggyback  3300     Total Intake(mL/kg)  3850 (68.6) 1420.8 (25.3)    Urine (mL/kg/hr)  1750 (1.3)     Emesis/NG output  500 0    Drains  7950 250    Stool  0 0    Total Output  66222 250    Net  -6350 +1170.8           Stool Occurrence  0 x           Physical Exam   Constitutional: He is oriented to person, place, and time. No distress.   Cachectic appearing.     HENT:   Head: Normocephalic and atraumatic.   NG tube in place on LIWS with bilious output.   Eyes: Conjunctivae and EOM are normal.   Neck: Normal range of motion. No tracheal deviation present.   Cardiovascular: Normal rate and intact distal  pulses.   Pulmonary/Chest: Effort normal. No respiratory distress.   Abdominal: Soft. He exhibits no distension. There is tenderness (mildly tender to palpation). There is no guarding.   Musculoskeletal: Normal range of motion. He exhibits no edema.   Neurological: He is alert and oriented to person, place, and time.   Skin: He is not diaphoretic.   Vitals reviewed.      Significant Labs:  CBC:   Recent Labs   Lab 09/26/19  0453   WBC 18.71*   RBC 3.51*   HGB 9.5*   HCT 30.5*   *   MCV 87   MCH 27.1   MCHC 31.1*     CMP:   Recent Labs   Lab 09/26/19  0453   *   CALCIUM 9.2   ALBUMIN 2.7*   PROT 7.2      K 2.7*   CO2 36*   CL 90*   BUN 21*   CREATININE 0.8   ALKPHOS 78   ALT 10   AST 22   BILITOT 0.3       Significant Diagnostics:  I have reviewed all pertinent imaging results/findings within the past 24 hours.

## 2019-09-26 NOTE — PROGRESS NOTES
NGT to LIWS intact, draining large amts greenish liquid --see flow sheet. LR bolus 2L administered. K riders in progress. Pt states he is feeling much better. Will continue to monitor.

## 2019-09-26 NOTE — NURSING
Pt upset requesting ice chips. Throwing items in room. Teaching provided. MD aware dr. Agruello at bedside. Notified md pt critical potassium level. Will continue to monitor.

## 2019-09-27 LAB
ANION GAP SERPL CALC-SCNC: 11 MMOL/L (ref 8–16)
ANION GAP SERPL CALC-SCNC: 13 MMOL/L (ref 8–16)
ANION GAP SERPL CALC-SCNC: 14 MMOL/L (ref 8–16)
ANION GAP SERPL CALC-SCNC: 14 MMOL/L (ref 8–16)
ANION GAP SERPL CALC-SCNC: 16 MMOL/L (ref 8–16)
ANISOCYTOSIS BLD QL SMEAR: SLIGHT
BASO STIPL BLD QL SMEAR: ABNORMAL
BASOPHILS NFR BLD: 0 % (ref 0–1.9)
BUN SERPL-MCNC: 16 MG/DL (ref 6–20)
BUN SERPL-MCNC: 18 MG/DL (ref 6–20)
BUN SERPL-MCNC: 19 MG/DL (ref 6–20)
BUN SERPL-MCNC: 19 MG/DL (ref 6–20)
BUN SERPL-MCNC: 20 MG/DL (ref 6–20)
CALCIUM SERPL-MCNC: 9.1 MG/DL (ref 8.7–10.5)
CALCIUM SERPL-MCNC: 9.2 MG/DL (ref 8.7–10.5)
CALCIUM SERPL-MCNC: 9.2 MG/DL (ref 8.7–10.5)
CALCIUM SERPL-MCNC: 9.7 MG/DL (ref 8.7–10.5)
CALCIUM SERPL-MCNC: 9.8 MG/DL (ref 8.7–10.5)
CHLORIDE SERPL-SCNC: 87 MMOL/L (ref 95–110)
CHLORIDE SERPL-SCNC: 90 MMOL/L (ref 95–110)
CHLORIDE SERPL-SCNC: 91 MMOL/L (ref 95–110)
CO2 SERPL-SCNC: 28 MMOL/L (ref 23–29)
CO2 SERPL-SCNC: 32 MMOL/L (ref 23–29)
CO2 SERPL-SCNC: 34 MMOL/L (ref 23–29)
CO2 SERPL-SCNC: 35 MMOL/L (ref 23–29)
CO2 SERPL-SCNC: 36 MMOL/L (ref 23–29)
CREAT SERPL-MCNC: 0.7 MG/DL (ref 0.5–1.4)
CREAT SERPL-MCNC: 0.8 MG/DL (ref 0.5–1.4)
DIFFERENTIAL METHOD: ABNORMAL
DOHLE BOD BLD QL SMEAR: PRESENT
EOSINOPHIL NFR BLD: 1 % (ref 0–8)
ERYTHROCYTE [DISTWIDTH] IN BLOOD BY AUTOMATED COUNT: 14.1 % (ref 11.5–14.5)
EST. GFR  (AFRICAN AMERICAN): >60 ML/MIN/1.73 M^2
EST. GFR  (NON AFRICAN AMERICAN): >60 ML/MIN/1.73 M^2
GIANT PLATELETS BLD QL SMEAR: PRESENT
GLUCOSE SERPL-MCNC: 102 MG/DL (ref 70–110)
GLUCOSE SERPL-MCNC: 90 MG/DL (ref 70–110)
GLUCOSE SERPL-MCNC: 92 MG/DL (ref 70–110)
GLUCOSE SERPL-MCNC: 95 MG/DL (ref 70–110)
GLUCOSE SERPL-MCNC: 98 MG/DL (ref 70–110)
HCT VFR BLD AUTO: 34.1 % (ref 40–54)
HGB BLD-MCNC: 10.4 G/DL (ref 14–18)
IMM GRANULOCYTES # BLD AUTO: ABNORMAL K/UL (ref 0–0.04)
IMM GRANULOCYTES NFR BLD AUTO: ABNORMAL % (ref 0–0.5)
LYMPHOCYTES NFR BLD: 58 % (ref 18–48)
MAGNESIUM SERPL-MCNC: 2.3 MG/DL (ref 1.6–2.6)
MCH RBC QN AUTO: 27.2 PG (ref 27–31)
MCHC RBC AUTO-ENTMCNC: 30.5 G/DL (ref 32–36)
MCV RBC AUTO: 89 FL (ref 82–98)
MONOCYTES NFR BLD: 2 % (ref 4–15)
NEUTROPHILS NFR BLD: 39 % (ref 38–73)
NRBC BLD-RTO: 0 /100 WBC
PHOSPHATE SERPL-MCNC: 2.6 MG/DL (ref 2.7–4.5)
PLATELET # BLD AUTO: 610 K/UL (ref 150–350)
PLATELET BLD QL SMEAR: ABNORMAL
PMV BLD AUTO: 9.4 FL (ref 9.2–12.9)
POLYCHROMASIA BLD QL SMEAR: ABNORMAL
POTASSIUM SERPL-SCNC: 3.1 MMOL/L (ref 3.5–5.1)
POTASSIUM SERPL-SCNC: 3.4 MMOL/L (ref 3.5–5.1)
POTASSIUM SERPL-SCNC: 3.7 MMOL/L (ref 3.5–5.1)
RBC # BLD AUTO: 3.82 M/UL (ref 4.6–6.2)
SMUDGE CELLS BLD QL SMEAR: PRESENT
SODIUM SERPL-SCNC: 134 MMOL/L (ref 136–145)
SODIUM SERPL-SCNC: 135 MMOL/L (ref 136–145)
SODIUM SERPL-SCNC: 137 MMOL/L (ref 136–145)
SODIUM SERPL-SCNC: 137 MMOL/L (ref 136–145)
SODIUM SERPL-SCNC: 138 MMOL/L (ref 136–145)
TOXIC GRANULES BLD QL SMEAR: PRESENT
WBC # BLD AUTO: 19.78 K/UL (ref 3.9–12.7)

## 2019-09-27 PROCEDURE — 63600175 PHARM REV CODE 636 W HCPCS: Performed by: NURSE PRACTITIONER

## 2019-09-27 PROCEDURE — 83735 ASSAY OF MAGNESIUM: CPT

## 2019-09-27 PROCEDURE — 80048 BASIC METABOLIC PNL TOTAL CA: CPT | Mod: 91

## 2019-09-27 PROCEDURE — 63600175 PHARM REV CODE 636 W HCPCS: Performed by: STUDENT IN AN ORGANIZED HEALTH CARE EDUCATION/TRAINING PROGRAM

## 2019-09-27 PROCEDURE — 84100 ASSAY OF PHOSPHORUS: CPT

## 2019-09-27 PROCEDURE — 25000003 PHARM REV CODE 250: Performed by: STUDENT IN AN ORGANIZED HEALTH CARE EDUCATION/TRAINING PROGRAM

## 2019-09-27 PROCEDURE — 20600001 HC STEP DOWN PRIVATE ROOM

## 2019-09-27 PROCEDURE — 85007 BL SMEAR W/DIFF WBC COUNT: CPT

## 2019-09-27 PROCEDURE — 94761 N-INVAS EAR/PLS OXIMETRY MLT: CPT

## 2019-09-27 PROCEDURE — 25500020 PHARM REV CODE 255: Performed by: SURGERY

## 2019-09-27 PROCEDURE — S4991 NICOTINE PATCH NONLEGEND: HCPCS | Performed by: STUDENT IN AN ORGANIZED HEALTH CARE EDUCATION/TRAINING PROGRAM

## 2019-09-27 PROCEDURE — 80048 BASIC METABOLIC PNL TOTAL CA: CPT

## 2019-09-27 PROCEDURE — 85027 COMPLETE CBC AUTOMATED: CPT

## 2019-09-27 PROCEDURE — 36415 COLL VENOUS BLD VENIPUNCTURE: CPT

## 2019-09-27 RX ORDER — POTASSIUM CHLORIDE 7.45 MG/ML
10 INJECTION INTRAVENOUS
Status: DISPENSED | OUTPATIENT
Start: 2019-09-27 | End: 2019-09-27

## 2019-09-27 RX ORDER — POTASSIUM CHLORIDE 7.45 MG/ML
10 INJECTION INTRAVENOUS
Status: DISCONTINUED | OUTPATIENT
Start: 2019-09-27 | End: 2019-09-27

## 2019-09-27 RX ORDER — DIPHENHYDRAMINE HYDROCHLORIDE 50 MG/ML
6.25 INJECTION INTRAMUSCULAR; INTRAVENOUS ONCE
Status: COMPLETED | OUTPATIENT
Start: 2019-09-28 | End: 2019-09-28

## 2019-09-27 RX ADMIN — SODIUM PHOSPHATE, MONOBASIC, MONOHYDRATE AND SODIUM PHOSPHATE, DIBASIC, ANHYDROUS 20.01 MMOL: 276; 142 INJECTION, SOLUTION INTRAVENOUS at 11:09

## 2019-09-27 RX ADMIN — ENOXAPARIN SODIUM 40 MG: 100 INJECTION SUBCUTANEOUS at 05:09

## 2019-09-27 RX ADMIN — POTASSIUM CHLORIDE 10 MEQ: 7.46 INJECTION, SOLUTION INTRAVENOUS at 11:09

## 2019-09-27 RX ADMIN — HYDROMORPHONE HYDROCHLORIDE 1 MG: 1 INJECTION, SOLUTION INTRAMUSCULAR; INTRAVENOUS; SUBCUTANEOUS at 11:09

## 2019-09-27 RX ADMIN — HYDROMORPHONE HYDROCHLORIDE 1 MG: 1 INJECTION, SOLUTION INTRAMUSCULAR; INTRAVENOUS; SUBCUTANEOUS at 08:09

## 2019-09-27 RX ADMIN — NICOTINE 1 PATCH: 21 PATCH, EXTENDED RELEASE TRANSDERMAL at 09:09

## 2019-09-27 RX ADMIN — SODIUM CHLORIDE, SODIUM LACTATE, POTASSIUM CHLORIDE, AND CALCIUM CHLORIDE: 600; 310; 30; 20 INJECTION, SOLUTION INTRAVENOUS at 05:09

## 2019-09-27 RX ADMIN — HYDROMORPHONE HYDROCHLORIDE 1 MG: 1 INJECTION, SOLUTION INTRAMUSCULAR; INTRAVENOUS; SUBCUTANEOUS at 05:09

## 2019-09-27 RX ADMIN — SODIUM CHLORIDE, SODIUM LACTATE, POTASSIUM CHLORIDE, AND CALCIUM CHLORIDE 1000 ML: .6; .31; .03; .02 INJECTION, SOLUTION INTRAVENOUS at 11:09

## 2019-09-27 RX ADMIN — HYDROMORPHONE HYDROCHLORIDE 1 MG: 1 INJECTION, SOLUTION INTRAMUSCULAR; INTRAVENOUS; SUBCUTANEOUS at 01:09

## 2019-09-27 RX ADMIN — POTASSIUM CHLORIDE 10 MEQ: 7.46 INJECTION, SOLUTION INTRAVENOUS at 03:09

## 2019-09-27 RX ADMIN — SODIUM CHLORIDE, SODIUM LACTATE, POTASSIUM CHLORIDE, AND CALCIUM CHLORIDE: 600; 310; 30; 20 INJECTION, SOLUTION INTRAVENOUS at 03:09

## 2019-09-27 RX ADMIN — POTASSIUM CHLORIDE 10 MEQ: 7.46 INJECTION, SOLUTION INTRAVENOUS at 12:09

## 2019-09-27 RX ADMIN — IOHEXOL 240 ML: 350 INJECTION, SOLUTION INTRAVENOUS at 01:09

## 2019-09-27 RX ADMIN — ONDANSETRON 4 MG: 2 INJECTION INTRAMUSCULAR; INTRAVENOUS at 05:09

## 2019-09-27 RX ADMIN — POTASSIUM CHLORIDE 10 MEQ: 7.46 INJECTION, SOLUTION INTRAVENOUS at 05:09

## 2019-09-27 RX ADMIN — HYDROMORPHONE HYDROCHLORIDE 1 MG: 1 INJECTION, SOLUTION INTRAMUSCULAR; INTRAVENOUS; SUBCUTANEOUS at 03:09

## 2019-09-27 RX ADMIN — HYDROMORPHONE HYDROCHLORIDE 1 MG: 1 INJECTION, SOLUTION INTRAMUSCULAR; INTRAVENOUS; SUBCUTANEOUS at 06:09

## 2019-09-27 RX ADMIN — HYDROMORPHONE HYDROCHLORIDE 1 MG: 1 INJECTION, SOLUTION INTRAMUSCULAR; INTRAVENOUS; SUBCUTANEOUS at 09:09

## 2019-09-27 NOTE — PLAN OF CARE
09/27/19 1413   Post-Acute Status   Post-Acute Authorization Other   Other Status No Post-Acute Service Needs       SW is following this Pt for DC planning needs. There are no identified needs at this time.      Floresita Bates LMSW  [facility]  [id number]

## 2019-09-27 NOTE — SUBJECTIVE & OBJECTIVE
Interval History:   NAEON. VSS. NGT remains in place to LIWS with continued high output. Appears more compliant with NPO status today. No nausea or vomiting, pain well controlled. Not yet passing flatus, no bowel movements. Ambulating without difficulty. Adequate UOP. No complaints.    Medications:  Continuous Infusions:   lactated ringers 125 mL/hr at 09/27/19 0505     Scheduled Meds:   enoxaparin  40 mg Subcutaneous Daily    lactated ringers  1,000 mL Intravenous Once    nicotine  1 patch Transdermal Daily    potassium chloride in water  10 mEq Intravenous Q1H    sodium phosphate IVPB  20.01 mmol Intravenous Once     PRN Meds:HYDROmorphone, HYDROmorphone, ondansetron, promethazine (PHENERGAN) IVPB, sodium chloride 0.9%     Review of patient's allergies indicates:  No Known Allergies  Objective:     Vital Signs (Most Recent):  Temp: 98.6 °F (37 °C) (09/27/19 0800)  Pulse: 105 (09/27/19 0800)  Resp: 18 (09/27/19 0800)  BP: 125/82 (09/27/19 0800)  SpO2: 98 % (09/27/19 0928) Vital Signs (24h Range):  Temp:  [97.5 °F (36.4 °C)-98.8 °F (37.1 °C)] 98.6 °F (37 °C)  Pulse:  [] 105  Resp:  [16-18] 18  SpO2:  [93 %-98 %] 98 %  BP: (114-131)/(76-82) 125/82     Weight: 56.1 kg (123 lb 10.8 oz)  Body mass index is 17.75 kg/m².    Intake/Output - Last 3 Shifts       09/25 0700 - 09/26 0659 09/26 0700 - 09/27 0659 09/27 0700 - 09/28 0659    P.O. 150 400     I.V. (mL/kg) 400 (7.1) 3400 (60.6)     NG/GT 0      IV Piggyback 3300 1000     Total Intake(mL/kg) 3850 (68.6) 4800 (85.6)     Urine (mL/kg/hr) 1750 (1.3) 1450 (1.1)     Emesis/NG output 500 0     Drains 7950 2650     Stool 0 0     Total Output 22551 4100     Net -6350 +700            Stool Occurrence 0 x            Physical Exam   Constitutional: He is oriented to person, place, and time. No distress.   Cachectic appearing.     HENT:   Head: Normocephalic and atraumatic.   NG tube in place on LIWS with bilious output.   Eyes: Conjunctivae and EOM are normal.    Neck: Normal range of motion. No tracheal deviation present.   Cardiovascular: Normal rate and intact distal pulses.   Pulmonary/Chest: Effort normal. No respiratory distress.   Abdominal: Soft. He exhibits no distension. There is tenderness (mildly tender to palpation). There is no guarding.   Musculoskeletal: Normal range of motion. He exhibits no edema.   Neurological: He is alert and oriented to person, place, and time.   Skin: He is not diaphoretic.   Vitals reviewed.      Significant Labs:  CBC:   Recent Labs   Lab 09/27/19  0555   WBC 19.78*   RBC 3.82*   HGB 10.4*   HCT 34.1*   *   MCV 89   MCH 27.2   MCHC 30.5*     CMP:   Recent Labs   Lab 09/26/19  0453  09/27/19  0556   *   < > 98   CALCIUM 9.2   < > 9.7   ALBUMIN 2.7*  --   --    PROT 7.2  --   --       < > 138   K 2.7*   < > 3.1*   CO2 36*   < > 34*   CL 90*   < > 90*   BUN 21*   < > 19   CREATININE 0.8   < > 0.7   ALKPHOS 78  --   --    ALT 10  --   --    AST 22  --   --    BILITOT 0.3  --   --     < > = values in this interval not displayed.       Significant Diagnostics:  I have reviewed all pertinent imaging results/findings within the past 24 hours.

## 2019-09-27 NOTE — PLAN OF CARE
"Patient  is awake, alert, oriented. VSS, J-tube remains clamped. NGT in place to LIWS, draining large amount of green, thick secretions. LR at 125ml/hr. Explained patient the reason not to have not too many ice chips. Patient is un-compliant with ice chips consumption. Patient states, "I know what I'm doing!, I don't need a lecture!". Patient keeps requesting ice chips, only a few ice chips were given at a time. Patient states he's been eating ice chips when he was on the 5th floor. C/o abdominal pain, relieved with IVP Dilaudid. Void in the urinal. No other complaints, will continue to monitor.    "

## 2019-09-27 NOTE — PHYSICIAN QUERY
PT Name: Isaiah Greene  MR #: [id number]    Physician Query Form - Consultant Diagnosis Clarification     CDS/: Natty Vazquez RN, CDS               Contact information: [email or web address]                                                                                                                          This form is a permanent document in the medical record.     Query Date: September 27, 2019      By submitting this query, we are merely seeking further clarification of documentation.  Please utilize your independent clinical judgment when addressing the question(s) below.      The Medical record contains the following:       Diagnosis Supporting Clinical Information Location in Medical Record   Severe malnutrition  Malnutrition in the context of Chronic Illness/Injury         59 y.o. male with history of newly diagnosed stomach cancer (9/13) s/p PEG and port 9/19 who presents as a transfer from [facility] with a small bowel obstruction.    <50% of estimated energy requirement for > 5 days     Weight Loss: 17% x 3 months per chart review     Muscle Mass Depletion: moderate and severe depletion of temples, clavicle region, interosseous muscle and lower extremities      Body Fat Depletion: mild and severe depletion of orbitals, triceps and thoracic and lumbar region     BMI (Calculated): 17.8    NPO, NG tube. Pt reports no TF at home and eating by mouth. Eating ice chips and drinking Boost with N/V PTA. Noted wt loss per chart review. NFPE completed (by SHIRLEY Souza). Pt severe muscle/fat wasting. Pt meets severe malnutrition criteria.     As medically able, ADAT to Regular + Boost Plus  If TF warranted, recommend TF of Isosource 1.5  Nutrition Consult 9/25        H&P 9/25          Nutrition Consult 9/25    Nutrition Consult 9/25    Nutrition Consult 9/25        Nutrition Consult 9/25      Nutrition Consult 9/25    Nutrition Consult 9/25            Nutrition Consult  9/25             Do you agree with the Consultants diagnosis of ____severe malnutrition____?    [X  ] Yes   [  ] No   [  ] Other/Clarification of findings:   [  ] Clinically undetermined

## 2019-09-27 NOTE — PROGRESS NOTES
[facility]  General Surgery  Progress Note    Subjective:       Post-Op Info:  * No surgery found *         Interval History:   NAEON. VSS. NGT remains in place to LIWS with continued high output. Appears more compliant with NPO status today. No nausea or vomiting, pain well controlled. Not yet passing flatus, no bowel movements. Ambulating without difficulty. Adequate UOP. No complaints.    Medications:  Continuous Infusions:   lactated ringers 125 mL/hr at 09/27/19 0505     Scheduled Meds:   enoxaparin  40 mg Subcutaneous Daily    lactated ringers  1,000 mL Intravenous Once    nicotine  1 patch Transdermal Daily    potassium chloride in water  10 mEq Intravenous Q1H    sodium phosphate IVPB  20.01 mmol Intravenous Once     PRN Meds:HYDROmorphone, HYDROmorphone, ondansetron, promethazine (PHENERGAN) IVPB, sodium chloride 0.9%     Review of patient's allergies indicates:  No Known Allergies  Objective:     Vital Signs (Most Recent):  Temp: 98.6 °F (37 °C) (09/27/19 0800)  Pulse: 105 (09/27/19 0800)  Resp: 18 (09/27/19 0800)  BP: 125/82 (09/27/19 0800)  SpO2: 98 % (09/27/19 0928) Vital Signs (24h Range):  Temp:  [97.5 °F (36.4 °C)-98.8 °F (37.1 °C)] 98.6 °F (37 °C)  Pulse:  [] 105  Resp:  [16-18] 18  SpO2:  [93 %-98 %] 98 %  BP: (114-131)/(76-82) 125/82     Weight: 56.1 kg (123 lb 10.8 oz)  Body mass index is 17.75 kg/m².    Intake/Output - Last 3 Shifts       09/25 0700 - 09/26 0659 09/26 0700 - 09/27 0659 09/27 0700 - 09/28 0659    P.O. 150 400     I.V. (mL/kg) 400 (7.1) 3400 (60.6)     NG/GT 0      IV Piggyback 3300 1000     Total Intake(mL/kg) 3850 (68.6) 4800 (85.6)     Urine (mL/kg/hr) 1750 (1.3) 1450 (1.1)     Emesis/NG output 500 0     Drains 7950 2650     Stool 0 0     Total Output 39050 4100     Net -6350 +700            Stool Occurrence 0 x            Physical Exam   Constitutional: He is oriented to person, place, and time. No distress.   Cachectic appearing.     HENT:    Head: Normocephalic and atraumatic.   NG tube in place on LIWS with bilious output.   Eyes: Conjunctivae and EOM are normal.   Neck: Normal range of motion. No tracheal deviation present.   Cardiovascular: Normal rate and intact distal pulses.   Pulmonary/Chest: Effort normal. No respiratory distress.   Abdominal: Soft. He exhibits no distension. There is tenderness (mildly tender to palpation). There is no guarding.   Musculoskeletal: Normal range of motion. He exhibits no edema.   Neurological: He is alert and oriented to person, place, and time.   Skin: He is not diaphoretic.   Vitals reviewed.      Significant Labs:  CBC:   Recent Labs   Lab 09/27/19  0555   WBC 19.78*   RBC 3.82*   HGB 10.4*   HCT 34.1*   *   MCV 89   MCH 27.2   MCHC 30.5*     CMP:   Recent Labs   Lab 09/26/19  0453  09/27/19  0556   *   < > 98   CALCIUM 9.2   < > 9.7   ALBUMIN 2.7*  --   --    PROT 7.2  --   --       < > 138   K 2.7*   < > 3.1*   CO2 36*   < > 34*   CL 90*   < > 90*   BUN 21*   < > 19   CREATININE 0.8   < > 0.7   ALKPHOS 78  --   --    ALT 10  --   --    AST 22  --   --    BILITOT 0.3  --   --     < > = values in this interval not displayed.       Significant Diagnostics:  I have reviewed all pertinent imaging results/findings within the past 24 hours.    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 Readmitted for small bowel obstruction and intractable nausea and vomiting.     - UGI with small bowel follow through today  - Continue NGT to LIWS  - NPO, okay for 3-5 ice chips per hour  - Aggressive fluid resuscitation with LR, bolus as needed and continue mIVF   - Close monitoring of electrolytes given high output NGT, replace as needed. Check TID.   - Anti-emetics PRN  - Pain control  - Encourage continued ambulation  - SCDs, Lovenox daily          Lynda Arguello MD  General  Surgery  [facility]

## 2019-09-27 NOTE — PHYSICIAN QUERY
PT Name: Isaiah Greene  MR #: [id number]     Physician Query Form - Bowel Obstruction Clarification     CDS/: Natty Vazquez RN, CDS               Contact information: [email or web address]                                                                                                                          This form is a permanent document in the medical record.     Query Date: September 27, 2019    By submitting this query, we are merely seeking further clarification of documentation to reflect the severity of illness of your patient. Please utilize your independent clinical judgment when addressing the question(s) below.    The Medical record reflects the following:     Indicators   Supporting Clinical Findings Location in Medical Record   x Bowel obstruction, intestinal obstruction, LBO or SBO documented Returns with post operative small bowel obstruction H&P 9/25   x Radiology findings At the OSH a CT was obtained which was concerning for high grade small bowel obstruction.    I believe he transitions at the J tube site.   H&P 9/25      H&P 9/25   x Treatment/Medication NPO  NGT to [facility] General Surgery PN 9/26    Procedure/Surgery     x Other 59 y.o. male with history of newly diagnosed stomach cancer (9/13) s/p PEG and port 9/19 who presents as a transfer from [facility] with a small bowel obstruction. He initially presented with 3 days of nausea and vomiting which had onset shortly after discharge from his placement of the PEG on 9/19. He has not had a BM since before his surgery H&P 9/25     Provider, please further specify the diagnosis of __post operative small bowel obstruction__  [   ] Postoperative partial or incomplete intestinal obstruction, a complication of procedure   [ X  ] Postoperative partial or incomplete intestinal obstruction, not a complication of procedure   [   ] Postoperative complete intestinal obstruction, a complication of procedure   [    ] Postoperative complete intestinal obstruction, not a complication of procedure   [   ] Other intestinal condition (please specify): _____________________   [   ]  Clinically Undetermined     Please document in your progress notes daily for the duration of treatment until resolved, and include in your discharge summary.

## 2019-09-27 NOTE — ASSESSMENT & PLAN NOTE
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 Readmitted for small bowel obstruction and intractable nausea and vomiting.     - UGI with small bowel follow through today  - Continue NGT to LIWS  - NPO, okay for 3-5 ice chips per hour  - Aggressive fluid resuscitation with LR, bolus as needed and continue mIVF   - Close monitoring of electrolytes given high output NGT, replace as needed. Check TID.   - Anti-emetics PRN  - Pain control  - Encourage continued ambulation  - SCDs, Lovenox daily

## 2019-09-27 NOTE — NURSING
NGT clamped per order for Upper GI FL with oral contrast, multiple series xrays. Orders to remain clamped for the entire series at bedside. Pt vomited 425 ml thin green fluid with NGT still clamped. All scheduled IV KCl infused as ordered. IV Dilaudid administered Q3hr PRN as ordered for pain control. No acute distress or needs at this time. TM

## 2019-09-28 LAB
ANION GAP SERPL CALC-SCNC: 11 MMOL/L (ref 8–16)
ANION GAP SERPL CALC-SCNC: 15 MMOL/L (ref 8–16)
ANISOCYTOSIS BLD QL SMEAR: SLIGHT
BASOPHILS NFR BLD: 0 % (ref 0–1.9)
BUN SERPL-MCNC: 18 MG/DL (ref 6–20)
BUN SERPL-MCNC: 18 MG/DL (ref 6–20)
CALCIUM SERPL-MCNC: 9.3 MG/DL (ref 8.7–10.5)
CALCIUM SERPL-MCNC: 9.4 MG/DL (ref 8.7–10.5)
CHLORIDE SERPL-SCNC: 86 MMOL/L (ref 95–110)
CHLORIDE SERPL-SCNC: 90 MMOL/L (ref 95–110)
CO2 SERPL-SCNC: 34 MMOL/L (ref 23–29)
CO2 SERPL-SCNC: 35 MMOL/L (ref 23–29)
CREAT SERPL-MCNC: 0.8 MG/DL (ref 0.5–1.4)
CREAT SERPL-MCNC: 0.8 MG/DL (ref 0.5–1.4)
DIFFERENTIAL METHOD: ABNORMAL
EOSINOPHIL NFR BLD: 1 % (ref 0–8)
ERYTHROCYTE [DISTWIDTH] IN BLOOD BY AUTOMATED COUNT: 14 % (ref 11.5–14.5)
EST. GFR  (AFRICAN AMERICAN): >60 ML/MIN/1.73 M^2
EST. GFR  (AFRICAN AMERICAN): >60 ML/MIN/1.73 M^2
EST. GFR  (NON AFRICAN AMERICAN): >60 ML/MIN/1.73 M^2
EST. GFR  (NON AFRICAN AMERICAN): >60 ML/MIN/1.73 M^2
GLUCOSE SERPL-MCNC: 104 MG/DL (ref 70–110)
GLUCOSE SERPL-MCNC: 96 MG/DL (ref 70–110)
HCT VFR BLD AUTO: 35.6 % (ref 40–54)
HGB BLD-MCNC: 10.8 G/DL (ref 14–18)
IMM GRANULOCYTES # BLD AUTO: ABNORMAL K/UL (ref 0–0.04)
IMM GRANULOCYTES NFR BLD AUTO: ABNORMAL % (ref 0–0.5)
LYMPHOCYTES NFR BLD: 48 % (ref 18–48)
MAGNESIUM SERPL-MCNC: 2 MG/DL (ref 1.6–2.6)
MCH RBC QN AUTO: 27.1 PG (ref 27–31)
MCHC RBC AUTO-ENTMCNC: 30.3 G/DL (ref 32–36)
MCV RBC AUTO: 89 FL (ref 82–98)
MONOCYTES NFR BLD: 9 % (ref 4–15)
NEUTROPHILS NFR BLD: 42 % (ref 38–73)
NRBC BLD-RTO: 0 /100 WBC
PHOSPHATE SERPL-MCNC: 2.9 MG/DL (ref 2.7–4.5)
PLATELET # BLD AUTO: 577 K/UL (ref 150–350)
PMV BLD AUTO: 9.1 FL (ref 9.2–12.9)
POIKILOCYTOSIS BLD QL SMEAR: SLIGHT
POTASSIUM SERPL-SCNC: 3.2 MMOL/L (ref 3.5–5.1)
POTASSIUM SERPL-SCNC: 4.2 MMOL/L (ref 3.5–5.1)
RBC # BLD AUTO: 3.98 M/UL (ref 4.6–6.2)
SMUDGE CELLS BLD QL SMEAR: PRESENT
SODIUM SERPL-SCNC: 135 MMOL/L (ref 136–145)
SODIUM SERPL-SCNC: 136 MMOL/L (ref 136–145)
WBC # BLD AUTO: 18.39 K/UL (ref 3.9–12.7)

## 2019-09-28 PROCEDURE — 80048 BASIC METABOLIC PNL TOTAL CA: CPT | Mod: 91

## 2019-09-28 PROCEDURE — 85007 BL SMEAR W/DIFF WBC COUNT: CPT

## 2019-09-28 PROCEDURE — 63600175 PHARM REV CODE 636 W HCPCS: Performed by: NURSE PRACTITIONER

## 2019-09-28 PROCEDURE — 63600175 PHARM REV CODE 636 W HCPCS: Performed by: STUDENT IN AN ORGANIZED HEALTH CARE EDUCATION/TRAINING PROGRAM

## 2019-09-28 PROCEDURE — 20600001 HC STEP DOWN PRIVATE ROOM

## 2019-09-28 PROCEDURE — 25000003 PHARM REV CODE 250: Performed by: STUDENT IN AN ORGANIZED HEALTH CARE EDUCATION/TRAINING PROGRAM

## 2019-09-28 PROCEDURE — 84100 ASSAY OF PHOSPHORUS: CPT

## 2019-09-28 PROCEDURE — 85027 COMPLETE CBC AUTOMATED: CPT

## 2019-09-28 PROCEDURE — 83735 ASSAY OF MAGNESIUM: CPT

## 2019-09-28 PROCEDURE — 80048 BASIC METABOLIC PNL TOTAL CA: CPT

## 2019-09-28 PROCEDURE — S4991 NICOTINE PATCH NONLEGEND: HCPCS | Performed by: STUDENT IN AN ORGANIZED HEALTH CARE EDUCATION/TRAINING PROGRAM

## 2019-09-28 PROCEDURE — 36415 COLL VENOUS BLD VENIPUNCTURE: CPT

## 2019-09-28 RX ORDER — POTASSIUM CHLORIDE 7.45 MG/ML
10 INJECTION INTRAVENOUS
Status: COMPLETED | OUTPATIENT
Start: 2019-09-28 | End: 2019-09-28

## 2019-09-28 RX ORDER — DIPHENHYDRAMINE HYDROCHLORIDE 50 MG/ML
6.25 INJECTION INTRAMUSCULAR; INTRAVENOUS NIGHTLY PRN
Status: DISCONTINUED | OUTPATIENT
Start: 2019-09-28 | End: 2019-10-02

## 2019-09-28 RX ORDER — DIPHENHYDRAMINE HYDROCHLORIDE 50 MG/ML
6.25 INJECTION INTRAMUSCULAR; INTRAVENOUS NIGHTLY PRN
Status: DISCONTINUED | OUTPATIENT
Start: 2019-09-28 | End: 2019-09-28

## 2019-09-28 RX ADMIN — POTASSIUM CHLORIDE 10 MEQ: 10 INJECTION, SOLUTION INTRAVENOUS at 01:09

## 2019-09-28 RX ADMIN — SODIUM CHLORIDE, SODIUM LACTATE, POTASSIUM CHLORIDE, AND CALCIUM CHLORIDE: 600; 310; 30; 20 INJECTION, SOLUTION INTRAVENOUS at 06:09

## 2019-09-28 RX ADMIN — POTASSIUM CHLORIDE 10 MEQ: 10 INJECTION, SOLUTION INTRAVENOUS at 10:09

## 2019-09-28 RX ADMIN — HYDROMORPHONE HYDROCHLORIDE 1 MG: 1 INJECTION, SOLUTION INTRAMUSCULAR; INTRAVENOUS; SUBCUTANEOUS at 12:09

## 2019-09-28 RX ADMIN — DIPHENHYDRAMINE HYDROCHLORIDE 6.25 MG: 50 INJECTION, SOLUTION INTRAMUSCULAR; INTRAVENOUS at 10:09

## 2019-09-28 RX ADMIN — HYDROMORPHONE HYDROCHLORIDE 1 MG: 1 INJECTION, SOLUTION INTRAMUSCULAR; INTRAVENOUS; SUBCUTANEOUS at 09:09

## 2019-09-28 RX ADMIN — POTASSIUM CHLORIDE 10 MEQ: 10 INJECTION, SOLUTION INTRAVENOUS at 09:09

## 2019-09-28 RX ADMIN — DIPHENHYDRAMINE HYDROCHLORIDE 6.25 MG: 50 INJECTION, SOLUTION INTRAMUSCULAR; INTRAVENOUS at 12:09

## 2019-09-28 RX ADMIN — HYDROMORPHONE HYDROCHLORIDE 1 MG: 1 INJECTION, SOLUTION INTRAMUSCULAR; INTRAVENOUS; SUBCUTANEOUS at 04:09

## 2019-09-28 RX ADMIN — SODIUM CHLORIDE, SODIUM LACTATE, POTASSIUM CHLORIDE, AND CALCIUM CHLORIDE: 600; 310; 30; 20 INJECTION, SOLUTION INTRAVENOUS at 09:09

## 2019-09-28 RX ADMIN — POTASSIUM CHLORIDE 10 MEQ: 10 INJECTION, SOLUTION INTRAVENOUS at 02:09

## 2019-09-28 RX ADMIN — NICOTINE 1 PATCH: 21 PATCH, EXTENDED RELEASE TRANSDERMAL at 08:09

## 2019-09-28 RX ADMIN — HYDROMORPHONE HYDROCHLORIDE 1 MG: 1 INJECTION, SOLUTION INTRAMUSCULAR; INTRAVENOUS; SUBCUTANEOUS at 10:09

## 2019-09-28 RX ADMIN — POTASSIUM CHLORIDE 10 MEQ: 10 INJECTION, SOLUTION INTRAVENOUS at 04:09

## 2019-09-28 RX ADMIN — HYDROMORPHONE HYDROCHLORIDE 1 MG: 1 INJECTION, SOLUTION INTRAMUSCULAR; INTRAVENOUS; SUBCUTANEOUS at 03:09

## 2019-09-28 RX ADMIN — HYDROMORPHONE HYDROCHLORIDE 1 MG: 1 INJECTION, SOLUTION INTRAMUSCULAR; INTRAVENOUS; SUBCUTANEOUS at 06:09

## 2019-09-28 RX ADMIN — ONDANSETRON 4 MG: 2 INJECTION INTRAMUSCULAR; INTRAVENOUS at 12:09

## 2019-09-28 RX ADMIN — POTASSIUM CHLORIDE 10 MEQ: 10 INJECTION, SOLUTION INTRAVENOUS at 11:09

## 2019-09-28 RX ADMIN — SODIUM CHLORIDE, SODIUM LACTATE, POTASSIUM CHLORIDE, AND CALCIUM CHLORIDE 1000 ML: .6; .31; .03; .02 INJECTION, SOLUTION INTRAVENOUS at 09:09

## 2019-09-28 RX ADMIN — ENOXAPARIN SODIUM 40 MG: 100 INJECTION SUBCUTANEOUS at 04:09

## 2019-09-28 NOTE — NURSING
SX on call at BS, reconnected NG to MILAN, immediate 1600 thin green liq out. SX on call reinforced to PT the amount of ice per hour allowed. PT stated understanding. Will cont to manage POC.

## 2019-09-28 NOTE — SUBJECTIVE & OBJECTIVE
Interval History:   No acute events.   UGI yesterday showed contrast moves past jejunostomy, but marked dilation proximal, partial obstruction.   Some nausea with contrast.   NGT 2.3L, bilious.  Otherwise without complaint.     Medications:  Continuous Infusions:   lactated ringers 125 mL/hr at 09/28/19 0637     Scheduled Meds:   enoxaparin  40 mg Subcutaneous Daily    nicotine  1 patch Transdermal Daily     PRN Meds:HYDROmorphone, HYDROmorphone, ondansetron, promethazine (PHENERGAN) IVPB, sodium chloride 0.9%     Review of patient's allergies indicates:  No Known Allergies  Objective:     Vital Signs (Most Recent):  Temp: 98 °F (36.7 °C) (09/28/19 0701)  Pulse: 104 (09/28/19 0701)  Resp: 18 (09/28/19 0701)  BP: 115/83 (09/28/19 0701)  SpO2: 99 % (09/28/19 0701) Vital Signs (24h Range):  Temp:  [97.5 °F (36.4 °C)-98.2 °F (36.8 °C)] 98 °F (36.7 °C)  Pulse:  [] 104  Resp:  [16-18] 18  SpO2:  [94 %-99 %] 99 %  BP: (115-139)/(73-88) 115/83     Weight: 56.1 kg (123 lb 10.8 oz)  Body mass index is 17.75 kg/m².    Intake/Output - Last 3 Shifts       09/26 0700 - 09/27 0659 09/27 0700 - 09/28 0659 09/28 0700 - 09/29 0659    P.O. 400 200     I.V. (mL/kg) 3400 (60.6) 1879.2 (33.5)     NG/GT  120 60    IV Piggyback 1000 1500     Total Intake(mL/kg) 4800 (85.6) 3699.2 (65.9) 60 (1.1)    Urine (mL/kg/hr) 1450 (1.1) 475 (0.4)     Emesis/NG output 0 550     Drains 2650 2350 150    Stool 0 0     Total Output 4100 3375 150    Net +700 +324.2 -90           Urine Occurrence  500 x     Stool Occurrence  0 x     Emesis Occurrence  2 x         Physical Exam   Constitutional: He is oriented to person, place, and time. No distress.   Cachectic appearing.     HENT:   Head: Normocephalic and atraumatic.   NG tube in place on LIWS with bilious output.   Eyes: Conjunctivae and EOM are normal.   Neck: Normal range of motion. No tracheal deviation present.   Cardiovascular: Normal rate and intact distal pulses.   Pulmonary/Chest: Effort  normal. No respiratory distress.   Abdominal: Soft. He exhibits no distension. There is no tenderness. There is no guarding.   Musculoskeletal: Normal range of motion. He exhibits no edema.   Neurological: He is alert and oriented to person, place, and time.   Skin: He is not diaphoretic.   Vitals reviewed.    Significant Labs:  CBC:   Recent Labs   Lab 09/28/19  0558   WBC 18.39*   RBC 3.98*   HGB 10.8*   HCT 35.6*   *   MCV 89   MCH 27.1   MCHC 30.3*     CMP:   Recent Labs   Lab 09/26/19  0453  09/28/19  0555   *   < > 96   CALCIUM 9.2   < > 9.4   ALBUMIN 2.7*  --   --    PROT 7.2  --   --       < > 135*   K 2.7*   < > 3.2*   CO2 36*   < > 34*   CL 90*   < > 86*   BUN 21*   < > 18   CREATININE 0.8   < > 0.8   ALKPHOS 78  --   --    ALT 10  --   --    AST 22  --   --    BILITOT 0.3  --   --     < > = values in this interval not displayed.       Significant Diagnostics:  I have reviewed all pertinent imaging results/findings within the past 24 hours.

## 2019-09-28 NOTE — ASSESSMENT & PLAN NOTE
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 Readmitted for small bowel obstruction and intractable nausea and vomiting.     - to OR for J tube revision Monday 9/30  - Continue NGT to LIWS  - NPO, okay for 3-5 ice chips per hour  - Aggressive fluid resuscitation with LR, bolus as needed (2:1) and continue mIVF   - Close monitoring of electrolytes given high output NGT, replace as needed. Check TID.   - Anti-emetics PRN  - Pain control  - Encourage continued ambulation  - SCDs, Lovenox daily

## 2019-09-28 NOTE — NURSING
Pt aggressively demands 3-5 ice chips per hour when awake. IV KCL replacement as ordered, labs in am. High NGT output possible from ice intake. VSS on RA. NGT ok to clamp while Ambulates independently. Adequate UOP with no BM >10 days. IV Dilaudid administered Q3hrs as ordered and pt requested. No acute distress or needs at this time. WCTM

## 2019-09-28 NOTE — PLAN OF CARE
POC reviewed with PT, stated understanding, AOX4, VSS, NG to R nare WDL. J tube to LLQ WDL.  ABD IX WDL. PIV infusing per orders.  PT voids IND per urinal.  NO BM reported this shift. May have eaten ice chips from ICE pack requested for neck pain relief, other wise appears to have maintained min ice chip intake this shift.  Pain meds requested around the clock, 8-9/10 at all times. No adverse events this shift, bed low, call light in reach, will cont to manage POC.

## 2019-09-28 NOTE — NURSING
SX on call paged for instruction to be attached back to LIWS. FL UGI still pending, Per on call await FL upper GI results. SX on call made aware of PT noncompliance with ice chips, and demanding more ice than ordered, with the potential need for him to come and educate PT. Will cont to manage POC.

## 2019-09-28 NOTE — PROGRESS NOTES
[facility]  General Surgery  Progress Note    Subjective:     Post-Op Info:  * No surgery found *         Interval History:   No acute events.   UGI yesterday showed contrast moves past jejunostomy, but marked dilation proximal, partial obstruction.   Some nausea with contrast.   NGT 2.3L, bilious.  Otherwise without complaint.     Medications:  Continuous Infusions:   lactated ringers 125 mL/hr at 09/28/19 0637     Scheduled Meds:   enoxaparin  40 mg Subcutaneous Daily    nicotine  1 patch Transdermal Daily     PRN Meds:HYDROmorphone, HYDROmorphone, ondansetron, promethazine (PHENERGAN) IVPB, sodium chloride 0.9%     Review of patient's allergies indicates:  No Known Allergies  Objective:     Vital Signs (Most Recent):  Temp: 98 °F (36.7 °C) (09/28/19 0701)  Pulse: 104 (09/28/19 0701)  Resp: 18 (09/28/19 0701)  BP: 115/83 (09/28/19 0701)  SpO2: 99 % (09/28/19 0701) Vital Signs (24h Range):  Temp:  [97.5 °F (36.4 °C)-98.2 °F (36.8 °C)] 98 °F (36.7 °C)  Pulse:  [] 104  Resp:  [16-18] 18  SpO2:  [94 %-99 %] 99 %  BP: (115-139)/(73-88) 115/83     Weight: 56.1 kg (123 lb 10.8 oz)  Body mass index is 17.75 kg/m².    Intake/Output - Last 3 Shifts       09/26 0700 - 09/27 0659 09/27 0700 - 09/28 0659 09/28 0700 - 09/29 0659    P.O. 400 200     I.V. (mL/kg) 3400 (60.6) 1879.2 (33.5)     NG/GT  120 60    IV Piggyback 1000 1500     Total Intake(mL/kg) 4800 (85.6) 3699.2 (65.9) 60 (1.1)    Urine (mL/kg/hr) 1450 (1.1) 475 (0.4)     Emesis/NG output 0 550     Drains 2650 2350 150    Stool 0 0     Total Output 4100 3375 150    Net +700 +324.2 -90           Urine Occurrence  500 x     Stool Occurrence  0 x     Emesis Occurrence  2 x         Physical Exam   Constitutional: He is oriented to person, place, and time. No distress.   Cachectic appearing.     HENT:   Head: Normocephalic and atraumatic.   NG tube in place on LIWS with bilious output.   Eyes: Conjunctivae and EOM are normal.   Neck: Normal  range of motion. No tracheal deviation present.   Cardiovascular: Normal rate and intact distal pulses.   Pulmonary/Chest: Effort normal. No respiratory distress.   Abdominal: Soft. He exhibits no distension. There is no tenderness. There is no guarding.   Musculoskeletal: Normal range of motion. He exhibits no edema.   Neurological: He is alert and oriented to person, place, and time.   Skin: He is not diaphoretic.   Vitals reviewed.    Significant Labs:  CBC:   Recent Labs   Lab 09/28/19  0558   WBC 18.39*   RBC 3.98*   HGB 10.8*   HCT 35.6*   *   MCV 89   MCH 27.1   MCHC 30.3*     CMP:   Recent Labs   Lab 09/26/19  0453  09/28/19  0555   *   < > 96   CALCIUM 9.2   < > 9.4   ALBUMIN 2.7*  --   --    PROT 7.2  --   --       < > 135*   K 2.7*   < > 3.2*   CO2 36*   < > 34*   CL 90*   < > 86*   BUN 21*   < > 18   CREATININE 0.8   < > 0.8   ALKPHOS 78  --   --    ALT 10  --   --    AST 22  --   --    BILITOT 0.3  --   --     < > = values in this interval not displayed.       Significant Diagnostics:  I have reviewed all pertinent imaging results/findings within the past 24 hours.    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 Readmitted for small bowel obstruction and intractable nausea and vomiting.     - to OR for J tube revision Monday 9/30  - Continue NGT to LIWS  - NPO, okay for 3-5 ice chips per hour  - Aggressive fluid resuscitation with LR, bolus as needed (2:1) and continue mIVF   - Close monitoring of electrolytes given high output NGT, replace as needed. Check TID.   - Anti-emetics PRN  - Pain control  - Encourage continued ambulation  - SCDs, Lovenox daily          Cecilia Oden MD  General Surgery  [facility]

## 2019-09-29 LAB
ANION GAP SERPL CALC-SCNC: 12 MMOL/L (ref 8–16)
ANION GAP SERPL CALC-SCNC: 12 MMOL/L (ref 8–16)
ANISOCYTOSIS BLD QL SMEAR: SLIGHT
BASOPHILS # BLD AUTO: ABNORMAL K/UL (ref 0–0.2)
BASOPHILS NFR BLD: 0 % (ref 0–1.9)
BUN SERPL-MCNC: 17 MG/DL (ref 6–20)
BUN SERPL-MCNC: 18 MG/DL (ref 6–20)
CALCIUM SERPL-MCNC: 8.7 MG/DL (ref 8.7–10.5)
CALCIUM SERPL-MCNC: 9.4 MG/DL (ref 8.7–10.5)
CHLORIDE SERPL-SCNC: 88 MMOL/L (ref 95–110)
CHLORIDE SERPL-SCNC: 89 MMOL/L (ref 95–110)
CO2 SERPL-SCNC: 35 MMOL/L (ref 23–29)
CO2 SERPL-SCNC: 36 MMOL/L (ref 23–29)
CREAT SERPL-MCNC: 0.8 MG/DL (ref 0.5–1.4)
CREAT SERPL-MCNC: 0.8 MG/DL (ref 0.5–1.4)
DIFFERENTIAL METHOD: ABNORMAL
EOSINOPHIL # BLD AUTO: ABNORMAL K/UL (ref 0–0.5)
EOSINOPHIL NFR BLD: 0 % (ref 0–8)
ERYTHROCYTE [DISTWIDTH] IN BLOOD BY AUTOMATED COUNT: 14 % (ref 11.5–14.5)
EST. GFR  (AFRICAN AMERICAN): >60 ML/MIN/1.73 M^2
EST. GFR  (AFRICAN AMERICAN): >60 ML/MIN/1.73 M^2
EST. GFR  (NON AFRICAN AMERICAN): >60 ML/MIN/1.73 M^2
EST. GFR  (NON AFRICAN AMERICAN): >60 ML/MIN/1.73 M^2
GLUCOSE SERPL-MCNC: 103 MG/DL (ref 70–110)
GLUCOSE SERPL-MCNC: 119 MG/DL (ref 70–110)
HCT VFR BLD AUTO: 33.4 % (ref 40–54)
HGB BLD-MCNC: 10.4 G/DL (ref 14–18)
IMM GRANULOCYTES # BLD AUTO: ABNORMAL K/UL (ref 0–0.04)
IMM GRANULOCYTES NFR BLD AUTO: ABNORMAL % (ref 0–0.5)
LYMPHOCYTES # BLD AUTO: ABNORMAL K/UL (ref 1–4.8)
LYMPHOCYTES NFR BLD: 34 % (ref 18–48)
MAGNESIUM SERPL-MCNC: 2.1 MG/DL (ref 1.6–2.6)
MCH RBC QN AUTO: 27 PG (ref 27–31)
MCHC RBC AUTO-ENTMCNC: 31.1 G/DL (ref 32–36)
MCV RBC AUTO: 87 FL (ref 82–98)
MONOCYTES # BLD AUTO: ABNORMAL K/UL (ref 0.3–1)
MONOCYTES NFR BLD: 2 % (ref 4–15)
NEUTROPHILS NFR BLD: 64 % (ref 38–73)
NRBC BLD-RTO: 0 /100 WBC
PHOSPHATE SERPL-MCNC: 2 MG/DL (ref 2.7–4.5)
PLATELET # BLD AUTO: 566 K/UL (ref 150–350)
PLATELET BLD QL SMEAR: ABNORMAL
PMV BLD AUTO: 8.9 FL (ref 9.2–12.9)
POTASSIUM SERPL-SCNC: 2.9 MMOL/L (ref 3.5–5.1)
POTASSIUM SERPL-SCNC: 3.2 MMOL/L (ref 3.5–5.1)
RBC # BLD AUTO: 3.85 M/UL (ref 4.6–6.2)
SMUDGE CELLS BLD QL SMEAR: PRESENT
SODIUM SERPL-SCNC: 136 MMOL/L (ref 136–145)
SODIUM SERPL-SCNC: 136 MMOL/L (ref 136–145)
WBC # BLD AUTO: 18.91 K/UL (ref 3.9–12.7)

## 2019-09-29 PROCEDURE — 25000003 PHARM REV CODE 250: Performed by: STUDENT IN AN ORGANIZED HEALTH CARE EDUCATION/TRAINING PROGRAM

## 2019-09-29 PROCEDURE — 63600175 PHARM REV CODE 636 W HCPCS: Performed by: STUDENT IN AN ORGANIZED HEALTH CARE EDUCATION/TRAINING PROGRAM

## 2019-09-29 PROCEDURE — 84100 ASSAY OF PHOSPHORUS: CPT

## 2019-09-29 PROCEDURE — S4991 NICOTINE PATCH NONLEGEND: HCPCS | Performed by: STUDENT IN AN ORGANIZED HEALTH CARE EDUCATION/TRAINING PROGRAM

## 2019-09-29 PROCEDURE — 85007 BL SMEAR W/DIFF WBC COUNT: CPT

## 2019-09-29 PROCEDURE — 63600175 PHARM REV CODE 636 W HCPCS: Performed by: NURSE PRACTITIONER

## 2019-09-29 PROCEDURE — 20600001 HC STEP DOWN PRIVATE ROOM

## 2019-09-29 PROCEDURE — 80048 BASIC METABOLIC PNL TOTAL CA: CPT | Mod: 91

## 2019-09-29 PROCEDURE — 85027 COMPLETE CBC AUTOMATED: CPT

## 2019-09-29 PROCEDURE — 83735 ASSAY OF MAGNESIUM: CPT

## 2019-09-29 PROCEDURE — 36415 COLL VENOUS BLD VENIPUNCTURE: CPT

## 2019-09-29 RX ORDER — POTASSIUM CHLORIDE 7.45 MG/ML
10 INJECTION INTRAVENOUS
Status: COMPLETED | OUTPATIENT
Start: 2019-09-29 | End: 2019-09-30

## 2019-09-29 RX ADMIN — HYDROMORPHONE HYDROCHLORIDE 1 MG: 1 INJECTION, SOLUTION INTRAMUSCULAR; INTRAVENOUS; SUBCUTANEOUS at 05:09

## 2019-09-29 RX ADMIN — NICOTINE 1 PATCH: 21 PATCH, EXTENDED RELEASE TRANSDERMAL at 08:09

## 2019-09-29 RX ADMIN — HYDROMORPHONE HYDROCHLORIDE 1 MG: 1 INJECTION, SOLUTION INTRAMUSCULAR; INTRAVENOUS; SUBCUTANEOUS at 11:09

## 2019-09-29 RX ADMIN — DIPHENHYDRAMINE HYDROCHLORIDE 6.25 MG: 50 INJECTION, SOLUTION INTRAMUSCULAR; INTRAVENOUS at 08:09

## 2019-09-29 RX ADMIN — ENOXAPARIN SODIUM 40 MG: 100 INJECTION SUBCUTANEOUS at 05:09

## 2019-09-29 RX ADMIN — HYDROMORPHONE HYDROCHLORIDE 1 MG: 1 INJECTION, SOLUTION INTRAMUSCULAR; INTRAVENOUS; SUBCUTANEOUS at 08:09

## 2019-09-29 RX ADMIN — POTASSIUM CHLORIDE 10 MEQ: 10 INJECTION, SOLUTION INTRAVENOUS at 11:09

## 2019-09-29 RX ADMIN — HYDROMORPHONE HYDROCHLORIDE 1 MG: 1 INJECTION, SOLUTION INTRAMUSCULAR; INTRAVENOUS; SUBCUTANEOUS at 03:09

## 2019-09-29 RX ADMIN — HYDROMORPHONE HYDROCHLORIDE 1 MG: 1 INJECTION, SOLUTION INTRAMUSCULAR; INTRAVENOUS; SUBCUTANEOUS at 02:09

## 2019-09-29 NOTE — SUBJECTIVE & OBJECTIVE
Interval History:   NAEON. VSS. Continues to have high NGT output, no nausea or vomiting. Pain well controlled on current regimen. Ambulating without difficulty. Not passing gas, no bowel movements. Plan to go to OR tomorrow for exploratory laparotomy and j tube revision.     Medications:  Continuous Infusions:   lactated ringers 125 mL/hr at 09/28/19 2125     Scheduled Meds:   enoxaparin  40 mg Subcutaneous Daily    nicotine  1 patch Transdermal Daily     PRN Meds:diphenhydrAMINE, HYDROmorphone, HYDROmorphone, ondansetron, promethazine (PHENERGAN) IVPB, sodium chloride 0.9%     Review of patient's allergies indicates:  No Known Allergies  Objective:     Vital Signs (Most Recent):  Temp: 97.5 °F (36.4 °C) (09/29/19 0745)  Pulse: 101 (09/29/19 0745)  Resp: 17 (09/29/19 0745)  BP: 122/73 (09/29/19 0745)  SpO2: 99 % (09/29/19 0745) Vital Signs (24h Range):  Temp:  [97.4 °F (36.3 °C)-98.5 °F (36.9 °C)] 97.5 °F (36.4 °C)  Pulse:  [] 101  Resp:  [17-18] 17  SpO2:  [97 %-100 %] 99 %  BP: (114-135)/(73-87) 122/73     Weight: 56.1 kg (123 lb 10.8 oz)  Body mass index is 17.75 kg/m².    Intake/Output - Last 3 Shifts       09/27 0700 - 09/28 0659 09/28 0700 - 09/29 0659 09/29 0700 - 09/30 0659    P.O. 200 75     I.V. (mL/kg) 2993.8 (53.4) 1422.9 (25.4)     NG/ 180 60    IV Piggyback 1500 1600     Total Intake(mL/kg) 4813.8 (85.8) 3277.9 (58.4) 60 (1.1)    Urine (mL/kg/hr) 475 (0.4) 1200 (0.9)     Emesis/NG output 550 0     Drains 2350 3000     Stool 0 0     Total Output 3375 4200     Net +1438.8 -922.1 +60           Urine Occurrence 500 x 1 x     Stool Occurrence 0 x 0 x     Emesis Occurrence 2 x 0 x         Physical Exam   Constitutional: He is oriented to person, place, and time. No distress.   Cachectic appearing.     HENT:   Head: Normocephalic and atraumatic.   NG tube in place on LIWS with bilious output.   Eyes: Conjunctivae and EOM are normal.   Neck: Normal range of motion. No tracheal deviation  present.   Cardiovascular: Normal rate and intact distal pulses.   Pulmonary/Chest: Effort normal. No respiratory distress.   Abdominal: Soft. He exhibits no distension. There is no tenderness. There is no guarding.   J tube in place, no erythema.   Musculoskeletal: Normal range of motion. He exhibits no edema.   Neurological: He is alert and oriented to person, place, and time.   Skin: He is not diaphoretic.   Vitals reviewed.    Significant Labs:  CBC:   Recent Labs   Lab 09/28/19  0558   WBC 18.39*   RBC 3.98*   HGB 10.8*   HCT 35.6*   *   MCV 89   MCH 27.1   MCHC 30.3*     CMP:   Recent Labs   Lab 09/26/19  0453  09/28/19  1819   *   < > 104   CALCIUM 9.2   < > 9.3   ALBUMIN 2.7*  --   --    PROT 7.2  --   --       < > 136   K 2.7*   < > 4.2   CO2 36*   < > 35*   CL 90*   < > 90*   BUN 21*   < > 18   CREATININE 0.8   < > 0.8   ALKPHOS 78  --   --    ALT 10  --   --    AST 22  --   --    BILITOT 0.3  --   --     < > = values in this interval not displayed.       Significant Diagnostics:  I have reviewed all pertinent imaging results/findings within the past 24 hours.

## 2019-09-29 NOTE — NURSING
Pt frequently request more and more ice chips, he attempts to ask someone different to receive more ice for dry mouth. During shift so far, patient has had >1100 ml of brownish yellow output from NGT to LIWS. Pt able to shave and bath self today but with constant inappropriate words and attitude. Pt ambulates independently after NGT clamped for short periods of time. IVFs infusing as ordered. No acute distress or needs at this time. WCTM

## 2019-09-29 NOTE — ASSESSMENT & PLAN NOTE
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 Readmitted for small bowel obstruction and intractable nausea and vomiting.     - to OR for J tube revision Monday 9/30, consent obtained today  - Continue NGT to LIWS  - NPO, okay for 3-5 ice chips per hour  - Continue mIVF  - Close monitoring of electrolytes given high output NGT, replace as needed. Check BID  - Anti-emetics PRN  - Pain control  - Encourage continued ambulation  - SCDs, Lovenox daily

## 2019-09-29 NOTE — PLAN OF CARE
Reviewed POC with PT, stated understanding, needs reinfrocement, constantly. Pain 9/10 this shift, meds requested frequently, was able to sleep from 1050 dose to 0330 dose.  NG to MILAN, WDL. PT seen drinking directly from hospital handwashing sink, unable to provide accurate amount of PO intake, only provided with 3-5 ice chips per/hr. PIV infusing per order. PT AMB in halls and RM, voids adequate clear eliana urine. No adverse events this shift, bed low, call light in reach, will cont to manage POC.

## 2019-09-29 NOTE — PROGRESS NOTES
[facility]  General Surgery  Progress Note    Subjective:     Post-Op Info:  Procedure(s) (LRB):  INSERTION, JEJUNOSTOMY TUBE, revision (N/A)         Interval History:   NAEON. VSS. Continues to have high NGT output, no nausea or vomiting. Pain well controlled on current regimen. Ambulating without difficulty. Not passing gas, no bowel movements. Plan to go to OR tomorrow for exploratory laparotomy and j tube revision.     Medications:  Continuous Infusions:   lactated ringers 125 mL/hr at 09/28/19 2125     Scheduled Meds:   enoxaparin  40 mg Subcutaneous Daily    nicotine  1 patch Transdermal Daily     PRN Meds:diphenhydrAMINE, HYDROmorphone, HYDROmorphone, ondansetron, promethazine (PHENERGAN) IVPB, sodium chloride 0.9%     Review of patient's allergies indicates:  No Known Allergies  Objective:     Vital Signs (Most Recent):  Temp: 97.5 °F (36.4 °C) (09/29/19 0745)  Pulse: 101 (09/29/19 0745)  Resp: 17 (09/29/19 0745)  BP: 122/73 (09/29/19 0745)  SpO2: 99 % (09/29/19 0745) Vital Signs (24h Range):  Temp:  [97.4 °F (36.3 °C)-98.5 °F (36.9 °C)] 97.5 °F (36.4 °C)  Pulse:  [] 101  Resp:  [17-18] 17  SpO2:  [97 %-100 %] 99 %  BP: (114-135)/(73-87) 122/73     Weight: 56.1 kg (123 lb 10.8 oz)  Body mass index is 17.75 kg/m².    Intake/Output - Last 3 Shifts       09/27 0700 - 09/28 0659 09/28 0700 - 09/29 0659 09/29 0700 - 09/30 0659    P.O. 200 75     I.V. (mL/kg) 2993.8 (53.4) 1422.9 (25.4)     NG/ 180 60    IV Piggyback 1500 1600     Total Intake(mL/kg) 4813.8 (85.8) 3277.9 (58.4) 60 (1.1)    Urine (mL/kg/hr) 475 (0.4) 1200 (0.9)     Emesis/NG output 550 0     Drains 2350 3000     Stool 0 0     Total Output 3375 4200     Net +1438.8 -922.1 +60           Urine Occurrence 500 x 1 x     Stool Occurrence 0 x 0 x     Emesis Occurrence 2 x 0 x         Physical Exam   Constitutional: He is oriented to person, place, and time. No distress.   Cachectic appearing.     HENT:   Head:  Normocephalic and atraumatic.   NG tube in place on LIWS with bilious output.   Eyes: Conjunctivae and EOM are normal.   Neck: Normal range of motion. No tracheal deviation present.   Cardiovascular: Normal rate and intact distal pulses.   Pulmonary/Chest: Effort normal. No respiratory distress.   Abdominal: Soft. He exhibits no distension. There is no tenderness. There is no guarding.   J tube in place, no erythema.   Musculoskeletal: Normal range of motion. He exhibits no edema.   Neurological: He is alert and oriented to person, place, and time.   Skin: He is not diaphoretic.   Vitals reviewed.    Significant Labs:  CBC:   Recent Labs   Lab 09/28/19  0558   WBC 18.39*   RBC 3.98*   HGB 10.8*   HCT 35.6*   *   MCV 89   MCH 27.1   MCHC 30.3*     CMP:   Recent Labs   Lab 09/26/19  0453  09/28/19  1819   *   < > 104   CALCIUM 9.2   < > 9.3   ALBUMIN 2.7*  --   --    PROT 7.2  --   --       < > 136   K 2.7*   < > 4.2   CO2 36*   < > 35*   CL 90*   < > 90*   BUN 21*   < > 18   CREATININE 0.8   < > 0.8   ALKPHOS 78  --   --    ALT 10  --   --    AST 22  --   --    BILITOT 0.3  --   --     < > = values in this interval not displayed.       Significant Diagnostics:  I have reviewed all pertinent imaging results/findings within the past 24 hours.    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 Readmitted for small bowel obstruction and intractable nausea and vomiting.     - to OR for J tube revision Monday 9/30, consent obtained today  - Continue NGT to LIWS  - NPO, okay for 3-5 ice chips per hour  - Continue mIVF  - Close monitoring of electrolytes given high output NGT, replace as needed. Check BID  - Anti-emetics PRN  - Pain control  - Encourage continued ambulation  - SCDs, Lovenox daily          Lynda Arguello MD  General Surgery  [facility]

## 2019-09-29 NOTE — NURSING
PT walking down the hallway disconnected from IV infusion, and ambulating towards the elevator. When asked how he was unhooked, PT became angry and was I was unable to understand what he was yelling. I at that time asked the PT to go back to his RM so that I could admin PIV meds, PT refused, yelled more and walked to the elevator. Will cont to attempt to manage POC.

## 2019-09-29 NOTE — PLAN OF CARE
Reviewed safety precautions when AMB, and transferring, educated on risk for skin breakdown, PT self turns and AMB well. Reviewed pain management, PT stated understanding, will cont to manage POC.

## 2019-09-29 NOTE — NURSING
Pt ambulated back onto unit. Patient left the floor early this morning on off going RN. Pt disconnected IVFs and clamped his own NGT to leave the floor. No acute distress or needs upon return except demanding his IV pain medication. Pt educated on hospital policies and the risk of injury to himself. Pt verbalizes that he DOES NOT care about our policies. Pt de-escalated but patient verbally attacked PCT for no reason while I was in the room as a witness. After talking with patient he apologized to PCT and less verbally abusive. MUSA

## 2019-09-30 ENCOUNTER — ANESTHESIA (OUTPATIENT)
Dept: SURGERY | Facility: HOSPITAL | Age: 59
DRG: 326 | End: 2019-09-30
Payer: MEDICAID

## 2019-09-30 ENCOUNTER — ANESTHESIA EVENT (OUTPATIENT)
Dept: SURGERY | Facility: HOSPITAL | Age: 59
DRG: 326 | End: 2019-09-30
Payer: MEDICAID

## 2019-09-30 LAB
ANION GAP SERPL CALC-SCNC: 10 MMOL/L (ref 8–16)
ANION GAP SERPL CALC-SCNC: 14 MMOL/L (ref 8–16)
ANION GAP SERPL CALC-SCNC: 8 MMOL/L (ref 8–16)
ANION GAP SERPL CALC-SCNC: 8 MMOL/L (ref 8–16)
ANISOCYTOSIS BLD QL SMEAR: SLIGHT
BASOPHILS NFR BLD: 2 % (ref 0–1.9)
BUN SERPL-MCNC: 14 MG/DL (ref 6–20)
BUN SERPL-MCNC: 14 MG/DL (ref 6–20)
BUN SERPL-MCNC: 15 MG/DL (ref 6–20)
BUN SERPL-MCNC: 16 MG/DL (ref 6–20)
CALCIUM SERPL-MCNC: 8.2 MG/DL (ref 8.7–10.5)
CALCIUM SERPL-MCNC: 8.8 MG/DL (ref 8.7–10.5)
CALCIUM SERPL-MCNC: 8.8 MG/DL (ref 8.7–10.5)
CALCIUM SERPL-MCNC: 9 MG/DL (ref 8.7–10.5)
CHLORIDE SERPL-SCNC: 90 MMOL/L (ref 95–110)
CHLORIDE SERPL-SCNC: 90 MMOL/L (ref 95–110)
CHLORIDE SERPL-SCNC: 92 MMOL/L (ref 95–110)
CHLORIDE SERPL-SCNC: 97 MMOL/L (ref 95–110)
CO2 SERPL-SCNC: 29 MMOL/L (ref 23–29)
CO2 SERPL-SCNC: 34 MMOL/L (ref 23–29)
CO2 SERPL-SCNC: 37 MMOL/L (ref 23–29)
CO2 SERPL-SCNC: 38 MMOL/L (ref 23–29)
CREAT SERPL-MCNC: 0.7 MG/DL (ref 0.5–1.4)
CREAT SERPL-MCNC: 0.7 MG/DL (ref 0.5–1.4)
CREAT SERPL-MCNC: 0.8 MG/DL (ref 0.5–1.4)
CREAT SERPL-MCNC: 0.8 MG/DL (ref 0.5–1.4)
DIFFERENTIAL METHOD: ABNORMAL
EOSINOPHIL NFR BLD: 1 % (ref 0–8)
ERYTHROCYTE [DISTWIDTH] IN BLOOD BY AUTOMATED COUNT: 14.1 % (ref 11.5–14.5)
EST. GFR  (AFRICAN AMERICAN): >60 ML/MIN/1.73 M^2
EST. GFR  (NON AFRICAN AMERICAN): >60 ML/MIN/1.73 M^2
GLUCOSE SERPL-MCNC: 100 MG/DL (ref 70–110)
GLUCOSE SERPL-MCNC: 101 MG/DL (ref 70–110)
GLUCOSE SERPL-MCNC: 103 MG/DL (ref 70–110)
GLUCOSE SERPL-MCNC: 82 MG/DL (ref 70–110)
HCT VFR BLD AUTO: 31.7 % (ref 40–54)
HGB BLD-MCNC: 9.8 G/DL (ref 14–18)
HYPOCHROMIA BLD QL SMEAR: ABNORMAL
IMM GRANULOCYTES # BLD AUTO: ABNORMAL K/UL (ref 0–0.04)
IMM GRANULOCYTES NFR BLD AUTO: ABNORMAL % (ref 0–0.5)
LYMPHOCYTES NFR BLD: 57 % (ref 18–48)
MAGNESIUM SERPL-MCNC: 2 MG/DL (ref 1.6–2.6)
MCH RBC QN AUTO: 27 PG (ref 27–31)
MCHC RBC AUTO-ENTMCNC: 30.9 G/DL (ref 32–36)
MCV RBC AUTO: 87 FL (ref 82–98)
MONOCYTES NFR BLD: 11 % (ref 4–15)
NEUTROPHILS NFR BLD: 29 % (ref 38–73)
NRBC BLD-RTO: 0 /100 WBC
OVALOCYTES BLD QL SMEAR: ABNORMAL
PHOSPHATE SERPL-MCNC: 2.2 MG/DL (ref 2.7–4.5)
PLATELET # BLD AUTO: 561 K/UL (ref 150–350)
PMV BLD AUTO: 9.2 FL (ref 9.2–12.9)
POIKILOCYTOSIS BLD QL SMEAR: SLIGHT
POLYCHROMASIA BLD QL SMEAR: ABNORMAL
POTASSIUM SERPL-SCNC: 2.8 MMOL/L (ref 3.5–5.1)
POTASSIUM SERPL-SCNC: 3.4 MMOL/L (ref 3.5–5.1)
POTASSIUM SERPL-SCNC: 3.9 MMOL/L (ref 3.5–5.1)
POTASSIUM SERPL-SCNC: 4.3 MMOL/L (ref 3.5–5.1)
RBC # BLD AUTO: 3.63 M/UL (ref 4.6–6.2)
SMUDGE CELLS BLD QL SMEAR: PRESENT
SODIUM SERPL-SCNC: 135 MMOL/L (ref 136–145)
SODIUM SERPL-SCNC: 136 MMOL/L (ref 136–145)
SODIUM SERPL-SCNC: 138 MMOL/L (ref 136–145)
SODIUM SERPL-SCNC: 138 MMOL/L (ref 136–145)
WBC # BLD AUTO: 17.83 K/UL (ref 3.9–12.7)

## 2019-09-30 PROCEDURE — D9220A PRA ANESTHESIA: ICD-10-PCS | Mod: ,,, | Performed by: ANESTHESIOLOGY

## 2019-09-30 PROCEDURE — 25000003 PHARM REV CODE 250: Performed by: STUDENT IN AN ORGANIZED HEALTH CARE EDUCATION/TRAINING PROGRAM

## 2019-09-30 PROCEDURE — 63600175 PHARM REV CODE 636 W HCPCS: Performed by: STUDENT IN AN ORGANIZED HEALTH CARE EDUCATION/TRAINING PROGRAM

## 2019-09-30 PROCEDURE — 94761 N-INVAS EAR/PLS OXIMETRY MLT: CPT

## 2019-09-30 PROCEDURE — 80048 BASIC METABOLIC PNL TOTAL CA: CPT

## 2019-09-30 PROCEDURE — S4991 NICOTINE PATCH NONLEGEND: HCPCS | Performed by: STUDENT IN AN ORGANIZED HEALTH CARE EDUCATION/TRAINING PROGRAM

## 2019-09-30 PROCEDURE — 83735 ASSAY OF MAGNESIUM: CPT

## 2019-09-30 PROCEDURE — 44799 PR REVISION OF JEJUNOSTOMY: ICD-10-PCS | Mod: ,,, | Performed by: SURGERY

## 2019-09-30 PROCEDURE — 25000003 PHARM REV CODE 250: Performed by: SURGERY

## 2019-09-30 PROCEDURE — 20600001 HC STEP DOWN PRIVATE ROOM

## 2019-09-30 PROCEDURE — 36000706: Performed by: SURGERY

## 2019-09-30 PROCEDURE — D9220A PRA ANESTHESIA: Mod: ,,, | Performed by: ANESTHESIOLOGY

## 2019-09-30 PROCEDURE — 37000009 HC ANESTHESIA EA ADD 15 MINS: Performed by: SURGERY

## 2019-09-30 PROCEDURE — 80048 BASIC METABOLIC PNL TOTAL CA: CPT | Mod: 91

## 2019-09-30 PROCEDURE — 63600175 PHARM REV CODE 636 W HCPCS: Performed by: NURSE ANESTHETIST, CERTIFIED REGISTERED

## 2019-09-30 PROCEDURE — 63600175 PHARM REV CODE 636 W HCPCS: Performed by: NURSE PRACTITIONER

## 2019-09-30 PROCEDURE — 71000039 HC RECOVERY, EACH ADD'L HOUR: Performed by: SURGERY

## 2019-09-30 PROCEDURE — 43832 PR GASTROSTOMY,OPEN,W/TUBE CNSTR: ICD-10-PCS | Mod: 51,,, | Performed by: SURGERY

## 2019-09-30 PROCEDURE — 36000707: Performed by: SURGERY

## 2019-09-30 PROCEDURE — 25000003 PHARM REV CODE 250: Performed by: NURSE ANESTHETIST, CERTIFIED REGISTERED

## 2019-09-30 PROCEDURE — C1729 CATH, DRAINAGE: HCPCS | Performed by: SURGERY

## 2019-09-30 PROCEDURE — 84100 ASSAY OF PHOSPHORUS: CPT

## 2019-09-30 PROCEDURE — 63600175 PHARM REV CODE 636 W HCPCS: Performed by: ANESTHESIOLOGY

## 2019-09-30 PROCEDURE — 36415 COLL VENOUS BLD VENIPUNCTURE: CPT

## 2019-09-30 PROCEDURE — 85007 BL SMEAR W/DIFF WBC COUNT: CPT

## 2019-09-30 PROCEDURE — 85027 COMPLETE CBC AUTOMATED: CPT

## 2019-09-30 PROCEDURE — 63600175 PHARM REV CODE 636 W HCPCS: Performed by: SURGERY

## 2019-09-30 PROCEDURE — 37000008 HC ANESTHESIA 1ST 15 MINUTES: Performed by: SURGERY

## 2019-09-30 PROCEDURE — 43832 GSTRST OPEN W/CONSTJ TUBE: CPT | Mod: 51,,, | Performed by: SURGERY

## 2019-09-30 PROCEDURE — 44799 UNLISTED PX SMALL INTESTINE: CPT | Mod: ,,, | Performed by: SURGERY

## 2019-09-30 PROCEDURE — 71000033 HC RECOVERY, INTIAL HOUR: Performed by: SURGERY

## 2019-09-30 RX ORDER — GLYCOPYRROLATE 0.2 MG/ML
INJECTION INTRAMUSCULAR; INTRAVENOUS
Status: DISCONTINUED | OUTPATIENT
Start: 2019-09-30 | End: 2019-09-30

## 2019-09-30 RX ORDER — POTASSIUM CHLORIDE 7.45 MG/ML
10 INJECTION INTRAVENOUS
Status: COMPLETED | OUTPATIENT
Start: 2019-09-30 | End: 2019-09-30

## 2019-09-30 RX ORDER — HYDROMORPHONE HYDROCHLORIDE 1 MG/ML
0.2 INJECTION, SOLUTION INTRAMUSCULAR; INTRAVENOUS; SUBCUTANEOUS EVERY 5 MIN PRN
Status: COMPLETED | OUTPATIENT
Start: 2019-09-30 | End: 2019-09-30

## 2019-09-30 RX ORDER — KETAMINE HCL IN 0.9 % NACL 50 MG/5 ML
SYRINGE (ML) INTRAVENOUS
Status: DISCONTINUED | OUTPATIENT
Start: 2019-09-30 | End: 2019-09-30

## 2019-09-30 RX ORDER — SUCCINYLCHOLINE CHLORIDE 20 MG/ML
INJECTION INTRAMUSCULAR; INTRAVENOUS
Status: DISCONTINUED | OUTPATIENT
Start: 2019-09-30 | End: 2019-09-30

## 2019-09-30 RX ORDER — HYDROMORPHONE HCL IN 0.9% NACL 6 MG/30 ML
PATIENT CONTROLLED ANALGESIA SYRINGE INTRAVENOUS CONTINUOUS
Status: DISCONTINUED | OUTPATIENT
Start: 2019-09-30 | End: 2019-09-30

## 2019-09-30 RX ORDER — BUPIVACAINE HYDROCHLORIDE 2.5 MG/ML
INJECTION, SOLUTION EPIDURAL; INFILTRATION; INTRACAUDAL
Status: DISCONTINUED | OUTPATIENT
Start: 2019-09-30 | End: 2019-09-30 | Stop reason: HOSPADM

## 2019-09-30 RX ORDER — FENTANYL CITRATE 50 UG/ML
INJECTION, SOLUTION INTRAMUSCULAR; INTRAVENOUS
Status: DISCONTINUED | OUTPATIENT
Start: 2019-09-30 | End: 2019-09-30

## 2019-09-30 RX ORDER — PROPOFOL 10 MG/ML
VIAL (ML) INTRAVENOUS
Status: DISCONTINUED | OUTPATIENT
Start: 2019-09-30 | End: 2019-09-30

## 2019-09-30 RX ORDER — NALOXONE HCL 0.4 MG/ML
0.02 VIAL (ML) INJECTION
Status: DISCONTINUED | OUTPATIENT
Start: 2019-09-30 | End: 2019-10-02

## 2019-09-30 RX ORDER — POTASSIUM CHLORIDE 7.45 MG/ML
10 INJECTION INTRAVENOUS
Status: COMPLETED | OUTPATIENT
Start: 2019-09-30 | End: 2019-10-01

## 2019-09-30 RX ORDER — HYDROMORPHONE HCL IN 0.9% NACL 6 MG/30 ML
PATIENT CONTROLLED ANALGESIA SYRINGE INTRAVENOUS CONTINUOUS
Status: DISCONTINUED | OUTPATIENT
Start: 2019-09-30 | End: 2019-10-02

## 2019-09-30 RX ORDER — HYDROMORPHONE HCL IN 0.9% NACL 6 MG/30 ML
PATIENT CONTROLLED ANALGESIA SYRINGE INTRAVENOUS
Status: DISPENSED
Start: 2019-09-30 | End: 2019-10-01

## 2019-09-30 RX ORDER — NEOSTIGMINE METHYLSULFATE 1 MG/ML
INJECTION, SOLUTION INTRAVENOUS
Status: DISCONTINUED | OUTPATIENT
Start: 2019-09-30 | End: 2019-09-30

## 2019-09-30 RX ORDER — ONDANSETRON 2 MG/ML
INJECTION INTRAMUSCULAR; INTRAVENOUS
Status: DISCONTINUED | OUTPATIENT
Start: 2019-09-30 | End: 2019-09-30

## 2019-09-30 RX ORDER — SODIUM CHLORIDE 9 MG/ML
INJECTION, SOLUTION INTRAVENOUS CONTINUOUS
Status: DISCONTINUED | OUTPATIENT
Start: 2019-09-30 | End: 2019-09-30

## 2019-09-30 RX ORDER — SODIUM CHLORIDE, SODIUM LACTATE, POTASSIUM CHLORIDE, CALCIUM CHLORIDE 600; 310; 30; 20 MG/100ML; MG/100ML; MG/100ML; MG/100ML
INJECTION, SOLUTION INTRAVENOUS CONTINUOUS
Status: DISCONTINUED | OUTPATIENT
Start: 2019-09-30 | End: 2019-10-03

## 2019-09-30 RX ORDER — CEFAZOLIN SODIUM 1 G/3ML
INJECTION, POWDER, FOR SOLUTION INTRAMUSCULAR; INTRAVENOUS
Status: DISCONTINUED | OUTPATIENT
Start: 2019-09-30 | End: 2019-09-30

## 2019-09-30 RX ORDER — ROCURONIUM BROMIDE 10 MG/ML
INJECTION, SOLUTION INTRAVENOUS
Status: DISCONTINUED | OUTPATIENT
Start: 2019-09-30 | End: 2019-09-30

## 2019-09-30 RX ORDER — LEVALBUTEROL INHALATION SOLUTION 0.63 MG/3ML
0.63 SOLUTION RESPIRATORY (INHALATION)
Status: DISPENSED | OUTPATIENT
Start: 2019-09-30 | End: 2019-10-02

## 2019-09-30 RX ORDER — LIDOCAINE HCL/PF 100 MG/5ML
SYRINGE (ML) INTRAVENOUS
Status: DISCONTINUED | OUTPATIENT
Start: 2019-09-30 | End: 2019-09-30

## 2019-09-30 RX ORDER — MIDAZOLAM HYDROCHLORIDE 1 MG/ML
INJECTION, SOLUTION INTRAMUSCULAR; INTRAVENOUS
Status: DISCONTINUED | OUTPATIENT
Start: 2019-09-30 | End: 2019-09-30

## 2019-09-30 RX ORDER — PHENYLEPHRINE HYDROCHLORIDE 10 MG/ML
INJECTION INTRAVENOUS
Status: DISCONTINUED | OUTPATIENT
Start: 2019-09-30 | End: 2019-09-30

## 2019-09-30 RX ORDER — ACETAMINOPHEN 10 MG/ML
INJECTION, SOLUTION INTRAVENOUS
Status: DISCONTINUED | OUTPATIENT
Start: 2019-09-30 | End: 2019-09-30

## 2019-09-30 RX ADMIN — HYDROMORPHONE HYDROCHLORIDE 1 MG: 1 INJECTION, SOLUTION INTRAMUSCULAR; INTRAVENOUS; SUBCUTANEOUS at 05:09

## 2019-09-30 RX ADMIN — HYDROMORPHONE HYDROCHLORIDE 0.2 MG: 1 INJECTION, SOLUTION INTRAMUSCULAR; INTRAVENOUS; SUBCUTANEOUS at 03:09

## 2019-09-30 RX ADMIN — POTASSIUM CHLORIDE 10 MEQ: 7.46 INJECTION, SOLUTION INTRAVENOUS at 09:09

## 2019-09-30 RX ADMIN — POTASSIUM CHLORIDE 10 MEQ: 7.46 INJECTION, SOLUTION INTRAVENOUS at 06:09

## 2019-09-30 RX ADMIN — ROCURONIUM BROMIDE 45 MG: 10 INJECTION, SOLUTION INTRAVENOUS at 12:09

## 2019-09-30 RX ADMIN — Medication 15 MG: at 02:09

## 2019-09-30 RX ADMIN — FENTANYL CITRATE 50 MCG: 50 INJECTION, SOLUTION INTRAMUSCULAR; INTRAVENOUS at 12:09

## 2019-09-30 RX ADMIN — SODIUM CHLORIDE, SODIUM LACTATE, POTASSIUM CHLORIDE, AND CALCIUM CHLORIDE: 600; 310; 30; 20 INJECTION, SOLUTION INTRAVENOUS at 12:09

## 2019-09-30 RX ADMIN — HYDROMORPHONE HYDROCHLORIDE 1 MG: 1 INJECTION, SOLUTION INTRAMUSCULAR; INTRAVENOUS; SUBCUTANEOUS at 09:09

## 2019-09-30 RX ADMIN — PHENYLEPHRINE HYDROCHLORIDE 100 MCG: 10 INJECTION INTRAVENOUS at 12:09

## 2019-09-30 RX ADMIN — SODIUM CHLORIDE, SODIUM LACTATE, POTASSIUM CHLORIDE, AND CALCIUM CHLORIDE 1000 ML: .6; .31; .03; .02 INJECTION, SOLUTION INTRAVENOUS at 09:09

## 2019-09-30 RX ADMIN — Medication 15 MG: at 01:09

## 2019-09-30 RX ADMIN — POTASSIUM CHLORIDE 10 MEQ: 10 INJECTION, SOLUTION INTRAVENOUS at 12:09

## 2019-09-30 RX ADMIN — SODIUM CHLORIDE, SODIUM GLUCONATE, SODIUM ACETATE, POTASSIUM CHLORIDE, MAGNESIUM CHLORIDE, SODIUM PHOSPHATE, DIBASIC, AND POTASSIUM PHOSPHATE: .53; .5; .37; .037; .03; .012; .00082 INJECTION, SOLUTION INTRAVENOUS at 01:09

## 2019-09-30 RX ADMIN — POTASSIUM CHLORIDE 10 MEQ: 7.46 INJECTION, SOLUTION INTRAVENOUS at 10:09

## 2019-09-30 RX ADMIN — ONDANSETRON 4 MG: 2 INJECTION INTRAMUSCULAR; INTRAVENOUS at 09:09

## 2019-09-30 RX ADMIN — FENTANYL CITRATE 50 MCG: 50 INJECTION, SOLUTION INTRAMUSCULAR; INTRAVENOUS at 02:09

## 2019-09-30 RX ADMIN — MIDAZOLAM HYDROCHLORIDE 2 MG: 1 INJECTION, SOLUTION INTRAMUSCULAR; INTRAVENOUS at 12:09

## 2019-09-30 RX ADMIN — PHENYLEPHRINE HYDROCHLORIDE 100 MCG: 10 INJECTION INTRAVENOUS at 01:09

## 2019-09-30 RX ADMIN — HYDROMORPHONE HYDROCHLORIDE 0.2 MG: 1 INJECTION, SOLUTION INTRAMUSCULAR; INTRAVENOUS; SUBCUTANEOUS at 04:09

## 2019-09-30 RX ADMIN — ACETAMINOPHEN 1000 MG: 10 INJECTION, SOLUTION INTRAVENOUS at 01:09

## 2019-09-30 RX ADMIN — ROCURONIUM BROMIDE 5 MG: 10 INJECTION, SOLUTION INTRAVENOUS at 12:09

## 2019-09-30 RX ADMIN — ONDANSETRON 4 MG: 2 INJECTION INTRAMUSCULAR; INTRAVENOUS at 02:09

## 2019-09-30 RX ADMIN — SODIUM CHLORIDE, SODIUM LACTATE, POTASSIUM CHLORIDE, AND CALCIUM CHLORIDE: 600; 310; 30; 20 INJECTION, SOLUTION INTRAVENOUS at 03:09

## 2019-09-30 RX ADMIN — ENOXAPARIN SODIUM 40 MG: 100 INJECTION SUBCUTANEOUS at 05:09

## 2019-09-30 RX ADMIN — POTASSIUM CHLORIDE 10 MEQ: 10 INJECTION, SOLUTION INTRAVENOUS at 01:09

## 2019-09-30 RX ADMIN — CEFAZOLIN 2 G: 330 INJECTION, POWDER, FOR SOLUTION INTRAMUSCULAR; INTRAVENOUS at 12:09

## 2019-09-30 RX ADMIN — SUCCINYLCHOLINE CHLORIDE 100 MG: 20 INJECTION, SOLUTION INTRAMUSCULAR; INTRAVENOUS at 12:09

## 2019-09-30 RX ADMIN — Medication: at 03:09

## 2019-09-30 RX ADMIN — GLYCOPYRROLATE 0.6 MG: 0.2 INJECTION, SOLUTION INTRAMUSCULAR; INTRAVENOUS at 02:09

## 2019-09-30 RX ADMIN — POTASSIUM CHLORIDE 10 MEQ: 7.46 INJECTION, SOLUTION INTRAVENOUS at 08:09

## 2019-09-30 RX ADMIN — SODIUM CHLORIDE: 0.9 INJECTION, SOLUTION INTRAVENOUS at 11:09

## 2019-09-30 RX ADMIN — SODIUM CHLORIDE, SODIUM LACTATE, POTASSIUM CHLORIDE, AND CALCIUM CHLORIDE 1000 ML: .6; .31; .03; .02 INJECTION, SOLUTION INTRAVENOUS at 12:09

## 2019-09-30 RX ADMIN — POTASSIUM CHLORIDE 10 MEQ: 10 INJECTION, SOLUTION INTRAVENOUS at 02:09

## 2019-09-30 RX ADMIN — Medication: at 04:09

## 2019-09-30 RX ADMIN — HYDROMORPHONE HYDROCHLORIDE 1 MG: 1 INJECTION, SOLUTION INTRAMUSCULAR; INTRAVENOUS; SUBCUTANEOUS at 02:09

## 2019-09-30 RX ADMIN — Medication: at 09:09

## 2019-09-30 RX ADMIN — NEOSTIGMINE METHYLSULFATE 5 MG: 1 INJECTION INTRAVENOUS at 02:09

## 2019-09-30 RX ADMIN — PROPOFOL 140 MG: 10 INJECTION, EMULSION INTRAVENOUS at 12:09

## 2019-09-30 RX ADMIN — LIDOCAINE HYDROCHLORIDE 60 MG: 20 INJECTION, SOLUTION INTRAVENOUS at 12:09

## 2019-09-30 RX ADMIN — FENTANYL CITRATE 50 MCG: 50 INJECTION, SOLUTION INTRAMUSCULAR; INTRAVENOUS at 01:09

## 2019-09-30 RX ADMIN — SODIUM CHLORIDE, SODIUM LACTATE, POTASSIUM CHLORIDE, AND CALCIUM CHLORIDE: 600; 310; 30; 20 INJECTION, SOLUTION INTRAVENOUS at 11:09

## 2019-09-30 RX ADMIN — POTASSIUM CHLORIDE 10 MEQ: 7.46 INJECTION, SOLUTION INTRAVENOUS at 11:09

## 2019-09-30 RX ADMIN — ROCURONIUM BROMIDE 25 MG: 10 INJECTION, SOLUTION INTRAVENOUS at 01:09

## 2019-09-30 RX ADMIN — NICOTINE 1 PATCH: 21 PATCH, EXTENDED RELEASE TRANSDERMAL at 09:09

## 2019-09-30 NOTE — PROGRESS NOTES
[facility]  General Surgery  Progress Note    Subjective:     History of Present Illness:  No notes on file    Post-Op Info:  Procedure(s) (LRB):  INSERTION, JEJUNOSTOMY TUBE, revision (N/A)  LAPAROTOMY, EXPLORATORY (N/A)         Interval History:   NAEON. VSS. Adequate urine output. Continues to have high NGT output, no nausea or vomiting. Pain well controlled on current regimen. Ambulating without difficulty. Passing gas, no bowel movements. Plan to go to OR today for exploratory laparotomy and j tube revision. Hypokalemia, restarted k replacement, pending morning labs.     Medications:  Continuous Infusions:   lactated ringers 125 mL/hr at 09/28/19 2125     Scheduled Meds:   enoxaparin  40 mg Subcutaneous Daily    lactated ringers  1,000 mL Intravenous Once    nicotine  1 patch Transdermal Daily    potassium chloride in water  10 mEq Intravenous Q1H     PRN Meds:diphenhydrAMINE, HYDROmorphone, HYDROmorphone, ondansetron, promethazine (PHENERGAN) IVPB, sodium chloride 0.9%     Review of patient's allergies indicates:  No Known Allergies     Objective:     Vital Signs (Most Recent):  Temp: 97.8 °F (36.6 °C) (09/30/19 0004)  Pulse: 88 (09/30/19 0004)  Resp: 18 (09/30/19 0004)  BP: 104/77 (09/30/19 0004)  SpO2: 99 % (09/30/19 0004) Vital Signs (24h Range):  Temp:  [97.5 °F (36.4 °C)-98.3 °F (36.8 °C)] 97.8 °F (36.6 °C)  Pulse:  [] 88  Resp:  [16-18] 18  SpO2:  [95 %-100 %] 99 %  BP: (101-124)/(73-85) 104/77     Weight: 56.1 kg (123 lb 10.8 oz)  Body mass index is 17.75 kg/m².    Intake/Output - Last 3 Shifts       09/28 0700 - 09/29 0659 09/29 0700 - 09/30 0659    P.O. 75 45    I.V. (mL/kg) 2645.8 (47.2) 2487.5 (44.3)    NG/ 120    IV Piggyback 1600 100    Total Intake(mL/kg) 4500.8 (80.2) 2752.5 (49.1)    Urine (mL/kg/hr) 1200 (0.9) 900 (0.7)    Emesis/NG output 0 0    Drains 3000 2400    Stool 0 0    Total Output 4200 3300    Net +300.8 -547.5          Urine Occurrence 1 x 2 x     Stool Occurrence 0 x 0 x    Emesis Occurrence 0 x 0 x        Physical Exam   Constitutional: He is oriented to person, place, and time. No distress.   Cachectic appearing.     HENT:   Head: Normocephalic and atraumatic.   NG tube in place on LIWS with bilious output.   Eyes: Conjunctivae and EOM are normal.   Neck: Normal range of motion. No tracheal deviation present.   Cardiovascular: Normal rate and intact distal pulses.   Pulmonary/Chest: Effort normal. No respiratory distress.   Abdominal: Soft. He exhibits no distension. There is no tenderness. There is no guarding.   J tube in place, no erythema.   Musculoskeletal: Normal range of motion. He exhibits no edema.   Neurological: He is alert and oriented to person, place, and time.   Skin: He is not diaphoretic.   Vitals reviewed.    Significant Labs:  CBC:   Recent Labs   Lab 09/29/19  0841   WBC 18.91*   RBC 3.85*   HGB 10.4*   HCT 33.4*   *   MCV 87   MCH 27.0   MCHC 31.1*     CMP:   Recent Labs   Lab 09/26/19  0453  09/29/19  0841   *   < > 119*  103   CALCIUM 9.2   < > 8.7  9.4   ALBUMIN 2.7*  --   --    PROT 7.2  --   --       < > 136  136   K 2.7*   < > 2.9*  3.2*   CO2 36*   < > 36*  35*   CL 90*   < > 88*  89*   BUN 21*   < > 18  17   CREATININE 0.8   < > 0.8  0.8   ALKPHOS 78  --   --    ALT 10  --   --    AST 22  --   --    BILITOT 0.3  --   --     < > = values in this interval not displayed.       Significant Diagnostics:  I have reviewed all pertinent imaging results/findings within the past 24 hours.    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 Readmitted for small bowel obstruction and intractable nausea and vomiting.     - to OR for ex-lap, J tube revision Monday 9/30, consent obtained   - Continue NGT to LIWS  - NPO, okay for 3-5 ice chips per hour  - Continue mIVF  - Close monitoring of  electrolytes given high output NGT, replace as needed. Check BID  - Anti-emetics PRN  - Pain control  - Encourage continued ambulation  - SCDs, Lovenox daily          Leonard Aponte MD  General Surgery  [facility]

## 2019-09-30 NOTE — PLAN OF CARE
Patient to OR today for Exploratory Laparotomy, Previous jejunostomy site examined with mild proximal bowel dilation and decompressed bowel distally; bowel run proximally to the ligament of Treitz and distally to the terminal ileum without other evidence of obstruction; previous jejunostomy site taken down and closed; new Guadalupe jejunostomy created distal to previous site (12 fr); Guadalupe gastrostomy created (18 fr).  Patient expected to discharge home with tube feeds +/- 10/4/2019.       09/30/19 5107   Discharge Reassessment   Assessment Type Discharge Planning Reassessment   Discharge Plan A Home   Discharge Plan B Home with family   Post-Acute Status   Post-Acute Authorization Other  (tube feeds)   Discharge Delays (!) Change in Medical Condition

## 2019-09-30 NOTE — ANESTHESIA PREPROCEDURE EVALUATION
[facility]  Anesthesia Pre-Operative Evaluation         Patient Name: Isaiah Greene  YOB: 1960  MRN: [id number]    SUBJECTIVE:     Pre-operative evaluation for Procedure(s) (LRB):  INSERTION, JEJUNOSTOMY TUBE, revision (N/A)  LAPAROTOMY, EXPLORATORY (N/A)     09/30/2019    Isaiah Greene is a 59 y.o. male w/ a significant PMHx of newly diagnosed stomach cancer (9/13) GJ bypass with J tube and port-a-cath placement on 9/19/19 who presents as a transfer from [facility] with a small bowel obstruction.    Patient now presents for the above procedure(s).      LDA: None documented.       PowerPort A Cath Single Lumen 09/19/19 1650 right subclavian;right internal jugular (Active)   Accessed by: not accessed 9/29/2019  8:37 PM   Number of days: 10            Peripheral IV - Single Lumen 09/29/19 1710 18 G Anterior;Proximal;Right Forearm (Active)   Site Assessment Clean;Dry;Intact;No warmth;No drainage 9/30/2019  3:58 AM   Line Status Infusing 9/29/2019  8:37 PM   Dressing Status Clean;Dry;Intact 9/29/2019  8:37 PM   Reason Not Rotated Not due 9/29/2019  8:37 PM   Number of days: 0            NG/OG Tube 09/25/19 0958 16 Fr. Right nostril (Active)   Placement Check placement verified by aspirate characteristics 9/29/2019  8:37 PM   Tolerance no signs/symptoms of discomfort 9/29/2019  8:37 PM   Securement secured to nostril center 9/29/2019  8:37 PM   Clamp Status/Tolerance clamped;nausea;emesis 9/29/2019  7:45 AM   Suction Setting/Drainage Method suction at;low;intermittent setting 9/29/2019  8:37 PM   Insertion Site Appearance no redness, warmth, tenderness, skin breakdown, drainage 9/29/2019  7:45 AM   Drainage Thin;Green;Yellow 9/29/2019  7:45 AM   Flush/Irrigation flushed w/;water;no resistance met 9/29/2019  7:45 AM   Tube Output(mL)(Include Discarded Residual) 325 mL 9/30/2019  3:58 AM   Number of days: 4             Gastrostomy/Enterostomy 09/19/19 1730 Jejunostomy tube LUQ feeding (Active)   Securement secured to abdomen w/ adhesive device;sutured to abdomen 9/29/2019  8:37 PM   Interventions Prior to Feeding patency checked 9/29/2019  7:45 AM   Suction Setting/Drainage Method dependent drainage 9/27/2019  8:44 PM   Clamp Status/Tolerance clamped 9/29/2019  8:37 PM   Dressing no dressing 9/29/2019  7:45 AM   Insertion Site no redness;no warmth;no drainage;no tenderness;no swelling 9/29/2019  8:37 PM   Site Care device rotatated 9/29/2019  7:45 AM   Flush/Irrigation flushed w/;water;no resistance met 9/29/2019  7:45 AM   Intake (mL) 0 mL 9/29/2019  7:45 AM   Water Bolus (mL) 60 mL 9/29/2019 12:10 PM   Tube Output(mL)(Include Discarded Residual) 0 mL 9/27/2019  9:42 PM   Number of days: 10       Prev airway:    Present Prior to Hospital Arrival?: No; Placement Date: 09/19/19; Placement Time: 1555; Method of Intubation: Direct laryngoscopy; Inserted by: CRNA; Airway Device: Endotracheal Tube; Mask Ventilation: Not Attempted; Intubated: Postinduction; Blade: Hendrix #2; Airway Device Size: 7.5; Style: Cuffed; Cuff Inflation: Minimal occlusive pressure; Inflation Amount: 6; Placement Verified By: Auscultation, Capnometry, ETT Condensation; Grade: Grade I; Complicating Factors: None; Intubation Findings: Positive EtCO2, Bilateral breath sounds, Atraumatic/Condition of teeth unchanged;  Depth of Insertion: 21; Securment: Lips; Complications: None; Breath Sounds: Equal Bilateral; Insertion Attempts: 1; Removal Date: 09/19/19;  Removal Time: 1821 09/19/19 1555 by Nino Beard,          Drips: None documented.   lactated ringers 125 mL/hr at 09/30/19 0248       Patient Active Problem List   Diagnosis    Left arm pain    Gastric outlet obstruction    Intractable generalized abdominal pain    Severe protein-calorie malnutrition    Malignant neoplasm of stomach    Severe malnutrition    Signet ring cell adenocarcinoma of stomach     Weight loss, unintentional    Emphysema lung    Iliac artery occlusion, right    Tobacco abuse counseling    H. pylori infection    Iron deficiency anemia    Small bowel obstruction       Review of patient's allergies indicates:  No Known Allergies    Current Inpatient Medications:   enoxaparin  40 mg Subcutaneous Daily    nicotine  1 patch Transdermal Daily       No current facility-administered medications on file prior to encounter.      Current Outpatient Medications on File Prior to Encounter   Medication Sig Dispense Refill    aspirin 81 MG Chew Chew and swallow 1 tablet (81 mg total) by mouth once daily. May take over the counter 30 tablet 12    HYDROcodone-acetaminophen (NORCO) 5-325 mg per tablet Take 1 tablet by mouth every 6 (six) hours as needed for Pain. 30 tablet 0    nicotine (NICODERM CQ) 21 mg/24 hr Place 1 patch onto the skin once daily. 7 patch 3    ondansetron (ZOFRAN) 4 MG tablet Take 1 tablet (4 mg total) by mouth every 6 (six) hours as needed for Nausea. 30 tablet 1       Past Surgical History:   Procedure Laterality Date    DIAGNOSTIC LAPAROSCOPY N/A 9/19/2019    Procedure: LAPAROSCOPY, DIAGNOSTIC;  Surgeon: Josse Saravia MD;  Location: [address];  Service: General;  Laterality: N/A;    ENDOSCOPIC ULTRASOUND OF UPPER GASTROINTESTINAL TRACT N/A 9/17/2019    Procedure: ULTRASOUND, UPPER GI TRACT, ENDOSCOPIC;  Surgeon: Nino Randhawa MD;  Location: [address]);  Service: Endoscopy;  Laterality: N/A;    ESOPHAGOGASTRODUODENOSCOPY N/A 9/10/2019    Procedure: EGD (ESOPHAGOGASTRODUODENOSCOPY);  Surgeon: Ok Diaz MD;  Location: [facility];  Service: Endoscopy;  Laterality: N/A;    EYE SURGERY      FACIAL RECONSTRUCTION SURGERY      GASTROJEJUNOSTOMY N/A 9/19/2019    Procedure: GASTROJEJUNOSTOMY, possible G-tube;  Surgeon: Josse Saravia MD;  Location: [address];  Service: General;  Laterality: N/A;    INSERTION OF VENOUS ACCESS PORT Right  9/19/2019    Procedure: INSERTION, VENOUS ACCESS PORT;  Surgeon: Josse Saravia MD;  [facility];  Service: General;  Laterality: Right;    LAPAROSCOPIC INSERTION OF JEJUNOSTOMY TUBE N/A 9/19/2019    Procedure: INSERTION, JEJUNOSTOMY TUBE, LAPAROSCOPIC, possible open;  Surgeon: Josse Saravia MD;  Location: [facility] OR 2ND FLR;  Service: General;  Laterality: N/A;       Social History     Socioeconomic History    Marital status: Single     Spouse name: Not on file    Number of children: Not on file    Years of education: Not on file    Highest education level: Not on file   Occupational History    Not on file   Social Needs    Financial resource strain: Not on file    Food insecurity:     Worry: Not on file     Inability: Not on file    Transportation needs:     Medical: Not on file     Non-medical: Not on file   Tobacco Use    Smoking status: Current Every Day Smoker     Packs/day: 1.00     Years: 40.00     Pack years: 40.00     Types: Cigarettes    Smokeless tobacco: Never Used   Substance and Sexual Activity    Alcohol use: Not Currently     Comment: quit a year agao    Drug use: Yes     Types: Marijuana    Sexual activity: Not on file   Lifestyle    Physical activity:     Days per week: Not on file     Minutes per session: Not on file    Stress: Not on file   Relationships    Social connections:     Talks on phone: Not on file     Gets together: Not on file     Attends Mandaeism service: Not on file     Active member of club or organization: Not on file     Attends meetings of clubs or organizations: Not on file     Relationship status: Not on file   Other Topics Concern    Not on file   Social History Narrative    Not on file       OBJECTIVE:     Vital Signs Range (Last 24H):  Temp:  [36.4 °C (97.5 °F)-36.8 °C (98.3 °F)]   Pulse:  []   Resp:  [16-18]   BP: (101-122)/(63-81)   SpO2:  [95 %-100 %]       Significant Labs:  Lab Results   Component Value Date    WBC 18.91 (H)  09/29/2019    HGB 10.4 (L) 09/29/2019    HCT 33.4 (L) 09/29/2019     (H) 09/29/2019    ALT 10 09/26/2019    AST 22 09/26/2019     09/30/2019    K 2.8 (L) 09/30/2019    CL 90 (L) 09/30/2019    CREATININE 0.7 09/30/2019    BUN 16 09/30/2019    CO2 34 (H) 09/30/2019    INR 1.1 02/16/2017    HGBA1C 5.6 09/16/2019       Diagnostic Studies: No relevant studies.    EKG:   Results for orders placed or performed during the hospital encounter of 09/16/19   EKG 12-lead    Collection Time: 09/17/19  8:40 AM    Narrative    Test Reason : R07.9,    Vent. Rate : 078 BPM     Atrial Rate : 078 BPM     P-R Int : 140 ms          QRS Dur : 076 ms      QT Int : 408 ms       P-R-T Axes : 079 064 071 degrees     QTc Int : 465 ms    Sinus rhythm with Premature atrial complexes  Septal infarct (cited on or before 09-SEP-2019)  Abnormal ECG  When compared with ECG of 09-SEP-2019 11:07,  No significant change was found    Confirmed by Rodriguez Llanos MD ([id number]) on 9/17/2019 6:55:56 PM    Referred By: CHRISTOPHER BELLE           Confirmed By:Rodriguez Llanos MD       2D ECHO:  TTE:  No results found for this or any previous visit.    CECE:  No results found for this or any previous visit.    ASSESSMENT/PLAN:         Anesthesia Evaluation    I have reviewed the Patient Summary Reports.    I have reviewed the Nursing Notes.   I have reviewed the Medications.     Review of Systems  Anesthesia Hx:  No problems with previous Anesthesia  History of prior surgery of interest to airway management or planning: Previous anesthesia: General Denies Family Hx of Anesthesia complications.   Denies Personal Hx of Anesthesia complications.   Social:  Smoker    Hematology/Oncology:         -- Anemia: Current/Recent Cancer.   Cardiovascular:   Denies Pacemaker.  Denies Hypertension.  Denies CAD.    Denies CABG/stent.   Functional Capacity low / < 4 METS    Pulmonary:   COPD Denies Asthma.    Renal/:  Renal/ Normal     Hepatic/GI:   GERD Denies Liver  Disease.    Musculoskeletal:  Musculoskeletal Normal    Neurological:  Neurology Normal Denies TIA.  Denies CVA.    Endocrine:   Denies Diabetes.        Physical Exam  General:  Cachexia    Airway/Jaw/Neck:  Airway Findings: Mouth Opening: Normal Tongue: Normal  General Airway Assessment: Adult  Mallampati: I  TM Distance: Normal, at least 6 cm  Jaw/Neck Findings:  Neck ROM: Normal ROM  NGT in place   Eyes/Ears/Nose:  EYES/EARS/NOSE FINDINGS: Normal   Dental:  Dental Findings: Periodontal disease, Severe    Chest/Lungs:  Chest/Lungs Findings: Clear to auscultation, Normal Respiratory Rate     Heart/Vascular:  Heart Findings: Rate: Normal  Rhythm: Regular Rhythm  Sounds: Normal  Vascular Findings: Normal      Skin:  Skin Findings: Normal    Mental Status:  Mental Status Findings:  Cooperative, Alert and Oriented         Anesthesia Plan  Type of Anesthesia, risks & benefits discussed:  Anesthesia Type:  general  Patient's Preference:   Intra-op Monitoring Plan: standard ASA monitors  Intra-op Monitoring Plan Comments:   Post Op Pain Control Plan: multimodal analgesia, IV/PO Opioids PRN and per primary service following discharge from PACU  Post Op Pain Control Plan Comments:   Induction:   IV  Beta Blocker:  Patient is not currently on a Beta-Blocker (No further documentation required).       Informed Consent: Patient understands risks and agrees with Anesthesia plan.  Questions answered. Anesthesia consent signed with patient.  ASA Score: 3     Day of Surgery Review of History & Physical: I have interviewed and examined the patient. I have reviewed the patient's H&P dated:  There are no significant changes.  H&P update referred to the surgeon.         Ready For Surgery From Anesthesia Perspective.

## 2019-09-30 NOTE — PLAN OF CARE
SW is following this Pt for DC planning needs. There are no identified needs at this time.      Floresita Bates LMSW  [facility]  [phone]

## 2019-09-30 NOTE — PLAN OF CARE
Plan of care reviewed with patient, who verbalized understanding. Pain control is difficult, with patient Requiring IV Dilaudid at the maximum frequency allowed. Pt is able to ambulate frequently and independently without incident. Large output from NGT is difficult to account for. Restricting PO intake has been difficult, with Pt having been observed prior shift drinking from the sink in the hallway. Strict limits on ice chips re- enforced, but Pt is very willful and may be ignoring repeated [place].

## 2019-09-30 NOTE — ASSESSMENT & PLAN NOTE
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 Readmitted for small bowel obstruction and intractable nausea and vomiting.     - to OR for ex-lap, J tube revision Monday 9/30, consent obtained   - Continue NGT to LIWS  - NPO, okay for 3-5 ice chips per hour  - Continue mIVF  - Close monitoring of electrolytes given high output NGT, replace as needed. Check BID  - Anti-emetics PRN  - Pain control  - Encourage continued ambulation  - SCDs, Lovenox daily

## 2019-09-30 NOTE — TRANSFER OF CARE
"Anesthesia Transfer of Care Note    Patient: Isaiah Greene    Procedure(s) Performed: Procedure(s) (LRB):  INSERTION, JEJUNOSTOMY TUBE, revision (N/A)  LAPAROTOMY, EXPLORATORY (N/A)  INSERTION, GASTROSTOMY TUBE    Patient location: PACU    Anesthesia Type: general    Transport from OR: Transported from OR on 6-10 L/min O2 by face mask with adequate spontaneous ventilation    Post pain: adequate analgesia    Post assessment: no apparent anesthetic complications    Post vital signs: stable    Level of consciousness: awake    Nausea/Vomiting: no nausea/vomiting    Complications: none    Transfer of care protocol was followed      Last vitals:   Visit Vitals  /69 (BP Location: Left arm, Patient Position: Lying)   Pulse 67   Temp 36.5 °C (97.7 °F) (Temporal)   Resp 20   Ht 5' 10" (1.778 m)   Wt 56.1 kg (123 lb 10.8 oz)   SpO2 98%   BMI 17.75 kg/m²     "

## 2019-09-30 NOTE — ASSESSMENT & PLAN NOTE
Malnutrition in the context of Chronic Illness/Injury    Related to (etiology):  Inadequate Energy Intake    Signs and Symptoms (as evidenced by):  Energy Intake: <50% of estimated energy requirement for > 5 days   Body Fat Depletion: mild and severe depletion of orbitals, triceps and thoracic and lumbar region   Muscle Mass Depletion: moderate and severe depletion of temples, clavicle region, interosseous muscle and lower extremities   Weight Loss: 17% x 3 months per chart review      Interventions:  Collaboration of nutrition care with other providers    Nutrition Diagnosis Status:  Continues

## 2019-09-30 NOTE — SUBJECTIVE & OBJECTIVE
Interval History:   NAEON. VSS. Adequate urine output. Continues to have high NGT output, no nausea or vomiting. Pain well controlled on current regimen. Ambulating without difficulty. Passing gas, no bowel movements. Plan to go to OR today for exploratory laparotomy and j tube revision.      Medications:  Continuous Infusions:   lactated ringers 125 mL/hr at 09/28/19 2125     Scheduled Meds:   enoxaparin  40 mg Subcutaneous Daily    lactated ringers  1,000 mL Intravenous Once    nicotine  1 patch Transdermal Daily    potassium chloride in water  10 mEq Intravenous Q1H     PRN Meds:diphenhydrAMINE, HYDROmorphone, HYDROmorphone, ondansetron, promethazine (PHENERGAN) IVPB, sodium chloride 0.9%     Review of patient's allergies indicates:  No Known Allergies     Objective:     Vital Signs (Most Recent):  Temp: 97.8 °F (36.6 °C) (09/30/19 0004)  Pulse: 88 (09/30/19 0004)  Resp: 18 (09/30/19 0004)  BP: 104/77 (09/30/19 0004)  SpO2: 99 % (09/30/19 0004) Vital Signs (24h Range):  Temp:  [97.5 °F (36.4 °C)-98.3 °F (36.8 °C)] 97.8 °F (36.6 °C)  Pulse:  [] 88  Resp:  [16-18] 18  SpO2:  [95 %-100 %] 99 %  BP: (101-124)/(73-85) 104/77     Weight: 56.1 kg (123 lb 10.8 oz)  Body mass index is 17.75 kg/m².    Intake/Output - Last 3 Shifts       09/28 0700 - 09/29 0659 09/29 0700 - 09/30 0659    P.O. 75 45    I.V. (mL/kg) 2645.8 (47.2) 2487.5 (44.3)    NG/ 120    IV Piggyback 1600 100    Total Intake(mL/kg) 4500.8 (80.2) 2752.5 (49.1)    Urine (mL/kg/hr) 1200 (0.9) 900 (0.7)    Emesis/NG output 0 0    Drains 3000 2400    Stool 0 0    Total Output 4200 3300    Net +300.8 -547.5          Urine Occurrence 1 x 2 x    Stool Occurrence 0 x 0 x    Emesis Occurrence 0 x 0 x        Physical Exam   Constitutional: He is oriented to person, place, and time. No distress.   Cachectic appearing.     HENT:   Head: Normocephalic and atraumatic.   NG tube in place on LIWS with bilious output.   Eyes: Conjunctivae and EOM are  normal.   Neck: Normal range of motion. No tracheal deviation present.   Cardiovascular: Normal rate and intact distal pulses.   Pulmonary/Chest: Effort normal. No respiratory distress.   Abdominal: Soft. He exhibits no distension. There is no tenderness. There is no guarding.   J tube in place, no erythema.   Musculoskeletal: Normal range of motion. He exhibits no edema.   Neurological: He is alert and oriented to person, place, and time.   Skin: He is not diaphoretic.   Vitals reviewed.    Significant Labs:  CBC:   Recent Labs   Lab 09/29/19  0841   WBC 18.91*   RBC 3.85*   HGB 10.4*   HCT 33.4*   *   MCV 87   MCH 27.0   MCHC 31.1*     CMP:   Recent Labs   Lab 09/26/19  0453  09/29/19  0841   *   < > 119*  103   CALCIUM 9.2   < > 8.7  9.4   ALBUMIN 2.7*  --   --    PROT 7.2  --   --       < > 136  136   K 2.7*   < > 2.9*  3.2*   CO2 36*   < > 36*  35*   CL 90*   < > 88*  89*   BUN 21*   < > 18  17   CREATININE 0.8   < > 0.8  0.8   ALKPHOS 78  --   --    ALT 10  --   --    AST 22  --   --    BILITOT 0.3  --   --     < > = values in this interval not displayed.       Significant Diagnostics:  I have reviewed all pertinent imaging results/findings within the past 24 hours.

## 2019-09-30 NOTE — PROGRESS NOTES
Dr. Krishnan with anesthesia notified that now reports having 2 butterscotch hard candies this AM (estimate finish time of 0930). Per MD, ok to proceed at this time.

## 2019-09-30 NOTE — PROGRESS NOTES
"[facility]  Adult Nutrition  Progress Note    SUMMARY       Recommendations    Pt with severe malnutrition criteria.      1. As medically able, ADAT to Regular + Boost Plus with texture per SLP.  2. If TF warranted, recommend TF of Isosource 1.5 advancing as tolerated to goal rate of 55 mL/hr to provide 1980 kcal, 90 gm protein, and 1008 mL free fluid.               -If nocturnal desired: Isosource 1.5 goal rate of 75 mL/hr x 18 hrs to provide 2025 kcal, 92 gm protein, and 1031 free fluid.   3. RD following.     Goals: receive nutrition by RD follow-up  Nutrition Goal Status: goal not met  Communication of RD Recs: (POC)    Reason for Assessment    Reason For Assessment: RD follow-up  Diagnosis: (SBO)  Relevant Medical History: hx of stomach cancer s/p J tube  per RD note from   Interdisciplinary Rounds: attended  General Information Comments: OR today for ex lap and J tube revision. NPO, NG tube output high. NFPE completed . Pt meets severe malnutrition criteria.   Nutrition Discharge Planning: unable to determine at this time    Nutrition Risk Screen    Nutrition Risk Screen: tube feeding or parenteral nutrition    Nutrition/Diet History    Spiritual, Cultural Beliefs, Congregational Practices, Values that Affect Care: no  Factors Affecting Nutritional Intake: altered gastrointestinal function, NPO    Anthropometrics    Temp: 97.5 °F (36.4 °C)  Height Method: Stated  Height: 5' 10" (177.8 cm)(per RN )  Height (inches): 70 in  Weight Method: Bed Scale  Weight: 56.1 kg (123 lb 10.8 oz)  Weight (lb): 123.68 lb  Ideal Body Weight (IBW), Male: 166 lb  % Ideal Body Weight, Male (lb): 74.51 lb  BMI (Calculated): 17.8  BMI Grade: 17 - 18.4 protein-energy malnutrition grade I  Weight Loss: unintentional  Usual Body Weight (UBW), k.7 kg(per chart review 19)  % Usual Body Weight: 83.04  % Weight Change From Usual Weight: -17.14 %     Lab/Procedures/Meds    Pertinent Labs Reviewed: " reviewed  Pertinent Labs Comments: K 2.8  Pertinent Medications Reviewed: reviewed  Pertinent Medications Comments: lactated ringers    Estimated/Assessed Needs    Weight Used For Calorie Calculations: 56.1 kg (123 lb 10.9 oz)  Energy Calorie Requirements (kcal): 1963 kcal/day  Energy Need Method: Kcal/kg(35)  Protein Requirements: 73-90 gm/day(1.3-1.6 gm/kg)  Weight Used For Protein Calculations: 56.1 kg (123 lb 10.9 oz)  Fluid Requirements (mL): 1 mL/kcal or per MD     RDA Method (mL): 1963     Nutrition Prescription Ordered    Current Diet Order: NPO    Evaluation of Received Nutrient/Fluid Intake    Comments: LBM recorded 9/19  % Intake of Estimated Energy Needs: 0 - 25 %  % Meal Intake: NPO    Nutrition Risk    Level of Risk/Frequency of Follow-up: (f/u 2 x wk)     Assessment and Plan    Severe malnutrition  Malnutrition in the context of Chronic Illness/Injury    Related to (etiology):  Inadequate Energy Intake    Signs and Symptoms (as evidenced by):  Energy Intake: <50% of estimated energy requirement for > 5 days   Body Fat Depletion: mild and severe depletion of orbitals, triceps and thoracic and lumbar region   Muscle Mass Depletion: moderate and severe depletion of temples, clavicle region, interosseous muscle and lower extremities   Weight Loss: 17% x 3 months per chart review      Interventions:  Collaboration of nutrition care with other providers    Nutrition Diagnosis Status:  Continues             Monitor and Evaluation    Food and Nutrient Intake: energy intake  Food and Nutrient Adminstration: diet order, enteral and parenteral nutrition administration  Anthropometric Measurements: weight, weight change, body mass index  Biochemical Data, Medical Tests and Procedures: electrolyte and renal panel, gastrointestinal profile, glucose/endocrine profile, inflammatory profile, lipid profile  Nutrition-Focused Physical Findings: overall appearance     Malnutrition Assessment  Malnutrition Type: chronic  illness          Weight Loss (Malnutrition): (17% x 3 months)  Energy Intake (Malnutrition): less than or equal to 50% for greater than or equal to 5 days   Orbital Region (Subcutaneous Fat Loss): severe depletion  Upper Arm Region (Subcutaneous Fat Loss): severe depletion   [place] Region (Muscle Loss): severe depletion  Clavicle Bone Region (Muscle Loss): severe depletion  Clavicle and Acromion Bone Region (Muscle Loss): moderate depletion  Dorsal Hand (Muscle Loss): moderate depletion  Patellar Region (Muscle Loss): moderate depletion  Anterior Thigh Region (Muscle Loss): severe depletion                 Nutrition Follow-Up    RD Follow-up?: Yes

## 2019-09-30 NOTE — BRIEF OP NOTE
[facility]  Brief Operative Note    SUMMARY     Surgery Date: 9/30/2019     Surgeon(s) and Role:     * Josse Saravia MD - Primary     * Jon Cates MD - Resident - Assisting      Pre-op Diagnosis:  Small bowel obstruction [K56.609]    Post-op Diagnosis:  Post-Op Diagnosis Codes:     * Small bowel obstruction [K56.609]    Procedure(s) (LRB):  INSERTION, JEJUNOSTOMY TUBE, revision (N/A)  LAPAROTOMY, EXPLORATORY (N/A)  INSERTION, GASTROSTOMY TUBE    Anesthesia: General    Description of Procedure: Jejunostomy revision; jejunostomy tube placement; gastrostomy tube placement.     Description of the findings of the procedure: Previous jejunostomy site examined with mild proximal bowel dilation and decompressed bowel distally; bowel run proximally to the ligament of Treitz and distally to the terminal ileum without other evidence of obstruction; previous jejunostomy site taken down and closed; new Gu[place]upe jejunostomy created distal to previous site (12 fr); Gu[place]upe gastrostomy created (18 fr).      Estimated Blood Loss: Minimal         Specimens:   Specimen (12h ago, onward)    None

## 2019-09-30 NOTE — OP NOTE
[facility]  Surgery Department  Operative Note       Date of Procedure: 9/30/2019     Procedure: Procedure(s) (LRB):  INSERTION, JEJUNOSTOMY TUBE, revision (N/A)  LAPAROTOMY, EXPLORATORY (N/A)  INSERTION, GASTROSTOMY TUBE     Surgeon(s) and Role:     * Josse Saravia MD - Primary     * Jon Cates MD - Resident - Assisting    Pre-Operative Diagnosis: Small bowel obstruction [K56.609]  1. Locally advanced gastric cancer    Post-Operative Diagnosis:   1. Same    Anesthesia: General    Operative Findings:   1. No evidence of distant intraperitoneal metastases   2. Mild transition in bowel caliber at the jejunostomy site with no obvious twisting or adhesion, bowel palpably patent    Procedures:  1. Exploratory laparotomy through previous midline incision  2. Takedown of previous jejunostomy tube site, primary repair of jejunostomy enterotomy  3. Placement of jejunostomy tube  4. Placement of gastrostomy tube    Estimated Blood Loss (EBL):  min mL           Indications:  Isaiah Greene presents for the above procedures. He is known to the service with previous gastrojejunostomy and jejunal feeding tube for a locally advanced gastric cancer, and presented with a small bowel obstruction with concern for transition at the J tube site.  Risks and benefits were reviewed including bleeding, infection, damage to local structures, cardiovascular and pulmonary complications, need for additional procedures, death, and imponderables.  He understands and gave informed consent to proceed.    Details:  The patient was transported to the operating room and satisfactory anesthesia established.  Preoperative antibiotics were administered.  The patient was placed in the supine position and extremities were padded and protected throughout.  A harrison catheter was placed. An appropriate timeout was performed.    His previous midline laparotomy incision was reopened and the abdomen examined. There were no  dense adhesions. His previous gastrojejunostomy was intact and patent. The small bowel was run from the gastrojejunstomy to a mild transition at the site of the jejunostomy tube, which was tacked to the abdominal wall. There was no clear adhesion and the lumen appeared patent, however there was certainly a change in caliber. The remainder of the small bowel was run and there was no abnormality.     Although this site was not dramatic I felt like we had to revise the J tube given the evidence of obstruction on CT and gastrograffin study. The previous witzel tunnel was de-constructed and the tube removed. The enterotomy was closed with 3-0 silk. The lumen here was clearly open with a width easily of two finger breadths and no obvious stricture. A site distal to this was selected, a pursestring and enterotomy made and the tube was replaced into the small bowel and secured to the abdominal wall. It lay nicely with no kinking.    Because his bowel was dilated and he was requiring NG decompression, I did go ahead and place an 18F gastrostomy tube through the abdominal wall and into the stomach towards the antrum. This was tacked to the abdominal wall with 3-0 silk and placed to gravity. His NG was removed in the operating room.    The fascia was closed using 0 non-looped PDS in deep bite, shallow advance fashion.  The wound was irrigated and skin closed with staples since this was a re-operative field.    All needle, lap, and sponge counts were reported as correct. I communicated the intraoperative findings with the family following the procedure.     Implants: * No implants in log *    Specimens:   Specimen (12h ago, onward)    None                  Condition: Good    Disposition: PACU - hemodynamically stable.    Attestation: I was present and scrubbed for the entire procedure.

## 2019-09-30 NOTE — PLAN OF CARE
Pt AAOX4. VSS. Pt remained stable during pacu stay. Patient complains of pain to abdomen. Pt has a PCA Instructed on use of it. . No N&V. Pt is NPO. J tube clamped. G tube to harrison bag to gravity. Midline incision to abdomen with island dressing. Teds/SCD's in place throughout duration in PACU. Transferred to the next Phase of Care.

## 2019-10-01 LAB
ANION GAP SERPL CALC-SCNC: 7 MMOL/L (ref 8–16)
ANION GAP SERPL CALC-SCNC: 9 MMOL/L (ref 8–16)
ANISOCYTOSIS BLD QL SMEAR: SLIGHT
BASOPHILS # BLD AUTO: 0.05 K/UL (ref 0–0.2)
BASOPHILS NFR BLD: 0.3 % (ref 0–1.9)
BUN SERPL-MCNC: 11 MG/DL (ref 6–20)
BUN SERPL-MCNC: 14 MG/DL (ref 6–20)
CALCIUM SERPL-MCNC: 8.1 MG/DL (ref 8.7–10.5)
CALCIUM SERPL-MCNC: 8.4 MG/DL (ref 8.7–10.5)
CHLORIDE SERPL-SCNC: 97 MMOL/L (ref 95–110)
CHLORIDE SERPL-SCNC: 97 MMOL/L (ref 95–110)
CO2 SERPL-SCNC: 32 MMOL/L (ref 23–29)
CO2 SERPL-SCNC: 33 MMOL/L (ref 23–29)
CREAT SERPL-MCNC: 0.7 MG/DL (ref 0.5–1.4)
CREAT SERPL-MCNC: 0.7 MG/DL (ref 0.5–1.4)
DIFFERENTIAL METHOD: ABNORMAL
EOSINOPHIL # BLD AUTO: 0.1 K/UL (ref 0–0.5)
EOSINOPHIL NFR BLD: 0.3 % (ref 0–8)
ERYTHROCYTE [DISTWIDTH] IN BLOOD BY AUTOMATED COUNT: 14.4 % (ref 11.5–14.5)
EST. GFR  (AFRICAN AMERICAN): >60 ML/MIN/1.73 M^2
EST. GFR  (AFRICAN AMERICAN): >60 ML/MIN/1.73 M^2
EST. GFR  (NON AFRICAN AMERICAN): >60 ML/MIN/1.73 M^2
EST. GFR  (NON AFRICAN AMERICAN): >60 ML/MIN/1.73 M^2
GIANT PLATELETS BLD QL SMEAR: PRESENT
GLUCOSE SERPL-MCNC: 106 MG/DL (ref 70–110)
GLUCOSE SERPL-MCNC: 116 MG/DL (ref 70–110)
HCT VFR BLD AUTO: 31.7 % (ref 40–54)
HGB BLD-MCNC: 9.4 G/DL (ref 14–18)
HYPOCHROMIA BLD QL SMEAR: ABNORMAL
IMM GRANULOCYTES # BLD AUTO: 0.05 K/UL (ref 0–0.04)
IMM GRANULOCYTES NFR BLD AUTO: 0.3 % (ref 0–0.5)
LYMPHOCYTES # BLD AUTO: 9.9 K/UL (ref 1–4.8)
LYMPHOCYTES NFR BLD: 50.3 % (ref 18–48)
MAGNESIUM SERPL-MCNC: 1.8 MG/DL (ref 1.6–2.6)
MCH RBC QN AUTO: 26.6 PG (ref 27–31)
MCHC RBC AUTO-ENTMCNC: 29.7 G/DL (ref 32–36)
MCV RBC AUTO: 90 FL (ref 82–98)
MONOCYTES # BLD AUTO: 0.8 K/UL (ref 0.3–1)
MONOCYTES NFR BLD: 4.2 % (ref 4–15)
NEUTROPHILS # BLD AUTO: 8.7 K/UL (ref 1.8–7.7)
NEUTROPHILS NFR BLD: 44.6 % (ref 38–73)
NRBC BLD-RTO: 0 /100 WBC
PHOSPHATE SERPL-MCNC: 2.8 MG/DL (ref 2.7–4.5)
PLATELET # BLD AUTO: 474 K/UL (ref 150–350)
PLATELET BLD QL SMEAR: ABNORMAL
PMV BLD AUTO: 10 FL (ref 9.2–12.9)
POIKILOCYTOSIS BLD QL SMEAR: ABNORMAL
POLYCHROMASIA BLD QL SMEAR: ABNORMAL
POTASSIUM SERPL-SCNC: 3.4 MMOL/L (ref 3.5–5.1)
POTASSIUM SERPL-SCNC: 4.2 MMOL/L (ref 3.5–5.1)
RBC # BLD AUTO: 3.53 M/UL (ref 4.6–6.2)
SMUDGE CELLS BLD QL SMEAR: PRESENT
SODIUM SERPL-SCNC: 137 MMOL/L (ref 136–145)
SODIUM SERPL-SCNC: 138 MMOL/L (ref 136–145)
WBC # BLD AUTO: 19.57 K/UL (ref 3.9–12.7)

## 2019-10-01 PROCEDURE — 94640 AIRWAY INHALATION TREATMENT: CPT

## 2019-10-01 PROCEDURE — 36415 COLL VENOUS BLD VENIPUNCTURE: CPT

## 2019-10-01 PROCEDURE — 63600175 PHARM REV CODE 636 W HCPCS: Performed by: STUDENT IN AN ORGANIZED HEALTH CARE EDUCATION/TRAINING PROGRAM

## 2019-10-01 PROCEDURE — S4991 NICOTINE PATCH NONLEGEND: HCPCS | Performed by: STUDENT IN AN ORGANIZED HEALTH CARE EDUCATION/TRAINING PROGRAM

## 2019-10-01 PROCEDURE — 97165 OT EVAL LOW COMPLEX 30 MIN: CPT

## 2019-10-01 PROCEDURE — 80048 BASIC METABOLIC PNL TOTAL CA: CPT | Mod: 91

## 2019-10-01 PROCEDURE — 83735 ASSAY OF MAGNESIUM: CPT

## 2019-10-01 PROCEDURE — 25000242 PHARM REV CODE 250 ALT 637 W/ HCPCS: Performed by: STUDENT IN AN ORGANIZED HEALTH CARE EDUCATION/TRAINING PROGRAM

## 2019-10-01 PROCEDURE — 25000003 PHARM REV CODE 250: Performed by: STUDENT IN AN ORGANIZED HEALTH CARE EDUCATION/TRAINING PROGRAM

## 2019-10-01 PROCEDURE — 97161 PT EVAL LOW COMPLEX 20 MIN: CPT

## 2019-10-01 PROCEDURE — 20600001 HC STEP DOWN PRIVATE ROOM

## 2019-10-01 PROCEDURE — 25000003 PHARM REV CODE 250: Performed by: NURSE PRACTITIONER

## 2019-10-01 PROCEDURE — 99900035 HC TECH TIME PER 15 MIN (STAT)

## 2019-10-01 PROCEDURE — 94761 N-INVAS EAR/PLS OXIMETRY MLT: CPT

## 2019-10-01 PROCEDURE — 84100 ASSAY OF PHOSPHORUS: CPT

## 2019-10-01 PROCEDURE — 63600175 PHARM REV CODE 636 W HCPCS: Performed by: NURSE PRACTITIONER

## 2019-10-01 PROCEDURE — 85025 COMPLETE CBC W/AUTO DIFF WBC: CPT

## 2019-10-01 RX ORDER — MAGNESIUM SULFATE HEPTAHYDRATE 40 MG/ML
2 INJECTION, SOLUTION INTRAVENOUS ONCE
Status: COMPLETED | OUTPATIENT
Start: 2019-10-01 | End: 2019-10-01

## 2019-10-01 RX ADMIN — NICOTINE 1 PATCH: 21 PATCH, EXTENDED RELEASE TRANSDERMAL at 08:10

## 2019-10-01 RX ADMIN — Medication: at 08:10

## 2019-10-01 RX ADMIN — DIPHENHYDRAMINE HYDROCHLORIDE 6.25 MG: 50 INJECTION, SOLUTION INTRAMUSCULAR; INTRAVENOUS at 11:10

## 2019-10-01 RX ADMIN — Medication: at 04:10

## 2019-10-01 RX ADMIN — ENOXAPARIN SODIUM 40 MG: 100 INJECTION SUBCUTANEOUS at 05:10

## 2019-10-01 RX ADMIN — SODIUM CHLORIDE, SODIUM LACTATE, POTASSIUM CHLORIDE, AND CALCIUM CHLORIDE: 600; 310; 30; 20 INJECTION, SOLUTION INTRAVENOUS at 08:10

## 2019-10-01 RX ADMIN — SODIUM PHOSPHATE, MONOBASIC, MONOHYDRATE AND SODIUM PHOSPHATE, DIBASIC, ANHYDROUS 20.01 MMOL: 276; 142 INJECTION, SOLUTION INTRAVENOUS at 06:10

## 2019-10-01 RX ADMIN — LEVALBUTEROL HYDROCHLORIDE 0.63 MG: 0.63 SOLUTION RESPIRATORY (INHALATION) at 08:10

## 2019-10-01 RX ADMIN — Medication: at 02:10

## 2019-10-01 RX ADMIN — SODIUM CHLORIDE, SODIUM LACTATE, POTASSIUM CHLORIDE, AND CALCIUM CHLORIDE: 600; 310; 30; 20 INJECTION, SOLUTION INTRAVENOUS at 11:10

## 2019-10-01 RX ADMIN — POTASSIUM CHLORIDE 10 MEQ: 7.46 INJECTION, SOLUTION INTRAVENOUS at 02:10

## 2019-10-01 RX ADMIN — MAGNESIUM SULFATE IN WATER 2 G: 40 INJECTION, SOLUTION INTRAVENOUS at 03:10

## 2019-10-01 NOTE — PLAN OF CARE
POC reviewed with patient who verbalized understanding. AAO. VSS. Pain controlled with PCS pain. G-tube to gravity bag. Isosource 1.5 tube feeds started @ 20 cc/h from 2 pm-8 am. IVMF running @ 75 cc/h. Remains on clear liquid diet, tolerated well. Ambulated with PT/OT. Free of falls or injuries.

## 2019-10-01 NOTE — PLAN OF CARE
"VS stable. Patient c/o abdominal pain while on the PCA pump, encouraged to press button to relieve pain, patient asking of ice chips, explained he is NPO. Patient responded, "I'm too old for this, I know what I'm doing!". Explained the reason for strict NPO. Patient c/o nausea, I went to get Zofran from the medicine room. When arrived at his room, he wasn't there, patient was ambulating towards the back elevators, explained I have his nausea medicine. Patient responded, "Get away from me!", while walking towards the elevators. Attempted to stop him, but he went down to ER, charge nurse notified. Patient came back up to his room, escorted by Security.   J-tube clamped, G-tube to gravity, draining thin, pinkish drainage with sediments. LR at 125ml/hr. Encouraged PCA pump use for pain. Patient stated, "I need to swab my mouth".Swabs given with small amount of water, patient drank the water, even though it was explained to him not to swallow. Patient remains non-compliant with diet, will continue to monitor.    "

## 2019-10-01 NOTE — PT/OT/SLP EVAL
"Physical Therapy Evaluation and Discharge Note    Patient Name:  Isaiah Greene   MRN:  [id number]    Recommendations:     Discharge Recommendations:  home   Discharge Equipment Recommendations: none   Barriers to discharge: None    Assessment:     Isaiah Greene is a 59 y.o. male admitted with a medical diagnosis of Small bowel obstruction. .  At this time, patient is functioning at their prior level of function and does not require further acute PT services.     Recent Surgery: Procedure(s) (LRB):  INSERTION, JEJUNOSTOMY TUBE, revision (N/A)  LAPAROTOMY, EXPLORATORY (N/A)  INSERTION, GASTROSTOMY TUBE 1 Day Post-Op    Plan:     During this hospitalization, patient does not require further acute PT services.  Please re-consult if situation changes.      Subjective     Chief Complaint: pain in L abdomen.   Patient/Family Comments/goals: "I walked to the front doors of the building the other day".   Pain/Comfort:  · Pain Rating 1: (Pt. reports moderate pain in L abdomen region. )    Patients cultural, spiritual, Religion conflicts given the current situation: no    Living Environment:  Resides in truck. PTA no occupation, no falls.   Prior to admission, patients level of function was independent.  Equipment used at home: none.  DME owned (not currently used): none.  Upon discharge, patient will have assistance from himself.    Objective:     Communicated with RN prior to session.  Patient found supine with PCA, peripheral IV(j tube) upon PT entry to room.    General Precautions: Standard, fall   Orthopedic Precautions:N/A   Braces: N/A     Exams:  · Cognitive Exam:  Patient is oriented to Person, Place, Time and Situation  · RLE ROM: WFL  · RLE Strength: WFL  · LLE ROM: WFL  · LLE Strength: WFL    Functional Mobility:  · Bed Mobility:     · Rolling Right: independence  · Scooting: independence  · Supine to Sit: independence  · Sit to Supine: independence  · Transfers:     · Sit to Stand:  independence " with no AD  · Gait: 60 ft. c IV pole modified I  · Balance: Sitting I, Standing I.     AM-PAC 6 CLICK MOBILITY  Total Score:23       Therapeutic Activities and Exercises:   Pt educated on: PT role/POC; safety c mobility; benefits of OOB activities; d/c recs - v/u      AM-PAC 6 CLICK MOBILITY  Total Score:23     Patient left HOB elevated with all lines intact and call button in reach.    GOALS:   Multidisciplinary Problems     Physical Therapy Goals     Not on file          Multidisciplinary Problems (Resolved)        Problem: Physical Therapy Goal    Goal Priority Disciplines Outcome Goal Variances Interventions   Physical Therapy Goal   (Resolved)     PT, PT/OT Met                     History:     Past Medical History:   Diagnosis Date    Chronic gastritis     Diverticulosis     Positive H. 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;  Service: Endoscopy;  Laterality: N/A;    EYE SURGERY      FACIAL RECONSTRUCTION SURGERY      GASTROJEJUNOSTOMY N/A 9/19/2019    Procedure: GASTROJEJUNOSTOMY, possible G-tube;  Surgeon: Josse Saravia MD;  Location: [address];  Service: General;  Laterality: N/A;    INSERTION OF VENOUS ACCESS PORT Right 9/19/2019    Procedure: INSERTION, VENOUS ACCESS PORT;  Surgeon: Josse Saravia MD;  Location: [address];  Service: General;   Laterality: Right;    LAPAROSCOPIC INSERTION OF JEJUNOSTOMY TUBE N/A 9/19/2019    Procedure: INSERTION, JEJUNOSTOMY TUBE, LAPAROSCOPIC, possible open;  Surgeon: Josse Saravia MD;  Location: [facility] OR [address] [place] [address];  Service: General;  Laterality: N/A;    PERCUTANEOUS INSERTION OF GASTROSTOMY TUBE  9/30/2019    Procedure: INSERTION, GASTROSTOMY TUBE;  Surgeon: Josse Saravia MD;  Location: [facility] OR [address] [place] [address];  Service: General;;    PLACEMENT OF JEJUNOSTOMY TUBE N/A 9/30/2019    Procedure: INSERTION, JEJUNOSTOMY TUBE, revision;  Surgeon: Josse Saravia MD;  Location: [facility] OR [address] [place] [address];  Service: General;  Laterality: N/A;       Time Tracking:     PT Received On: 10/01/19  PT Start Time: 1110     PT Stop Time: 1128  PT Total Time (min): 18 min     Billable Minutes: Evaluation 18 mins.       Issa Ontiveros, PT  10/01/2019

## 2019-10-01 NOTE — PLAN OF CARE
10/01/19 1622   Post-Acute Status   Post-Acute Authorization Home Health/Hospice   Home Health/Hospice Status Referrals Sent       Referral sent to You Bates LMSW  [facility]  [id number]

## 2019-10-01 NOTE — ANESTHESIA POSTPROCEDURE EVALUATION
Anesthesia Post Evaluation    Patient: Isaiah Greene    Procedure(s) Performed: Procedure(s) (LRB):  INSERTION, JEJUNOSTOMY TUBE, revision (N/A)  LAPAROTOMY, EXPLORATORY (N/A)  INSERTION, GASTROSTOMY TUBE    Final Anesthesia Type: general  Patient location during evaluation: PACU  Patient participation: Yes- Able to Participate  Level of consciousness: awake  Post-procedure vital signs: reviewed and stable  Pain management: adequate  Airway patency: patent  PONV status at discharge: No PONV  Anesthetic complications: no      Cardiovascular status: blood pressure returned to baseline  Respiratory status: unassisted  Hydration status: euvolemic  Follow-up not needed.          Vitals Value Taken Time   /83 9/30/2019  5:18 PM   Temp 36.4 °C (97.5 °F) 9/30/2019  5:18 PM   Pulse 73 9/30/2019  5:18 PM   Resp 22 9/30/2019  5:18 PM   SpO2 97 % 9/30/2019  5:18 PM         Event Time     Out of Recovery 09/30/2019 16:34:00          Pain/Chris Score: Pain Rating Prior to Med Admin: 8 (9/30/2019  4:26 PM)  Pain Rating Post Med Admin: 8 (9/30/2019  3:58 AM)  Chris Score: 10 (9/30/2019  4:00 PM)

## 2019-10-01 NOTE — PLAN OF CARE
Problem: Occupational Therapy Goal  Goal: Occupational Therapy Goal  Outcome: Met     OT eval completed.  No further OT needed on acute.

## 2019-10-01 NOTE — PROGRESS NOTES
Pt arrived back to floor AAOx4, very upset about his pain.  PCA pump in use, RN educated pt on how to use PCA pump.  ETCO2 monitor on.  Pt requesting ice chips, confirmed with MD on call that pt is strictly NPO.  Pt was notified of this on shift change rounds with oncoming night RN and verbalized understanding.  Midline telfa dressing CDI.  G tube to gravity.  Bed locked, lowest position, call light in reach.  Pt is DTV between [id number].  WCTM.

## 2019-10-01 NOTE — PLAN OF CARE
Problem: Physical Therapy Goal  Goal: Physical Therapy Goal  Outcome: Met   Tiffanieal and D/C from acute PT services    Issa Ontiveros PT,DPT  10/1/2019

## 2019-10-01 NOTE — SUBJECTIVE & OBJECTIVE
Interval History:   NAEON. VSS. Adequate urine output. Taken yesterday to OR for take down of previous jejunostomy tube site, and placement of J and G tube. Adequate pain control, was kept NPO.     Medications:  Continuous Infusions:   hydromorphone in 0.9 % NaCl 6 mg/30 ml      lactated ringers 125 mL/hr at 09/30/19 2351     Scheduled Meds:   enoxaparin  40 mg Subcutaneous Daily    levalbuterol  0.63 mg Nebulization Q6H WAKE    nicotine  1 patch Transdermal Daily     PRN Meds:diphenhydrAMINE, naloxone, ondansetron, promethazine (PHENERGAN) IVPB, sodium chloride 0.9%     Review of patient's allergies indicates:  No Known Allergies     Objective:     Vital Signs (Most Recent):  Temp: 97.5 °F (36.4 °C) (10/01/19 0525)  Pulse: 83 (10/01/19 0525)  Resp: 16 (10/01/19 0525)  BP: (!) 145/87 (10/01/19 0525)  SpO2: 98 % (10/01/19 0525) Vital Signs (24h Range):  Temp:  [97.5 °F (36.4 °C)-97.8 °F (36.6 °C)] 97.5 °F (36.4 °C)  Pulse:  [] 83  Resp:  [16-22] 16  SpO2:  [95 %-100 %] 98 %  BP: (109-161)/(69-90) 145/87     Weight: 56.1 kg (123 lb 10.8 oz)  Body mass index is 17.75 kg/m².    Intake/Output - Last 3 Shifts       09/29 0700 - 09/30 0659 09/30 0700 - 10/01 0659 10/01 0700 - 10/02 0659    P.O. 45 0     I.V. (mL/kg) 3122.9 (55.7) 3154.9 (56.2)     NG/      IV Piggyback 1500 300     Total Intake(mL/kg) 4787.9 (85.3) 3454.9 (61.6)     Urine (mL/kg/hr) 900 (0.7) 300 (0.2)     Emesis/NG output 0      Drains 3575 575     Stool 0 0     Total Output 4475 875     Net +312.9 +2579.9            Urine Occurrence 2 x      Stool Occurrence 0 x 0 x     Emesis Occurrence 0 x          Physical Exam   Constitutional: He is oriented to person, place, and time. No distress.   Cachectic appearing.     HENT:   Head: Normocephalic and atraumatic.   NG tube in place on LIWS with bilious output.   Eyes: Conjunctivae and EOM are normal.   Neck: Normal range of motion. No tracheal deviation present.   Cardiovascular: Normal rate  and intact distal pulses.   Pulmonary/Chest: Effort normal. No respiratory distress.   Abdominal: Soft. He exhibits no distension. There is no tenderness. There is no guarding.   G and J tube in place, no erythema.   Musculoskeletal: Normal range of motion. He exhibits no edema.   Neurological: He is alert and oriented to person, place, and time.   Skin: He is not diaphoretic.   Vitals reviewed.    Significant Labs:  CBC:   Recent Labs   Lab 10/01/19  0518   WBC 19.57*   RBC 3.53*   HGB 9.4*   HCT 31.7*   *   MCV 90   MCH 26.6*   MCHC 29.7*     CMP:   Recent Labs   Lab 09/26/19  0453  09/30/19  1753   *   < > 103   CALCIUM 9.2   < > 8.2*   ALBUMIN 2.7*  --   --    PROT 7.2  --   --       < > 136   K 2.7*   < > 3.4*   CO2 36*   < > 29   CL 90*   < > 97   BUN 21*   < > 14   CREATININE 0.8   < > 0.7   ALKPHOS 78  --   --    ALT 10  --   --    AST 22  --   --    BILITOT 0.3  --   --     < > = values in this interval not displayed.       Significant Diagnostics:  I have reviewed all pertinent imaging results/findings within the past 24 hours.

## 2019-10-01 NOTE — PLAN OF CARE
[facility]       HOME  HEALTH ORDERS                                    FACE TO FACE ENCOUNTER      Patient Name: Isaiah Greene  YOB: 1960    PCP: Primary Doctor No   PCP Address: None  PCP Phone Number: None  PCP Fax: None    Encounter Date: 10/01/2019    Admit to Home Health    Diagnoses:  Active Hospital Problems    Diagnosis  POA    *Small bowel obstruction [K56.609]  Yes    Severe malnutrition [E43]  Yes      Resolved Hospital Problems   No resolved problems to display.     I have seen and examined this patient face to face today. My clinical findings that support the need for the home health skilled services and home bound status are the following:  Weakness/numbness causing balance and gait disturbance due to Surgery making it taxing to leave home.    Allergies:Review of patient's allergies indicates:  No Known Allergies    Diet: _______    Activities: activity as tolerated    Nursing:   SN to complete comprehensive assessment including routine vital signs. Instruct on disease process and s/s of complications to report to MD. Review/verify medication list sent home with the patient at time of discharge  and instruct patient/caregiver as needed. Frequency may be adjusted depending on start of care date.    Notify MD if SBP > 160 or < 90; DBP > 90 or < 50; HR > 120 or < 50; Temp > 101    Medications: Review discharge medications with patient and family and provide education.      Current Discharge Medication List      CONTINUE these medications which have NOT CHANGED    Details   aspirin 81 MG Chew Chew and swallow 1 tablet (81 mg total) by mouth once daily. May take over the counter  Qty: 30 tablet, Refills: 12      HYDROcodone-acetaminophen (NORCO) 5-325 mg per tablet Take 1 tablet by mouth every 6 (six) hours as needed for Pain.  Qty: 30 tablet, Refills: 0    Comments: Quantity prescribed more than 7 day supply? Yes, quantity  medically necessary      nicotine (NICODERM CQ) 21 mg/24 hr Place 1 patch onto the skin once daily.  Qty: 7 patch, Refills: 3      ondansetron (ZOFRAN) 4 MG tablet Take 1 tablet (4 mg total) by mouth every 6 (six) hours as needed for Nausea.  Qty: 30 tablet, Refills: 1               Disciplines requested: Nursing Services to provide:   Other: S/P  Exploratory laparotomy through previous midline incision, Takedown of previous jejunostomy tube site, primary repair of jejunostomy enterotomy,  Placement of jejunostomy tube, Placement of gastrostomy tube on 9/30/2019    Flush jejunostomy tube with 20cc water Q 8 hours     Open gastrostomy tube for nausea or vomiting     Free water flushes of 200cc via jejunostomy tube TID while awake and free water infusion @ 30cc/hour while tube feeds are infusing.     Monitor abdomen for redness, oozing from staple site, abdominal distension, or pain not controlled by pain medication.     TUBE FEEDS via jejunostomy tube: Isosource 1.5 (or equivalent) @ __cc/hour from 2p-8a.  Increase by 10cc every day at 2pm to a goal of 75cc/hour.    Vitals signs daily. Call MD with Temperature > 101, SBP > 180, HR < 50.   Physician to follow patient's care (the person listed here will be responsible for signing ongoing orders): Other: Dr. MAEVE Saravia, Dr. LINA Morillo [phone] office [phone] fax Requested Start of Care Date: 10/1/2019    I certify that this patient is confined to his home and needs intermittent skilled nursing care, physical therapy and occupational therapy.          Electronically Signed  _________________________________  Silke Dennison NP  10/01/2019

## 2019-10-01 NOTE — PROGRESS NOTES
[facility]  General Surgery  Progress Note    Subjective:     History of Present Illness:  No notes on file    Post-Op Info:  Procedure(s) (LRB):  INSERTION, JEJUNOSTOMY TUBE, revision (N/A)  LAPAROTOMY, EXPLORATORY (N/A)  INSERTION, GASTROSTOMY TUBE   1 Day Post-Op     Interval History:   NAEON. VSS. Adequate urine output. Taken yesterday to OR for take down of previous jejunostomy tube site, and placement of J and G tube. Adequate pain control, was kept NPO.     Medications:  Continuous Infusions:   hydromorphone in 0.9 % NaCl 6 mg/30 ml      lactated ringers 125 mL/hr at 09/30/19 0671     Scheduled Meds:   enoxaparin  40 mg Subcutaneous Daily    levalbuterol  0.63 mg Nebulization Q6H WAKE    nicotine  1 patch Transdermal Daily     PRN Meds:diphenhydrAMINE, naloxone, ondansetron, promethazine (PHENERGAN) IVPB, sodium chloride 0.9%     Review of patient's allergies indicates:  No Known Allergies     Objective:     Vital Signs (Most Recent):  Temp: 97.5 °F (36.4 °C) (10/01/19 0525)  Pulse: 83 (10/01/19 0525)  Resp: 16 (10/01/19 0525)  BP: (!) 145/87 (10/01/19 0525)  SpO2: 98 % (10/01/19 0525) Vital Signs (24h Range):  Temp:  [97.5 °F (36.4 °C)-97.8 °F (36.6 °C)] 97.5 °F (36.4 °C)  Pulse:  [] 83  Resp:  [16-22] 16  SpO2:  [95 %-100 %] 98 %  BP: (109-161)/(69-90) 145/87     Weight: 56.1 kg (123 lb 10.8 oz)  Body mass index is 17.75 kg/m².    Intake/Output - Last 3 Shifts       09/29 0700 - 09/30 0659 09/30 0700 - 10/01 0659 10/01 0700 - 10/02 0659    P.O. 45 0     I.V. (mL/kg) 3122.9 (55.7) 3154.9 (56.2)     NG/      IV Piggyback 1500 300     Total Intake(mL/kg) 4787.9 (85.3) 3454.9 (61.6)     Urine (mL/kg/hr) 900 (0.7) 300 (0.2)     Emesis/NG output 0      Drains 3575 575     Stool 0 0     Total Output 4475 875     Net +312.9 +2579.9            Urine Occurrence 2 x      Stool Occurrence 0 x 0 x     Emesis Occurrence 0 x          Physical Exam   Constitutional: He is oriented to  person, place, and time. No distress.   Cachectic appearing.     HENT:   Head: Normocephalic and atraumatic.   Eyes: Conjunctivae and EOM are normal.   Neck: Normal range of motion. No tracheal deviation present.   Cardiovascular: Normal rate and intact distal pulses.   Pulmonary/Chest: Effort normal. No respiratory distress.   Abdominal: Soft. He exhibits no distension. There is no tenderness. There is no guarding.   G and J tube in place, no erythema.   Musculoskeletal: Normal range of motion. He exhibits no edema.   Neurological: He is alert and oriented to person, place, and time.   Skin: He is not diaphoretic.   Vitals reviewed.    Significant Labs:  CBC:   Recent Labs   Lab 10/01/19  0518   WBC 19.57*   RBC 3.53*   HGB 9.4*   HCT 31.7*   *   MCV 90   MCH 26.6*   MCHC 29.7*     CMP:   Recent Labs   Lab 09/26/19  0453  09/30/19  1753   *   < > 103   CALCIUM 9.2   < > 8.2*   ALBUMIN 2.7*  --   --    PROT 7.2  --   --       < > 136   K 2.7*   < > 3.4*   CO2 36*   < > 29   CL 90*   < > 97   BUN 21*   < > 14   CREATININE 0.8   < > 0.7   ALKPHOS 78  --   --    ALT 10  --   --    AST 22  --   --    BILITOT 0.3  --   --     < > = values in this interval not displayed.       Significant Diagnostics:  I have reviewed all pertinent imaging results/findings within the past 24 hours.    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 Readmitted for small bowel obstruction and intractable nausea and vomiting. POD 1 of previous J tube takedown, G and J tube placement     - Leave G tube open   - CLD  - Anti-emetics PRN  - Pain control with PCA  - Encourage continued ambulation  - SCDs, Lovenox daily  - TF at 20ml/h 2pm-8am  - Nicotine patch             Leonard Aponte MD  General Surgery  [facility]

## 2019-10-01 NOTE — PT/OT/SLP EVAL
Occupational Therapy   Evaluation and Discharge Note    Name: Isaiah Greene  MRN: [id number]  Admitting Diagnosis:  Small bowel obstruction 1 Day Post-Op    Recommendations:     Discharge Recommendations: home  Discharge Equipment Recommendations:  none  Barriers to discharge:  None    Assessment:     Isaiah Greene is a 59 y.o. male with a medical diagnosis of Small bowel obstruction. At this time, patient is functioning close to their prior level of function and does not require further acute OT services.     Plan:     During this hospitalization, patient does not require further acute OT services.  Please re-consult if situation changes.    · Plan of Care Reviewed with: patient    Subjective     Chief Complaint: discomfort at surgical site  Patient/Family Comments/goals: none stated    Occupational Profile:  Living Environment & PLOF: Pt is currently living in his truck or a friends boat.  Pt was independent with ADL's & ambulation & is an active .  Pt works at times in [employer] & enjoys hunting & working with/selling ginseng.  Equipment Used at home:  none  Assistance upon Discharge: friend    Pain/Comfort:  · Pain Addressed 1: Reposition, Distraction  · Pain Rating Post-Intervention 1: (pt reported discomfort at surgical site however did not rate pain)    Patients cultural, spiritual, Faith conflicts given the current situation: no    Objective:     Communicated with: RN prior to session.  Patient found supine with PEG Tube, peripheral IV, PCA upon OT entry to room. No family present.    General Precautions: Standard, fall     Occupational Performance:    Bed Mobility:    · Patient completed Supine to Sit with modified independence  · Patient completed Sit to Supine with modified independence    Functional Mobility/Transfers:  · Patient completed Sit <> Stand Transfer with modified independence  with  no assistive device   · Patient completed Toilet Transfer Stand Pivot technique with  modified independence with  no AD  · Functional Mobility: Modified independent in room during ADL's    Activities of Daily Living:  · Upper Body Dressing: modified independence seated EOB  · Lower Body Dressing: modified independence donning socks seated EOB    Cognitive/Visual Perceptual:  Cognitive/Psychosocial Skills:     -       Oriented to: Person, Place, Time and Situation   -       Follows Commands/attention:Follows multistep  commands  -       Communication: clear/fluent  -       Memory: No Deficits noted  -       Safety awareness/insight to disability: intact   -       Mood/Affect/Coping skills/emotional control: Appropriate to situation    Physical Exam:  Sensation:    -       Intact  Dominant hand:    -       right  Upper Extremity Range of Motion:     -       Right Upper Extremity: WNL  -       Left Upper Extremity: WNL  Upper Extremity Strength:    -       Right Upper Extremity: WNL  -       Left Upper Extremity: WNL   Strength:    -       Right Upper Extremity: WNL  -       Left Upper Extremity: WNL    AMPAC 6 Click ADL:  AMPAC Total Score: 24    Treatment & Education:  Provided education regarding role of OT, POC, & discharge recommendations with pt verbalizing understanding.  Pt had no further questions & when asked whether there were any concerns pt reported none.      Education:    Patient left supine with all lines intact, call button in reach, RN notified and white board updated.    GOALS:   Multidisciplinary Problems     Occupational Therapy Goals     Not on file          Multidisciplinary Problems (Resolved)        Problem: Occupational Therapy Goal    Goal Priority Disciplines Outcome Interventions   Occupational Therapy Goal   (Resolved)     OT, PT/OT Met                    History:     Past Medical History:   Diagnosis Date    Chronic gastritis     Diverticulosis     Positive H. 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       Past Surgical History:    Procedure Laterality Date    DIAGNOSTIC LAPAROSCOPY N/A 9/19/2019    Procedure: LAPAROSCOPY, DIAGNOSTIC;  Surgeon: Josse Saravia MD;  [facility];  Service: General;  Laterality: N/A;    ENDOSCOPIC ULTRASOUND OF UPPER GASTROINTESTINAL TRACT N/A 9/17/2019    Procedure: ULTRASOUND, UPPER GI TRACT, ENDOSCOPIC;  Surgeon: Nino Randhawa MD;  Location: [facility] (2ND FLR);  Service: Endoscopy;  Laterality: N/A;    ESOPHAGOGASTRODUODENOSCOPY N/A 9/10/2019    Procedure: EGD (ESOPHAGOGASTRODUODENOSCOPY);  Surgeon: Ok Diaz MD;  [facility];  Service: Endoscopy;  Laterality: N/A;    EYE SURGERY      FACIAL RECONSTRUCTION SURGERY      GASTROJEJUNOSTOMY N/A 9/19/2019    Procedure: GASTROJEJUNOSTOMY, possible G-tube;  Surgeon: Josse Saravia MD;  Location: [address];  Service: General;  Laterality: N/A;    INSERTION OF VENOUS ACCESS PORT Right 9/19/2019    Procedure: INSERTION, VENOUS ACCESS PORT;  Surgeon: Josse Saravia MD;  [address];  Service: General;  Laterality: Right;    LAPAROSCOPIC INSERTION OF JEJUNOSTOMY TUBE N/A 9/19/2019    Procedure: INSERTION, JEJUNOSTOMY TUBE, LAPAROSCOPIC, possible open;  Surgeon: Josse Saravia MD;  Location: [address];  Service: General;  Laterality: N/A;    PERCUTANEOUS INSERTION OF GASTROSTOMY TUBE  9/30/2019    Procedure: INSERTION, GASTROSTOMY TUBE;  Surgeon: Josse Saravia MD;  Location: [address];  Service: General;;    PLACEMENT OF JEJUNOSTOMY TUBE N/A 9/30/2019    Procedure: INSERTION, JEJUNOSTOMY TUBE, revision;  Surgeon: Josse Saravia MD;  [address];  Service: General;  Laterality: N/A;       Time Tracking:     OT Date of Treatment: 10/01/19  OT Start Time: 1339  OT Stop Time: 1400  OT Total Time (min): 21 min    Billable Minutes:Evaluation 21    GUNNER NavasR  10/1/2019

## 2019-10-01 NOTE — ASSESSMENT & PLAN NOTE
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 Readmitted for small bowel obstruction and intractable nausea and vomiting. POD 1 of previous J tube takedown, G and J tube placement     - Leave G tube open   - CLD  - Anti-emetics PRN  - Pain control with PCA  - Encourage continued ambulation  - SCDs, Lovenox daily  - TF at 20ml/h 2pm-8am  - Nicotine patch

## 2019-10-02 LAB
AMORPH CRY UR QL COMP ASSIST: ABNORMAL
ANION GAP SERPL CALC-SCNC: 9 MMOL/L (ref 8–16)
ANISOCYTOSIS BLD QL SMEAR: SLIGHT
BACTERIA #/AREA URNS AUTO: ABNORMAL /HPF
BASOPHILS # BLD AUTO: ABNORMAL K/UL (ref 0–0.2)
BASOPHILS NFR BLD: 1 % (ref 0–1.9)
BILIRUB UR QL STRIP: NEGATIVE
BUN SERPL-MCNC: 11 MG/DL (ref 6–20)
CALCIUM SERPL-MCNC: 8.8 MG/DL (ref 8.7–10.5)
CHLORIDE SERPL-SCNC: 97 MMOL/L (ref 95–110)
CLARITY UR REFRACT.AUTO: ABNORMAL
CO2 SERPL-SCNC: 33 MMOL/L (ref 23–29)
COLOR UR AUTO: ABNORMAL
CREAT SERPL-MCNC: 0.8 MG/DL (ref 0.5–1.4)
DIFFERENTIAL METHOD: ABNORMAL
EOSINOPHIL # BLD AUTO: ABNORMAL K/UL (ref 0–0.5)
EOSINOPHIL NFR BLD: 0 % (ref 0–8)
ERYTHROCYTE [DISTWIDTH] IN BLOOD BY AUTOMATED COUNT: 14.8 % (ref 11.5–14.5)
EST. GFR  (AFRICAN AMERICAN): >60 ML/MIN/1.73 M^2
EST. GFR  (NON AFRICAN AMERICAN): >60 ML/MIN/1.73 M^2
GLUCOSE SERPL-MCNC: 145 MG/DL (ref 70–110)
GLUCOSE UR QL STRIP: NEGATIVE
HCT VFR BLD AUTO: 33.1 % (ref 40–54)
HGB BLD-MCNC: 9.7 G/DL (ref 14–18)
HGB UR QL STRIP: NEGATIVE
HYALINE CASTS UR QL AUTO: 2 /LPF
HYPOCHROMIA BLD QL SMEAR: ABNORMAL
IMM GRANULOCYTES # BLD AUTO: ABNORMAL K/UL (ref 0–0.04)
IMM GRANULOCYTES NFR BLD AUTO: ABNORMAL % (ref 0–0.5)
KETONES UR QL STRIP: NEGATIVE
LEUKOCYTE ESTERASE UR QL STRIP: NEGATIVE
LYMPHOCYTES # BLD AUTO: ABNORMAL K/UL (ref 1–4.8)
LYMPHOCYTES NFR BLD: 43 % (ref 18–48)
MAGNESIUM SERPL-MCNC: 2.4 MG/DL (ref 1.6–2.6)
MCH RBC QN AUTO: 26.6 PG (ref 27–31)
MCHC RBC AUTO-ENTMCNC: 29.3 G/DL (ref 32–36)
MCV RBC AUTO: 91 FL (ref 82–98)
MICROSCOPIC COMMENT: ABNORMAL
MONOCYTES # BLD AUTO: ABNORMAL K/UL (ref 0.3–1)
MONOCYTES NFR BLD: 3 % (ref 4–15)
NEUTROPHILS NFR BLD: 53 % (ref 38–73)
NITRITE UR QL STRIP: NEGATIVE
NRBC BLD-RTO: 0 /100 WBC
OVALOCYTES BLD QL SMEAR: ABNORMAL
PH UR STRIP: 8 [PH] (ref 5–8)
PHOSPHATE SERPL-MCNC: 2.4 MG/DL (ref 2.7–4.5)
PLATELET # BLD AUTO: 568 K/UL (ref 150–350)
PMV BLD AUTO: 9.6 FL (ref 9.2–12.9)
POIKILOCYTOSIS BLD QL SMEAR: SLIGHT
POLYCHROMASIA BLD QL SMEAR: ABNORMAL
POTASSIUM SERPL-SCNC: 3.7 MMOL/L (ref 3.5–5.1)
PROT UR QL STRIP: ABNORMAL
RBC # BLD AUTO: 3.64 M/UL (ref 4.6–6.2)
RBC #/AREA URNS AUTO: 5 /HPF (ref 0–4)
SODIUM SERPL-SCNC: 139 MMOL/L (ref 136–145)
SP GR UR STRIP: 1.02 (ref 1–1.03)
URN SPEC COLLECT METH UR: ABNORMAL
WBC # BLD AUTO: 20.19 K/UL (ref 3.9–12.7)
WBC #/AREA URNS AUTO: 2 /HPF (ref 0–5)

## 2019-10-02 PROCEDURE — 25000242 PHARM REV CODE 250 ALT 637 W/ HCPCS: Performed by: STUDENT IN AN ORGANIZED HEALTH CARE EDUCATION/TRAINING PROGRAM

## 2019-10-02 PROCEDURE — 87040 BLOOD CULTURE FOR BACTERIA: CPT | Mod: 59

## 2019-10-02 PROCEDURE — 85027 COMPLETE CBC AUTOMATED: CPT

## 2019-10-02 PROCEDURE — 63600175 PHARM REV CODE 636 W HCPCS: Performed by: STUDENT IN AN ORGANIZED HEALTH CARE EDUCATION/TRAINING PROGRAM

## 2019-10-02 PROCEDURE — 80048 BASIC METABOLIC PNL TOTAL CA: CPT

## 2019-10-02 PROCEDURE — 36415 COLL VENOUS BLD VENIPUNCTURE: CPT

## 2019-10-02 PROCEDURE — 81001 URINALYSIS AUTO W/SCOPE: CPT

## 2019-10-02 PROCEDURE — 84100 ASSAY OF PHOSPHORUS: CPT

## 2019-10-02 PROCEDURE — 25000003 PHARM REV CODE 250: Performed by: STUDENT IN AN ORGANIZED HEALTH CARE EDUCATION/TRAINING PROGRAM

## 2019-10-02 PROCEDURE — 85007 BL SMEAR W/DIFF WBC COUNT: CPT

## 2019-10-02 PROCEDURE — 94640 AIRWAY INHALATION TREATMENT: CPT

## 2019-10-02 PROCEDURE — 83735 ASSAY OF MAGNESIUM: CPT

## 2019-10-02 PROCEDURE — 20600001 HC STEP DOWN PRIVATE ROOM

## 2019-10-02 PROCEDURE — 94761 N-INVAS EAR/PLS OXIMETRY MLT: CPT

## 2019-10-02 PROCEDURE — S4991 NICOTINE PATCH NONLEGEND: HCPCS | Performed by: STUDENT IN AN ORGANIZED HEALTH CARE EDUCATION/TRAINING PROGRAM

## 2019-10-02 RX ORDER — HYDROCODONE BITARTRATE AND ACETAMINOPHEN 7.5; 325 MG/15ML; MG/15ML
20 SOLUTION ORAL EVERY 4 HOURS PRN
Status: DISCONTINUED | OUTPATIENT
Start: 2019-10-02 | End: 2019-10-02

## 2019-10-02 RX ORDER — ONDANSETRON 4 MG/1
4 TABLET, ORALLY DISINTEGRATING ORAL ONCE
Status: COMPLETED | OUTPATIENT
Start: 2019-10-02 | End: 2019-10-02

## 2019-10-02 RX ORDER — OXYCODONE HCL 5 MG/5 ML
10 SOLUTION, ORAL ORAL EVERY 4 HOURS PRN
Status: DISCONTINUED | OUTPATIENT
Start: 2019-10-02 | End: 2019-10-03

## 2019-10-02 RX ORDER — HYDROCODONE BITARTRATE AND ACETAMINOPHEN 7.5; 325 MG/15ML; MG/15ML
10 SOLUTION ORAL EVERY 4 HOURS PRN
Status: DISCONTINUED | OUTPATIENT
Start: 2019-10-02 | End: 2019-10-02

## 2019-10-02 RX ORDER — DIPHENHYDRAMINE HCL 12.5MG/5ML
12.5 ELIXIR ORAL NIGHTLY PRN
Status: DISCONTINUED | OUTPATIENT
Start: 2019-10-02 | End: 2019-10-04 | Stop reason: HOSPADM

## 2019-10-02 RX ORDER — OXYCODONE HCL 5 MG/5 ML
5 SOLUTION, ORAL ORAL EVERY 4 HOURS PRN
Status: DISCONTINUED | OUTPATIENT
Start: 2019-10-02 | End: 2019-10-03

## 2019-10-02 RX ADMIN — Medication: at 02:10

## 2019-10-02 RX ADMIN — Medication: at 06:10

## 2019-10-02 RX ADMIN — SODIUM CHLORIDE, SODIUM LACTATE, POTASSIUM CHLORIDE, AND CALCIUM CHLORIDE: 600; 310; 30; 20 INJECTION, SOLUTION INTRAVENOUS at 08:10

## 2019-10-02 RX ADMIN — ONDANSETRON 4 MG: 4 TABLET, ORALLY DISINTEGRATING ORAL at 08:10

## 2019-10-02 RX ADMIN — NICOTINE 1 PATCH: 21 PATCH, EXTENDED RELEASE TRANSDERMAL at 08:10

## 2019-10-02 RX ADMIN — SODIUM CHLORIDE, SODIUM LACTATE, POTASSIUM CHLORIDE, AND CALCIUM CHLORIDE 1000 ML: .6; .31; .03; .02 INJECTION, SOLUTION INTRAVENOUS at 08:10

## 2019-10-02 RX ADMIN — HYDROCODONE BITARTRATE AND ACETAMINOPHEN 20 ML: 7.5; 325 SOLUTION ORAL at 03:10

## 2019-10-02 RX ADMIN — HYDROCODONE BITARTRATE AND ACETAMINOPHEN 20 ML: 7.5; 325 SOLUTION ORAL at 10:10

## 2019-10-02 RX ADMIN — OXYCODONE HYDROCHLORIDE 10 MG: 5 SOLUTION ORAL at 08:10

## 2019-10-02 RX ADMIN — LEVALBUTEROL HYDROCHLORIDE 0.63 MG: 0.63 SOLUTION RESPIRATORY (INHALATION) at 07:10

## 2019-10-02 NOTE — SUBJECTIVE & OBJECTIVE
Interval History:   NAEON. VSS. Adequate urine output. Started on TF without any issue. U: 525cc.G tube output: 3625. No bowel movements. Good  pain control.     Medications:  Continuous Infusions:   lactated ringers 75 mL/hr at 10/01/19 2317     Scheduled Meds:   enoxaparin  40 mg Subcutaneous Daily    lactated ringers  1,000 mL Intravenous Once    levalbuterol  0.63 mg Nebulization Q6H WAKE    nicotine  1 patch Transdermal Daily    ondansetron  4 mg Oral Once     PRN Meds:diphenhydrAMINE, hydrocodone-apap 7.5-325 MG/15 ML, hydrocodone-apap 7.5-325 MG/15 ML, sodium chloride 0.9%     Review of patient's allergies indicates:  No Known Allergies     Objective:     Vital Signs (Most Recent):  Temp: 97.9 °F (36.6 °C) (10/02/19 0355)  Pulse: 90 (10/02/19 0713)  Resp: 17 (10/02/19 0713)  BP: 111/73 (10/02/19 0355)  SpO2: (!) 94 % (10/02/19 0713) Vital Signs (24h Range):  Temp:  [97.6 °F (36.4 °C)-98.2 °F (36.8 °C)] 97.9 °F (36.6 °C)  Pulse:  [] 90  Resp:  [16-20] 17  SpO2:  [91 %-98 %] 94 %  BP: (111-139)/(67-79) 111/73     Weight: 56.1 kg (123 lb 10.8 oz)  Body mass index is 17.75 kg/m².    Intake/Output - Last 3 Shifts       09/30 0700 - 10/01 0659 10/01 0700 - 10/02 0659 10/02 0700 - 10/03 0659    P.O. 0      I.V. (mL/kg) 3154.9 (56.2) 1859.2 (33.1)     NG/GT  319     IV Piggyback 300 250     Total Intake(mL/kg) 3454.9 (61.6) 2428.2 (43.3)     Urine (mL/kg/hr) 300 (0.2) 975 (0.7)     Emesis/NG output       Drains 575 4150     Stool 0 0     Total Output 875 5125     Net +2579.9 -2696.8            Stool Occurrence 0 x 0 x         Physical Exam   Constitutional: He is oriented to person, place, and time. No distress.   Cachectic appearing.     HENT:   Head: Normocephalic and atraumatic.   Eyes: Conjunctivae and EOM are normal.   Neck: Normal range of motion. No tracheal deviation present.   Cardiovascular: Normal rate and intact distal pulses.   Pulmonary/Chest: Effort normal. No respiratory distress.    Abdominal: Soft. He exhibits no distension. There is no tenderness. There is no guarding.   G and J tube in place, no erythema.   Musculoskeletal: Normal range of motion. He exhibits no edema.   Neurological: He is alert and oriented to person, place, and time.   Skin: He is not diaphoretic.   Vitals reviewed.    Significant Labs:  CBC:   Recent Labs   Lab 10/02/19  0416   WBC 20.19*   RBC 3.64*   HGB 9.7*   HCT 33.1*   *   MCV 91   MCH 26.6*   MCHC 29.3*     CMP:   Recent Labs   Lab 09/26/19  0453  10/01/19  1839   *   < > 116*   CALCIUM 9.2   < > 8.4*   ALBUMIN 2.7*  --   --    PROT 7.2  --   --       < > 137   K 2.7*   < > 4.2   CO2 36*   < > 33*   CL 90*   < > 97   BUN 21*   < > 11   CREATININE 0.8   < > 0.7   ALKPHOS 78  --   --    ALT 10  --   --    AST 22  --   --    BILITOT 0.3  --   --     < > = values in this interval not displayed.       Significant Diagnostics:  I have reviewed all pertinent imaging results/findings within the past 24 hours.

## 2019-10-02 NOTE — ASSESSMENT & PLAN NOTE
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 Readmitted for small bowel obstruction and intractable nausea and vomiting. POD 2 of previous J tube takedown, G and J tube placement     - Leave G tube open   - CLD  - Anti-emetics PRN  - Pain control with current regimen  - Encourage continued ambulation  - SCDs, Lovenox daily  - TF at 30ml/h 2pm-8am  - Nicotine patch   - PT/OT

## 2019-10-02 NOTE — PLAN OF CARE
AAO x 4. ML with telfa CDI. G tube unclamped putting out large amount of thick green fluid due to pt being on clear liquid diet. Isosource 1.5 tube feeds going through J tube at 20 mL/hr. No complaints of N/V overnight. Pt voids per urinal and ambulates independently. VSS on RA. Pain managed with dilaudid PCA pump. Pt remained free of falls overnight. POC reviewed with pt who verbalized understanding. Bed in low position, call light in reach. No signs of distress or concerns noted at this time. WCTM.

## 2019-10-02 NOTE — PROGRESS NOTES
Contacted via secure chart for bolus TF recommendations.        Recommendations     Pt meets severe malnutrition criteria.      Recommend TF of Isosource 1.5 bolus 5.5 cartons/day to provide 2063 kcal, 94 gm protein, and 1051 mL free fluid.   Additional water per MD.       Will follow-up as previously scheduled.   Please re-consult as needed.

## 2019-10-02 NOTE — NURSING
Patient AAOx4, between 1pm and 3pm pt left unit with a family member and returned complaining of 9/10 pain, stated he has been up and down and wore himself out. Pt requesting more pain medication repeatedly, MD notified.  Patient refusing tube feeds at this time, stating it is making him nauseous and in pain, RN educated pt on how the tube feeding empties into small intestine and not stomach, pt insisted on stopping tube feeds. MD notified. g tube currently unclamped to gravity as pt stated he is nauseous, will continue to monitor.

## 2019-10-02 NOTE — PROGRESS NOTES
[facility]  General Surgery  Progress Note    Subjective:     History of Present Illness:  No notes on file    Post-Op Info:  Procedure(s) (LRB):  INSERTION, JEJUNOSTOMY TUBE, revision (N/A)  LAPAROTOMY, EXPLORATORY (N/A)  INSERTION, GASTROSTOMY TUBE   2 Days Post-Op     Interval History:   NAEON. VSS. Adequate urine output. Started on TF without any issue. U: 525cc.G tube output: 3625. No bowel movements. Good  pain control.     Medications:  Continuous Infusions:   lactated ringers 75 mL/hr at 10/01/19 2317     Scheduled Meds:   enoxaparin  40 mg Subcutaneous Daily    lactated ringers  1,000 mL Intravenous Once    levalbuterol  0.63 mg Nebulization Q6H WAKE    nicotine  1 patch Transdermal Daily    ondansetron  4 mg Oral Once     PRN Meds:diphenhydrAMINE, hydrocodone-apap 7.5-325 MG/15 ML, hydrocodone-apap 7.5-325 MG/15 ML, sodium chloride 0.9%     Review of patient's allergies indicates:  No Known Allergies     Objective:     Vital Signs (Most Recent):  Temp: 97.9 °F (36.6 °C) (10/02/19 0355)  Pulse: 90 (10/02/19 0713)  Resp: 17 (10/02/19 0713)  BP: 111/73 (10/02/19 0355)  SpO2: (!) 94 % (10/02/19 0713) Vital Signs (24h Range):  Temp:  [97.6 °F (36.4 °C)-98.2 °F (36.8 °C)] 97.9 °F (36.6 °C)  Pulse:  [] 90  Resp:  [16-20] 17  SpO2:  [91 %-98 %] 94 %  BP: (111-139)/(67-79) 111/73     Weight: 56.1 kg (123 lb 10.8 oz)  Body mass index is 17.75 kg/m².    Intake/Output - Last 3 Shifts       09/30 0700 - 10/01 0659 10/01 0700 - 10/02 0659 10/02 0700 - 10/03 0659    P.O. 0      I.V. (mL/kg) 3154.9 (56.2) 1859.2 (33.1)     NG/GT  319     IV Piggyback 300 250     Total Intake(mL/kg) 3454.9 (61.6) 2428.2 (43.3)     Urine (mL/kg/hr) 300 (0.2) 975 (0.7)     Emesis/NG output       Drains 575 4150     Stool 0 0     Total Output 875 5125     Net +2579.9 -2696.8            Stool Occurrence 0 x 0 x         Physical Exam   Constitutional: He is oriented to person, place, and time. No distress.    Cachectic appearing.     HENT:   Head: Normocephalic and atraumatic.   Eyes: Conjunctivae and EOM are normal.   Neck: Normal range of motion. No tracheal deviation present.   Cardiovascular: Normal rate and intact distal pulses.   Pulmonary/Chest: Effort normal. No respiratory distress.   Abdominal: Soft. He exhibits no distension. There is no tenderness. There is no guarding.   G and J tube in place, no erythema.   Musculoskeletal: Normal range of motion. He exhibits no edema.   Neurological: He is alert and oriented to person, place, and time.   Skin: He is not diaphoretic.   Vitals reviewed.    Significant Labs:  CBC:   Recent Labs   Lab 10/02/19  0416   WBC 20.19*   RBC 3.64*   HGB 9.7*   HCT 33.1*   *   MCV 91   MCH 26.6*   MCHC 29.3*     CMP:   Recent Labs   Lab 09/26/19  0453  10/01/19  1839   *   < > 116*   CALCIUM 9.2   < > 8.4*   ALBUMIN 2.7*  --   --    PROT 7.2  --   --       < > 137   K 2.7*   < > 4.2   CO2 36*   < > 33*   CL 90*   < > 97   BUN 21*   < > 11   CREATININE 0.8   < > 0.7   ALKPHOS 78  --   --    ALT 10  --   --    AST 22  --   --    BILITOT 0.3  --   --     < > = values in this interval not displayed.       Significant Diagnostics:  I have reviewed all pertinent imaging results/findings within the past 24 hours.    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 Readmitted for small bowel obstruction and intractable nausea and vomiting. POD 2 of previous J tube takedown, G and J tube placement     - Leave G tube open   - CLD  - Anti-emetics PRN  - Pain control with current regimen  - Encourage continued ambulation  - SCDs, Lovenox daily  - TF at 30ml/h 2pm-8am  - Nicotine patch   - PT/OT            Leonard Aponte MD  General Surgery  [facility]

## 2019-10-03 LAB
ANION GAP SERPL CALC-SCNC: 10 MMOL/L (ref 8–16)
ANISOCYTOSIS BLD QL SMEAR: SLIGHT
BASOPHILS NFR BLD: 1 % (ref 0–1.9)
BUN SERPL-MCNC: 8 MG/DL (ref 6–20)
CALCIUM SERPL-MCNC: 8.6 MG/DL (ref 8.7–10.5)
CHLORIDE SERPL-SCNC: 93 MMOL/L (ref 95–110)
CO2 SERPL-SCNC: 31 MMOL/L (ref 23–29)
CREAT SERPL-MCNC: 0.7 MG/DL (ref 0.5–1.4)
DIFFERENTIAL METHOD: ABNORMAL
EOSINOPHIL NFR BLD: 5 % (ref 0–8)
ERYTHROCYTE [DISTWIDTH] IN BLOOD BY AUTOMATED COUNT: 15.1 % (ref 11.5–14.5)
EST. GFR  (AFRICAN AMERICAN): >60 ML/MIN/1.73 M^2
EST. GFR  (NON AFRICAN AMERICAN): >60 ML/MIN/1.73 M^2
GLUCOSE SERPL-MCNC: 107 MG/DL (ref 70–110)
HCT VFR BLD AUTO: 30 % (ref 40–54)
HGB BLD-MCNC: 9.3 G/DL (ref 14–18)
HYPOCHROMIA BLD QL SMEAR: ABNORMAL
IMM GRANULOCYTES # BLD AUTO: ABNORMAL K/UL (ref 0–0.04)
IMM GRANULOCYTES NFR BLD AUTO: ABNORMAL % (ref 0–0.5)
LYMPHOCYTES NFR BLD: 37 % (ref 18–48)
MAGNESIUM SERPL-MCNC: 2.1 MG/DL (ref 1.6–2.6)
MCH RBC QN AUTO: 27.2 PG (ref 27–31)
MCHC RBC AUTO-ENTMCNC: 31 G/DL (ref 32–36)
MCV RBC AUTO: 88 FL (ref 82–98)
MONOCYTES NFR BLD: 3 % (ref 4–15)
NEUTROPHILS NFR BLD: 54 % (ref 38–73)
NRBC BLD-RTO: 0 /100 WBC
OVALOCYTES BLD QL SMEAR: ABNORMAL
PHOSPHATE SERPL-MCNC: 2.4 MG/DL (ref 2.7–4.5)
PLATELET # BLD AUTO: 517 K/UL (ref 150–350)
PLATELET BLD QL SMEAR: ABNORMAL
PMV BLD AUTO: 9.5 FL (ref 9.2–12.9)
POIKILOCYTOSIS BLD QL SMEAR: SLIGHT
POTASSIUM SERPL-SCNC: 3.3 MMOL/L (ref 3.5–5.1)
RBC # BLD AUTO: 3.42 M/UL (ref 4.6–6.2)
SCHISTOCYTES BLD QL SMEAR: ABNORMAL
SODIUM SERPL-SCNC: 134 MMOL/L (ref 136–145)
WBC # BLD AUTO: 23.58 K/UL (ref 3.9–12.7)

## 2019-10-03 PROCEDURE — 85027 COMPLETE CBC AUTOMATED: CPT

## 2019-10-03 PROCEDURE — 25000003 PHARM REV CODE 250: Performed by: STUDENT IN AN ORGANIZED HEALTH CARE EDUCATION/TRAINING PROGRAM

## 2019-10-03 PROCEDURE — 63600175 PHARM REV CODE 636 W HCPCS: Performed by: STUDENT IN AN ORGANIZED HEALTH CARE EDUCATION/TRAINING PROGRAM

## 2019-10-03 PROCEDURE — 83735 ASSAY OF MAGNESIUM: CPT

## 2019-10-03 PROCEDURE — 84100 ASSAY OF PHOSPHORUS: CPT

## 2019-10-03 PROCEDURE — 36415 COLL VENOUS BLD VENIPUNCTURE: CPT

## 2019-10-03 PROCEDURE — 20600001 HC STEP DOWN PRIVATE ROOM

## 2019-10-03 PROCEDURE — 99900035 HC TECH TIME PER 15 MIN (STAT)

## 2019-10-03 PROCEDURE — 85007 BL SMEAR W/DIFF WBC COUNT: CPT

## 2019-10-03 PROCEDURE — 80048 BASIC METABOLIC PNL TOTAL CA: CPT

## 2019-10-03 PROCEDURE — 63600175 PHARM REV CODE 636 W HCPCS: Performed by: NURSE PRACTITIONER

## 2019-10-03 PROCEDURE — 94761 N-INVAS EAR/PLS OXIMETRY MLT: CPT

## 2019-10-03 RX ORDER — POTASSIUM CHLORIDE 20 MEQ/15ML
40 SOLUTION ORAL ONCE
Status: COMPLETED | OUTPATIENT
Start: 2019-10-03 | End: 2019-10-03

## 2019-10-03 RX ORDER — OXYCODONE HCL 5 MG/5 ML
5 SOLUTION, ORAL ORAL EVERY 4 HOURS PRN
Status: DISCONTINUED | OUTPATIENT
Start: 2019-10-03 | End: 2019-10-03

## 2019-10-03 RX ORDER — OXYCODONE HCL 5 MG/5 ML
10 SOLUTION, ORAL ORAL EVERY 4 HOURS PRN
Status: DISCONTINUED | OUTPATIENT
Start: 2019-10-03 | End: 2019-10-04 | Stop reason: HOSPADM

## 2019-10-03 RX ORDER — DEXTROSE MONOHYDRATE, SODIUM CHLORIDE, AND POTASSIUM CHLORIDE 50; 1.49; 9 G/1000ML; G/1000ML; G/1000ML
INJECTION, SOLUTION INTRAVENOUS CONTINUOUS
Status: DISCONTINUED | OUTPATIENT
Start: 2019-10-03 | End: 2019-10-04 | Stop reason: HOSPADM

## 2019-10-03 RX ORDER — OXYCODONE HCL 5 MG/5 ML
20 SOLUTION, ORAL ORAL EVERY 4 HOURS PRN
Status: DISCONTINUED | OUTPATIENT
Start: 2019-10-03 | End: 2019-10-04 | Stop reason: HOSPADM

## 2019-10-03 RX ORDER — HYDROMORPHONE HYDROCHLORIDE 1 MG/ML
1 INJECTION, SOLUTION INTRAMUSCULAR; INTRAVENOUS; SUBCUTANEOUS
Status: DISCONTINUED | OUTPATIENT
Start: 2019-10-03 | End: 2019-10-04

## 2019-10-03 RX ORDER — OXYCODONE HCL 5 MG/5 ML
10 SOLUTION, ORAL ORAL EVERY 4 HOURS PRN
Status: DISCONTINUED | OUTPATIENT
Start: 2019-10-03 | End: 2019-10-03

## 2019-10-03 RX ADMIN — POTASSIUM CHLORIDE 40 MEQ: 20 SOLUTION ORAL at 07:10

## 2019-10-03 RX ADMIN — OXYCODONE HYDROCHLORIDE 20 MG: 5 SOLUTION ORAL at 07:10

## 2019-10-03 RX ADMIN — OXYCODONE HYDROCHLORIDE 10 MG: 5 SOLUTION ORAL at 05:10

## 2019-10-03 RX ADMIN — ENOXAPARIN SODIUM 40 MG: 100 INJECTION SUBCUTANEOUS at 07:10

## 2019-10-03 RX ADMIN — DEXTROSE MONOHYDRATE, SODIUM CHLORIDE, AND POTASSIUM CHLORIDE: 50; 9; 1.49 INJECTION, SOLUTION INTRAVENOUS at 07:10

## 2019-10-03 RX ADMIN — OXYCODONE HYDROCHLORIDE 20 MG: 5 SOLUTION ORAL at 10:10

## 2019-10-03 RX ADMIN — OXYCODONE HYDROCHLORIDE 10 MG: 5 SOLUTION ORAL at 01:10

## 2019-10-03 RX ADMIN — HYDROMORPHONE HYDROCHLORIDE 1 MG: 1 INJECTION, SOLUTION INTRAMUSCULAR; INTRAVENOUS; SUBCUTANEOUS at 12:10

## 2019-10-03 RX ADMIN — POTASSIUM PHOSPHATE, MONOBASIC AND POTASSIUM PHOSPHATE, DIBASIC 15 MMOL: 224; 236 INJECTION, SOLUTION, CONCENTRATE INTRAVENOUS at 01:10

## 2019-10-03 RX ADMIN — OXYCODONE HYDROCHLORIDE 20 MG: 5 SOLUTION ORAL at 03:10

## 2019-10-03 RX ADMIN — POTASSIUM CHLORIDE 40 MEQ: 20 SOLUTION ORAL at 09:10

## 2019-10-03 RX ADMIN — HYDROMORPHONE HYDROCHLORIDE 1 MG: 1 INJECTION, SOLUTION INTRAMUSCULAR; INTRAVENOUS; SUBCUTANEOUS at 09:10

## 2019-10-03 NOTE — PROGRESS NOTES
"[facility]  Adult Nutrition  Progress Note    SUMMARY       Recommendations    Pt meets severe malnutrition criteria.     1. Recommend TF of Peptamen 1.5 with Prebio advancing as tolerated to goal rate of 55 mL/hr x 24 hrs full strength to provide 1980 kcal, 90 gm protein, and 1016 mL free fluid.    - Do not recommend to bolus a J tube.    2. Advance diet as medically able.     3. RD following.     Goals: receive nutrition by RD follow-up  Nutrition Goal Status: goal met  Communication of RD Recs: (POC)    Reason for Assessment    Reason For Assessment: RD follow-up  Diagnosis: (SBO)  Relevant Medical History: hx of stomach cancer s/p J tube  per RD note from   Interdisciplinary Rounds: attended  General Information Comments: POD1 ex lap and J tube revision. Pt off the unit during visit. Spoke with RN who reports that the pt is refusing TF 2/2 chest and abdominal pain caused by TF. Pt did not receive TF last night. NFPE completed . Pt meets severe malnutrition criteria.  Nutrition Discharge Planning: adequate nutrition via TF +/- po intake    Nutrition Risk Screen    Nutrition Risk Screen: tube feeding or parenteral nutrition    Nutrition/Diet History    Spiritual, Cultural Beliefs, Jewish Practices, Values that Affect Care: no  Factors Affecting Nutritional Intake: altered gastrointestinal function, clear liquid diet    Anthropometrics    Temp: 97.5 °F (36.4 °C)  Height Method: Stated  Height: 5' 10" (177.8 cm)(per RN )  Height (inches): 70 in  Weight Method: Bed Scale  Weight: 56.1 kg (123 lb 10.8 oz)  Weight (lb): 123.68 lb  Ideal Body Weight (IBW), Male: 166 lb  % Ideal Body Weight, Male (lb): 74.51 lb  BMI (Calculated): 17.8  BMI Grade: 17 - 18.4 protein-energy malnutrition grade I  Weight Loss: unintentional  Usual Body Weight (UBW), k.7 kg(per chart review 19)  % Usual Body Weight: 83.04  % Weight Change From Usual Weight: -17.14 %   "   Lab/Procedures/Meds    Pertinent Labs Reviewed: reviewed  Pertinent Labs Comments: Na 134, K 3.3, phos 2.4  Pertinent Medications Reviewed: reviewed  Pertinent Medications Comments: lactated ringers    Estimated/Assessed Needs    Weight Used For Calorie Calculations: 56.1 kg (123 lb 10.9 oz)  Energy Calorie Requirements (kcal): 1963 kcal/day  Energy Need Method: Kcal/kg(35)  Protein Requirements: 73-90 gm/day(1.3-1.6 gm/kg)  Weight Used For Protein Calculations: 56.1 kg (123 lb 10.9 oz)  Fluid Requirements (mL): 1 mL/kcal or per MD     RDA Method (mL): 1963     Nutrition Prescription Ordered    Current Diet Order: NPO  Nutrition Order Comments: Pt refusing TF at this time  Current Nutrition Support Formula Ordered: Peptamen 1.5 w/Prebio  Current Nutrition Support Rate Ordered: 30 (ml)  Current Nutrition Support Frequency Ordered: mL/hr(x 18 hrs 3/4th strength)    Evaluation of Received Nutrient/Fluid Intake    I/O: -2.35L x 24 hrs, -6.06L since admit  Energy Calories Required: not meeting needs  Protein Required: not meeting needs  Fluid Required: (per MD)  Comments: LBM 10/19  % Intake of Estimated Energy Needs: 0 - 25 %  % Meal Intake: 0 - 25 %    Nutrition Risk    Level of Risk/Frequency of Follow-up: (f/u 2 x wk)     Assessment and Plan    Severe malnutrition  Malnutrition in the context of Chronic Illness/Injury    Related to (etiology):  Inadequate Energy Intake    Signs and Symptoms (as evidenced by):  Energy Intake: <50% of estimated energy requirement for > 5 days   Body Fat Depletion: mild and severe depletion of orbitals, triceps and thoracic and lumbar region   Muscle Mass Depletion: moderate and severe depletion of temples, clavicle region, interosseous muscle and lower extremities   Weight Loss: 17% x 3 months per chart review      Interventions:  Collaboration of nutrition care with other providers    Nutrition Diagnosis Status:  Continues             Monitor and Evaluation    Food and Nutrient  Intake: energy intake, food and beverage intake, enteral nutrition intake  Food and Nutrient Adminstration: diet order, enteral and parenteral nutrition administration  Physical Activity and Function: nutrition-related ADLs and IADLs  Anthropometric Measurements: weight, weight change, body mass index  Biochemical Data, Medical Tests and Procedures: electrolyte and renal panel, gastrointestinal profile, glucose/endocrine profile, inflammatory profile, lipid profile  Nutrition-Focused Physical Findings: overall appearance     Malnutrition Assessment  Malnutrition Type: chronic illness          Weight Loss (Malnutrition): (17% x 3 months)  Energy Intake (Malnutrition): less than or equal to 50% for greater than or equal to 5 days   Orbital Region (Subcutaneous Fat Loss): severe depletion  Upper Arm Region (Subcutaneous Fat Loss): severe depletion   [place]att Region (Muscle Loss): severe depletion  Clavicle Bone Region (Muscle Loss): severe depletion  Clavicle and Acromion Bone Region (Muscle Loss): moderate depletion  Dorsal Hand (Muscle Loss): moderate depletion  Patellar Region (Muscle Loss): moderate depletion  Anterior Thigh Region (Muscle Loss): severe depletion                 Nutrition Follow-Up    RD Follow-up?: Yes

## 2019-10-03 NOTE — ASSESSMENT & PLAN NOTE
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 Readmitted for small bowel obstruction and intractable nausea and vomiting. POD 3 of previous J tube takedown, G and J tube placement     - Leave G tube open   - CLD  - Anti-emetics PRN  - Pain meds were increased   - Encourage continued ambulation  - SCDs, Lovenox daily  - TF at 30ml/h 2pm-8am. 3/4 Pentamen 1.5.   - Nicotine patch   - PT/OT

## 2019-10-03 NOTE — SUBJECTIVE & OBJECTIVE
Interval History:   NAEON. VSS. Only one episode of urine output recorded.TF stopped overnight due to chest pain, abdominal pain, and SOB.  G tube output: 3925. No bowel movements. Reports partial pain control.     Medications:  Continuous Infusions:   lactated ringers 75 mL/hr at 10/02/19 2032     Scheduled Meds:   enoxaparin  40 mg Subcutaneous Daily    lactated ringers  1,000 mL Intravenous Once    nicotine  1 patch Transdermal Daily    potassium phosphate IVPB  15 mmol Intravenous Once     PRN Meds:diphenhydrAMINE, oxyCODONE, oxyCODONE, sodium chloride 0.9%     Review of patient's allergies indicates:  No Known Allergies     Objective:     Vital Signs (Most Recent):  Temp: 97.5 °F (36.4 °C) (10/03/19 0734)  Pulse: 95 (10/03/19 0734)  Resp: 16 (10/03/19 0734)  BP: 111/73 (10/03/19 0734)  SpO2: 97 % (10/03/19 0734) Vital Signs (24h Range):  Temp:  [96.8 °F (36 °C)-98.6 °F (37 °C)] 97.5 °F (36.4 °C)  Pulse:  [] 95  Resp:  [16] 16  SpO2:  [95 %-98 %] 97 %  BP: (100-128)/(59-79) 111/73     Weight: 56.1 kg (123 lb 10.8 oz)  Body mass index is 17.75 kg/m².    Intake/Output - Last 3 Shifts       10/01 0700 - 10/02 0659 10/02 0700 - 10/03 0659 10/03 0700 - 10/04 0659    P.O.       I.V. (mL/kg) 1859.2 (33.1) 1798.8 (32.1)     NG/      IV Piggyback 250      Total Intake(mL/kg) 2428.2 (43.3) 1798.8 (32.1)     Urine (mL/kg/hr) 975 (0.7) 225 (0.2)     Drains 4150 3925     Stool 0 0     Total Output 5125 4150     Net -2696.8 -2351.3            Urine Occurrence  1 x     Stool Occurrence 0 x 0 x         Physical Exam   Constitutional: He is oriented to person, place, and time. No distress.   Cachectic appearing.     HENT:   Head: Normocephalic and atraumatic.   Eyes: Conjunctivae and EOM are normal.   Neck: Normal range of motion. No tracheal deviation present.   Cardiovascular: Normal rate and intact distal pulses.   Pulmonary/Chest: Effort normal. No respiratory distress.   Abdominal: Soft. He exhibits no  distension. There is no tenderness. There is no guarding.   G and J tube in place, no erythema.   Musculoskeletal: Normal range of motion. He exhibits no edema.   Neurological: He is alert and oriented to person, place, and time.   Skin: He is not diaphoretic.   Vitals reviewed.    Significant Labs:  CBC:   Recent Labs   Lab 10/03/19  0423   WBC 23.58*   RBC 3.42*   HGB 9.3*   HCT 30.0*   *   MCV 88   MCH 27.2   MCHC 31.0*     CMP:   Recent Labs   Lab 10/03/19  0423      CALCIUM 8.6*   *   K 3.3*   CO2 31*   CL 93*   BUN 8   CREATININE 0.7       Significant Diagnostics:  I have reviewed all pertinent imaging results/findings within the past 24 hours.

## 2019-10-03 NOTE — NURSING
Patient reported to RN that he was on tube feeds all night until he felt the tube feed fill up his throat and cause him to gag and choke causing him pain in his chest and stomach.   RN had turned off TF before shift change at 7pm and pt did not receive any TF throughout the night. Pt requested to be disconnect from IV to move freely. Pt got dressed in personal clothing and promptly left unit.  Subsequently, a moderate amount of ashes were found on the floor next to patients bed, reported to charge nurse, MUSA.    Pt removed tube feeding line from Jtube and stated he was done and had enough, pt received 73mls of 1/2 strength formula, Silke NP notified.    Pt removed self from IV and was seen leaving unit by wheelchair with a visitor from his room.

## 2019-10-03 NOTE — PROGRESS NOTES
[facility]  General Surgery  Progress Note    Subjective:     History of Present Illness:  No notes on file    Post-Op Info:  Procedure(s) (LRB):  INSERTION, JEJUNOSTOMY TUBE, revision (N/A)  LAPAROTOMY, EXPLORATORY (N/A)  INSERTION, GASTROSTOMY TUBE   3 Days Post-Op     Interval History:   NAEON. VSS. Only one episode of urine output recorded.TF stopped overnight due to chest pain, abdominal pain, and SOB.  G tube output: 3925. No bowel movements. Reports partial pain control.     Medications:  Continuous Infusions:   lactated ringers 75 mL/hr at 10/02/19 2032     Scheduled Meds:   enoxaparin  40 mg Subcutaneous Daily    lactated ringers  1,000 mL Intravenous Once    nicotine  1 patch Transdermal Daily    potassium phosphate IVPB  15 mmol Intravenous Once     PRN Meds:diphenhydrAMINE, oxyCODONE, oxyCODONE, sodium chloride 0.9%     Review of patient's allergies indicates:  No Known Allergies     Objective:     Vital Signs (Most Recent):  Temp: 97.5 °F (36.4 °C) (10/03/19 0734)  Pulse: 95 (10/03/19 0734)  Resp: 16 (10/03/19 0734)  BP: 111/73 (10/03/19 0734)  SpO2: 97 % (10/03/19 0734) Vital Signs (24h Range):  Temp:  [96.8 °F (36 °C)-98.6 °F (37 °C)] 97.5 °F (36.4 °C)  Pulse:  [] 95  Resp:  [16] 16  SpO2:  [95 %-98 %] 97 %  BP: (100-128)/(59-79) 111/73     Weight: 56.1 kg (123 lb 10.8 oz)  Body mass index is 17.75 kg/m².    Intake/Output - Last 3 Shifts       10/01 0700 - 10/02 0659 10/02 0700 - 10/03 0659 10/03 0700 - 10/04 0659    P.O.       I.V. (mL/kg) 1859.2 (33.1) 1798.8 (32.1)     NG/      IV Piggyback 250      Total Intake(mL/kg) 2428.2 (43.3) 1798.8 (32.1)     Urine (mL/kg/hr) 975 (0.7) 225 (0.2)     Drains 4150 3925     Stool 0 0     Total Output 5125 4150     Net -2696.8 -2351.3            Urine Occurrence  1 x     Stool Occurrence 0 x 0 x         Physical Exam   Constitutional: He is oriented to person, place, and time. No distress.   Cachectic appearing.     HENT:    Head: Normocephalic and atraumatic.   Eyes: Conjunctivae and EOM are normal.   Neck: Normal range of motion. No tracheal deviation present.   Cardiovascular: Normal rate and intact distal pulses.   Pulmonary/Chest: Effort normal. No respiratory distress.   Abdominal: Soft. He exhibits no distension. There is no tenderness. There is no guarding.   G and J tube in place, no erythema.   Musculoskeletal: Normal range of motion. He exhibits no edema.   Neurological: He is alert and oriented to person, place, and time.   Skin: He is not diaphoretic.   Vitals reviewed.    Significant Labs:  CBC:   Recent Labs   Lab 10/03/19  0423   WBC 23.58*   RBC 3.42*   HGB 9.3*   HCT 30.0*   *   MCV 88   MCH 27.2   MCHC 31.0*     CMP:   Recent Labs   Lab 10/03/19  0423      CALCIUM 8.6*   *   K 3.3*   CO2 31*   CL 93*   BUN 8   CREATININE 0.7       Significant Diagnostics:  I have reviewed all pertinent imaging results/findings within the past 24 hours.    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 Readmitted for small bowel obstruction and intractable nausea and vomiting. POD 3 of previous J tube takedown, G and J tube placement     - Leave G tube open   - CLD  - 1 L bolus  - mIVF  - Anti-emetics PRN  - Pain meds were increased   - Encourage continued ambulation  - SCDs, Lovenox daily  - TF at 20ml/h 2pm-8am. 1/2 Pentamen 1.5.   - Nicotine patch   - PT/OT  - Replace lytes PRN            Leonard Aponte MD  General Surgery  [facility]

## 2019-10-03 NOTE — PLAN OF CARE
AAO x 4. ML with telfa CDI. G tube unclamped putting out large amount of thick green fluid due to pt being on clear liquid diet. Pt refuses tube feeds. No complaints of N/V overnight. Pt voids per urinal and ambulates independently. However, got extremely upset when there was no urinal in his room and he had to get up and go to the bathroom. VSS on RA. Pt complains of constant 10/10 pain, received PRN meds. Pt remained free of falls overnight. POC reviewed with pt who verbalized understanding. Bed in low position, call light in reach. No signs of distress or concerns noted at this time. WCTM.

## 2019-10-03 NOTE — PLAN OF CARE
Recommendations    Pt meets severe malnutrition criteria.     1. Recommend TF of Peptamen 1.5 with Prebio advancing as tolerated to goal rate of 55 mL/hr x 24 hrs full strength to provide 1980 kcal, 90 gm protein, and 1016 mL free fluid.    - Do not recommend to bolus a J tube.    2. Advance diet as medically able.     3. RD following.     Goals: receive nutrition by RD follow-up  Nutrition Goal Status: goal met

## 2019-10-04 VITALS
OXYGEN SATURATION: 98 % | WEIGHT: 123.69 LBS | SYSTOLIC BLOOD PRESSURE: 103 MMHG | TEMPERATURE: 99 F | DIASTOLIC BLOOD PRESSURE: 62 MMHG | HEART RATE: 93 BPM | BODY MASS INDEX: 17.71 KG/M2 | RESPIRATION RATE: 19 BRPM | HEIGHT: 70 IN

## 2019-10-04 LAB
ANION GAP SERPL CALC-SCNC: 8 MMOL/L (ref 8–16)
ANISOCYTOSIS BLD QL SMEAR: SLIGHT
BASOPHILS # BLD AUTO: ABNORMAL K/UL (ref 0–0.2)
BASOPHILS NFR BLD: 0 % (ref 0–1.9)
BUN SERPL-MCNC: 6 MG/DL (ref 6–20)
CALCIUM SERPL-MCNC: 8.1 MG/DL (ref 8.7–10.5)
CHLORIDE SERPL-SCNC: 97 MMOL/L (ref 95–110)
CO2 SERPL-SCNC: 28 MMOL/L (ref 23–29)
CREAT SERPL-MCNC: 0.7 MG/DL (ref 0.5–1.4)
DACRYOCYTES BLD QL SMEAR: ABNORMAL
DIFFERENTIAL METHOD: ABNORMAL
EOSINOPHIL # BLD AUTO: ABNORMAL K/UL (ref 0–0.5)
EOSINOPHIL NFR BLD: 5 % (ref 0–8)
ERYTHROCYTE [DISTWIDTH] IN BLOOD BY AUTOMATED COUNT: 15.3 % (ref 11.5–14.5)
EST. GFR  (AFRICAN AMERICAN): >60 ML/MIN/1.73 M^2
EST. GFR  (NON AFRICAN AMERICAN): >60 ML/MIN/1.73 M^2
GLUCOSE SERPL-MCNC: 86 MG/DL (ref 70–110)
HCT VFR BLD AUTO: 27.8 % (ref 40–54)
HGB BLD-MCNC: 8.4 G/DL (ref 14–18)
HYPOCHROMIA BLD QL SMEAR: ABNORMAL
IMM GRANULOCYTES # BLD AUTO: ABNORMAL K/UL (ref 0–0.04)
IMM GRANULOCYTES NFR BLD AUTO: ABNORMAL % (ref 0–0.5)
LYMPHOCYTES # BLD AUTO: ABNORMAL K/UL (ref 1–4.8)
LYMPHOCYTES NFR BLD: 61 % (ref 18–48)
MAGNESIUM SERPL-MCNC: 1.9 MG/DL (ref 1.6–2.6)
MCH RBC QN AUTO: 27.5 PG (ref 27–31)
MCHC RBC AUTO-ENTMCNC: 30.2 G/DL (ref 32–36)
MCV RBC AUTO: 91 FL (ref 82–98)
MONOCYTES # BLD AUTO: ABNORMAL K/UL (ref 0.3–1)
MONOCYTES NFR BLD: 2 % (ref 4–15)
NEUTROPHILS NFR BLD: 32 % (ref 38–73)
NRBC BLD-RTO: 0 /100 WBC
OVALOCYTES BLD QL SMEAR: ABNORMAL
PHOSPHATE SERPL-MCNC: 2.3 MG/DL (ref 2.7–4.5)
PLATELET # BLD AUTO: 483 K/UL (ref 150–350)
PLATELET BLD QL SMEAR: ABNORMAL
PMV BLD AUTO: 10.1 FL (ref 9.2–12.9)
POIKILOCYTOSIS BLD QL SMEAR: SLIGHT
POLYCHROMASIA BLD QL SMEAR: ABNORMAL
POTASSIUM SERPL-SCNC: 3.7 MMOL/L (ref 3.5–5.1)
RBC # BLD AUTO: 3.05 M/UL (ref 4.6–6.2)
SODIUM SERPL-SCNC: 133 MMOL/L (ref 136–145)
TARGETS BLD QL SMEAR: ABNORMAL
WBC # BLD AUTO: 16.92 K/UL (ref 3.9–12.7)

## 2019-10-04 PROCEDURE — 84100 ASSAY OF PHOSPHORUS: CPT

## 2019-10-04 PROCEDURE — 83735 ASSAY OF MAGNESIUM: CPT

## 2019-10-04 PROCEDURE — 85007 BL SMEAR W/DIFF WBC COUNT: CPT

## 2019-10-04 PROCEDURE — 99900035 HC TECH TIME PER 15 MIN (STAT)

## 2019-10-04 PROCEDURE — 85027 COMPLETE CBC AUTOMATED: CPT

## 2019-10-04 PROCEDURE — 36415 COLL VENOUS BLD VENIPUNCTURE: CPT

## 2019-10-04 PROCEDURE — 25000003 PHARM REV CODE 250: Performed by: STUDENT IN AN ORGANIZED HEALTH CARE EDUCATION/TRAINING PROGRAM

## 2019-10-04 PROCEDURE — 80048 BASIC METABOLIC PNL TOTAL CA: CPT

## 2019-10-04 PROCEDURE — S4991 NICOTINE PATCH NONLEGEND: HCPCS | Performed by: STUDENT IN AN ORGANIZED HEALTH CARE EDUCATION/TRAINING PROGRAM

## 2019-10-04 PROCEDURE — 63600175 PHARM REV CODE 636 W HCPCS: Performed by: NURSE PRACTITIONER

## 2019-10-04 RX ORDER — OXYCODONE AND ACETAMINOPHEN 10; 325 MG/1; MG/1
1 TABLET ORAL EVERY 4 HOURS PRN
Qty: 15 TABLET | Refills: 0 | Status: SHIPPED | OUTPATIENT
Start: 2019-10-04 | End: 2019-10-10 | Stop reason: SDUPTHER

## 2019-10-04 RX ORDER — OXYCODONE AND ACETAMINOPHEN 10; 325 MG/1; MG/1
1 TABLET ORAL EVERY 4 HOURS PRN
Qty: 15 TABLET | Refills: 0 | Status: SHIPPED | OUTPATIENT
Start: 2019-10-04 | End: 2019-10-04 | Stop reason: SDUPTHER

## 2019-10-04 RX ORDER — HYDROMORPHONE HYDROCHLORIDE 1 MG/ML
1 INJECTION, SOLUTION INTRAMUSCULAR; INTRAVENOUS; SUBCUTANEOUS EVERY 4 HOURS PRN
Status: DISCONTINUED | OUTPATIENT
Start: 2019-10-04 | End: 2019-10-04 | Stop reason: HOSPADM

## 2019-10-04 RX ADMIN — OXYCODONE HYDROCHLORIDE 20 MG: 5 SOLUTION ORAL at 09:10

## 2019-10-04 RX ADMIN — HYDROMORPHONE HYDROCHLORIDE 1 MG: 1 INJECTION, SOLUTION INTRAMUSCULAR; INTRAVENOUS; SUBCUTANEOUS at 03:10

## 2019-10-04 RX ADMIN — HYDROMORPHONE HYDROCHLORIDE 1 MG: 1 INJECTION, SOLUTION INTRAMUSCULAR; INTRAVENOUS; SUBCUTANEOUS at 06:10

## 2019-10-04 RX ADMIN — OXYCODONE HYDROCHLORIDE 20 MG: 5 SOLUTION ORAL at 12:10

## 2019-10-04 RX ADMIN — OXYCODONE HYDROCHLORIDE 20 MG: 5 SOLUTION ORAL at 02:10

## 2019-10-04 RX ADMIN — NICOTINE 1 PATCH: 21 PATCH, EXTENDED RELEASE TRANSDERMAL at 09:10

## 2019-10-04 RX ADMIN — DEXTROSE MONOHYDRATE, SODIUM CHLORIDE, AND POTASSIUM CHLORIDE: 50; 9; 1.49 INJECTION, SOLUTION INTRAVENOUS at 03:10

## 2019-10-04 RX ADMIN — OXYCODONE HYDROCHLORIDE 20 MG: 5 SOLUTION ORAL at 04:10

## 2019-10-04 NOTE — PHYSICIAN QUERY
PT Name: Isaiah Greene  MR #: [id number]     PHYSICIAN QUERY -  ELECTROLYTE CLARIFICATION      CDS/: Natty Vazquez RN, CDS               Contact information: [email or web address]                                                                                                                         [phone]  This form is a permanent document in the medical record.     Query Date: October 4, 2019    By submitting this query, we are merely seeking further clarification of documentation to reflect the severity of illness of your patient. Please utilize your independent clinical judgment when addressing the question(s) below.    The Medical record reflects the following:     Indicators   Supporting Clinical Findings Location in Medical Record   x Lab Value(s) K: 2.7 --> 3.1 --> 3.2 --> 2.9 --> 3.3    Phos: 2.0 --> 2.6 --> 2.0 --> 2.2 --> 2.4   Labs 9/26- 10/3    Labs 9/26- 10/3   x Treatment                     Medication potassium chloride 10 mEq in 100 mL IVPB   potassium chloride 10% oral solution 40 mEq    sodium phosphate 30 mmol in dextrose 5 % 250 mL IVPB    sodium phosphate 20.01 mmol in dextrose 5 % 250 mL IVPB   sodium phosphate 20.01 mmol in dextrose 5 % 250 mL IVPB   potassium phosphate 15 mmol in dextrose 5 % 250 mL infusion  MAR 9/25- 10/1  MAR 10/3    MAR 9/26  MAR 9/27    MAR 10/1    MAR 10/3    Other       Provider, please specify the diagnosis or diagnoses that correspond(s) to the above indicators. Kelvin all that apply:    [ X ] Hypokalemia   [ X ] Hypophosphatemia   [   ] Other electrolyte disturbance (please specify): _______   [   ] Clinically Undetermined       Please document in your progress notes daily for the duration of treatment until resolved, and include in your discharge summary.

## 2019-10-04 NOTE — ASSESSMENT & PLAN NOTE
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 Readmitted for small bowel obstruction and intractable nausea and vomiting. S/p previous J tube takedown, G and J tube placement 9/30    - KUB today  - Unclamp G-tube prn  - CLD  - TF: Peptamen 1.5 with Prebio: 18 full strength goal 70 x 18 hours  - 1/2 strength tube feeds. 6am-midnight.  May disconnect tube feeds for an hour at 0900, 1200, 1500, 1800 when patient is ambulating in halls/wheelchair.  - Anti-emetics PRN  - PRN pain meds, consider a long acting agent on discharge  - Encourage continued ambulation  - SCDs, Lovenox daily  - Nicotine patch   - PT/OT: discharged patient

## 2019-10-04 NOTE — PROGRESS NOTES
[facility]  General Surgery  Progress Note    Subjective:     Post-Op Info:  Procedure(s) (LRB):  INSERTION, JEJUNOSTOMY TUBE, revision (N/A)  LAPAROTOMY, EXPLORATORY (N/A)  INSERTION, GASTROSTOMY TUBE   4 Days Post-Op     Interval History:   No acute events.     Tolerated ~4 hours of TF. Reports distention. G tube: 175. Inaccurate I/O's.     No bowel movements. Some flatus. Reports improved pain control with adjustment.     Leukocytosis downtrending 23.6 --> 16.9    Medications:  Continuous Infusions:   dextrose 5 % and 0.9 % NaCl with KCl 20 mEq Stopped (10/04/19 0645)     Scheduled Meds:   enoxaparin  40 mg Subcutaneous Daily    nicotine  1 patch Transdermal Daily     PRN Meds:diphenhydrAMINE, HYDROmorphone, oxyCODONE, oxyCODONE, sodium chloride 0.9%     Review of patient's allergies indicates:  No Known Allergies     Objective:     Vital Signs (Most Recent):  Temp: 97.4 °F (36.3 °C) (10/04/19 0713)  Pulse: 101 (10/04/19 0713)  Resp: 18 (10/04/19 0713)  BP: 124/81 (10/04/19 0713)  SpO2: 99 % (10/04/19 0713) Vital Signs (24h Range):  Temp:  [97.1 °F (36.2 °C)-99.2 °F (37.3 °C)] 97.4 °F (36.3 °C)  Pulse:  [] 101  Resp:  [16-20] 18  SpO2:  [97 %-100 %] 99 %  BP: (115-128)/(75-81) 124/81     Weight: 56.1 kg (123 lb 10.8 oz)  Body mass index is 17.75 kg/m².    Intake/Output - Last 3 Shifts       10/02 0700 - 10/03 0659 10/03 0700 - 10/04 0659 10/04 0700 - 10/05 0659    P.O.  465     I.V. (mL/kg) 1798.8 (32.1) 1041.7 (18.6)     NG/GT  253 60    IV Piggyback  250     Total Intake(mL/kg) 1798.8 (32.1) 2009.7 (35.8) 60 (1.1)    Urine (mL/kg/hr) 225 (0.2) 1175 (0.9)     Drains 3925 175     Stool 0      Total Output 4150 1350     Net -2351.3 +659.7 +60           Urine Occurrence 1 x      Stool Occurrence 0 x          Physical Exam   Constitutional: He is oriented to person, place, and time. No distress.   Cachectic appearing.     HENT:   Head: Normocephalic and atraumatic.   Eyes: Conjunctivae  and EOM are normal.   Neck: Normal range of motion. No tracheal deviation present.   Cardiovascular: Normal rate and intact distal pulses.   Pulmonary/Chest: Effort normal. No respiratory distress.   Abdominal: Soft. He exhibits no distension. There is no tenderness. There is no guarding.   G and J tube in place, no erythema.   Musculoskeletal: Normal range of motion. He exhibits no edema.   Neurological: He is alert and oriented to person, place, and time.   Skin: He is not diaphoretic.   Vitals reviewed.    Significant Labs:  CBC:   Recent Labs   Lab 10/04/19  0352   WBC 16.92*   RBC 3.05*   HGB 8.4*   HCT 27.8*   *   MCV 91   MCH 27.5   MCHC 30.2*     CMP:   Recent Labs   Lab 10/04/19  0352   GLU 86   CALCIUM 8.1*   *   K 3.7   CO2 28   CL 97   BUN 6   CREATININE 0.7       Significant Diagnostics:  I have reviewed all pertinent imaging results/findings within the past 24 hours.    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 Readmitted for small bowel obstruction and intractable nausea and vomiting. S/p previous J tube takedown, G and J tube placement 9/30    - KUB today  - Unclamp G-tube prn  - CLD  - TF: Peptamen 1.5 with Prebio: 18 full strength goal 70 x 18 hours  - 1/2 strength tube feeds. 6am-midnight.  May disconnect tube feeds for an hour at 0900, 1200, 1500, 1800 when patient is ambulating in halls/wheelchair.  - Anti-emetics PRN  - PRN pain meds, consider a long acting agent on discharge  - Encourage continued ambulation  - SCDs, Lovenox daily  - Nicotine patch   - PT/OT: discharged patient            Cecilia Oden MD  General Surgery  [facility]

## 2019-10-04 NOTE — PLAN OF CARE
Patient with some discomfort with tube feeds, only tolerated four hours.  Patient's goal is 70 ml/hr for 18 hours.  KUKINA today.  Spoke with patient's sister yesterday, stated patient can stay with their sister on her land in a mobile home.  Stated she had electricity turned on, but, they have no running water due to a broken pipe.  Stated patient can go to her home to take a shower etc and she will fill jugs of water for him daily.  Will contact patient's insurance company again to discuss more safe discharge options.  Will continue to follow for needs.       10/04/19 1241   Discharge Reassessment   Assessment Type Discharge Planning Reassessment   Discharge Plan A Home with family   Discharge Plan B Home with family;Home Health   Anticipated Discharge Disposition Home   Post-Acute Status   Post-Acute Authorization Other  (tube feeds)   Home Health/Hospice Status Set-up Complete   Discharge Delays (!) Diet Not Ready for Discharge

## 2019-10-04 NOTE — NURSING
Pt refuses TF stating it doesn't settle well in his stomach and would like an oral diet. MD notified with no diet advancements. Pt educated on POC and diet advancements. Pt request to be saline locked so he can leave the unit. MD aware. NAD noted. Pt left unit with no acute distress noted. WCTM once pt arrives back on unit.

## 2019-10-04 NOTE — PLAN OF CARE
Plan of care reviewed with patient, who verbalized understanding. Pain management remains difficult with patient describing pain consistently as a 7 out of ten at the lowest, although Pt is able to ambulate, position and breathe deeply without additional discomfort. Pt's diet has been advanced to clears, although he has not had a bowel movement since 9/19. G tube emptied for nausea and discomfort. Pt is refusing his TEDs, SCDs and tube feedings. Pt disconnected himself from the IV during the prior shift and was without IVF for several hours. Safety and the need to refrain from mariajuana or tobacco smoking reviewed. Increased use of the incentive spirometer should be encouraged again in the morning, as he refuses to do so tonight.

## 2019-10-04 NOTE — SUBJECTIVE & OBJECTIVE
Interval History:   No acute events.     Tolerated ~4 hours of TF. Reports distention. G tube: 175. Inaccurate I/O's.     No bowel movements. Some flatus. Reports improved pain control with adjustment.     Leukocytosis downtrending 23.6 --> 16.9    Medications:  Continuous Infusions:   dextrose 5 % and 0.9 % NaCl with KCl 20 mEq Stopped (10/04/19 0645)     Scheduled Meds:   enoxaparin  40 mg Subcutaneous Daily    nicotine  1 patch Transdermal Daily     PRN Meds:diphenhydrAMINE, HYDROmorphone, oxyCODONE, oxyCODONE, sodium chloride 0.9%     Review of patient's allergies indicates:  No Known Allergies     Objective:     Vital Signs (Most Recent):  Temp: 97.4 °F (36.3 °C) (10/04/19 0713)  Pulse: 101 (10/04/19 0713)  Resp: 18 (10/04/19 0713)  BP: 124/81 (10/04/19 0713)  SpO2: 99 % (10/04/19 0713) Vital Signs (24h Range):  Temp:  [97.1 °F (36.2 °C)-99.2 °F (37.3 °C)] 97.4 °F (36.3 °C)  Pulse:  [] 101  Resp:  [16-20] 18  SpO2:  [97 %-100 %] 99 %  BP: (115-128)/(75-81) 124/81     Weight: 56.1 kg (123 lb 10.8 oz)  Body mass index is 17.75 kg/m².    Intake/Output - Last 3 Shifts       10/02 0700 - 10/03 0659 10/03 0700 - 10/04 0659 10/04 0700 - 10/05 0659    P.O.  465     I.V. (mL/kg) 1798.8 (32.1) 1041.7 (18.6)     NG/GT  253 60    IV Piggyback  250     Total Intake(mL/kg) 1798.8 (32.1) 2009.7 (35.8) 60 (1.1)    Urine (mL/kg/hr) 225 (0.2) 1175 (0.9)     Drains 3925 175     Stool 0      Total Output 4150 1350     Net -2351.3 +659.7 +60           Urine Occurrence 1 x      Stool Occurrence 0 x          Physical Exam   Constitutional: He is oriented to person, place, and time. No distress.   Cachectic appearing.     HENT:   Head: Normocephalic and atraumatic.   Eyes: Conjunctivae and EOM are normal.   Neck: Normal range of motion. No tracheal deviation present.   Cardiovascular: Normal rate and intact distal pulses.   Pulmonary/Chest: Effort normal. No respiratory distress.   Abdominal: Soft. He exhibits no distension.  There is no tenderness. There is no guarding.   G and J tube in place, no erythema.   Musculoskeletal: Normal range of motion. He exhibits no edema.   Neurological: He is alert and oriented to person, place, and time.   Skin: He is not diaphoretic.   Vitals reviewed.    Significant Labs:  CBC:   Recent Labs   Lab 10/04/19  0352   WBC 16.92*   RBC 3.05*   HGB 8.4*   HCT 27.8*   *   MCV 91   MCH 27.5   MCHC 30.2*     CMP:   Recent Labs   Lab 10/04/19  0352   GLU 86   CALCIUM 8.1*   *   K 3.7   CO2 28   CL 97   BUN 6   CREATININE 0.7       Significant Diagnostics:  I have reviewed all pertinent imaging results/findings within the past 24 hours.

## 2019-10-04 NOTE — HOSPITAL COURSE
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 Readmitted for small bowel obstruction and intractable nausea and vomiting. He underwent a previous J tube takedown, G and J tube placement without any complication, tube feeds were started. Patient decided to leave against medical advise. It was discussed in multiple opportunities how this decision will affect his care and life, the risk and possible outcomes of leaving the hospital. He agreed and fully understood the consequences.   Instructions were given as well as scripts.

## 2019-10-04 NOTE — HPI
Patient is a 59 y.o. male with history of newly diagnosed stomach cancer (9/13) s/p PEG and port 9/19 who presents as a transfer from [facility] with a small bowel obstruction. He initially presented with 3 days of nausea and vomiting which had onset shortly after discharge from his placement of the PEG on 9/19. He has not had a BM since before his surgery. At the OSH a CT was obtained which was concerning for high grade small bowel obstruction. An NGT was not placed as it as unknown exactly what surgeries were performed. His Cr was elevated to 1.8 at the OSH as well and he did receive fluid resuscitation there. He was then transferred here for higher level of care. He appeared clinically improved at the time of transfer.

## 2019-10-04 NOTE — DISCHARGE SUMMARY
[facility]  General Surgery  Discharge Summary      Patient Name: Isaiah Greene  MRN: [id number]  Admission Date: 9/25/2019  Hospital Length of Stay: 9 days  Discharge Date and Time:  10/04/2019 6:01 PM  Attending Physician: Josse Saravia MD   Discharging Provider: Leonard Aponte MD  Primary Care Provider: Primary Doctor No    HPI:   Patient is a 59 y.o. male with history of newly diagnosed stomach cancer (9/13) s/p PEG and port 9/19 who presents as a transfer from [facility] with a small bowel obstruction. He initially presented with 3 days of nausea and vomiting which had onset shortly after discharge from his placement of the PEG on 9/19. He has not had a BM since before his surgery. At the OSH a CT was obtained which was concerning for high grade small bowel obstruction. An NGT was not placed as it as unknown exactly what surgeries were performed. His Cr was elevated to 1.8 at the OSH as well and he did receive fluid resuscitation there. He was then transferred here for higher level of care. He appeared clinically improved at the time of transfer.     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 Readmitted for small bowel obstruction and intractable nausea and vomiting. He underwent a previous J tube takedown, G and J tube  placement without any complication, tube feeds were started. Patient decided to leave against medical advise. It was discussed in multiple opportunities how this decision will affect his care and life, the risk and possible outcomes of leaving the hospital. He agreed and fully understood the consequences.   Instructions were given as well as scripts.      Consults:   Consults (From admission, onward)        Status Ordering Provider     Inpatient consult to Registered Dietitian/Nutritionist  Once     Provider:  (Not yet assigned)    Completed JEANETTE JOHNSTON          Significant Diagnostic Studies: Labs:   BMP:   Recent Labs   Lab 10/03/19  0423 10/04/19  0352    86   * 133*   K 3.3* 3.7   CL 93* 97   CO2 31* 28   BUN 8 6   CREATININE 0.7 0.7   CALCIUM 8.6* 8.1*   MG 2.1 1.9       Pending Diagnostic Studies:     Procedure Component Value Units Date/Time    CBC auto differential [[id number]] Collected:  09/30/19 0840    Order Status:  Sent Lab Status:  In process Updated:  09/30/19 0841    Specimen:  Blood     Narrative:       Collection has been rescheduled by CS1 at 09/30/2019 05:17 Reason:   stick once pt aggressive.want someone else    Magnesium [[id number]] Collected:  09/30/19 0840    Order Status:  Sent Lab Status:  In process Updated:  09/30/19 0841    Specimen:  Blood     Narrative:       Collection has been rescheduled by CS1 at 09/30/2019 05:17 Reason:   stick once pt aggressive.want someone else    Phosphorus [[id number]] Collected:  09/30/19 0840    Order Status:  Sent Lab Status:  In process Updated:  09/30/19 0841    Specimen:  Blood     Narrative:       Collection has been rescheduled by CS1 at 09/30/2019 05:17 Reason:   stick once pt aggressive.want someone else        Final Active Diagnoses:    Diagnosis Date Noted POA    PRINCIPAL PROBLEM:  Small bowel obstruction [K56.609] 09/25/2019 Yes    Severe malnutrition [E43] 09/14/2019 Yes      Problems Resolved During this Admission:       Discharged Condition: poor    Disposition: Left Against Medical Advice    Follow Up:  Follow-up Information     Josse Saravia MD In 2 weeks.    Specialties:  Surgical Oncology, General Surgery  Why:  For wound re-check  Contact information:  [address]  [facility] [address] [phone]                 Patient Instructions:      Notify your health care provider if you experience any of the following:  increased confusion or weakness     Notify your health care provider if you experience any of the following:  persistent dizziness, light-headedness, or visual disturbances     Notify your health care provider if you experience any of the following:  worsening rash     Notify your health care provider if you experience any of the following:  severe persistent headache     Notify your health care provider if you experience any of the following:  difficulty breathing or increased cough     Notify your health care provider if you experience any of the following:  redness, tenderness, or signs of infection (pain, swelling, redness, odor or green/yellow discharge around incision site)     Notify your health care provider if you experience any of the following:  severe uncontrolled pain     Notify your health care provider if you experience any of the following:  persistent nausea and vomiting or diarrhea     Notify your health care provider if you experience any of the following:  temperature >100.4     Medications:  Reconciled Home Medications:      Medication List      START taking these medications    oxyCODONE-acetaminophen  mg per tablet  Commonly known as:  PERCOCET  Take 1 tablet by mouth every 4 (four) hours as needed for Pain.        CONTINUE taking these medications    ondansetron 4 MG tablet  Commonly known as:  ZOFRAN  Take 1 tablet (4 mg total) by mouth every 6 (six) hours as needed for Nausea.        STOP taking these medications    aspirin 81 MG Chew     HYDROcodone-acetaminophen 5-325 mg  per tablet  Commonly known as:  NORCO     nicotine 21 mg/24 hr  Commonly known as:  NICODERM CQ          Time spent on the discharge of patient: 25 minutes    Leonard Aponte MD  General Surgery  [facility]

## 2019-10-04 NOTE — NURSING
Pt very anxious and content on leaving AMA. Me (RN), unit director (RN) and Dr. HENRI Saravia educated patient on medical necessity/needs and risk with leaving AMA. Pt states he has a safe home to stay with family and friends available to help if needed. Pt given a printed pain prescription to fill at any pharmacy of his choice. Pt already has his personal belongings packed on a hospital wheelchair. Pt urgent to leave unit. Pt has refuses TF all shift stating  The 1/2 TF doesn't settle well with his stomach. Pt believes he can tolerate and oral liquid diet and advance himself. Pt states he has a friend en route to pick him up and transport home. No acute distress or needs at this time. PIV d/c'd with no complications. Pt seen getting on elevator with hospital wheelchair.

## 2019-10-07 DIAGNOSIS — C80.1 SIGNET RING CELL ADENOCARCINOMA: Primary | ICD-10-CM

## 2019-10-07 DIAGNOSIS — K56.609 SMALL BOWEL OBSTRUCTION: ICD-10-CM

## 2019-10-07 DIAGNOSIS — E43 SEVERE PROTEIN-CALORIE MALNUTRITION: ICD-10-CM

## 2019-10-07 LAB
BACTERIA BLD CULT: NORMAL
BACTERIA BLD CULT: NORMAL

## 2019-10-09 ENCOUNTER — TELEPHONE (OUTPATIENT)
Dept: HEMATOLOGY/ONCOLOGY | Facility: CLINIC | Age: 59
End: 2019-10-09

## 2019-10-10 RX ORDER — OXYCODONE AND ACETAMINOPHEN 10; 325 MG/1; MG/1
1 TABLET ORAL EVERY 4 HOURS PRN
Qty: 12 TABLET | Refills: 0 | Status: SHIPPED | OUTPATIENT
Start: 2019-10-10 | End: 2019-10-14 | Stop reason: SDUPTHER

## 2019-10-11 ENCOUNTER — TELEPHONE (OUTPATIENT)
Dept: SURGERY | Facility: CLINIC | Age: 59
End: 2019-10-11

## 2019-10-11 NOTE — PLAN OF CARE
Patient discharged home AMA 10/4/2019.  Spoke with patient and his sister on speaker phone 10/8/2019.  Informed patient's sister patient left the hospital AMA, discussion with patient and sister about future for patient.  Offered patient Home Hospice care if he did not want to follow medical advice.  Patient stated he did not want hospice, he wanted to be treated.  Notified KAJAL Ludwig in Dr Saravia's office, scheduled Dr Saravia's follow up appointment.  Also, Vani scheduled patient's Oncology appt.  Patient already had tube feeds delivered to his home from a previous admit, stated he will take care of his nutrition.  Spoke with patient and gave him appointment dates and times with understanding verbalized.      Future Appointments   Date Time Provider Department Center   10/14/2019  2:00 PM Geovani Mars MD [facility] HEM ONC [place]   10/21/2019 11:00 AM Josse Saravia MD [facility] SURONC [place]          10/11/19 0825   Final Note   Assessment Type Final Discharge Note   Anticipated Discharge Disposition Home   Hospital Follow Up  Appt(s) scheduled? Yes   Right Care Referral Info   Post Acute Recommendation No Care

## 2019-10-11 NOTE — TELEPHONE ENCOUNTER
----- Message from Christina Godinez sent at 10/11/2019  8:52 AM CDT -----  Contact: Patient  Refill or New Rx: Refill    RX Name and Strength: oxyCODONE-acetaminophen (PERCOCET)  mg per tablet    Preferred Pharmacy with phone number:  [facility] - [facility] - [address]  [address]  [place] [address]  Phone: [phone] Fax: [phone]    Best Call Back Number: [phone]    Additional Information:

## 2019-10-14 ENCOUNTER — OFFICE VISIT (OUTPATIENT)
Dept: SURGERY | Facility: CLINIC | Age: 59
End: 2019-10-14
Payer: MEDICAID

## 2019-10-14 ENCOUNTER — TELEPHONE (OUTPATIENT)
Dept: HEMATOLOGY/ONCOLOGY | Facility: CLINIC | Age: 59
End: 2019-10-14

## 2019-10-14 VITALS
OXYGEN SATURATION: 99 % | DIASTOLIC BLOOD PRESSURE: 71 MMHG | HEIGHT: 70 IN | TEMPERATURE: 98 F | WEIGHT: 116.88 LBS | SYSTOLIC BLOOD PRESSURE: 107 MMHG | HEART RATE: 118 BPM | BODY MASS INDEX: 16.73 KG/M2

## 2019-10-14 DIAGNOSIS — R63.4 WEIGHT LOSS, UNINTENTIONAL: ICD-10-CM

## 2019-10-14 DIAGNOSIS — R10.84 INTRACTABLE GENERALIZED ABDOMINAL PAIN: ICD-10-CM

## 2019-10-14 DIAGNOSIS — C80.1 SIGNET RING CELL ADENOCARCINOMA: Primary | ICD-10-CM

## 2019-10-14 DIAGNOSIS — Z93.4 JEJUNOSTOMY TUBE PRESENT: ICD-10-CM

## 2019-10-14 DIAGNOSIS — C16.0 MALIGNANT NEOPLASM OF CARDIA OF STOMACH: ICD-10-CM

## 2019-10-14 DIAGNOSIS — E43 SEVERE PROTEIN-CALORIE MALNUTRITION: ICD-10-CM

## 2019-10-14 PROBLEM — K56.609 SMALL BOWEL OBSTRUCTION: Status: RESOLVED | Noted: 2019-09-25 | Resolved: 2019-10-14

## 2019-10-14 PROBLEM — M79.602 LEFT ARM PAIN: Status: RESOLVED | Noted: 2017-02-16 | Resolved: 2019-10-14

## 2019-10-14 PROCEDURE — 99024 POSTOP FOLLOW-UP VISIT: CPT | Mod: ,,, | Performed by: NURSE PRACTITIONER

## 2019-10-14 PROCEDURE — 99024 PR POST-OP FOLLOW-UP VISIT: ICD-10-PCS | Mod: ,,, | Performed by: NURSE PRACTITIONER

## 2019-10-14 PROCEDURE — 99999 PR PBB SHADOW E&M-EST. PATIENT-LVL III: CPT | Mod: PBBFAC,,, | Performed by: NURSE PRACTITIONER

## 2019-10-14 PROCEDURE — 99213 OFFICE O/P EST LOW 20 MIN: CPT | Mod: PBBFAC | Performed by: NURSE PRACTITIONER

## 2019-10-14 PROCEDURE — 99999 PR PBB SHADOW E&M-EST. PATIENT-LVL III: ICD-10-PCS | Mod: PBBFAC,,, | Performed by: NURSE PRACTITIONER

## 2019-10-14 RX ORDER — OXYCODONE AND ACETAMINOPHEN 10; 325 MG/1; MG/1
1 TABLET ORAL EVERY 4 HOURS PRN
Qty: 42 TABLET | Refills: 0 | Status: SHIPPED | OUTPATIENT
Start: 2019-10-14 | End: 2019-10-21

## 2019-10-14 NOTE — PROGRESS NOTES
"  Post-Op Follow-up Visit:   10/14/2019  Patient ID: Isaiah Greene is a 59 y.o. male, born 1960    Chief Complaint   Patient presents with    Post-op Evaluation     Interval History: This 60 y/o gentleman returns to clinic from [place]. 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 pylori. He had GJ bypass with J tube and port-a-cath placement on 9/19/19. He was then transferred back to [facility] from [facility] with SBO on 9/25/19. Underwent exp lap with G and J tube placement with Dr. Josse Saravia on 9/30/19. He left AMA on 10/4/19. He has an appt with Dr. Mars later today.   C/o intense abd pain at incision, rates 10 on pain scale 1-10. No relief with Norco, minimal relief with Percocet but he is now out of pain Rx. Denies any prior pain Rx use, denies chronic pain.   He is eating small meals, usually only 1-2 x day. He is not using J tube for feedings nor is he flushing. He has lost 7# since last hospital stay. Pre dx weight closer to 145# range, so he down ~ 30 #.   Continues to smoke daily but reports down from 1 ppd to half ppd, has nictoine patches at home but not using.     Physical Exam:  /71   Pulse (!) 118   Temp 97.9 °F (36.6 °C) (Oral)   Ht 5' 10" (1.778 m)   Wt 53 kg (116 lb 13.5 oz)   SpO2 99%   BMI 16.77 kg/m²     General:  Non-toxic, ambulatory  Abd:  Soft, non-tender  Incision:  Midline abd staples CANDY intact. G tube in place. J tube in place. J tube flushed easily with 60cc water. Removed staples and flushed with sterile saline. Upper aspect with 0.5cm opening that tunnels down approximately 3 cm.           ICD-10-CM ICD-9-CM    1. 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 Intractable generalized abdominal pain R10.84 789.07    4. Severe protein-calorie malnutrition E43 262  "   5. Weight loss, unintentional R63.4 783.21    6. Jejunostomy tube present Z93.4 V44.4    Plan   Continue with oral intake, regular diet, encouraged protein and monitor weight at home.   Flush J tube with water BID to maintain patency, even if not using for nutritional support.   Stop smoking, resume nicotine patches.   Refill pain Rx x 1. If he requires additional pain Rx, recommended obtain from med onc for pain control during chemotherapy.   Removed staples, planned to open incision to allow for dsg change for wound care but pt refused. Took pain Rx and left clinic.         Follow up in about 1 week (around 10/21/2019).    Questions were asked and answered to patient's satisfaction.    We discussed the need for continued clinical/radiographic/endoscopic follow-up.    Ashley Baker NP  Surgical Oncology  [facility] [place], [place]  Office: [phone]  Fax: [phone]

## 2019-10-14 NOTE — TELEPHONE ENCOUNTER
MD Cecilia Cedeno   Caller: Unspecified (Today,  3:48 PM)             Meets criteria for seek care elsewhere   Left AMA x 3   Left clinic # with Rodriguez          Pt arrived in clinic waiting room around 11am this morning and was asking to be seen earlier than 2pm (when scheduled for consult with Dr. Mars). Per , pt not feeling well.   Informed  to not check in and nurse would go and assess pt.   Nurse introduced self and asked symptoms having today- pt reported pain (pt reported out of pain rx) and pus around incision site and having the inability to clean.   Nurse asked who pt's surgeon was and pt reported Dr. Saravia (clarified it was Josse Saravia). Dr. Nino Saravia walked in waiting room simultaneously and nurse asked if were in clinic on 3rd floor today to which Dr. Nino Saravia replied yes and Dr. Josse Saravia was out today for family.   Dr. Nino Saravia informed his nurse to schedule pt today with them (was scheduled for next Monday and lives in [place]) and would work in to assess post op infection.   NP Ashley Baker saw and after pt left clinic after receiving pain prescription.   Nurse attempted to call pt 3 times and was unsuccessful in reaching to see if 2pm appt with Dr. Mars needed to be rescheduled.

## 2019-10-17 NOTE — PLAN OF CARE
SW is following this Pt for DC planning needs. SW was informed pt may need a ride home when dc. SW will continue to follow and assist with needs as indicated.        Floresita Bates LMSW  [facility]  [id number]       DISPLAY PLAN FREE TEXT DISPLAY PLAN FREE TEXT DISPLAY PLAN FREE TEXT DISPLAY PLAN FREE TEXT DISPLAY PLAN FREE TEXT

## 2019-10-21 ENCOUNTER — TELEPHONE (OUTPATIENT)
Dept: SURGERY | Facility: CLINIC | Age: 59
End: 2019-10-21

## 2019-10-21 NOTE — TELEPHONE ENCOUNTER
----- Message from Nora Joshi sent at 10/21/2019 10:16 AM CDT -----  Contact: PT  PT called to reschedule his post op appointment from 11 am today to something in the afternoon    Callback: [phone]

## 2019-10-24 ENCOUNTER — TELEPHONE (OUTPATIENT)
Dept: SURGERY | Facility: CLINIC | Age: 59
End: 2019-10-24

## 2019-10-24 ENCOUNTER — OFFICE VISIT (OUTPATIENT)
Dept: SURGERY | Facility: CLINIC | Age: 59
End: 2019-10-24
Payer: MEDICAID

## 2019-10-24 VITALS
HEIGHT: 70 IN | HEART RATE: 97 BPM | WEIGHT: 120.38 LBS | BODY MASS INDEX: 17.23 KG/M2 | DIASTOLIC BLOOD PRESSURE: 81 MMHG | SYSTOLIC BLOOD PRESSURE: 128 MMHG

## 2019-10-24 DIAGNOSIS — C16.3 MALIGNANT NEOPLASM OF PYLORIC ANTRUM: Primary | ICD-10-CM

## 2019-10-24 PROCEDURE — 99999 PR PBB SHADOW E&M-EST. PATIENT-LVL III: ICD-10-PCS | Mod: PBBFAC,,, | Performed by: SURGERY

## 2019-10-24 PROCEDURE — 99024 POSTOP FOLLOW-UP VISIT: CPT | Mod: ,,, | Performed by: SURGERY

## 2019-10-24 PROCEDURE — 99999 PR PBB SHADOW E&M-EST. PATIENT-LVL III: CPT | Mod: PBBFAC,,, | Performed by: SURGERY

## 2019-10-24 PROCEDURE — 99024 PR POST-OP FOLLOW-UP VISIT: ICD-10-PCS | Mod: ,,, | Performed by: SURGERY

## 2019-10-24 PROCEDURE — 99213 OFFICE O/P EST LOW 20 MIN: CPT | Mod: PBBFAC | Performed by: SURGERY

## 2019-10-24 RX ORDER — OXYCODONE HYDROCHLORIDE 5 MG/1
15 TABLET ORAL EVERY 4 HOURS PRN
Qty: 25 TABLET | Refills: 0 | Status: SHIPPED | OUTPATIENT
Start: 2019-10-24 | End: 2019-11-05

## 2019-10-24 RX ORDER — ONDANSETRON 4 MG/1
4 TABLET, FILM COATED ORAL EVERY 6 HOURS PRN
Qty: 25 TABLET | Refills: 0 | Status: SHIPPED | OUTPATIENT
Start: 2019-10-24 | End: 2020-01-08

## 2019-10-24 NOTE — TELEPHONE ENCOUNTER
----- Message from Moni Stone sent at 10/24/2019  3:59 PM CDT -----  Contact: pt at [phone]  Rani pt- Pt is still w ai ting at his pharmacy for an hour now.  Med has not been sent.  Pt uses [facility] at [phone].  Pt states the rx was written and pharmacy wants to verify the rx.  Med is Percocet 5mg.  Pharmacy is  Still waiting for approval to verify.  Pharmacy will not give him the rx w/o talking to you.  Pharmacy closes a 0600.

## 2019-10-24 NOTE — PROGRESS NOTES
"Subjective:       Isaiah Greene presents to the clinic after leaving [place] 10/05/19. 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 pylori. He had GJ bypass with J tube and port-a-cath placement on 9/19/19. He was then transferred back to [facility] from [facility] with SBO on 9/25/19. Underwent exp lap with G and J tube placement with Dr. Saravia on 9/30/19. He left [place] on 10/4/19. Now at clinic, he has missed his oncologist appointment. Is drinking the tube feeds, feels full after small meals, tolerating a regular diet, no nausea or vomit, is not venting his G tube. Small opening at incisions site, no discharge no fever. Gained 4 pounds since previous visit.      Objective:      /81 (BP Location: Right arm)   Pulse 97   Ht 5' 10" (1.778 m)   Wt 54.6 kg (120 lb 5.9 oz)   BMI 17.27 kg/m²     General:  alert, appears stated age, cachectic and cooperative   Abdomen: soft, bowel sounds active, non-tender   Incision:   Opening in the superior part of wound, no drainage, no erythema, no hernia, no seroma, no swelling, no dehiscence,.        Assessment:     59 y.o Male with gastric singlet ring cell adenocarcinoma   Underwent exp lap with G and J tube placement with Dr. Saravia on 9/30/19  Left hospital [place] 10/4/19. Now in clinic.      Plan:     Patient desires to start chemotherapy, we will send a referral to [facility] (closest to [place])   Wound is not infected, no need for antibiotic, continue dressing changes.   Will reorder home health so tube feeds are delivered.   Instructions and follow up appointment were discussed. Questions were answered,     Leonard Aponte MD   General Surgery PGY-I  Pager [phone]     "

## 2019-10-30 ENCOUNTER — TELEPHONE (OUTPATIENT)
Dept: SURGERY | Facility: CLINIC | Age: 59
End: 2019-10-30

## 2019-10-30 RX ORDER — OXYCODONE HYDROCHLORIDE 15 MG/1
15 TABLET ORAL EVERY 8 HOURS PRN
Qty: 21 TABLET | Refills: 0 | Status: SHIPPED | OUTPATIENT
Start: 2019-10-30 | End: 2019-11-05

## 2019-10-30 NOTE — TELEPHONE ENCOUNTER
----- Message from Massimo Mendozamissyzaina sent at 10/30/2019  1:48 PM CDT -----  Contact: Pt  Rx Refill/Request     Is this a Refill or New Rx:  Refill- since the Pt doesn't see the radiation doctor until next week.    Rx Name and Strength:  oxyCODONE (ROXICODONE) 5 MG immediate release tablet and the Pt needs something for upset stomach called in.    Preferred Pharmacy with phone number: [facility] - [facility] [address] - Phone # [phone]    Communication Preference:[phone]    Additional Information: The Pt states that Dr. Saravia normally sends the Rx by [employer].  Please contact the Pt.

## 2019-11-07 ENCOUNTER — TELEPHONE (OUTPATIENT)
Dept: SURGERY | Facility: CLINIC | Age: 59
End: 2019-11-07

## 2019-11-08 ENCOUNTER — OFFICE VISIT (OUTPATIENT)
Dept: SURGERY | Facility: CLINIC | Age: 59
End: 2019-11-08
Payer: MEDICAID

## 2019-11-08 VITALS
HEIGHT: 70 IN | BODY MASS INDEX: 18.25 KG/M2 | DIASTOLIC BLOOD PRESSURE: 83 MMHG | HEART RATE: 116 BPM | WEIGHT: 127.44 LBS | TEMPERATURE: 98 F | SYSTOLIC BLOOD PRESSURE: 117 MMHG

## 2019-11-08 DIAGNOSIS — R63.4 WEIGHT LOSS, UNINTENTIONAL: ICD-10-CM

## 2019-11-08 DIAGNOSIS — C80.1 SIGNET RING CELL ADENOCARCINOMA: Primary | ICD-10-CM

## 2019-11-08 DIAGNOSIS — E43 SEVERE PROTEIN-CALORIE MALNUTRITION: ICD-10-CM

## 2019-11-08 PROBLEM — K31.1 GASTRIC OUTLET OBSTRUCTION: Status: RESOLVED | Noted: 2019-09-09 | Resolved: 2019-11-08

## 2019-11-08 PROBLEM — A04.8 H. PYLORI INFECTION: Status: RESOLVED | Noted: 2019-09-16 | Resolved: 2019-11-08

## 2019-11-08 PROCEDURE — 99213 OFFICE O/P EST LOW 20 MIN: CPT | Mod: PBBFAC | Performed by: NURSE PRACTITIONER

## 2019-11-08 PROCEDURE — 99999 PR PBB SHADOW E&M-EST. PATIENT-LVL III: ICD-10-PCS | Mod: PBBFAC,,, | Performed by: NURSE PRACTITIONER

## 2019-11-08 PROCEDURE — 99999 PR PBB SHADOW E&M-EST. PATIENT-LVL III: CPT | Mod: PBBFAC,,, | Performed by: NURSE PRACTITIONER

## 2019-11-08 PROCEDURE — 99024 PR POST-OP FOLLOW-UP VISIT: ICD-10-PCS | Mod: ,,, | Performed by: NURSE PRACTITIONER

## 2019-11-08 PROCEDURE — 99024 POSTOP FOLLOW-UP VISIT: CPT | Mod: ,,, | Performed by: NURSE PRACTITIONER

## 2019-11-08 NOTE — PROGRESS NOTES
"  Post-Op Follow-up Visit:   11/8/2019  Patient ID: Isaiah Greene is a 59 y.o. male, born 1960    Chief Complaint   Patient presents with    Follow-up     09.10.2019 EGD: Gastic signet ring cell adenocarcinoma  09.19.2019  GJ bypass with J tube and port-a-cath placement  09.30.2019 exp lap with G & J tube placement  10.05.2019 AMA from [facility]    History: This 58 y/o gentleman returns to clinic from [place]. 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 pylori. Last seen by Dr. Saravia on 10/24/19.  He still has not used G tube or J tube. He does not open/ vent/ drain G tube. He does not flush J tube nor use for TF. Reports he is eating some but drinks the TF supplements up to 8-9 cans day. Denies N/V/D/dysphagia.   Pre dx weight closer to 145# range. He is up 7# since last clinic visit.   Continues to smoke tobacco daily and marijuana frequently.   He had appt yesterday with local med onc, Dr. Sol.     Physical Exam:  /83   Pulse (!) 116   Temp 98 °F (36.7 °C) (Oral)   Ht 5' 10" (1.778 m)   Wt 57.8 kg (127 lb 6.8 oz)   BMI 18.28 kg/m²     General:  Non-toxic, ambulatory  Abd:  Soft, non-tender  Incision:  Midline abd incision with upper aspect healing via secondary intent. G tube in place. J tube in place.       ICD-10-CM ICD-9-CM    1. 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 Weight loss, unintentional R63.4 783.21    3. Severe protein-calorie malnutrition E43 262    Plan   Continue with oral intake, regular diet, encouraged protein and monitor weight at home.   Remove J tube and G tube.   Stop smoking.   F/u with local med onc.         Follow up if symptoms worsen or fail to improve.      Ashley Baker NP  Surgical Oncology  [facility] [place], [place]  Office: [phone]  Fax: [phone]         "

## 2019-11-25 ENCOUNTER — TELEPHONE (OUTPATIENT)
Dept: SURGERY | Facility: CLINIC | Age: 59
End: 2019-11-25

## 2019-11-25 NOTE — TELEPHONE ENCOUNTER
----- Message from Luana Oswald sent at 11/25/2019 12:50 PM CST -----  Contact: pt  Pt would like to know why his post op was canceled on today with Dr. Saravia. Please give pt a call back at [phone].

## 2019-12-02 ENCOUNTER — TELEPHONE (OUTPATIENT)
Dept: SURGERY | Facility: CLINIC | Age: 59
End: 2019-12-02

## 2019-12-02 ENCOUNTER — TELEPHONE (OUTPATIENT)
Dept: HEMATOLOGY/ONCOLOGY | Facility: CLINIC | Age: 59
End: 2019-12-02

## 2019-12-02 NOTE — TELEPHONE ENCOUNTER
Attempted to call patient again. No answer, phone does not ring. Did not leave second voicemail, will try again tomorrow and leave another voicemail if still no answer

## 2019-12-02 NOTE — TELEPHONE ENCOUNTER
----- Message from Isa Do RN sent at 12/2/2019 12:05 PM CST -----  Would you be able to call him?  I'm on the [facility] today with Dr CASTANON and getting one of the new nurses oriented.  Looks like he had a new CT done on Friday.  Thanks a Vani blake    ----- Message -----  From: Sherry Cunha RN  Sent: 12/2/2019  12:00 PM CST  To: Isa Do RN    We can certainly set him up with another fellow. It looks like there is availability on Wed with Dr. Estrada. Would you like me to call him or do you think he would do better scheduling through you?      ----- Message -----  From: Isa Do RN  Sent: 12/2/2019  11:50 AM CST  To: Sherry Cunha RN    This sathish was set up to see Dr Mars with us at [facility] and was a no show on 10/21.    He was set up with Dr Lee at [facility] but wants to come to [facility].   He is a bit of a non compliant person.    Any thoughts?

## 2019-12-02 NOTE — TELEPHONE ENCOUNTER
----- Message from Massimo Boyle sent at 12/2/2019  9:22 AM CST -----  Contact: Pt      The Pt would like a call back about having his Chemo at [facility] and not at [facility].  The Pt had a CT Scan last Friday and would also like the results.  Please contact the Pt to schedule.    Phone # [phone]

## 2019-12-02 NOTE — TELEPHONE ENCOUNTER
Called patient about transferring care for oncology. Left voicemail with my callback number and the purpose of my call requesting return call at earliest convenience

## 2019-12-02 NOTE — TELEPHONE ENCOUNTER
----- Message from Isa Do RN sent at 12/2/2019 12:05 PM CST -----  Would you be able to call him?  I'm on the [facility] today with Dr CASTANON and getting one of the new nurses oriented.  Looks like he had a new CT done on Friday.  Thanks a Vani blake    ----- Message -----  From: Shrery Cunha RN  Sent: 12/2/2019  12:00 PM CST  To: Isa Do RN    We can certainly set him up with another fellow. It looks like there is availability on Wed with Dr. Estrada. Would you like me to call him or do you think he would do better scheduling through you?      ----- Message -----  From: Isa Do RN  Sent: 12/2/2019  11:50 AM CST  To: Sherry Cunha RN    This sathish was set up to see Dr Mars with us at [facility] and was a no show on 10/21.    He was set up with Dr Lee at [facility] but wants to come to [facility].   He is a bit of a non compliant person.    Any thoughts?

## 2019-12-03 ENCOUNTER — TELEPHONE (OUTPATIENT)
Dept: SURGERY | Facility: CLINIC | Age: 59
End: 2019-12-03

## 2019-12-03 ENCOUNTER — TELEPHONE (OUTPATIENT)
Dept: HEMATOLOGY/ONCOLOGY | Facility: CLINIC | Age: 59
End: 2019-12-03

## 2019-12-03 NOTE — TELEPHONE ENCOUNTER
----- Message from Massimo Boyle sent at 12/3/2019  8:42 AM CST -----  Contact: Pt      The Pt states that he needs to speak with you to get the results to his CT Scan and to see about starting his Chemo here at [facility].  The Pt states that he is in worse pain then he was in before.  The Chemo doctor at [facility] the Pt states that he is done with him and wants to receive care at [facility].  Please contact the Pt.    Phone [phone]

## 2019-12-03 NOTE — TELEPHONE ENCOUNTER
Attempted to contact patient again today regarding desire to switch to an oncologist at [facility]. Left voicemail with my callback number requesting return call at earliest convenience.

## 2019-12-03 NOTE — TELEPHONE ENCOUNTER
Patient returned my call, we discussed his desire to transfer his care to [facility]. Patient states he is unhappy with the care he is receiving at [facility]. Discussed options for consult. He would like to be scheduled as soon as possible, experiencing pain and believes he is getting worse. Scheduled for tomorrow with Dr. Villalpando. Reviewed the location of the cancer center, patient verbalized understanding of all information

## 2019-12-04 ENCOUNTER — TELEPHONE (OUTPATIENT)
Dept: HEMATOLOGY/ONCOLOGY | Facility: CLINIC | Age: 59
End: 2019-12-04

## 2019-12-04 ENCOUNTER — OFFICE VISIT (OUTPATIENT)
Dept: HEMATOLOGY/ONCOLOGY | Facility: CLINIC | Age: 59
End: 2019-12-04
Payer: MEDICAID

## 2019-12-04 VITALS
DIASTOLIC BLOOD PRESSURE: 83 MMHG | BODY MASS INDEX: 17.39 KG/M2 | WEIGHT: 121.5 LBS | TEMPERATURE: 98 F | SYSTOLIC BLOOD PRESSURE: 131 MMHG | HEIGHT: 70 IN | HEART RATE: 113 BPM | OXYGEN SATURATION: 100 % | RESPIRATION RATE: 18 BRPM

## 2019-12-04 DIAGNOSIS — G89.3 CANCER ASSOCIATED PAIN: Primary | ICD-10-CM

## 2019-12-04 DIAGNOSIS — C80.1 SIGNET RING CELL ADENOCARCINOMA: ICD-10-CM

## 2019-12-04 DIAGNOSIS — G89.3 CANCER ASSOCIATED PAIN: ICD-10-CM

## 2019-12-04 PROCEDURE — 99999 PR PBB SHADOW E&M-EST. PATIENT-LVL III: ICD-10-PCS | Mod: PBBFAC,,,

## 2019-12-04 PROCEDURE — 99205 PR OFFICE/OUTPT VISIT, NEW, LEVL V, 60-74 MIN: ICD-10-PCS | Mod: S$PBB,,,

## 2019-12-04 PROCEDURE — 99213 OFFICE O/P EST LOW 20 MIN: CPT | Mod: PBBFAC

## 2019-12-04 PROCEDURE — 99205 OFFICE O/P NEW HI 60 MIN: CPT | Mod: S$PBB,,,

## 2019-12-04 PROCEDURE — 99999 PR PBB SHADOW E&M-EST. PATIENT-LVL III: CPT | Mod: PBBFAC,,,

## 2019-12-04 RX ORDER — OXYCODONE HYDROCHLORIDE 20 MG/1
20 TABLET ORAL EVERY 8 HOURS PRN
Qty: 30 TABLET | Refills: 0 | Status: SHIPPED | OUTPATIENT
Start: 2019-12-04 | End: 2019-12-05 | Stop reason: SDUPTHER

## 2019-12-04 RX ORDER — OXYCODONE HYDROCHLORIDE 20 MG/1
20 TABLET ORAL EVERY 8 HOURS PRN
Qty: 30 TABLET | Refills: 0 | Status: SHIPPED | OUTPATIENT
Start: 2019-12-04 | End: 2019-12-04 | Stop reason: SDUPTHER

## 2019-12-04 RX ORDER — HYDROMORPHONE HYDROCHLORIDE 4 MG/1
4 TABLET ORAL EVERY 12 HOURS PRN
Qty: 30 TABLET | Refills: 0 | Status: SHIPPED | OUTPATIENT
Start: 2019-12-04 | End: 2019-12-11 | Stop reason: SDUPTHER

## 2019-12-04 RX ORDER — PROMETHAZINE HYDROCHLORIDE 25 MG/1
25 TABLET ORAL EVERY 4 HOURS
Qty: 30 TABLET | Refills: 0 | Status: SHIPPED | OUTPATIENT
Start: 2019-12-04 | End: 2019-12-11 | Stop reason: SDUPTHER

## 2019-12-04 NOTE — TELEPHONE ENCOUNTER
Returned call to pharmacy, spoke with Patrizia pharmacist to clarify.    After reviewing patient chart, confused on what pain medications patient to be taking.   Dilaudid script shows being sent to [facility] pharmacy, however, oxy prescription sent to [facility] in [place].     Per pharmacist patient just had a large amount of medications dispensed past month. Per pharmacy:   Percocet  60 tablets dispensed 11/5/19 1 q12, and 60 morphine ER 1 tablet q8    Then: 11/23/19 60 morphine ER 1 tablet q8 and 90 percocet dispensed    She wanted to clarify we are aware of this before she could process and fill the oxy prescription she received.   Explained that would follow up w/ provider to discuss pain management care and make sure he was aware of the recent dispenses.   Will contact pharmacy back once verified.   Patrizia expressed understanding, prescription currently on hold until further notice.

## 2019-12-04 NOTE — PROGRESS NOTES
[facility]  [facility]  Hematology/Medical Oncology Clinic      PATIENT: Isaiah Greene  MRN: [id number]  DATE: 12/4/2019    Diagnosis:   1. Cancer associated pain    2. Signet ring cell adenocarcinoma of stomach      Chief Complaint: No chief complaint on file.    Other MDs:  Primary Doctor No  Previous oncologist: Dr. Lee  Surg Onc: Dr. HENRI Saravia    Subjective:   Initial History: Mr. Greene is a 59 y.o. male who presents to oncology clinic for history of previously diagnosed, locally advanced, singlet ring cell gastric adenocarcinoma.    He was diagnosed back in Sept 2019, at [facility], on imaging that was done 2/2 to abdominal complaints. Initial CT scan did not show any evidence of metastatic disease though small volume ascites was seen. He was initially seen by Dr. Fair there though [place] [facility] [place] an EGD on 9/10 that showed obstruction and biopsy proven adeno + h pylori positive. Repeat imaging here showed evidence of scattered retroperitoneal paraaortic LAD. He then underwent a port placement.    At the end of September he underwent ex-lap for decompression and G&J tube placements. It appears he refused to use the tubes and continued eating by mouth. He has has some lack of appropriate follow up, non compliance and has left the hospital AMA.     He followed up with Dr. Lee in [place] who recommended PET scan and behavioral health appointments.     He has since followed back up with general surgery and had his G&J tubes removed since he was not using them.     He is by himself today in the clinic. He is complaining of abdominal pain and nausea. He appears to be in mild distress on exam. States he has been having difficulty eating and drinking since his surgery. He has been losing weight.     He continues to smoke.      Past Medical History:   Diagnosis Date    Chronic gastritis     Diverticulosis      Positive H. 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     Past Surgical History:   Procedure Laterality Date    DIAGNOSTIC LAPAROSCOPY N/A 9/19/2019    Procedure: LAPAROSCOPY, DIAGNOSTIC;  Surgeon: Josse Saravia MD;  Location: [address];  Service: General;  Laterality: N/A;    ENDOSCOPIC ULTRASOUND OF UPPER GASTROINTESTINAL TRACT N/A 9/17/2019    Procedure: ULTRASOUND, UPPER GI TRACT, ENDOSCOPIC;  Surgeon: Nino Randhawa MD;  Location: [address]);  Service: Endoscopy;  Laterality: N/A;    ESOPHAGOGASTRODUODENOSCOPY N/A 9/10/2019    Procedure: EGD (ESOPHAGOGASTRODUODENOSCOPY);  Surgeon: Ok Diaz MD;  Location: [facility];  Service: Endoscopy;  Laterality: N/A;    EYE SURGERY      FACIAL RECONSTRUCTION SURGERY      GASTROJEJUNOSTOMY N/A 9/19/2019    Procedure: GASTROJEJUNOSTOMY, possible G-tube;  Surgeon: Josse Saravia MD;  Location: [address];  Service: General;  Laterality: N/A;    INSERTION OF VENOUS ACCESS PORT Right 9/19/2019    Procedure: INSERTION, VENOUS ACCESS PORT;  Surgeon: Josse Saravia MD;  Location: [address];  Service: General;  Laterality: Right;    LAPAROSCOPIC INSERTION OF JEJUNOSTOMY TUBE N/A 9/19/2019    Procedure: INSERTION, JEJUNOSTOMY TUBE, LAPAROSCOPIC, possible open;  Surgeon: Josse Saravia MD;  Location: [address];  Service: General;  Laterality: N/A;    PERCUTANEOUS INSERTION OF GASTROSTOMY TUBE  9/30/2019    Procedure: INSERTION, GASTROSTOMY TUBE;  Surgeon: Josse Saravia MD;  Location: [address];  Service: General;;    PLACEMENT OF JEJUNOSTOMY TUBE N/A 9/30/2019    Procedure: INSERTION, JEJUNOSTOMY TUBE, revision;  Surgeon: Josse Saravia MD;  Location: [address];  Service: General;  Laterality: N/A;     Family History   Problem Relation Age of Onset    Cancer Father         colon      reports that he has been smoking cigarettes. He has a 20.00 pack-year smoking history. He  has never used smokeless tobacco. He reports that he drank alcohol. He reports that he has current or past drug history. Drug: Marijuana.  Review of patient's allergies indicates:  No Known Allergies  Current Outpatient Medications   Medication Sig Dispense Refill    HYDROmorphone (DILAUDID) 4 MG tablet Take 1 tablet (4 mg total) by mouth every 12 (twelve) hours as needed for Pain. 30 tablet 0    omeprazole (PRILOSEC) 40 MG capsule Take 1 capsule (40 mg total) by mouth once daily. 30 capsule 11    ondansetron (ZOFRAN) 4 MG tablet Take 1 tablet (4 mg total) by mouth every 6 (six) hours as needed for Nausea. 30 tablet 1    ondansetron (ZOFRAN) 4 MG tablet Take 1 tablet (4 mg total) by mouth every 6 (six) hours as needed for Nausea. 25 tablet 0    oxyCODONE (ROXICODONE) 20 mg Tab immediate release tablet Take 1 tablet (20 mg total) by mouth every 8 (eight) hours as needed for Pain. 30 tablet 0    promethazine (PHENERGAN) 25 MG tablet Take 1 tablet (25 mg total) by mouth every 4 (four) hours. 30 tablet 0     No current facility-administered medications for this visit.      Review of Systems   Constitutional: Positive for activity change, appetite change, fatigue and unexpected weight change.   HENT: Positive for dental problem and trouble swallowing. Negative for nosebleeds and voice change.    Eyes: Negative for photophobia, redness and visual disturbance.   Respiratory: Positive for choking. Negative for chest tightness, shortness of breath and wheezing.    Cardiovascular: Negative for chest pain, palpitations and leg swelling.   Gastrointestinal: Positive for abdominal distention, abdominal pain, constipation, nausea and vomiting. Negative for anal bleeding, blood in stool and diarrhea.   Genitourinary: Negative for decreased urine volume, difficulty urinating and hematuria.   Musculoskeletal: Positive for arthralgias and back pain. Negative for myalgias and neck stiffness.   Skin: Positive for pallor. Negative  "for rash and wound.   Neurological: Negative for dizziness, facial asymmetry and headaches.   Hematological: Negative for adenopathy. Does not bruise/bleed easily.   Psychiatric/Behavioral: Negative for agitation and behavioral problems.     ECOG Performance Status: 1    Objective:      Vitals:   Vitals:    12/04/19 0941   BP: 131/83   BP Location: Left arm   Patient Position: Sitting   BP Method: Medium (Automatic)   Pulse: (!) 113   Resp: 18   Temp: 98 °F (36.7 °C)   TempSrc: Oral   SpO2: 100%   Weight: 55.1 kg (121 lb 7.6 oz)   Height: 5' 10" (1.778 m)     BMI: Body mass index is 17.43 kg/m².    Physical Exam   Constitutional: He is oriented to person, place, and time.   Cachectic male, sitting in the chair, mild distress   HENT:   Temporal wasting noted, left false eye   Eyes: No scleral icterus.   Neck: No JVD present.   Thin anatomy   Cardiovascular: Normal rate and regular rhythm.   Pulmonary/Chest: Effort normal.   Diffuse rhonchi   Abdominal: He exhibits no distension and no mass. There is tenderness. There is no guarding.   Multiple scars, healing appropriately    Musculoskeletal: He exhibits no edema or deformity.   Thin anatomy   Lymphadenopathy:     He has no cervical adenopathy.   Neurological: He is alert and oriented to person, place, and time. No sensory deficit.   Skin: Skin is dry. There is pallor.   Vitals reviewed.    Laboratory Data:  No visits with results within 1 Week(s) from this visit.   Latest known visit with results is:   No results displayed because visit has over 200 results.          Assessment:       1. Cancer associated pain    2. Signet ring cell adenocarcinoma of stomach        Oncologic History:      2019  September   9/10 EGD: Gastric signet ring cell adenocarcinoma   9/19 Port placement   9/30 G&J tube placement  October    [place] [facility] [place]24 Surgical eval, referred to [facility] - tubes removed  November 11/5 [facility] h/o eval with Dr. 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 Greene comes to the cancer center for a transfer of care for his previously diagnosed gastric ca. He was diagnosed back in Sept of this year with biopsy proven ca. He was determined to be locally advanced during a ex lap for obstruction though not obvious evidence of metastatic disease at that time, per surgery. Plan is to treat esteban adjuvantly and reassess. He has chronic and acute pain problems that are likely going to be a barrier to treatment though we will do our best to accommodate these acute and chronic pain complaints. He states he will come in regularly and as scheduled, any chemo plan that we determine is best for them.     -obtain [id number] status on path - messaged Dr. Reno  -will plan for neoadjuvant chemo therapy - FLOT  -discuss with Dr. Saravia regarding plan of care   -no need for PET  -will require post esteban scans for restaging  -refill pain medication for 7 week at a time, instructed not to drive while taking  -cbc/cmp next week with 1 week f/u OV    Discussed with Dr. Soraya Villalpando MD  Hematology/Medical Oncology Fellow  [phone] (pager)      ATTENDING NOTE, ONCOLOGY CLINIC    As above.  Patient seen and examined, chart reviewed.  Appears comfortable, in NAD.  Lungs are clear to auscultation.  Abdomen is soft, nontender.  Labs reviewed.    PLAN  Case discussed with Dr. Saravia; he would like Mr Greene to receive neoadjuvant chemotherapy follwoed by an attempt at resection.  We will arrange for him to have a port placed, and begin the authorization process for chemotherapy.  RTC 1-2 weeks to begin chemotherapy.  His multiple questions were answered to his satisfaction.        Jigar Lira MD

## 2019-12-04 NOTE — PLAN OF CARE
DISCONTINUE ON PATHWAY REGIMEN - Gastroesophageal    No Medical Intervention - Off Treatment.    PRIOR TREATMENT: Gastric1: Referral to Surgery    Gastroesophageal - No Medical Intervention - Off Treatment.    Patient Characteristics:  Gastric, Adenocarcinoma, Preoperative or Nonsurgical Candidate (Clinical   Staging), cT1 - cT2, cN0, Surgical Candidate  Histology: Adenocarcinoma  Disease Classification: Gastric  Therapeutic Status: Preoperative or Nonsurgical Candidate (Clinical Staging)  AJCC N Category: cN0  AJCC M Category: cM0  AJCC 8 Stage Grouping: I  AJCC T Category: cT2  Patient Characteristics: Surgical Candidate

## 2019-12-04 NOTE — TELEPHONE ENCOUNTER
----- Message from Elan Villalpando MD sent at 12/4/2019 12:00 PM CST -----  Hey guys, he needs a follow next week with me and chemo auth, will be put in later today.    He also needs a lab visit with CBC/CMP next week prior to my visit.     I will message later than the chemo order is placed    Thanks!  Low

## 2019-12-04 NOTE — NURSING
Path report, mammogram, pap faxed to [facility]   Pt has been off the floor since around 11 am. PCA and fluids stopped. Tube feeds not yet begun. Pt said he was going to emergency room with his wife.

## 2019-12-04 NOTE — PLAN OF CARE
Gastroesophageal - No Medical Intervention - Off Treatment.    Patient Characteristics:  Gastric, Adenocarcinoma, Preoperative or Nonsurgical Candidate (Clinical   Staging), cT1 - cT2, cN0, Surgical Candidate  Histology: Adenocarcinoma  Disease Classification: Gastric  Therapeutic Status: Preoperative or Nonsurgical Candidate (Clinical Staging)  AJCC N Category: cN0  AJCC M Category: cM0  AJCC 8 Stage Grouping: I  AJCC T Category: cT2  Patient Characteristics: Surgical Candidate

## 2019-12-04 NOTE — PLAN OF CARE
DISCONTINUE ON PATHWAY REGIMEN - Gastroesophageal    No Medical Intervention - Off Treatment.    REASON: Other Reason  PRIOR TREATMENT: Off Treatment    START ON PATHWAY REGIMEN - Gastroesophageal    [id number]        Docetaxel (Taxotere(R))       Oxaliplatin (Eloxatin(R))       Leucovorin       5-Fluorouracil           Additional Orders: Premedicate with dexamethasone 8 mg PO bid for three   days beginning 1 day prior to chemotherapy.  Ref: Omari et al. Lancet Oncol   2016;17: 1697-708    **Always confirm dose/schedule in your pharmacy ordering system**    Patient Characteristics:  Gastric, Adenocarcinoma, Preoperative or Nonsurgical Candidate (Clinical   Staging), cT3 or Higher or cN+, Surgical Candidate (Up to cT4a) - Preoperative   Therapy  Histology: Adenocarcinoma  Disease Classification: Gastric  Therapeutic Status: Preoperative or Nonsurgical Candidate (Clinical Staging)  AJCC N Category: cNX  AJCC M Category: cM0  AJCC 8 Stage Grouping: Unknown  AJCC T Category: cT1b  Intent of Therapy:  Curative Intent, Discussed with Patient

## 2019-12-04 NOTE — Clinical Note
Hey guys, he needs a follow next week with me and chemo auth, will be put in later today.He also needs a lab visit with CBC/CMP next week prior to my visit. I will message later than the chemo order is placedThanks!Low

## 2019-12-05 ENCOUNTER — DOCUMENTATION ONLY (OUTPATIENT)
Dept: HEMATOLOGY/ONCOLOGY | Facility: CLINIC | Age: 59
End: 2019-12-05

## 2019-12-05 DIAGNOSIS — G89.3 CANCER ASSOCIATED PAIN: ICD-10-CM

## 2019-12-05 RX ORDER — OXYCODONE HYDROCHLORIDE 20 MG/1
20 TABLET ORAL EVERY 8 HOURS PRN
Qty: 30 TABLET | Refills: 0 | Status: SHIPPED | OUTPATIENT
Start: 2019-12-05 | End: 2019-12-11

## 2019-12-05 NOTE — PROGRESS NOTES
Received referral from the clinic that the patient was in need of referral for Becky Iqbal. Attempted to reach out to the patient to discuss as well as gather information for the application. There was no answer. I left a detailed message along with my contact information.

## 2019-12-11 ENCOUNTER — TELEPHONE (OUTPATIENT)
Dept: HEMATOLOGY/ONCOLOGY | Facility: CLINIC | Age: 59
End: 2019-12-11

## 2019-12-11 DIAGNOSIS — G89.3 CANCER ASSOCIATED PAIN: ICD-10-CM

## 2019-12-11 DIAGNOSIS — C80.1 SIGNET RING CELL ADENOCARCINOMA: ICD-10-CM

## 2019-12-11 RX ORDER — OMEPRAZOLE 40 MG/1
40 CAPSULE, DELAYED RELEASE ORAL DAILY
Qty: 30 CAPSULE | Refills: 11 | Status: SHIPPED | OUTPATIENT
Start: 2019-12-11 | End: 2019-12-18

## 2019-12-11 RX ORDER — PROMETHAZINE HYDROCHLORIDE 12.5 MG/1
25 TABLET ORAL EVERY 4 HOURS
Qty: 60 TABLET | Refills: 0 | Status: SHIPPED | OUTPATIENT
Start: 2019-12-11 | End: 2019-12-18 | Stop reason: SDUPTHER

## 2019-12-11 RX ORDER — HYDROMORPHONE HYDROCHLORIDE 4 MG/1
4 TABLET ORAL EVERY 12 HOURS PRN
Qty: 30 TABLET | Refills: 0 | Status: SHIPPED | OUTPATIENT
Start: 2019-12-11 | End: 2019-12-18

## 2019-12-11 RX ORDER — OXYCODONE HYDROCHLORIDE 30 MG/1
30 TABLET ORAL EVERY 8 HOURS PRN
Qty: 30 TABLET | Refills: 0 | Status: SHIPPED | OUTPATIENT
Start: 2019-12-11 | End: 2019-12-18

## 2019-12-11 RX ORDER — LORAZEPAM 1 MG/1
1 TABLET ORAL NIGHTLY PRN
Qty: 7 TABLET | Refills: 0 | Status: SHIPPED | OUTPATIENT
Start: 2019-12-11 | End: 2020-01-08

## 2019-12-11 NOTE — TELEPHONE ENCOUNTER
----- Message from Elan Villalpando MD sent at 12/10/2019  8:40 PM CST -----  Hey gurashi    Wondering where to auth is for Mr. Greene who is coming in tomorrow for chemo (as well as it doesn't look like he is scheduled for infusion.) If he is not authed yet, please have him rescheduled for 1 week later for an OV, with labs prior and infusion time.    Thanks and please contact with any questions    Low  ----- Message -----  From: Mckenzie Coles  Sent: 12/4/2019   2:05 PM CST  To: Lynda Miller, KAJAL, Melly Joseph, #    I do not see a referral in for chemo???    ----- Message -----  From: Elan Villalpando MD  Sent: 12/4/2019   1:56 PM CST  To: Lynda Miller RN, Melly Joseph    Ceci smith    Can we get auth for his chemo that was placed    He also needs to be scheduled for C1D1 next wednesday the 11th and D2 on the 12th.    He may require help with lodging on the night of the 12th since he [place] bit away. Thanks    Low

## 2019-12-18 ENCOUNTER — TELEPHONE (OUTPATIENT)
Dept: PHARMACY | Facility: CLINIC | Age: 59
End: 2019-12-18

## 2019-12-18 ENCOUNTER — OFFICE VISIT (OUTPATIENT)
Dept: HEMATOLOGY/ONCOLOGY | Facility: CLINIC | Age: 59
End: 2019-12-18
Payer: MEDICAID

## 2019-12-18 ENCOUNTER — LAB VISIT (OUTPATIENT)
Dept: LAB | Facility: HOSPITAL | Age: 59
End: 2019-12-18
Payer: MEDICAID

## 2019-12-18 VITALS
RESPIRATION RATE: 18 BRPM | OXYGEN SATURATION: 99 % | HEART RATE: 102 BPM | DIASTOLIC BLOOD PRESSURE: 81 MMHG | HEIGHT: 70 IN | WEIGHT: 119.25 LBS | SYSTOLIC BLOOD PRESSURE: 118 MMHG | TEMPERATURE: 98 F | BODY MASS INDEX: 17.07 KG/M2

## 2019-12-18 DIAGNOSIS — C80.1 SIGNET RING CELL ADENOCARCINOMA: ICD-10-CM

## 2019-12-18 DIAGNOSIS — G89.3 CANCER ASSOCIATED PAIN: ICD-10-CM

## 2019-12-18 LAB
ALBUMIN SERPL BCP-MCNC: 3.8 G/DL (ref 3.5–5.2)
ALP SERPL-CCNC: 93 U/L (ref 55–135)
ALT SERPL W/O P-5'-P-CCNC: 10 U/L (ref 10–44)
ANION GAP SERPL CALC-SCNC: 7 MMOL/L (ref 8–16)
AST SERPL-CCNC: 20 U/L (ref 10–40)
BASOPHILS NFR BLD: 0 % (ref 0–1.9)
BILIRUB SERPL-MCNC: 0.3 MG/DL (ref 0.1–1)
BUN SERPL-MCNC: 18 MG/DL (ref 6–20)
CALCIUM SERPL-MCNC: 9.4 MG/DL (ref 8.7–10.5)
CHLORIDE SERPL-SCNC: 101 MMOL/L (ref 95–110)
CO2 SERPL-SCNC: 27 MMOL/L (ref 23–29)
CREAT SERPL-MCNC: 0.8 MG/DL (ref 0.5–1.4)
DIFFERENTIAL METHOD: ABNORMAL
EOSINOPHIL NFR BLD: 0 % (ref 0–8)
ERYTHROCYTE [DISTWIDTH] IN BLOOD BY AUTOMATED COUNT: 15.4 % (ref 11.5–14.5)
EST. GFR  (AFRICAN AMERICAN): >60 ML/MIN/1.73 M^2
EST. GFR  (NON AFRICAN AMERICAN): >60 ML/MIN/1.73 M^2
GLUCOSE SERPL-MCNC: 108 MG/DL (ref 70–110)
HCT VFR BLD AUTO: 45.6 % (ref 40–54)
HGB BLD-MCNC: 14 G/DL (ref 14–18)
IMM GRANULOCYTES # BLD AUTO: ABNORMAL K/UL (ref 0–0.04)
IMM GRANULOCYTES NFR BLD AUTO: ABNORMAL % (ref 0–0.5)
LYMPHOCYTES NFR BLD: 51 % (ref 18–48)
MCH RBC QN AUTO: 27.9 PG (ref 27–31)
MCHC RBC AUTO-ENTMCNC: 30.7 G/DL (ref 32–36)
MCV RBC AUTO: 91 FL (ref 82–98)
MONOCYTES NFR BLD: 7 % (ref 4–15)
NEUTROPHILS NFR BLD: 42 % (ref 38–73)
NRBC BLD-RTO: 0 /100 WBC
PATH REV BLD -IMP: NORMAL
PLATELET # BLD AUTO: 382 K/UL (ref 150–350)
PLATELET BLD QL SMEAR: ABNORMAL
PMV BLD AUTO: 8.8 FL (ref 9.2–12.9)
POTASSIUM SERPL-SCNC: 5.2 MMOL/L (ref 3.5–5.1)
PROT SERPL-MCNC: 7.9 G/DL (ref 6–8.4)
RBC # BLD AUTO: 5.01 M/UL (ref 4.6–6.2)
SODIUM SERPL-SCNC: 135 MMOL/L (ref 136–145)
WBC # BLD AUTO: 19.14 K/UL (ref 3.9–12.7)

## 2019-12-18 PROCEDURE — 80053 COMPREHEN METABOLIC PANEL: CPT

## 2019-12-18 PROCEDURE — 99215 PR OFFICE/OUTPT VISIT, EST, LEVL V, 40-54 MIN: ICD-10-PCS | Mod: S$PBB,,,

## 2019-12-18 PROCEDURE — 99215 OFFICE O/P EST HI 40 MIN: CPT | Mod: S$PBB,,,

## 2019-12-18 PROCEDURE — 36415 COLL VENOUS BLD VENIPUNCTURE: CPT

## 2019-12-18 PROCEDURE — 85060 PATHOLOGIST REVIEW: ICD-10-PCS | Mod: ,,, | Performed by: PATHOLOGY

## 2019-12-18 PROCEDURE — 85027 COMPLETE CBC AUTOMATED: CPT

## 2019-12-18 PROCEDURE — 99999 PR PBB SHADOW E&M-EST. PATIENT-LVL IV: CPT | Mod: PBBFAC,,,

## 2019-12-18 PROCEDURE — 99214 OFFICE O/P EST MOD 30 MIN: CPT | Mod: PBBFAC

## 2019-12-18 PROCEDURE — 85007 BL SMEAR W/DIFF WBC COUNT: CPT

## 2019-12-18 PROCEDURE — 99999 PR PBB SHADOW E&M-EST. PATIENT-LVL IV: ICD-10-PCS | Mod: PBBFAC,,,

## 2019-12-18 PROCEDURE — 85060 BLOOD SMEAR INTERPRETATION: CPT | Mod: ,,, | Performed by: PATHOLOGY

## 2019-12-18 RX ORDER — HEPARIN 100 UNIT/ML
500 SYRINGE INTRAVENOUS
Status: CANCELLED | OUTPATIENT
Start: 2019-12-19

## 2019-12-18 RX ORDER — DEXAMETHASONE 4 MG/1
TABLET ORAL
Qty: 12 TABLET | Refills: 6 | Status: SHIPPED | OUTPATIENT
Start: 2019-12-18 | End: 2020-01-08

## 2019-12-18 RX ORDER — PROMETHAZINE HYDROCHLORIDE 12.5 MG/1
25 TABLET ORAL EVERY 4 HOURS
Qty: 60 TABLET | Refills: 0 | Status: SHIPPED | OUTPATIENT
Start: 2019-12-18 | End: 2019-12-18

## 2019-12-18 RX ORDER — PROMETHAZINE HYDROCHLORIDE 12.5 MG/1
25 TABLET ORAL EVERY 4 HOURS
Qty: 60 TABLET | Refills: 0 | Status: SHIPPED | OUTPATIENT
Start: 2019-12-18 | End: 2020-01-02

## 2019-12-18 RX ORDER — SODIUM CHLORIDE 0.9 % (FLUSH) 0.9 %
10 SYRINGE (ML) INJECTION
Status: CANCELLED | OUTPATIENT
Start: 2019-12-20

## 2019-12-18 RX ORDER — EPINEPHRINE 0.3 MG/.3ML
0.3 INJECTION SUBCUTANEOUS ONCE AS NEEDED
Status: CANCELLED | OUTPATIENT
Start: 2019-12-19

## 2019-12-18 RX ORDER — PANTOPRAZOLE SODIUM 40 MG/1
40 TABLET, DELAYED RELEASE ORAL 2 TIMES DAILY
Qty: 60 TABLET | Refills: 1 | Status: SHIPPED | OUTPATIENT
Start: 2019-12-18 | End: 2019-12-18

## 2019-12-18 RX ORDER — PANTOPRAZOLE SODIUM 40 MG/1
40 TABLET, DELAYED RELEASE ORAL 2 TIMES DAILY
Qty: 60 TABLET | Refills: 1 | Status: SHIPPED | OUTPATIENT
Start: 2019-12-18 | End: 2020-01-08

## 2019-12-18 RX ORDER — SODIUM CHLORIDE 0.9 % (FLUSH) 0.9 %
10 SYRINGE (ML) INJECTION
Status: CANCELLED | OUTPATIENT
Start: 2019-12-19

## 2019-12-18 RX ORDER — DIPHENHYDRAMINE HYDROCHLORIDE 50 MG/ML
50 INJECTION INTRAMUSCULAR; INTRAVENOUS ONCE AS NEEDED
Status: CANCELLED | OUTPATIENT
Start: 2019-12-19

## 2019-12-18 RX ORDER — HEPARIN 100 UNIT/ML
500 SYRINGE INTRAVENOUS
Status: CANCELLED | OUTPATIENT
Start: 2019-12-20

## 2019-12-18 RX ORDER — OXYCODONE HYDROCHLORIDE 20 MG/1
40 TABLET ORAL EVERY 8 HOURS PRN
Qty: 60 TABLET | Refills: 0 | Status: SHIPPED | OUTPATIENT
Start: 2019-12-18 | End: 2019-12-18

## 2019-12-18 RX ORDER — OXYCODONE HYDROCHLORIDE 20 MG/1
40 TABLET ORAL EVERY 8 HOURS PRN
Qty: 60 TABLET | Refills: 0 | Status: SHIPPED | OUTPATIENT
Start: 2019-12-18 | End: 2019-12-27

## 2019-12-18 NOTE — PROGRESS NOTES
[facility]  Hematology/Medical Oncology Clinic      PATIENT: Isaiah Greene  MRN: [id number]  DATE: 12/18/2019    Diagnosis:   1. Cancer associated pain    2. Signet ring cell adenocarcinoma of stomach      Chief Complaint: Cancer associated pain    Other MDs:  Primary Doctor No  Previous oncologist: Dr. Lee  Surg Onc: Dr. HENRI Saravia    Subjective:   Initial History: Mr. Greene is a 59 y.o. male who presents to oncology clinic for history of locally advanced, singlet ring cell gastric adenocarcinoma.    He continues to have cancer associated pain and nausea.     He also continues to complain of GERD than has not be improved with omeprazole.     He has be authed for his FLOT therapy and will start tomorrow in the infusion center.     He is cachectic and smells of tobacco today.    Past Medical History:   Diagnosis Date    Chronic gastritis     Diverticulosis     Positive H. 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     Past Surgical History:   Procedure Laterality Date    DIAGNOSTIC LAPAROSCOPY N/A 9/19/2019    Procedure: LAPAROSCOPY, DIAGNOSTIC;  Surgeon: Josse Saravia MD;  Location: [address];  Service: General;  Laterality: N/A;    ENDOSCOPIC ULTRASOUND OF UPPER GASTROINTESTINAL TRACT N/A 9/17/2019    Procedure: ULTRASOUND, UPPER GI TRACT, ENDOSCOPIC;  Surgeon: Nino Randhawa MD;  Location: [address]);  Service: Endoscopy;  Laterality: N/A;    ESOPHAGOGASTRODUODENOSCOPY N/A 9/10/2019    Procedure: EGD (ESOPHAGOGASTRODUODENOSCOPY);  Surgeon: Ok Diaz MD;  Location: [facility];  Service: Endoscopy;  Laterality: N/A;    EYE SURGERY      FACIAL RECONSTRUCTION SURGERY      GASTROJEJUNOSTOMY N/A 9/19/2019    Procedure: GASTROJEJUNOSTOMY, possible G-tube;  Surgeon: Josse Saravia MD;  Location: [address];  Service: General;  Laterality: N/A;    INSERTION OF VENOUS ACCESS PORT Right  9/19/2019    Procedure: INSERTION, VENOUS ACCESS PORT;  Surgeon: Josse Saravia MD;  Location: [facility];  Service: General;  Laterality: Right;    LAPAROSCOPIC INSERTION OF JEJUNOSTOMY TUBE N/A 9/19/2019    Procedure: INSERTION, JEJUNOSTOMY TUBE, LAPAROSCOPIC, possible open;  Surgeon: Josse Saravia MD;  Location: [facility] OR [facility];  Service: General;  Laterality: N/A;    PERCUTANEOUS INSERTION OF GASTROSTOMY TUBE  9/30/2019    Procedure: INSERTION, GASTROSTOMY TUBE;  Surgeon: Josse Saravia MD;  Location: [facility];  Service: General;;    PLACEMENT OF JEJUNOSTOMY TUBE N/A 9/30/2019    Procedure: INSERTION, JEJUNOSTOMY TUBE, revision;  Surgeon: Josse Saravia MD;  [facility];  Service: General;  Laterality: N/A;     Family History   Problem Relation Age of Onset    Cancer Father         colon      reports that he has been smoking cigarettes. He has a 20.00 pack-year smoking history. He has never used smokeless tobacco. He reports that he drank alcohol. He reports that he has current or past drug history. Drug: Marijuana.  Review of patient's allergies indicates:  No Known Allergies  Current Outpatient Medications   Medication Sig Dispense Refill    dexAMETHasone (DECADRON) 4 MG Tab Take 2 tablets twice a day the day prior to chemotherapy and two tablets the morning after chemotherapy 12 tablet 6    food supplemt, lactose-reduced (NUTRITIONAL SHAKE PLUS) Liqd Take 12 mLs by mouth 3 (three) times daily. 90 Bottle     LORazepam (ATIVAN) 1 MG tablet Take 1 tablet (1 mg total) by mouth nightly as needed for Anxiety. 7 tablet 0    ondansetron (ZOFRAN) 4 MG tablet Take 1 tablet (4 mg total) by mouth every 6 (six) hours as needed for Nausea. 30 tablet 1    ondansetron (ZOFRAN) 4 MG tablet Take 1 tablet (4 mg total) by mouth every 6 (six) hours as needed for Nausea. 25 tablet 0    oxyCODONE (ROXICODONE) 20 mg Tab immediate release tablet Take 2 tablets (40 mg total) by mouth  "every 8 (eight) hours as needed. 60 tablet 0    pantoprazole (PROTONIX) 40 MG tablet Take 1 tablet (40 mg total) by mouth 2 (two) times daily. 60 tablet 1    promethazine (PHENERGAN) 12.5 MG Tab Take 2 tablets (25 mg total) by mouth every 4 (four) hours. 60 tablet 0     No current facility-administered medications for this visit.      Review of Systems   Constitutional: Positive for activity change, appetite change, fatigue and unexpected weight change.   HENT: Positive for dental problem and trouble swallowing. Negative for nosebleeds and voice change.    Eyes: Negative for photophobia, redness and visual disturbance.   Respiratory: Positive for choking. Negative for chest tightness, shortness of breath and wheezing.    Cardiovascular: Negative for chest pain, palpitations and leg swelling.   Gastrointestinal: Positive for abdominal distention, abdominal pain, constipation, nausea and vomiting. Negative for anal bleeding, blood in stool and diarrhea.   Genitourinary: Negative for decreased urine volume, difficulty urinating and hematuria.   Musculoskeletal: Positive for arthralgias and back pain. Negative for myalgias and neck stiffness.   Skin: Positive for pallor. Negative for rash and wound.   Neurological: Negative for dizziness, facial asymmetry and headaches.   Hematological: Negative for adenopathy. Does not bruise/bleed easily.   Psychiatric/Behavioral: Negative for agitation and behavioral problems.     ECOG Performance Status: 1    Objective:      Vitals:   Vitals:    12/18/19 1038   BP: 118/81   BP Location: Left arm   Patient Position: Sitting   BP Method: Large (Automatic)   Pulse: 102   Resp: 18   Temp: 97.6 °F (36.4 °C)   TempSrc: Oral   SpO2: 99%   Weight: 54.1 kg (119 lb 4.3 oz)   Height: 5' 10" (1.778 m)     BMI: Body mass index is 17.11 kg/m².    Physical Exam   Constitutional: He is oriented to person, place, and time.   Cachectic male, sitting in the chair, mild distress   HENT:   Temporal " wasting noted, left false eye   Eyes: No scleral icterus.   Neck: No JVD present.   Thin anatomy   Cardiovascular: Normal rate and regular rhythm.   Pulmonary/Chest: Effort normal.   Diffuse rhonchi   Abdominal: He exhibits no distension and no mass. There is tenderness. There is no guarding.   Multiple scars, healing appropriately    Musculoskeletal: He exhibits no edema or deformity.   Thin anatomy   Lymphadenopathy:     He has no cervical adenopathy.   Neurological: He is alert and oriented to person, place, and time. No sensory deficit.   Skin: Skin is dry. There is pallor.   Vitals reviewed.    Laboratory Data:  Lab Visit on 12/18/2019   Component Date Value Ref Range Status    WBC 12/18/2019 19.14* 3.90 - 12.70 K/uL Final    RBC 12/18/2019 5.01  4.60 - 6.20 M/uL Final    Hemoglobin 12/18/2019 14.0  14.0 - 18.0 g/dL Final    Hematocrit 12/18/2019 45.6  40.0 - 54.0 % Final    Mean Corpuscular Volume 12/18/2019 91  82 - 98 fL Final    Mean Corpuscular Hemoglobin 12/18/2019 27.9  27.0 - 31.0 pg Final    Mean Corpuscular Hemoglobin Conc 12/18/2019 30.7* 32.0 - 36.0 g/dL Final    RDW 12/18/2019 15.4* 11.5 - 14.5 % Final    Platelets 12/18/2019 382* 150 - 350 K/uL Final    MPV 12/18/2019 8.8* 9.2 - 12.9 fL Final    Immature Granulocytes 12/18/2019 CANCELED  0.0 - 0.5 % Final    Result canceled by the ancillary.    Immature Grans (Abs) 12/18/2019 CANCELED  0.00 - 0.04 K/uL Final    Comment: Mild elevation in immature granulocytes is non specific and   can be seen in a variety of conditions including stress response,   acute inflammation, trauma and pregnancy. Correlation with other   laboratory and clinical findings is essential.    Result canceled by the ancillary.      nRBC 12/18/2019 0  0 /100 WBC Final    Gran% 12/18/2019 42.0  38.0 - 73.0 % Final    Lymph% 12/18/2019 51.0* 18.0 - 48.0 % Final    Mono% 12/18/2019 7.0  4.0 - 15.0 % Final    Eosinophil% 12/18/2019 0.0  0.0 - 8.0 % Final     Basophil% 12/18/2019 0.0  0.0 - 1.9 % Final    Platelet Estimate 12/18/2019 Increased*  Final    Differential Method 12/18/2019 Manual   Final    Sodium 12/18/2019 135* 136 - 145 mmol/L Final    Potassium 12/18/2019 5.2* 3.5 - 5.1 mmol/L Final    Chloride 12/18/2019 101  95 - 110 mmol/L Final    CO2 12/18/2019 27  23 - 29 mmol/L Final    Glucose 12/18/2019 108  70 - 110 mg/dL Final    BUN, Bld 12/18/2019 18  6 - 20 mg/dL Final    Creatinine 12/18/2019 0.8  0.5 - 1.4 mg/dL Final    Calcium 12/18/2019 9.4  8.7 - 10.5 mg/dL Final    Total Protein 12/18/2019 7.9  6.0 - 8.4 g/dL Final    Albumin 12/18/2019 3.8  3.5 - 5.2 g/dL Final    Total Bilirubin 12/18/2019 0.3  0.1 - 1.0 mg/dL Final    Comment: For infants and newborns, interpretation of results should be based  on gestational age, weight and in agreement with clinical  observations.  Premature Infant recommended reference ranges:  Up to 24 hours.............<8.0 mg/dL  Up to 48 hours............<12.0 mg/dL  3-5 days..................<15.0 mg/dL  6-29 days.................<15.0 mg/dL      Alkaline Phosphatase 12/18/2019 93  55 - 135 U/L Final    AST 12/18/2019 20  10 - 40 U/L Final    ALT 12/18/2019 10  10 - 44 U/L Final    Anion Gap 12/18/2019 7* 8 - 16 mmol/L Final    eGFR if African American 12/18/2019 >60.0  >60 mL/min/1.73 m^2 Final    eGFR if non African American 12/18/2019 >60.0  >60 mL/min/1.73 m^2 Final    Comment: Calculation used to obtain the estimated glomerular filtration  rate (eGFR) is the CKD-EPI equation.       Pathologist Review Peripheral Smear 12/18/2019 REVIEWED   Final    Comment: Electronically reviewed and signed by:  Hermelinda Rae MD  Signed on 12/18/19 at 13:41  Pathologist interpretation of peripheral blood smear:   Leukocytosis shows absolute and relative lymphocytosis with cytologic   atypia and scattered smudge cells.  Recommend flow cytometric   analysis of peripheral blood to evaluate this  "lymphocytosis.  Morphologically unremarkable red cells.    platelets are also morphologically unremarkable if mildly increased   by automated count.    Correlate clinically.         Assessment:       1. Cancer associated pain    2. Signet ring cell adenocarcinoma of stomach        Oncologic History:      2019  September   9/10 EGD: Gastric signet ring cell adenocarcinoma   9/19 Port placement   9/30 G&J tube placement  October    [place] [facility] [place] eval, referred to [facility] - tubes removed  November 11/5 [place] h/o eval with Dr. 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 Greene comes to the cancer center for a transfer of care for his previously diagnosed gastric adenocarcinoma. He is ready and authorized to start his neoadjuvant chemotherapy tomorrow with the FLOT regimen, with curative intent. [id number] was negative since last visit. Labs from today's visit look good to proceed tomorrow with cycle 1.    -pain control with 40 mg oxycodone q4 hr prn  -d/c omeprazole, start pantoprazole 40 mg BID for GERD  -FLOT to start tomorrow (C1D1), q14 days = neoadj with curative intent at this time  -smoking cessation counseling  -zofran and phenergan refilled  -Boost shake Rx sent to pharmacy  -follow up next Thursday with LINDA with labs  -discuss with pathology regarding MSI/MMR and PD-L1 testing    Discussed with Dr. Soraya Villalpando MD  Hematology/Medical Oncology Fellow  [phone] (pager)        ATTENDING NOTE, ONCOLOGY CLINIC    As above.  Patient seen and examined, chart reviewed.  Appears comfortable, in NAD.  Lungs are clear to auscultation.  Abdomen is soft, nontender.  Labs reviewed.    PLAN  He will begin "neoadjuvant" chemotherapy tomorrow.  RTC 8 days with labs.  His multiple questions were answered to his satisfaction.      Jigar" MD Soraya

## 2019-12-18 NOTE — Clinical Note
Wally. Mr. Greene needs:1. Follow up in 2 weeks (1/2) with Dr. Cao or LINDA with lab visit prior for CBC/CMP prior to C2 of FLOT2. Needs to have infusion appointment for FLOT cycle 2 on 1/2/20203. Please schedule him for follow up with me on 1/15 with infusion for C3 of FLOT and lab visit prior with cbc/cmpThanks, let me know if there are any questions or concernsMike

## 2019-12-19 ENCOUNTER — INFUSION (OUTPATIENT)
Dept: INFUSION THERAPY | Facility: HOSPITAL | Age: 59
End: 2019-12-19
Payer: MEDICAID

## 2019-12-19 ENCOUNTER — DOCUMENTATION ONLY (OUTPATIENT)
Dept: HEMATOLOGY/ONCOLOGY | Facility: CLINIC | Age: 59
End: 2019-12-19

## 2019-12-19 VITALS
HEART RATE: 91 BPM | RESPIRATION RATE: 18 BRPM | DIASTOLIC BLOOD PRESSURE: 60 MMHG | SYSTOLIC BLOOD PRESSURE: 107 MMHG | TEMPERATURE: 98 F

## 2019-12-19 DIAGNOSIS — C80.1 SIGNET RING CELL ADENOCARCINOMA: Primary | ICD-10-CM

## 2019-12-19 PROCEDURE — 25000003 PHARM REV CODE 250

## 2019-12-19 PROCEDURE — 96417 CHEMO IV INFUS EACH ADDL SEQ: CPT

## 2019-12-19 PROCEDURE — 96367 TX/PROPH/DG ADDL SEQ IV INF: CPT

## 2019-12-19 PROCEDURE — 96375 TX/PRO/DX INJ NEW DRUG ADDON: CPT

## 2019-12-19 PROCEDURE — 96413 CHEMO IV INFUSION 1 HR: CPT

## 2019-12-19 PROCEDURE — 96368 THER/DIAG CONCURRENT INF: CPT

## 2019-12-19 PROCEDURE — 96415 CHEMO IV INFUSION ADDL HR: CPT

## 2019-12-19 PROCEDURE — 96366 THER/PROPH/DIAG IV INF ADDON: CPT

## 2019-12-19 PROCEDURE — S0028 INJECTION, FAMOTIDINE, 20 MG: HCPCS

## 2019-12-19 PROCEDURE — 63600175 PHARM REV CODE 636 W HCPCS: Performed by: INTERNAL MEDICINE

## 2019-12-19 PROCEDURE — 96416 CHEMO PROLONG INFUSE W/PUMP: CPT

## 2019-12-19 RX ORDER — SODIUM CHLORIDE 0.9 % (FLUSH) 0.9 %
10 SYRINGE (ML) INJECTION
Status: DISCONTINUED | OUTPATIENT
Start: 2019-12-19 | End: 2019-12-19 | Stop reason: HOSPADM

## 2019-12-19 RX ORDER — HEPARIN 100 UNIT/ML
500 SYRINGE INTRAVENOUS
Status: DISCONTINUED | OUTPATIENT
Start: 2019-12-19 | End: 2019-12-19 | Stop reason: HOSPADM

## 2019-12-19 RX ORDER — FAMOTIDINE 10 MG/ML
20 INJECTION INTRAVENOUS ONCE
Status: COMPLETED | OUTPATIENT
Start: 2019-12-19 | End: 2019-12-19

## 2019-12-19 RX ORDER — DIPHENHYDRAMINE HYDROCHLORIDE 50 MG/ML
50 INJECTION INTRAMUSCULAR; INTRAVENOUS ONCE AS NEEDED
Status: DISCONTINUED | OUTPATIENT
Start: 2019-12-19 | End: 2019-12-19 | Stop reason: HOSPADM

## 2019-12-19 RX ORDER — EPINEPHRINE 0.3 MG/.3ML
0.3 INJECTION SUBCUTANEOUS ONCE AS NEEDED
Status: DISCONTINUED | OUTPATIENT
Start: 2019-12-19 | End: 2019-12-19 | Stop reason: HOSPADM

## 2019-12-19 RX ORDER — OXYCODONE HYDROCHLORIDE 5 MG/1
10 TABLET ORAL ONCE
Status: DISCONTINUED | OUTPATIENT
Start: 2019-12-19 | End: 2019-12-19 | Stop reason: RX

## 2019-12-19 RX ORDER — OXYCODONE AND ACETAMINOPHEN 5; 325 MG/1; MG/1
2 TABLET ORAL ONCE
Status: COMPLETED | OUTPATIENT
Start: 2019-12-19 | End: 2019-12-19

## 2019-12-19 RX ADMIN — DOCETAXEL ANHYDROUS 85 MG: 10 INJECTION, SOLUTION INTRAVENOUS at 11:12

## 2019-12-19 RX ADMIN — DEXTROSE: 50 INJECTION, SOLUTION INTRAVENOUS at 12:12

## 2019-12-19 RX ADMIN — OXYCODONE HYDROCHLORIDE AND ACETAMINOPHEN 2 TABLET: 5; 325 TABLET ORAL at 11:12

## 2019-12-19 RX ADMIN — FLUOROURACIL 4290 MG: 50 INJECTION, SOLUTION INTRAVENOUS at 02:12

## 2019-12-19 RX ADMIN — SODIUM CHLORIDE: 0.9 INJECTION, SOLUTION INTRAVENOUS at 11:12

## 2019-12-19 RX ADMIN — DEXAMETHASONE SODIUM PHOSPHATE: 4 INJECTION, SOLUTION INTRA-ARTICULAR; INTRALESIONAL; INTRAMUSCULAR; INTRAVENOUS; SOFT TISSUE at 10:12

## 2019-12-19 RX ADMIN — LEUCOVORIN CALCIUM 35 MG: 100 INJECTION, POWDER, LYOPHILIZED, FOR SOLUTION INTRAMUSCULAR; INTRAVENOUS at 12:12

## 2019-12-19 RX ADMIN — OXALIPLATIN 140 MG: 5 INJECTION, SOLUTION INTRAVENOUS at 12:12

## 2019-12-19 RX ADMIN — FAMOTIDINE 20 MG: 10 INJECTION, SOLUTION INTRAVENOUS at 12:12

## 2019-12-19 NOTE — PLAN OF CARE
Problem: Adult Inpatient Plan of Care  Goal: Optimal Comfort and Wellbeing  Intervention: Provide Person-Centered Care  Flowsheets (Taken 12/19/2019 1052)  Trust Relationship/Rapport: care explained; questions encouraged; emotional support provided; reassurance provided; choices provided; thoughts/feelings acknowledged; empathic listening provided; questions answered

## 2019-12-19 NOTE — PROGRESS NOTES
Received referral from the clinic that the patient needed assistance getting pain medication. Patient in need of a prior authorization. Needs assistance to get 10 day supply while waiting for PA to go through. Price is $25. Assisted the patient through patient assistance.

## 2019-12-19 NOTE — PLAN OF CARE
Patient tolerated C1D1 of FLOT well today. Patient states took dex prior to starting treatment. Education provided along with chemo binder. Verbalized understanding to call MD for any questions/concerns. Verbalized understanding importance of frequent handwashing and monitoring temp 100.4 or >. Thermometer provided to patient. Patient c/o extreme heartburn (ongoing, md aware). Pepcid 20 mg given ivp x1. Also c/o 8/10 abdominal pain, patient states left pain medicine at home. 2 tablets of oxy 5-352 mg given x1. Patient educated on importance of bringing home meds to clinic. CADD pump infusing at 4.2 ml/hr. Settings verified by 2 RNs. Plan to rtc tomorrow at 1430 for pump dc. AVS given. Discharged home, ambulated independently with friends by side.

## 2019-12-20 ENCOUNTER — INFUSION (OUTPATIENT)
Dept: INFUSION THERAPY | Facility: HOSPITAL | Age: 59
End: 2019-12-20
Payer: MEDICAID

## 2019-12-20 VITALS
RESPIRATION RATE: 18 BRPM | HEART RATE: 99 BPM | SYSTOLIC BLOOD PRESSURE: 137 MMHG | DIASTOLIC BLOOD PRESSURE: 66 MMHG | TEMPERATURE: 98 F

## 2019-12-20 DIAGNOSIS — C80.1 SIGNET RING CELL ADENOCARCINOMA: Primary | ICD-10-CM

## 2019-12-20 PROCEDURE — A4216 STERILE WATER/SALINE, 10 ML: HCPCS | Performed by: INTERNAL MEDICINE

## 2019-12-20 PROCEDURE — 63600175 PHARM REV CODE 636 W HCPCS: Performed by: INTERNAL MEDICINE

## 2019-12-20 PROCEDURE — 96523 IRRIG DRUG DELIVERY DEVICE: CPT

## 2019-12-20 PROCEDURE — 25000003 PHARM REV CODE 250: Performed by: INTERNAL MEDICINE

## 2019-12-20 RX ORDER — SODIUM CHLORIDE 0.9 % (FLUSH) 0.9 %
10 SYRINGE (ML) INJECTION
Status: DISCONTINUED | OUTPATIENT
Start: 2019-12-20 | End: 2019-12-20 | Stop reason: HOSPADM

## 2019-12-20 RX ORDER — HEPARIN 100 UNIT/ML
500 SYRINGE INTRAVENOUS
Status: DISCONTINUED | OUTPATIENT
Start: 2019-12-20 | End: 2019-12-20 | Stop reason: HOSPADM

## 2019-12-20 RX ADMIN — Medication 10 ML: at 02:12

## 2019-12-20 RX ADMIN — HEPARIN SODIUM (PORCINE) LOCK FLUSH IV SOLN 100 UNIT/ML 500 UNITS: 100 SOLUTION at 02:12

## 2019-12-23 ENCOUNTER — HOSPITAL ENCOUNTER (EMERGENCY)
Facility: HOSPITAL | Age: 59
Discharge: LEFT BEFORE DISCHARGE | End: 2019-12-23
Attending: EMERGENCY MEDICINE
Payer: MEDICAID

## 2019-12-23 VITALS
TEMPERATURE: 98 F | SYSTOLIC BLOOD PRESSURE: 149 MMHG | WEIGHT: 120 LBS | HEIGHT: 70 IN | BODY MASS INDEX: 17.18 KG/M2 | RESPIRATION RATE: 18 BRPM | OXYGEN SATURATION: 97 % | DIASTOLIC BLOOD PRESSURE: 85 MMHG | HEART RATE: 92 BPM

## 2019-12-23 DIAGNOSIS — D72.829 LEUKOCYTOSIS, UNSPECIFIED TYPE: ICD-10-CM

## 2019-12-23 DIAGNOSIS — R10.9 ABDOMINAL PAIN, UNSPECIFIED ABDOMINAL LOCATION: Primary | ICD-10-CM

## 2019-12-23 DIAGNOSIS — R11.2 INTRACTABLE NAUSEA AND VOMITING: ICD-10-CM

## 2019-12-23 DIAGNOSIS — C16.9 MALIGNANT NEOPLASM OF STOMACH, UNSPECIFIED LOCATION: ICD-10-CM

## 2019-12-23 LAB
ALBUMIN SERPL BCP-MCNC: 3.2 G/DL (ref 3.5–5.2)
ALP SERPL-CCNC: 69 U/L (ref 55–135)
ALT SERPL W/O P-5'-P-CCNC: 9 U/L (ref 10–44)
ANION GAP SERPL CALC-SCNC: 8 MMOL/L (ref 8–16)
AST SERPL-CCNC: 18 U/L (ref 10–40)
BASOPHILS # BLD AUTO: ABNORMAL K/UL (ref 0–0.2)
BASOPHILS NFR BLD: 0 % (ref 0–1.9)
BILIRUB SERPL-MCNC: 0.4 MG/DL (ref 0.1–1)
BILIRUB UR QL STRIP: NEGATIVE
BUN SERPL-MCNC: 14 MG/DL (ref 6–20)
CALCIUM SERPL-MCNC: 8.8 MG/DL (ref 8.7–10.5)
CHLORIDE SERPL-SCNC: 106 MMOL/L (ref 95–110)
CLARITY UR REFRACT.AUTO: CLEAR
CO2 SERPL-SCNC: 22 MMOL/L (ref 23–29)
COLOR UR AUTO: YELLOW
CREAT SERPL-MCNC: 0.7 MG/DL (ref 0.5–1.4)
DIFFERENTIAL METHOD: ABNORMAL
EOSINOPHIL # BLD AUTO: ABNORMAL K/UL (ref 0–0.5)
EOSINOPHIL NFR BLD: 0 % (ref 0–8)
ERYTHROCYTE [DISTWIDTH] IN BLOOD BY AUTOMATED COUNT: 15.6 % (ref 11.5–14.5)
EST. GFR  (AFRICAN AMERICAN): >60 ML/MIN/1.73 M^2
EST. GFR  (NON AFRICAN AMERICAN): >60 ML/MIN/1.73 M^2
GLUCOSE SERPL-MCNC: 89 MG/DL (ref 70–110)
GLUCOSE UR QL STRIP: NEGATIVE
HCT VFR BLD AUTO: 43.8 % (ref 40–54)
HGB BLD-MCNC: 13.3 G/DL (ref 14–18)
HGB UR QL STRIP: NEGATIVE
IMM GRANULOCYTES # BLD AUTO: ABNORMAL K/UL (ref 0–0.04)
IMM GRANULOCYTES NFR BLD AUTO: ABNORMAL % (ref 0–0.5)
INFLUENZA A, MOLECULAR: NEGATIVE
INFLUENZA B, MOLECULAR: NEGATIVE
KETONES UR QL STRIP: ABNORMAL
LEUKOCYTE ESTERASE UR QL STRIP: NEGATIVE
LIPASE SERPL-CCNC: 35 U/L (ref 4–60)
LYMPHOCYTES # BLD AUTO: ABNORMAL K/UL (ref 1–4.8)
LYMPHOCYTES NFR BLD: 68 % (ref 18–48)
MCH RBC QN AUTO: 27.9 PG (ref 27–31)
MCHC RBC AUTO-ENTMCNC: 30.4 G/DL (ref 32–36)
MCV RBC AUTO: 92 FL (ref 82–98)
MONOCYTES # BLD AUTO: ABNORMAL K/UL (ref 0.3–1)
MONOCYTES NFR BLD: 0 % (ref 4–15)
NEUTROPHILS NFR BLD: 32 % (ref 38–73)
NITRITE UR QL STRIP: NEGATIVE
NRBC BLD-RTO: 0 /100 WBC
OVALOCYTES BLD QL SMEAR: ABNORMAL
PH UR STRIP: 5 [PH] (ref 5–8)
PLATELET # BLD AUTO: 311 K/UL (ref 150–350)
PLATELET BLD QL SMEAR: ABNORMAL
PMV BLD AUTO: 10 FL (ref 9.2–12.9)
POIKILOCYTOSIS BLD QL SMEAR: SLIGHT
POTASSIUM SERPL-SCNC: 3.9 MMOL/L (ref 3.5–5.1)
PROT SERPL-MCNC: 6.6 G/DL (ref 6–8.4)
PROT UR QL STRIP: NEGATIVE
RBC # BLD AUTO: 4.77 M/UL (ref 4.6–6.2)
SODIUM SERPL-SCNC: 136 MMOL/L (ref 136–145)
SP GR UR STRIP: 1.02 (ref 1–1.03)
SPECIMEN SOURCE: NORMAL
URN SPEC COLLECT METH UR: ABNORMAL
WBC # BLD AUTO: 13.28 K/UL (ref 3.9–12.7)

## 2019-12-23 PROCEDURE — 96361 HYDRATE IV INFUSION ADD-ON: CPT

## 2019-12-23 PROCEDURE — 96374 THER/PROPH/DIAG INJ IV PUSH: CPT

## 2019-12-23 PROCEDURE — S0028 INJECTION, FAMOTIDINE, 20 MG: HCPCS | Performed by: EMERGENCY MEDICINE

## 2019-12-23 PROCEDURE — 87502 INFLUENZA DNA AMP PROBE: CPT

## 2019-12-23 PROCEDURE — 25500020 PHARM REV CODE 255: Performed by: EMERGENCY MEDICINE

## 2019-12-23 PROCEDURE — 81003 URINALYSIS AUTO W/O SCOPE: CPT

## 2019-12-23 PROCEDURE — 96375 TX/PRO/DX INJ NEW DRUG ADDON: CPT

## 2019-12-23 PROCEDURE — 99285 EMERGENCY DEPT VISIT HI MDM: CPT | Mod: 25

## 2019-12-23 PROCEDURE — 99285 EMERGENCY DEPT VISIT HI MDM: CPT | Mod: ,,, | Performed by: EMERGENCY MEDICINE

## 2019-12-23 PROCEDURE — 85007 BL SMEAR W/DIFF WBC COUNT: CPT

## 2019-12-23 PROCEDURE — 80053 COMPREHEN METABOLIC PANEL: CPT

## 2019-12-23 PROCEDURE — 63600175 PHARM REV CODE 636 W HCPCS: Performed by: EMERGENCY MEDICINE

## 2019-12-23 PROCEDURE — 85027 COMPLETE CBC AUTOMATED: CPT

## 2019-12-23 PROCEDURE — 83690 ASSAY OF LIPASE: CPT

## 2019-12-23 PROCEDURE — 99285 PR EMERGENCY DEPT VISIT,LEVEL V: ICD-10-PCS | Mod: ,,, | Performed by: EMERGENCY MEDICINE

## 2019-12-23 PROCEDURE — 25000003 PHARM REV CODE 250: Performed by: EMERGENCY MEDICINE

## 2019-12-23 RX ORDER — MORPHINE SULFATE 4 MG/ML
4 INJECTION, SOLUTION INTRAMUSCULAR; INTRAVENOUS
Status: COMPLETED | OUTPATIENT
Start: 2019-12-23 | End: 2019-12-23

## 2019-12-23 RX ORDER — HYDROMORPHONE HYDROCHLORIDE 1 MG/ML
1 INJECTION, SOLUTION INTRAMUSCULAR; INTRAVENOUS; SUBCUTANEOUS
Status: COMPLETED | OUTPATIENT
Start: 2019-12-23 | End: 2019-12-23

## 2019-12-23 RX ORDER — ONDANSETRON 2 MG/ML
8 INJECTION INTRAMUSCULAR; INTRAVENOUS
Status: COMPLETED | OUTPATIENT
Start: 2019-12-23 | End: 2019-12-23

## 2019-12-23 RX ORDER — FAMOTIDINE 10 MG/ML
20 INJECTION INTRAVENOUS
Status: COMPLETED | OUTPATIENT
Start: 2019-12-23 | End: 2019-12-23

## 2019-12-23 RX ADMIN — FAMOTIDINE 20 MG: 10 INJECTION, SOLUTION INTRAVENOUS at 09:12

## 2019-12-23 RX ADMIN — ONDANSETRON 8 MG: 2 INJECTION INTRAMUSCULAR; INTRAVENOUS at 06:12

## 2019-12-23 RX ADMIN — MORPHINE SULFATE 4 MG: 4 INJECTION, SOLUTION INTRAMUSCULAR; INTRAVENOUS at 09:12

## 2019-12-23 RX ADMIN — IOHEXOL 75 ML: 350 INJECTION, SOLUTION INTRAVENOUS at 10:12

## 2019-12-23 RX ADMIN — HYDROMORPHONE HYDROCHLORIDE 1 MG: 1 INJECTION, SOLUTION INTRAMUSCULAR; INTRAVENOUS; SUBCUTANEOUS at 06:12

## 2019-12-23 RX ADMIN — SODIUM CHLORIDE 1000 ML: 0.9 INJECTION, SOLUTION INTRAVENOUS at 06:12

## 2019-12-23 RX ADMIN — ALUMINUM HYDROXIDE, MAGNESIUM HYDROXIDE, AND SIMETHICONE 50 ML: 200; 200; 20 SUSPENSION ORAL at 09:12

## 2019-12-24 NOTE — ED NOTES
"Pt refusing to take discharge vital signs. Pt educated on importance of intervention. Pt refused again and stated "I want to go home."  "

## 2019-12-24 NOTE — ED PROVIDER NOTES
Encounter Date: 12/23/2019       History     Chief Complaint   Patient presents with    Fatigue     taking chemo was in clinic had one round of chemo and is feeling very weak and tired     HPI   Mr. Greene is a 59 y.o. male with signet ring cell adenocarcinoma of stomach here today with vomiting and weakness. States he had first dose of chemotherapy last Wednesday. Since then, he has had diffuse myalgias and intractable N/V. He is unable to keep his medications down. Zofran, phenergan and roxicodone are not help. Denies fevers, chest pain, SOB, diarrhea, constipation, dysuria, numbness, tingling, hematochezia.     Review of patient's allergies indicates:  No Known Allergies  Past Medical History:   Diagnosis Date    Chronic gastritis     Diverticulosis     Positive H. 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     Past Surgical History:   Procedure Laterality Date    DIAGNOSTIC LAPAROSCOPY N/A 9/19/2019    Procedure: LAPAROSCOPY, DIAGNOSTIC;  Surgeon: Josse Saravia MD;  Location: [address];  Service: General;  Laterality: N/A;    ENDOSCOPIC ULTRASOUND OF UPPER GASTROINTESTINAL TRACT N/A 9/17/2019    Procedure: ULTRASOUND, UPPER GI TRACT, ENDOSCOPIC;  Surgeon: Nino Randhawa MD;  Location: [address]);  Service: Endoscopy;  Laterality: N/A;    ESOPHAGOGASTRODUODENOSCOPY N/A 9/10/2019    Procedure: EGD (ESOPHAGOGASTRODUODENOSCOPY);  Surgeon: Ok Diaz MD;  Location: [facility];  Service: Endoscopy;  Laterality: N/A;    EYE SURGERY      FACIAL RECONSTRUCTION SURGERY      GASTROJEJUNOSTOMY N/A 9/19/2019    Procedure: GASTROJEJUNOSTOMY, possible G-tube;  Surgeon: Josse Saravia MD;  Location: [address];  Service: General;  Laterality: N/A;    INSERTION OF VENOUS ACCESS PORT Right 9/19/2019    Procedure: INSERTION, VENOUS ACCESS PORT;  Surgeon: Josse Saravia MD;  Location: [address];  Service: General;  Laterality: Right;     LAPAROSCOPIC INSERTION OF JEJUNOSTOMY TUBE N/A 9/19/2019    Procedure: INSERTION, JEJUNOSTOMY TUBE, LAPAROSCOPIC, possible open;  Surgeon: Josse Saravia MD;  Location: [facility] OR [address] [place] [address];  Service: General;  Laterality: N/A;    PERCUTANEOUS INSERTION OF GASTROSTOMY TUBE  9/30/2019    Procedure: INSERTION, GASTROSTOMY TUBE;  Surgeon: Josse Saravia MD;  [facility];  Service: General;;    PLACEMENT OF JEJUNOSTOMY TUBE N/A 9/30/2019    Procedure: INSERTION, JEJUNOSTOMY TUBE, revision;  Surgeon: Josse Saravia MD;  [facility];  Service: General;  Laterality: N/A;     Family History   Problem Relation Age of Onset    Cancer Father         colon     Social History     Tobacco Use    Smoking status: Current Every Day Smoker     Packs/day: 0.50     Years: 40.00     Pack years: 20.00     Types: Cigarettes    Smokeless tobacco: Never Used   Substance Use Topics    Alcohol use: Not Currently     Comment: quit a year ago    Drug use: Yes     Types: Marijuana     Review of Systems   Constitutional: Positive for fatigue. Negative for diaphoresis and fever.   HENT: Negative for sore throat.    Eyes: Negative for visual disturbance.   Respiratory: Negative for cough and shortness of breath.    Cardiovascular: Negative for chest pain and palpitations.   Gastrointestinal: Positive for abdominal pain, nausea and vomiting. Negative for blood in stool, constipation and diarrhea.   Genitourinary: Negative for dysuria.   Musculoskeletal: Negative for back pain.   Skin: Negative for rash.   Allergic/Immunologic: Positive for immunocompromised state.   Neurological: Negative for weakness.   Hematological: Does not bruise/bleed easily.       Physical Exam     Initial Vitals [12/23/19 1635]   BP Pulse Resp Temp SpO2   122/74 98 18 98.2 °F (36.8 °C) 96 %      MAP       --         Physical Exam    Nursing note and vitals reviewed.  Constitutional: He appears well-developed. He is not diaphoretic. No  distress.   Cachectic with bitemporal wasting.   HENT:   Head: Normocephalic and atraumatic.   Right Ear: External ear normal.   Left Ear: External ear normal.   Neck: Neck supple.   Cardiovascular: Normal rate, regular rhythm, normal heart sounds and intact distal pulses.   Pulmonary/Chest: Breath sounds normal. No respiratory distress. He has no wheezes. He has no rhonchi. He has no rales.   Abdominal: Soft. He exhibits no distension. There is tenderness (diffuse, nonspecific). There is no rebound and no guarding.   Firm scar tissue superior to umbilicus   Neurological: He is alert and oriented to person, place, and time. GCS score is 15. GCS eye subscore is 4. GCS verbal subscore is 5. GCS motor subscore is 6.   Skin: Skin is warm. Capillary refill takes less than 2 seconds. No rash noted.   Psychiatric: He has a normal mood and affect.         ED Course   Procedures  Labs Reviewed   CBC W/ AUTO DIFFERENTIAL - Abnormal; Notable for the following components:       Result Value    WBC 13.28 (*)     Hemoglobin 13.3 (*)     Mean Corpuscular Hemoglobin Conc 30.4 (*)     RDW 15.6 (*)     Gran% 32.0 (*)     Lymph% 68.0 (*)     Mono% 0.0 (*)     All other components within normal limits   URINALYSIS, REFLEX TO URINE CULTURE - Abnormal; Notable for the following components:    Ketones, UA 1+ (*)     All other components within normal limits    Narrative:     Preferred Collection Type->Urine, Clean Catch   COMPREHENSIVE METABOLIC PANEL - Abnormal; Notable for the following components:    CO2 22 (*)     Albumin 3.2 (*)     ALT 9 (*)     All other components within normal limits   INFLUENZA A & B BY MOLECULAR   LIPASE          Imaging Results           CT Abdomen Pelvis With Contrast (Final result)  Result time 12/23/19 23:25:20    Final result by Ramon Kendrick MD (12/23/19 23:25:20)                 Impression:      Postoperative changes of gastrojejunostomy with distension of both the duodenum as well as the proximal  jejunum extending just distal to the gastrojejunal anastomosis.  There appears to be an area of bowel wall thickening and luminal narrowing within the mid jejunum, (axial series 2, images 41 and 43), which may represent a transition point.  Neoplasm recurrence is not excluded as potential obstruction etiology.  Recommend surgical consultation.    Large volume stool throughout the colon.    Extensive coronary artery and aortic atherosclerotic calcification.  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  Marked luminal narrowing of the left renal vein near the insertion into the IVC.    Centrilobular emphysema.    This report was flagged in Epic as abnormal.    Electronically signed by resident: Monique Smith  Date:    12/23/2019  Time:    22:09    Electronically signed by: Ramon Kendrick MD  Date:    12/23/2019  Time:    23:25             Narrative:    EXAMINATION:  CT ABDOMEN PELVIS WITH CONTRAST    CLINICAL HISTORY:  Bowel obstruction, high-grade;    TECHNIQUE:  Low dose axial images, sagittal and coronal reformations were obtained from the lung bases to the pubic symphysis following the IV administration of 75 mL of Omnipaque 350.  Oral contrast was not administered.    COMPARISON:  CT chest abdomen pelvis 11/29/2019    FINDINGS:  Heart: Normal size. No effusion.  Coronary artery atherosclerotic calcification.    Lung Bases: Symmetrically expanded.  There is centrilobular emphysema.  There is bibasilar atelectasis.  Additionally there is a cluster of calcifications within the right middle lobe, (axial series 2, image 6), which may represent granulomatous change.  No pneumothorax or pleural effusion.    Liver: Normal size and attenuation. No focal lesions.    Gallbladder: No evidence of cholelithiasis.  No pericholecystic fluid or wall thickening to suggest acute cholecystitis.    Bile Ducts: No significant intra or extrahepatic ductal  dilatation.    Pancreas: No mass. No peripancreatic fat stranding.    Spleen: Unremarkable.    Adrenals: Unremarkable    Kidneys/Ureters: Normal in size and location.  Normal enhancement.  There is a subcentimeter hypodensity in the left kidney, too small to characterize but probable cyst.  No nephrolithiasis.  Ureters are normal in course and caliber.  No hydroureteronephrosis.    Bladder: Nondistended, limiting evaluation..    Reproductive organs: Normal.    GI Tract/Mesentery: The stomach is distended with fluid and air.  Postoperative changes of gastrojejunostomy there is distension of both the duodenum as well as the proximal jejunum just distal to the anastomosis, which appears a patent.  The distension measures up to 4.5 cm.  There appears to be an area of bowel with wall thickening and luminal narrowing distal to this area within the jejunum, best seen on axial series 2, images 41 and 43 and coronal series 4, image 15).  Distal to this location there appears to be decompressed loops of bowel with the ileum appearing unremarkable.  There is large volume stool throughout the colon.  No evidence of wall thickening or inflammation.    Peritoneal Space: No ascites or free air.    Retroperitoneum: No significant adenopathy.    Abdominal wall: Normal.    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  Extensive atherosclerotic calcification throughout the abdominal aorta and branch vessels.  There is marked luminal narrowing of the left renal vein near its insertion the IVC.  no aneurysm.    Bones: Degenerative changes.  No acute fracture. No suspicious lytic or sclerotic lesions.                                 Medical Decision Making:   History:   Old Medical Records: I decided to obtain old medical records.  Old Records Summarized: records from clinic visits and records from previous admission(s).       <> Summary of Records:  Being treated currently for stomach cancer on chemo  Clinical Tests:   Lab Tests: Ordered and Reviewed  Radiological Study: Ordered and Reviewed  ED Management:  Vitals normal. Afebrile. Here w/ abd pain and intractable n/v. Currently immunosuppressed. No known fevers. Will give 8 mg Zofran IV, 1 mg IV Dilaudid, 1 L normal saline.  Will check CBC, CMP, lipase, UA, and flu swab.    Labs grossly within normal limits without actionable values except mildly elevated WBC which is improved from prior.  Still complaining of abdominal pain after Dilaudid.  Given morphine dose.  Complaining of some burning pain to nursing staff as well. Will give GI cocktail and Pepcid.  Will check CT abdomen pelvis.    Sign out to Dr. White at shift change to follow up CT abdomen pelvis, repeat abdominal exam in overall disposition.  If patient is unable to tolerate p.o., I suspect patient will be admitted to oncology.  However, if negative CT, it is possible the patient could be discharged home.  Other:   I have discussed this case with another health care provider.       <> Summary of the Discussion: Oncoming ED physician                                 Clinical Impression:       ICD-10-CM ICD-9-CM   1. Abdominal pain, unspecified abdominal location R10.9 789.00   2. Malignant neoplasm of stomach, unspecified location C16.9 151.9   3. Leukocytosis, unspecified type D72.829 288.60   4. Intractable nausea and vomiting R11.2 536.2                             Dexter Singleton MD  12/24/19 8846

## 2019-12-24 NOTE — ED TRIAGE NOTES
Patient comes into ER with complaints of pain and nausea. Patient states that the pain is in his abdomen. Patient denies tenderness. Patient states that he just started chemo and the pain medication they gave him isn't working and just makes him nauseous.

## 2019-12-24 NOTE — ED NOTES
"Adult Physical Assessment  LOC: Isaiah Greene, 59 y.o. male verified via two identifiers.  The patient is awake, alert, oriented x4 and speaking appropriately at this time.  APPEARANCE: Patient restless. Pt states "I want to leave."  SKIN:The skin is warm and dry, color consistent with ethnicity, patient has normal skin turgor and moist mucus membranes, skin intact, no breakdown or brusing noted.  MUSCULOSKELETAL: Patient moving all extremities well, no obvious swelling or deformities noted. Pt ambulatory with steady gait noted.  RESPIRATORY: Airway is open and patent, respirations are spontaneous, patient has a normal effort and rate, no accessory muscle use noted. Denies SOB.  CARDIAC: Patient has a normal rate and rhythm, no periphreal edema noted in any extremity, capillary refill < 3 seconds in all extremities. Denies CP.  ABDOMEN: Pt reports abdominal pain at a 8/10 on 0-10 scale. Pt denies nausea at this time.   NEUROLOGIC: Eyes open spontaneously, behavior appropriate to situation, follows commands, facial expression symmetrical, bilateral hand grasp equal and even, purposeful motor response noted, normal sensation in all extremities when touched with a finger.  "

## 2019-12-24 NOTE — DISCHARGE INSTRUCTIONS
Diagnosis: Abdominal pain    Tests you had showed:   Labs Reviewed   CBC W/ AUTO DIFFERENTIAL - Abnormal; Notable for the following components:       Result Value    WBC 13.28 (*)     Hemoglobin 13.3 (*)     Mean Corpuscular Hemoglobin Conc 30.4 (*)     RDW 15.6 (*)     Gran% 32.0 (*)     Lymph% 68.0 (*)     Mono% 0.0 (*)     All other components within normal limits   URINALYSIS, REFLEX TO URINE CULTURE - Abnormal; Notable for the following components:    Ketones, UA 1+ (*)     All other components within normal limits    Narrative:     Preferred Collection Type->Urine, Clean Catch   COMPREHENSIVE METABOLIC PANEL - Abnormal; Notable for the following components:    CO2 22 (*)     Albumin 3.2 (*)     ALT 9 (*)     All other components within normal limits   INFLUENZA A & B BY MOLECULAR   LIPASE      CT Abdomen Pelvis With Contrast  Narrative: EXAMINATION:  CT ABDOMEN PELVIS WITH CONTRAST    CLINICAL HISTORY:  Bowel obstruction, high-grade;    TECHNIQUE:  Low dose axial images, sagittal and coronal reformations were obtained from the lung bases to the pubic symphysis following the IV administration of 75 mL of Omnipaque 350.  Oral contrast was not administered.    COMPARISON:  CT chest abdomen pelvis 11/29/2019    FINDINGS:  Heart: Normal size. No effusion.  Coronary artery atherosclerotic calcification.    Lung Bases: Symmetrically expanded.  There is centrilobular emphysema.  There is bibasilar atelectasis.  Additionally there is a cluster of calcifications within the right middle lobe, (axial series 2, image 6), which may represent granulomatous change.  No pneumothorax or pleural effusion.    Liver: Normal size and attenuation. No focal lesions.    Gallbladder: No evidence of cholelithiasis.  No pericholecystic fluid or wall thickening to suggest acute cholecystitis.    Bile Ducts: No significant intra or extrahepatic ductal dilatation.    Pancreas: No mass. No peripancreatic fat stranding.    Spleen:  Unremarkable.    Adrenals: Unremarkable    Kidneys/Ureters: Normal in size and location.  Normal enhancement.  There is a subcentimeter hypodensity in the left kidney, too small to characterize but probable cyst.  No nephrolithiasis.  Ureters are normal in course and caliber.  No hydroureteronephrosis.    Bladder: Nondistended, limiting evaluation..    Reproductive organs: Normal.    GI Tract/Mesentery: The stomach is distended with fluid and air.  Postoperative changes of gastrojejunostomy there is distension of both the duodenum as well as the proximal jejunum just distal to the anastomosis, which appears a patent.  The distension measures up to 4.5 cm.  There appears to be an area of bowel with wall thickening and luminal narrowing distal to this area within the jejunum, best seen on axial series 2, images 41 and 43 and coronal series 4, image 15).  Distal to this location there appears to be decompressed loops of bowel with the ileum appearing unremarkable.  There is large volume stool throughout the colon.  No evidence of wall thickening or inflammation.    Peritoneal Space: No ascites or free air.    Retroperitoneum: No significant adenopathy.    Abdominal wall: Normal.    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  Extensive atherosclerotic calcification throughout the abdominal aorta and branch vessels.  There is marked luminal narrowing of the left renal vein near its insertion the IVC.  no aneurysm.    Bones: Degenerative changes.  No acute fracture. No suspicious lytic or sclerotic lesions.  Impression: Postoperative changes of gastrojejunostomy with distension of both the duodenum as well as the proximal jejunum extending just distal to the gastrojejunal anastomosis.  There appears to be an area of bowel wall thickening and luminal narrowing within the mid jejunum, (axial series 2, images 41 and 43), which may  represent a transition point.  Neoplasm recurrence is not excluded as potential obstruction etiology.  Recommend surgical consultation.    Large volume stool throughout the colon.    Extensive coronary artery and aortic atherosclerotic calcification.  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  Marked luminal narrowing of the left renal vein near the insertion into the IVC.    Centrilobular emphysema.    This report was flagged in Epic as abnormal.    Electronically signed by resident: Monique Smith  Date:    12/23/2019  Time:    22:09    Electronically signed by: Ramon Kendrick MD  Date:    12/23/2019  Time:    23:25       Treatments you received were:   Medications   sodium chloride 0.9% bolus 1,000 mL (1,000 mLs Intravenous New Bag 12/23/19 1853)   ondansetron injection 8 mg (8 mg Intravenous Given 12/23/19 1849)   HYDROmorphone injection 1 mg (1 mg Intravenous Given 12/23/19 1849)   famotidine (PF) injection 20 mg (20 mg Intravenous Given 12/23/19 2104)   GI cocktail (mylanta 30 mL, lidocaine 2 % viscous 10 mL, dicyclomine 10 mL) 50 mL (50 mLs Oral Given 12/23/19 2104)   morphine injection 4 mg (4 mg Intravenous Given 12/23/19 2115)   iohexol (OMNIPAQUE 350) injection 75 mL (75 mLs Intravenous Given 12/23/19 2205)       Home Care Instructions:  - Medications: Continue taking your home medications as prescribed    Follow-Up Plan:  - Follow-up with: Primary care doctor within 3 - 5  days  - Additional testing and/or evaluation will be directed by your primary doctor    Return to the Emergency Department for symptoms including but not limited to: worsening symptoms, severe back pain, shortness of breath or chest pain, vomiting with inability to hold down fluids, blood in vomit or poop, fevers greater than 100.4°F, passing out/fainting/unconsciousness, or other concerning symptoms.

## 2019-12-24 NOTE — PROVIDER PROGRESS NOTES - EMERGENCY DEPT.
Encounter Date: 12/23/2019    ED Physician Progress Notes         I received sign-out on this patient from Dr. Singleton.  His he is a 59-year-old male with history of stomach cancer, recent bowel obstruction in September, presenting to ER with complaint of vomiting and abdominal pain. He received Dilaudid, IV fluids, and 8 of Zofran.  He is no longer actively vomiting.    I was called to bedside by nursing staff, as patient stated that he wanted to go home to sleep in his own bed.  I advised patient that the results of his CT were not back, and I was concerned that there could be bowel obstruction based on my review of the CT.  Patient states that he feels fine and like to go home.    I have interviewed and examined the patient. The patient wishes to leave the emergency department against medical advice.    - I have determined that the patient has the capacity to understand the information relevant to their care.   - The patient also understands the consequences of the various options after we discussed relevant information.   - I feel that the patient is able to understand the relevant information and responds appropriately to questions.   - I have attempted to persuade the patient to stay to receive care.   - The patient understands by leaving there is a possibility of death, disability, or serious injury.  - Despite the above information, the patient had made the decision to leave against medical advice.   - I have attempted to persuade the patient to follow up with a physician as soon as possible.   - I have also notified the patient they may return at any time to the ED for further care.

## 2019-12-24 NOTE — ED NOTES
"Report received from KAJAL Frias. Pt resting in stretcher. Pt refusing to wear automated BP cuff, pulse ox, and cardiac monitoring. Pt educated on importance of monitoring vital signs. Pt states "I don't want all that. I'm ok." Pt states he would like to leave ED. Pt states "I want my prescriptions and I want to leave. I'll just come back tomorrow" Dr. White informed. MD stated she would come speak with patient.  "

## 2019-12-27 ENCOUNTER — TELEPHONE (OUTPATIENT)
Dept: HEMATOLOGY/ONCOLOGY | Facility: CLINIC | Age: 59
End: 2019-12-27

## 2019-12-27 DIAGNOSIS — G89.3 CANCER ASSOCIATED PAIN: ICD-10-CM

## 2019-12-27 RX ORDER — OXYCODONE HYDROCHLORIDE 20 MG/1
40 TABLET ORAL EVERY 8 HOURS PRN
Qty: 60 TABLET | Refills: 0 | Status: SHIPPED | OUTPATIENT
Start: 2019-12-27 | End: 2020-01-06

## 2019-12-27 NOTE — TELEPHONE ENCOUNTER
Patient has 6 refills available on the dexamethasone and the pain medication was already electronically submitted this morning per provider after reviewing patient chart.

## 2019-12-27 NOTE — TELEPHONE ENCOUNTER
----- Message from Christina Godinez sent at 12/27/2019 11:09 AM CST -----  Contact: Patient  Refill or New Rx: Refill    RX Name and Strength:  -oxyCODONE (ROXICODONE) 20 mg Tab immediate release tablet  -dexAMETHasone (DECADRON) 4 MG Tab    Preferred Pharmacy with phone number: [facility]    Ordering Provider: Dr Nash Mckenzie Call Back Number: [phone]  Additional Information:

## 2020-01-02 DIAGNOSIS — C80.1 SIGNET RING CELL ADENOCARCINOMA: ICD-10-CM

## 2020-01-02 DIAGNOSIS — G89.3 CANCER ASSOCIATED PAIN: Primary | ICD-10-CM

## 2020-01-02 DIAGNOSIS — G89.3 CANCER ASSOCIATED PAIN: ICD-10-CM

## 2020-01-02 RX ORDER — SENNOSIDES 8.6 MG/1
8.6 TABLET ORAL DAILY PRN
Status: DISCONTINUED | OUTPATIENT
Start: 2020-01-02 | End: 2020-01-02 | Stop reason: HOSPADM

## 2020-01-02 RX ORDER — PROMETHAZINE HYDROCHLORIDE 12.5 MG/1
25 TABLET ORAL EVERY 4 HOURS
Qty: 60 TABLET | Refills: 0 | Status: SHIPPED | OUTPATIENT
Start: 2020-01-02 | End: 2020-02-05 | Stop reason: SDUPTHER

## 2020-01-02 RX ORDER — SENNOSIDES 8.6 MG/1
1 TABLET ORAL 2 TIMES DAILY
Qty: 60 TABLET | Refills: 2 | Status: SHIPPED | OUTPATIENT
Start: 2020-01-02 | End: 2021-01-01

## 2020-01-02 RX ORDER — HYDROMORPHONE HYDROCHLORIDE 4 MG/1
4 TABLET ORAL EVERY 12 HOURS PRN
Qty: 30 TABLET | Refills: 0 | Status: SHIPPED | OUTPATIENT
Start: 2020-01-02 | End: 2020-01-06
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
## 2020-01-02 NOTE — TELEPHONE ENCOUNTER
----- Message from Christina Godinez sent at 1/2/2020  8:17 AM CST -----  Contact: Patient  Reschedule existing appt      Appt date rescheduling:: 01/02    Is appt today or next day appt:: Yes    Type of appt :: Lab, chemo, and f/u    Provider:: Dr Cao    Contact:: [phone]    Reason for rescheduling::    Addition info::
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
## 2020-01-06 DIAGNOSIS — G89.3 CANCER ASSOCIATED PAIN: ICD-10-CM

## 2020-01-06 RX ORDER — OXYCODONE HYDROCHLORIDE 20 MG/1
40 TABLET ORAL EVERY 8 HOURS PRN
Qty: 60 TABLET | Refills: 0 | Status: SHIPPED | OUTPATIENT
Start: 2020-01-06 | End: 2020-01-13

## 2020-01-06 NOTE — TELEPHONE ENCOUNTER
----- Message from Gerard Sequeira sent at 1/6/2020 11:01 AM CST -----  Contact: Pt  Patient called requesting Rx refill on oxyCODONE (ROXICODONE) 20 mg Tab immediate release tablet    Callback:[phone]    [facility] [place] [facility] [place] [facility] - [address]  [address]  [place] [address]  Phone: [phone] Fax: [phone]

## 2020-01-08 ENCOUNTER — INFUSION (OUTPATIENT)
Dept: INFUSION THERAPY | Facility: HOSPITAL | Age: 60
End: 2020-01-08
Payer: MEDICAID

## 2020-01-08 ENCOUNTER — OFFICE VISIT (OUTPATIENT)
Dept: HEMATOLOGY/ONCOLOGY | Facility: CLINIC | Age: 60
End: 2020-01-08
Payer: MEDICAID

## 2020-01-08 ENCOUNTER — LAB VISIT (OUTPATIENT)
Dept: LAB | Facility: HOSPITAL | Age: 60
End: 2020-01-08
Payer: MEDICAID

## 2020-01-08 ENCOUNTER — CLINICAL SUPPORT (OUTPATIENT)
Dept: HEMATOLOGY/ONCOLOGY | Facility: CLINIC | Age: 60
End: 2020-01-08
Payer: MEDICAID

## 2020-01-08 ENCOUNTER — DOCUMENTATION ONLY (OUTPATIENT)
Dept: HEMATOLOGY/ONCOLOGY | Facility: CLINIC | Age: 60
End: 2020-01-08

## 2020-01-08 VITALS
BODY MASS INDEX: 17.52 KG/M2 | RESPIRATION RATE: 20 BRPM | SYSTOLIC BLOOD PRESSURE: 117 MMHG | HEIGHT: 70 IN | DIASTOLIC BLOOD PRESSURE: 73 MMHG | HEART RATE: 104 BPM | WEIGHT: 122.38 LBS | BODY MASS INDEX: 17.52 KG/M2 | WEIGHT: 122.38 LBS | TEMPERATURE: 98 F | HEIGHT: 70 IN

## 2020-01-08 VITALS
BODY MASS INDEX: 17.52 KG/M2 | WEIGHT: 122.38 LBS | RESPIRATION RATE: 18 BRPM | SYSTOLIC BLOOD PRESSURE: 102 MMHG | DIASTOLIC BLOOD PRESSURE: 64 MMHG | HEIGHT: 70 IN | TEMPERATURE: 98 F | HEART RATE: 93 BPM | OXYGEN SATURATION: 98 %

## 2020-01-08 DIAGNOSIS — C80.1 SIGNET RING CELL ADENOCARCINOMA: Primary | ICD-10-CM

## 2020-01-08 DIAGNOSIS — C80.1 SIGNET RING CELL ADENOCARCINOMA: ICD-10-CM

## 2020-01-08 DIAGNOSIS — G89.3 CANCER ASSOCIATED PAIN: ICD-10-CM

## 2020-01-08 DIAGNOSIS — Z71.3 NUTRITIONAL COUNSELING: ICD-10-CM

## 2020-01-08 DIAGNOSIS — A04.8 H. PYLORI INFECTION: ICD-10-CM

## 2020-01-08 DIAGNOSIS — E43 SEVERE PROTEIN-CALORIE MALNUTRITION: ICD-10-CM

## 2020-01-08 LAB
ALBUMIN SERPL BCP-MCNC: 3.2 G/DL (ref 3.5–5.2)
ALP SERPL-CCNC: 84 U/L (ref 55–135)
ALT SERPL W/O P-5'-P-CCNC: 10 U/L (ref 10–44)
ANION GAP SERPL CALC-SCNC: 8 MMOL/L (ref 8–16)
ANISOCYTOSIS BLD QL SMEAR: SLIGHT
AST SERPL-CCNC: 18 U/L (ref 10–40)
BASOPHILS # BLD AUTO: ABNORMAL K/UL (ref 0–0.2)
BASOPHILS NFR BLD: 0 % (ref 0–1.9)
BILIRUB SERPL-MCNC: 0.2 MG/DL (ref 0.1–1)
BUN SERPL-MCNC: 10 MG/DL (ref 6–20)
CALCIUM SERPL-MCNC: 8.8 MG/DL (ref 8.7–10.5)
CHLORIDE SERPL-SCNC: 106 MMOL/L (ref 95–110)
CO2 SERPL-SCNC: 27 MMOL/L (ref 23–29)
CREAT SERPL-MCNC: 0.7 MG/DL (ref 0.5–1.4)
DIFFERENTIAL METHOD: ABNORMAL
EOSINOPHIL # BLD AUTO: ABNORMAL K/UL (ref 0–0.5)
EOSINOPHIL NFR BLD: 1 % (ref 0–8)
ERYTHROCYTE [DISTWIDTH] IN BLOOD BY AUTOMATED COUNT: 15.9 % (ref 11.5–14.5)
EST. GFR  (AFRICAN AMERICAN): >60 ML/MIN/1.73 M^2
EST. GFR  (NON AFRICAN AMERICAN): >60 ML/MIN/1.73 M^2
GLUCOSE SERPL-MCNC: 95 MG/DL (ref 70–110)
HCT VFR BLD AUTO: 37.7 % (ref 40–54)
HGB BLD-MCNC: 11.2 G/DL (ref 14–18)
HYPOCHROMIA BLD QL SMEAR: ABNORMAL
IMM GRANULOCYTES # BLD AUTO: ABNORMAL K/UL (ref 0–0.04)
IMM GRANULOCYTES NFR BLD AUTO: ABNORMAL % (ref 0–0.5)
LYMPHOCYTES # BLD AUTO: ABNORMAL K/UL (ref 1–4.8)
LYMPHOCYTES NFR BLD: 45 % (ref 18–48)
MCH RBC QN AUTO: 27.4 PG (ref 27–31)
MCHC RBC AUTO-ENTMCNC: 29.7 G/DL (ref 32–36)
MCV RBC AUTO: 92 FL (ref 82–98)
MONOCYTES # BLD AUTO: ABNORMAL K/UL (ref 0.3–1)
MONOCYTES NFR BLD: 6 % (ref 4–15)
NEUTROPHILS NFR BLD: 48 % (ref 38–73)
NRBC BLD-RTO: 0 /100 WBC
OVALOCYTES BLD QL SMEAR: ABNORMAL
PLATELET # BLD AUTO: 294 K/UL (ref 150–350)
PMV BLD AUTO: 8.7 FL (ref 9.2–12.9)
POIKILOCYTOSIS BLD QL SMEAR: SLIGHT
POLYCHROMASIA BLD QL SMEAR: ABNORMAL
POTASSIUM SERPL-SCNC: 4.4 MMOL/L (ref 3.5–5.1)
PROT SERPL-MCNC: 6.6 G/DL (ref 6–8.4)
RBC # BLD AUTO: 4.09 M/UL (ref 4.6–6.2)
SODIUM SERPL-SCNC: 141 MMOL/L (ref 136–145)
WBC # BLD AUTO: 17.73 K/UL (ref 3.9–12.7)

## 2020-01-08 PROCEDURE — 99999 PR PBB SHADOW E&M-EST. PATIENT-LVL III: ICD-10-PCS | Mod: PBBFAC,,,

## 2020-01-08 PROCEDURE — 80053 COMPREHEN METABOLIC PANEL: CPT

## 2020-01-08 PROCEDURE — 99211 OFF/OP EST MAY X REQ PHY/QHP: CPT | Mod: PBBFAC,25 | Performed by: DIETITIAN, REGISTERED

## 2020-01-08 PROCEDURE — 85007 BL SMEAR W/DIFF WBC COUNT: CPT

## 2020-01-08 PROCEDURE — 99214 PR OFFICE/OUTPT VISIT, EST, LEVL IV, 30-39 MIN: ICD-10-PCS | Mod: S$PBB,,,

## 2020-01-08 PROCEDURE — 99213 OFFICE O/P EST LOW 20 MIN: CPT | Mod: PBBFAC,27

## 2020-01-08 PROCEDURE — 85027 COMPLETE CBC AUTOMATED: CPT

## 2020-01-08 PROCEDURE — 96415 CHEMO IV INFUSION ADDL HR: CPT

## 2020-01-08 PROCEDURE — 99999 PR PBB SHADOW E&M-EST. PATIENT-LVL I: ICD-10-PCS | Mod: PBBFAC,,,

## 2020-01-08 PROCEDURE — 63600175 PHARM REV CODE 636 W HCPCS: Performed by: INTERNAL MEDICINE

## 2020-01-08 PROCEDURE — 99999 PR PBB SHADOW E&M-EST. PATIENT-LVL III: CPT | Mod: PBBFAC,,,

## 2020-01-08 PROCEDURE — 96367 TX/PROPH/DG ADDL SEQ IV INF: CPT

## 2020-01-08 PROCEDURE — 96413 CHEMO IV INFUSION 1 HR: CPT

## 2020-01-08 PROCEDURE — 96417 CHEMO IV INFUS EACH ADDL SEQ: CPT

## 2020-01-08 PROCEDURE — 96368 THER/DIAG CONCURRENT INF: CPT

## 2020-01-08 PROCEDURE — 97802 MEDICAL NUTRITION INDIV IN: CPT | Mod: PBBFAC

## 2020-01-08 PROCEDURE — 99214 OFFICE O/P EST MOD 30 MIN: CPT | Mod: S$PBB,,,

## 2020-01-08 PROCEDURE — 36415 COLL VENOUS BLD VENIPUNCTURE: CPT

## 2020-01-08 PROCEDURE — 99999 PR PBB SHADOW E&M-EST. PATIENT-LVL I: CPT | Mod: PBBFAC,,,

## 2020-01-08 PROCEDURE — 96416 CHEMO PROLONG INFUSE W/PUMP: CPT

## 2020-01-08 RX ORDER — HEPARIN 100 UNIT/ML
500 SYRINGE INTRAVENOUS
Status: CANCELLED | OUTPATIENT
Start: 2020-01-09

## 2020-01-08 RX ORDER — AMOXICILLIN 500 MG/1
500 CAPSULE ORAL 2 TIMES DAILY
Qty: 28 CAPSULE | Refills: 0 | Status: SHIPPED | OUTPATIENT
Start: 2020-01-08 | End: 2020-01-27

## 2020-01-08 RX ORDER — EPINEPHRINE 0.3 MG/.3ML
0.3 INJECTION SUBCUTANEOUS ONCE AS NEEDED
Status: CANCELLED | OUTPATIENT
Start: 2020-01-08

## 2020-01-08 RX ORDER — HYDROMORPHONE HYDROCHLORIDE 4 MG/1
4 TABLET ORAL EVERY 12 HOURS PRN
Qty: 30 TABLET | Refills: 0 | Status: SHIPPED | OUTPATIENT
Start: 2020-01-08 | End: 2020-01-13

## 2020-01-08 RX ORDER — EPINEPHRINE 0.3 MG/.3ML
0.3 INJECTION SUBCUTANEOUS ONCE AS NEEDED
Status: DISCONTINUED | OUTPATIENT
Start: 2020-01-08 | End: 2020-01-08 | Stop reason: HOSPADM

## 2020-01-08 RX ORDER — PANTOPRAZOLE SODIUM 40 MG/1
40 TABLET, DELAYED RELEASE ORAL 2 TIMES DAILY
Qty: 60 TABLET | Refills: 1 | Status: SHIPPED | OUTPATIENT
Start: 2020-01-08 | End: 2020-02-05 | Stop reason: SDUPTHER

## 2020-01-08 RX ORDER — SODIUM CHLORIDE 0.9 % (FLUSH) 0.9 %
10 SYRINGE (ML) INJECTION
Status: CANCELLED | OUTPATIENT
Start: 2020-01-08

## 2020-01-08 RX ORDER — ONDANSETRON HYDROCHLORIDE 8 MG/1
8 TABLET, FILM COATED ORAL EVERY 6 HOURS PRN
Qty: 120 TABLET | Refills: 2 | Status: SHIPPED | OUTPATIENT
Start: 2020-01-08 | End: 2020-05-17

## 2020-01-08 RX ORDER — CLARITHROMYCIN 500 MG/1
500 TABLET, FILM COATED ORAL 2 TIMES DAILY
Qty: 28 TABLET | Refills: 0 | Status: SHIPPED | OUTPATIENT
Start: 2020-01-08 | End: 2020-01-27

## 2020-01-08 RX ORDER — SODIUM CHLORIDE 0.9 % (FLUSH) 0.9 %
10 SYRINGE (ML) INJECTION
Status: CANCELLED | OUTPATIENT
Start: 2020-01-09

## 2020-01-08 RX ORDER — SODIUM CHLORIDE 0.9 % (FLUSH) 0.9 %
10 SYRINGE (ML) INJECTION
Status: DISCONTINUED | OUTPATIENT
Start: 2020-01-08 | End: 2020-01-08 | Stop reason: HOSPADM

## 2020-01-08 RX ORDER — OMEPRAZOLE 20 MG/1
20 CAPSULE, DELAYED RELEASE ORAL DAILY
COMMUNITY
Start: 2019-11-05 | End: 2020-02-05 | Stop reason: SDUPTHER

## 2020-01-08 RX ORDER — HEPARIN 100 UNIT/ML
500 SYRINGE INTRAVENOUS
Status: CANCELLED | OUTPATIENT
Start: 2020-01-08

## 2020-01-08 RX ORDER — HEPARIN 100 UNIT/ML
500 SYRINGE INTRAVENOUS
Status: DISCONTINUED | OUTPATIENT
Start: 2020-01-08 | End: 2020-01-08 | Stop reason: HOSPADM

## 2020-01-08 RX ORDER — DIPHENHYDRAMINE HYDROCHLORIDE 50 MG/ML
50 INJECTION INTRAMUSCULAR; INTRAVENOUS ONCE AS NEEDED
Status: DISCONTINUED | OUTPATIENT
Start: 2020-01-08 | End: 2020-01-08 | Stop reason: HOSPADM

## 2020-01-08 RX ORDER — CYCLOBENZAPRINE HCL 5 MG
5 TABLET ORAL NIGHTLY
Qty: 10 TABLET | Refills: 0 | Status: SHIPPED | OUTPATIENT
Start: 2020-01-08 | End: 2020-01-23 | Stop reason: SDUPTHER

## 2020-01-08 RX ORDER — DIPHENHYDRAMINE HYDROCHLORIDE 50 MG/ML
50 INJECTION INTRAMUSCULAR; INTRAVENOUS ONCE AS NEEDED
Status: CANCELLED | OUTPATIENT
Start: 2020-01-08

## 2020-01-08 RX ADMIN — SODIUM CHLORIDE: 9 INJECTION, SOLUTION INTRAVENOUS at 01:01

## 2020-01-08 RX ADMIN — OXALIPLATIN 140 MG: 5 INJECTION, SOLUTION INTRAVENOUS at 03:01

## 2020-01-08 RX ADMIN — FLUOROURACIL 4290 MG: 50 INJECTION, SOLUTION INTRAVENOUS at 05:01

## 2020-01-08 RX ADMIN — DOCETAXEL ANHYDROUS 85 MG: 10 INJECTION, SOLUTION INTRAVENOUS at 02:01

## 2020-01-08 RX ADMIN — DEXAMETHASONE SODIUM PHOSPHATE: 4 INJECTION, SOLUTION INTRA-ARTICULAR; INTRALESIONAL; INTRAMUSCULAR; INTRAVENOUS; SOFT TISSUE at 01:01

## 2020-01-08 RX ADMIN — LEUCOVORIN CALCIUM 330 MG: 350 INJECTION, POWDER, LYOPHILIZED, FOR SOLUTION INTRAMUSCULAR; INTRAVENOUS at 03:01

## 2020-01-08 RX ADMIN — DEXTROSE: 50 INJECTION, SOLUTION INTRAVENOUS at 03:01

## 2020-01-08 NOTE — PROGRESS NOTES
Met with patient in clinic to discuss resources for place to stay while here for treatment. Discussed with him the [facility] and how referrals are made. He stated that his girlfriend or sister will be the caregiver when he comes for appointments. Provided him my business card with my direct contact information so that when his next round of chemo is scheduled so that we can make the referral. Obtained a good contact number for him because his current cell phone is not working. He stated that he does not have any problems being able to come back sarai at 4:30pm for his pump removal. [facility]

## 2020-01-08 NOTE — Clinical Note
Good afternoonMrRosa Greene needs some things:1. Follow up with me in clinic in 2 weeks with labs before (CBC/CMP) - and 2 weeks after so 1/22/20 and 2/5/20 2. He needs chemo upstairs 2 weeks from now as well, C3 of FLOT, will need to return on D3 for pump removal - needs both dates as above for his C3 and C43. Social work to help out with possible lodging for his cycles as wellPlease let me know if there are any questions.ThanksMihipolito

## 2020-01-08 NOTE — PLAN OF CARE
Pt received Taxotere, Oxaliplatin, Leucovorin, and 5FU Cadd pump. Tolerated all therapies without event and VSS. Cadd pump applied for infusion x24 hours and instructed to return on 1-9-2020 at 1715 for removal. Verbalized understanding. Reported and observed no distress. Ambulated off unit accompanied by family.

## 2020-01-08 NOTE — PROGRESS NOTES
[facility]  [facility]  Hematology/Medical Oncology Clinic      PATIENT: Isaiah Greene  MRN: [id number]  DATE: 1/8/2020    Diagnosis:   1. Cancer associated pain      Chief Complaint: signet ring cell adenocarcinoma of stomach (follow up)    Other MDs:  Primary Doctor No  Previous oncologist: Dr. Lee  Surg Onc: Dr. HENRI Saravia    Subjective:   Initial History: Mr. Greene is a 59 y.o. male who presents to oncology clinic for history of locally advanced, singlet ring cell gastric adenocarcinoma.    He appears well today though continues to struggle with social/financial situations at home. Appears he is living in a tailor, on his sisters property and has almost little to no resources. He is commuting here about 2 hours for follow up and treatment.     He continues to complain of gastritis, abdominal pain, reflex complaints. PPI was increased to BID last visit, but it does not seem to have improved his complaints at this time. Of note, it does not appears his H pylori has been treated, that was positive at time of biopsy.     He is here prior to C2 of FLOT neoadjuvant chemotherapy. He tolerated C1 relatively well, did have some nausea.     He also complains of difficulty sleeping. Ativan did not help and he has since stopped taking it.     Past Medical History:   Diagnosis Date    Chronic gastritis     Diverticulosis     Positive H. 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     Past Surgical History:   Procedure Laterality Date    DIAGNOSTIC LAPAROSCOPY N/A 9/19/2019    Procedure: LAPAROSCOPY, DIAGNOSTIC;  Surgeon: Josse Saravia MD;  Location: [facility] OR [address] [place] [address];  Service: General;  Laterality: N/A;    ENDOSCOPIC ULTRASOUND OF UPPER GASTROINTESTINAL TRACT N/A 9/17/2019    Procedure: ULTRASOUND, UPPER GI TRACT, ENDOSCOPIC;  Surgeon: Nino Randhawa MD;  Location: [facility] ([address] [place] [address]);  Service: Endoscopy;  Laterality: N/A;     ESOPHAGOGASTRODUODENOSCOPY N/A 9/10/2019    Procedure: EGD (ESOPHAGOGASTRODUODENOSCOPY);  Surgeon: Ok Diaz MD;  Location: [facility];  Service: Endoscopy;  Laterality: N/A;    EYE SURGERY      FACIAL RECONSTRUCTION SURGERY      GASTROJEJUNOSTOMY N/A 9/19/2019    Procedure: GASTROJEJUNOSTOMY, possible G-tube;  Surgeon: Josse Saravia MD;  Location: [facility] OR [address] [place] [address];  Service: General;  Laterality: N/A;    INSERTION OF VENOUS ACCESS PORT Right 9/19/2019    Procedure: INSERTION, VENOUS ACCESS PORT;  Surgeon: Josse Saravia MD;  Location: [facility] OR [address] [place] [address];  Service: General;  Laterality: Right;    LAPAROSCOPIC INSERTION OF JEJUNOSTOMY TUBE N/A 9/19/2019    Procedure: INSERTION, JEJUNOSTOMY TUBE, LAPAROSCOPIC, possible open;  Surgeon: Josse Saravia MD;  Location: [facility] OR [address] [place] [address];  Service: General;  Laterality: N/A;    PERCUTANEOUS INSERTION OF GASTROSTOMY TUBE  9/30/2019    Procedure: INSERTION, GASTROSTOMY TUBE;  Surgeon: Josse Saravia MD;  Location: [facility] OR [address] [place] [address];  Service: General;;    PLACEMENT OF JEJUNOSTOMY TUBE N/A 9/30/2019    Procedure: INSERTION, JEJUNOSTOMY TUBE, revision;  Surgeon: Josse Saravia MD;  Location: [facility] OR [address] [place] [address];  Service: General;  Laterality: N/A;     Family History   Problem Relation Age of Onset    Cancer Father         colon      reports that he has been smoking cigarettes. He has a 20.00 pack-year smoking history. He has never used smokeless tobacco. He reports that he drank alcohol. He reports that he has current or past drug history. Drug: Marijuana.  Review of patient's allergies indicates:  No Known Allergies  Current Outpatient Medications   Medication Sig Dispense Refill    oxyCODONE (ROXICODONE) 20 mg Tab immediate release tablet Take 2 tablets (40 mg total) by mouth every 8 (eight) hours as needed. 60 tablet 0    pantoprazole (PROTONIX) 40 MG tablet Take 1 tablet (40 mg total) by mouth 2 (two) times daily. 60 tablet 1    promethazine  (PHENERGAN) 12.5 MG Tab Take 2 tablets (25 mg total) by mouth every 4 (four) hours. 60 tablet 0    cyclobenzaprine (FLEXERIL) 5 MG tablet Take 1 tablet (5 mg total) by mouth every evening for 10 days. 10 tablet 0    food supplemt, lactose-reduced (NUTRITIONAL SHAKE PLUS) Liqd Take 12 mLs by mouth 3 (three) times daily. (Patient not taking: Reported on 1/8/2020) 90 Bottle     HYDROmorphone (DILAUDID) 4 MG tablet Take 1 tablet (4 mg total) by mouth every 12 (twelve) hours as needed for Pain. 30 tablet 0    omeprazole (PRILOSEC) 20 MG capsule Take 20 mg by mouth once daily.      ondansetron (ZOFRAN) 4 MG tablet Take 1 tablet (4 mg total) by mouth every 6 (six) hours as needed for Nausea. (Patient not taking: Reported on 1/8/2020) 25 tablet 0    senna (SENOKOT) 8.6 mg tablet Take 1 tablet by mouth 2 (two) times daily. (Patient not taking: Reported on 1/8/2020) 60 tablet 2     No current facility-administered medications for this visit.      Review of Systems   Constitutional: Positive for activity change, appetite change, fatigue and unexpected weight change.   HENT: Positive for dental problem and trouble swallowing. Negative for nosebleeds and voice change.    Eyes: Negative for photophobia, redness and visual disturbance.   Respiratory: Positive for choking. Negative for chest tightness, shortness of breath and wheezing.    Cardiovascular: Negative for chest pain, palpitations and leg swelling.   Gastrointestinal: Positive for abdominal distention, abdominal pain, constipation, nausea and vomiting. Negative for anal bleeding, blood in stool and diarrhea.   Genitourinary: Negative for decreased urine volume, difficulty urinating and hematuria.   Musculoskeletal: Positive for arthralgias and back pain. Negative for myalgias and neck stiffness.   Skin: Positive for pallor. Negative for rash and wound.   Neurological: Negative for dizziness, facial asymmetry and headaches.   Hematological: Negative for adenopathy.  "Does not bruise/bleed easily.   Psychiatric/Behavioral: Negative for agitation and behavioral problems.     ECOG Performance Status: 2    Objective:      Vitals:   Vitals:    01/08/20 0853   BP: 117/73   BP Location: Left arm   Patient Position: Sitting   BP Method: Medium (Automatic)   Pulse: 104   Resp: 20   Temp: 98.1 °F (36.7 °C)   TempSrc: Oral   Weight: 55.5 kg (122 lb 5.7 oz)   Height: 5' 10" (1.778 m)     BMI: Body mass index is 17.56 kg/m².    Physical Exam   Constitutional: He is oriented to person, place, and time.   Cachectic male, sitting in the chair, mild distress   HENT:   Temporal wasting noted, left false eye   Eyes: No scleral icterus.   Neck: No JVD present.   Thin anatomy   Cardiovascular: Normal rate and regular rhythm.   Pulmonary/Chest: Effort normal.   Diffuse rhonchi   Abdominal: He exhibits no distension and no mass. There is tenderness. There is no guarding.   Multiple scars, healing appropriately    Musculoskeletal: He exhibits no edema or deformity.   Thin anatomy   Lymphadenopathy:     He has no cervical adenopathy.   Neurological: He is alert and oriented to person, place, and time. No sensory deficit.   Skin: Skin is dry. There is pallor.   Vitals reviewed.    Laboratory Data:  Lab Visit on 01/08/2020   Component Date Value Ref Range Status    WBC 01/08/2020 17.73* 3.90 - 12.70 K/uL Final    RBC 01/08/2020 4.09* 4.60 - 6.20 M/uL Final    Hemoglobin 01/08/2020 11.2* 14.0 - 18.0 g/dL Final    Hematocrit 01/08/2020 37.7* 40.0 - 54.0 % Final    Mean Corpuscular Volume 01/08/2020 92  82 - 98 fL Final    Mean Corpuscular Hemoglobin 01/08/2020 27.4  27.0 - 31.0 pg Final    Mean Corpuscular Hemoglobin Conc 01/08/2020 29.7* 32.0 - 36.0 g/dL Final    RDW 01/08/2020 15.9* 11.5 - 14.5 % Final    Platelets 01/08/2020 294  150 - 350 K/uL Final    MPV 01/08/2020 8.7* 9.2 - 12.9 fL Final    Immature Granulocytes 01/08/2020 CANCELED  0.0 - 0.5 % Final    Result canceled by the ancillary. "    Immature Grans (Abs) 01/08/2020 CANCELED  0.00 - 0.04 K/uL Final    Comment: Mild elevation in immature granulocytes is non specific and   can be seen in a variety of conditions including stress response,   acute inflammation, trauma and pregnancy. Correlation with other   laboratory and clinical findings is essential.    Result canceled by the ancillary.      Lymph # 01/08/2020 CANCELED  1.0 - 4.8 K/uL Final    Result canceled by the ancillary.    Mono # 01/08/2020 CANCELED  0.3 - 1.0 K/uL Final    Result canceled by the ancillary.    Eos # 01/08/2020 CANCELED  0.0 - 0.5 K/uL Final    Result canceled by the ancillary.    Baso # 01/08/2020 CANCELED  0.00 - 0.20 K/uL Final    Result canceled by the ancillary.    nRBC 01/08/2020 0  0 /100 WBC Final    Gran% 01/08/2020 48.0  38.0 - 73.0 % Final    Lymph% 01/08/2020 45.0  18.0 - 48.0 % Final    Mono% 01/08/2020 6.0  4.0 - 15.0 % Final    Eosinophil% 01/08/2020 1.0  0.0 - 8.0 % Final    Basophil% 01/08/2020 0.0  0.0 - 1.9 % Final    Aniso 01/08/2020 Slight   Final    Poik 01/08/2020 Slight   Final    Poly 01/08/2020 Occasional   Final    Hypo 01/08/2020 Occasional   Final    Ovalocytes 01/08/2020 Occasional   Final    Differential Method 01/08/2020 Manual   Final    Sodium 01/08/2020 141  136 - 145 mmol/L Final    Potassium 01/08/2020 4.4  3.5 - 5.1 mmol/L Final    Chloride 01/08/2020 106  95 - 110 mmol/L Final    CO2 01/08/2020 27  23 - 29 mmol/L Final    Glucose 01/08/2020 95  70 - 110 mg/dL Final    BUN, Bld 01/08/2020 10  6 - 20 mg/dL Final    Creatinine 01/08/2020 0.7  0.5 - 1.4 mg/dL Final    Calcium 01/08/2020 8.8  8.7 - 10.5 mg/dL Final    Total Protein 01/08/2020 6.6  6.0 - 8.4 g/dL Final    Albumin 01/08/2020 3.2* 3.5 - 5.2 g/dL Final    Total Bilirubin 01/08/2020 0.2  0.1 - 1.0 mg/dL Final    Comment: For infants and newborns, interpretation of results should be based  on gestational age, weight and in agreement with  clinical  observations.  Premature Infant recommended reference ranges:  Up to 24 hours.............<8.0 mg/dL  Up to 48 hours............<12.0 mg/dL  3-5 days..................<15.0 mg/dL  6-29 days.................<15.0 mg/dL      Alkaline Phosphatase 01/08/2020 84  55 - 135 U/L Final    AST 01/08/2020 18  10 - 40 U/L Final    ALT 01/08/2020 10  10 - 44 U/L Final    Anion Gap 01/08/2020 8  8 - 16 mmol/L Final    eGFR if African American 01/08/2020 >60.0  >60 mL/min/1.73 m^2 Final    eGFR if non African American 01/08/2020 >60.0  >60 mL/min/1.73 m^2 Final    Comment: Calculation used to obtain the estimated glomerular filtration  rate (eGFR) is the CKD-EPI equation.          Assessment:       1. Cancer associated pain        Oncologic History:      2019  September   9/10 EGD: Gastric signet ring cell adenocarcinoma   9/19 Port placement   9/30 G&J tube placement  October    10/5 AMA from [facility] [place] [place]   10/24 Surgical eval, referred to [facility] - tubes removed  November 11/5 [place] h/o eval with Dr. 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 Greene returns for C2 of FLOT neoadjuvant chemotherapy, prior to surgical intervention. He does have a poor social situation that he will need to get some assistance with. He has ongoing and difficult to control nausea and GERD symptoms as well. He is also losing some weight and will see a nutritionist later today, prior to C2 of chemotherapy. Labs look okay to proceed to C2 of FLOT.    -C2 FLOT this PM  -will need follow up in 2 weeks for C3 and labs prior to the visit  -nutrition appointment this AM  -treat H pylor: PPI BID + Amoxicillin 1g BID + Clarithromycin 500 mg BID for 14 days  -refill dilaudid 4mg q6 prn for patient, c/w oxy 40 mg q6 prn - to be used alternatively   -bowel regimen on board  -social work to assist patient with  possible housing options during treatment weeks  -message out to discuss need for MMR/MSI and PD-L1 status on tissue [place] at [place] refilled  -5 mg flexeril night to help with sleep  -educated to not drive on his narcotics or muscle relaxants    Discussed with Dr. Kim.    Elan Villalpando MD  Hematology/Medical Oncology Fellow  [phone] (pager)

## 2020-01-08 NOTE — PROGRESS NOTES
"Oncology Nutrition Assessment for Medical Nutrition Therapy  Initial Visit    Isaiah Greene   1960    Referring Provider:  Jigar Lira MD      Reason for Visit: Pt in for education and nutrition counseling regarding weight loss/cachexia     PMHx:   Past Medical History:   Diagnosis Date    Chronic gastritis     Diverticulosis     Positive H. 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       Nutrition Assessment    This is a 59 y.o.male here for a medical diagnosis of Signet ring cell adenocarcinoma of stomach.   Patient initially had a G-J tube placed during ex-lap in September 2019. Never used feeding tube and had it removed. He has been drinking the tube feeding supplements in attempts to gain weight. Reports it has helped and he feels better, but he is running out.   He is also eating small, frequent meals consisting mostly of lean protein and vegetables. Reports early satiety limiting intake. Also has nausea and reflux.    Weight:55.5 kg (122 lb 5.7 oz)  Height:5' 10" (1.778 m)  BMI:Body mass index is 17.56 kg/m².   IBW: Ideal body weight: 73 kg (160 lb 15 oz)    Usual BW: 160lb  Weight Change: loss of almost 40lb    Nutrition focused physical findings: cachectic appearing; severe orbital and temporal wasting     Allergies: Patient has no known allergies.    Current Medications:    Current Outpatient Medications:     cyclobenzaprine (FLEXERIL) 5 MG tablet, Take 1 tablet (5 mg total) by mouth every evening for 10 days., Disp: 10 tablet, Rfl: 0    food supplemt, lactose-reduced (NUTRITIONAL SHAKE PLUS) Liqd, Take 12 mLs by mouth 3 (three) times daily. (Patient not taking: Reported on 1/8/2020), Disp: 90 Bottle, Rfl:     HYDROmorphone (DILAUDID) 4 MG tablet, Take 1 tablet (4 mg total) by mouth every 12 (twelve) hours as needed for Pain., Disp: 30 tablet, Rfl: 0    omeprazole (PRILOSEC) 20 MG capsule, Take 20 mg by mouth once daily., Disp: , Rfl:     " "ondansetron (ZOFRAN) 4 MG tablet, Take 1 tablet (4 mg total) by mouth every 6 (six) hours as needed for Nausea. (Patient not taking: Reported on 1/8/2020), Disp: 25 tablet, Rfl: 0    oxyCODONE (ROXICODONE) 20 mg Tab immediate release tablet, Take 2 tablets (40 mg total) by mouth every 8 (eight) hours as needed., Disp: 60 tablet, Rfl: 0    pantoprazole (PROTONIX) 40 MG tablet, Take 1 tablet (40 mg total) by mouth 2 (two) times daily., Disp: 60 tablet, Rfl: 1    promethazine (PHENERGAN) 12.5 MG Tab, Take 2 tablets (25 mg total) by mouth every 4 (four) hours., Disp: 60 tablet, Rfl: 0    senna (SENOKOT) 8.6 mg tablet, Take 1 tablet by mouth 2 (two) times daily. (Patient not taking: Reported on 1/8/2020), Disp: 60 tablet, Rfl: 2    Food/medication interactions noted: none     Vitamins/Supplements: tube feeds PO TID. Patient unsure of name, just knows it's "made by [employer]"     Labs: Reviewed. Albumin 3.2    Nutrition Diagnosis    Problem:  malnutrition  Etiology (related to): early satiety   Signs/Symptoms (as evidenced by): unintentional weight loss of 25% usual body weight     Nutrition Intervention    Nutrition Prescription   1950 Kcals (35kcal/kg)  83 g protein (1.5g/kg)   1950 mL fluid (35mL/kg)    Recommendations: Encouraged patient to continue small, frequent meals. Also encouraged him to continue with current eating habits (lean proteins and vegetables). Will apply to Medicaid for oral nutritional supplement coverage through [facility]. Recommend Ensure Enlive 3-4 times daily. Reviewed patient's medications for nausea and reflux. Recommended he avoid chocolate nutrition supplements as these can exacerbate reflux. Answered all nutrition related questions.     Nutrition Monitoring and Evaluation    Goals: Weight gain 1-2lb per week     Motivation to change/anticipated barriers: patient highly motivated to gain weight; limited socially and financially. Meeting with  today.     Follow up in " 2-3 weeks     Communication to referring provider/care team: Discussed with provider and notes available in chart     Counseling time: 15 Minutes    Marcella Ackerman, MPH, RD, , LDN, FAND   [phone]

## 2020-01-09 ENCOUNTER — INFUSION (OUTPATIENT)
Dept: INFUSION THERAPY | Facility: HOSPITAL | Age: 60
End: 2020-01-09
Payer: MEDICAID

## 2020-01-09 VITALS
HEART RATE: 63 BPM | RESPIRATION RATE: 18 BRPM | TEMPERATURE: 98 F | SYSTOLIC BLOOD PRESSURE: 92 MMHG | DIASTOLIC BLOOD PRESSURE: 53 MMHG

## 2020-01-09 DIAGNOSIS — C80.1 SIGNET RING CELL ADENOCARCINOMA: Primary | ICD-10-CM

## 2020-01-09 PROCEDURE — 25000003 PHARM REV CODE 250: Performed by: INTERNAL MEDICINE

## 2020-01-09 PROCEDURE — 63600175 PHARM REV CODE 636 W HCPCS: Performed by: INTERNAL MEDICINE

## 2020-01-09 PROCEDURE — 96523 IRRIG DRUG DELIVERY DEVICE: CPT

## 2020-01-09 PROCEDURE — A4216 STERILE WATER/SALINE, 10 ML: HCPCS | Performed by: INTERNAL MEDICINE

## 2020-01-09 RX ORDER — SODIUM CHLORIDE 0.9 % (FLUSH) 0.9 %
10 SYRINGE (ML) INJECTION
Status: DISCONTINUED | OUTPATIENT
Start: 2020-01-09 | End: 2020-01-09 | Stop reason: HOSPADM

## 2020-01-09 RX ORDER — HEPARIN 100 UNIT/ML
500 SYRINGE INTRAVENOUS
Status: DISCONTINUED | OUTPATIENT
Start: 2020-01-09 | End: 2020-01-09 | Stop reason: HOSPADM

## 2020-01-09 RX ADMIN — Medication 10 ML: at 05:01

## 2020-01-09 RX ADMIN — HEPARIN 500 UNITS: 100 SYRINGE at 05:01

## 2020-01-09 NOTE — NURSING
Patient arrived for pump dc. Tolerated well. NAD noted upon discharge. Verbalized understanding to call MD for any questions/concerns. Port + blood return present, flushed, and hep locked and deaccessed. Discharged home, ambulated independently.

## 2020-01-10 ENCOUNTER — DOCUMENTATION ONLY (OUTPATIENT)
Dept: HEMATOLOGY/ONCOLOGY | Facility: CLINIC | Age: 60
End: 2020-01-10

## 2020-01-10 NOTE — PROGRESS NOTES
Received dates from the clinic for his next round of chemo. Was calling patient to get application completed for referral to the [facility] completed. I called his sister's number which he gave as the best contact number for him but did not reach him. She stated that she would bring him the message and have him call me back.

## 2020-01-13 ENCOUNTER — TELEPHONE (OUTPATIENT)
Dept: HEMATOLOGY/ONCOLOGY | Facility: CLINIC | Age: 60
End: 2020-01-13

## 2020-01-13 DIAGNOSIS — G89.3 CANCER ASSOCIATED PAIN: ICD-10-CM

## 2020-01-13 RX ORDER — HYDROMORPHONE HYDROCHLORIDE 4 MG/1
4 TABLET ORAL 3 TIMES DAILY
Qty: 21 TABLET | Refills: 0 | Status: SHIPPED | OUTPATIENT
Start: 2020-01-13 | End: 2020-01-20 | Stop reason: SDUPTHER

## 2020-01-13 NOTE — TELEPHONE ENCOUNTER
Patient states he is out of pain medication. Let him know that oxycodone was prescribed on the 6th- patient states he has those. Discussed that dilaudid was sent on the 8th. Patient states he cannot pick those up until 1/20. Would like to know if there is something else he can take

## 2020-01-13 NOTE — TELEPHONE ENCOUNTER
----- Message from Hazel Shah sent at 1/13/2020 10:36 AM CST -----  Contact: Self [phone]  Patient would like to speak with you about needing pain medication since his last Chemo he is in so much pain. Please advise

## 2020-01-14 DIAGNOSIS — R10.84 INTRACTABLE GENERALIZED ABDOMINAL PAIN: ICD-10-CM

## 2020-01-14 DIAGNOSIS — C80.1 SIGNET RING CELL ADENOCARCINOMA: Primary | ICD-10-CM

## 2020-01-14 RX ORDER — OXYCODONE HYDROCHLORIDE 20 MG/1
40 TABLET ORAL EVERY 8 HOURS PRN
Qty: 60 TABLET | Refills: 0 | Status: SHIPPED | OUTPATIENT
Start: 2020-01-14 | End: 2020-01-23 | Stop reason: SDUPTHER

## 2020-01-17 ENCOUNTER — DOCUMENTATION ONLY (OUTPATIENT)
Dept: HEMATOLOGY/ONCOLOGY | Facility: CLINIC | Age: 60
End: 2020-01-17

## 2020-01-17 NOTE — PROGRESS NOTES
Received communication from [facility]. Patient's insurance approved Ensure for oral supplementation. He will receive his first shipment between 1/20/20-1/24/20. It will be delivered to his sister's house (per patient preference).

## 2020-01-19 ENCOUNTER — NURSE TRIAGE (OUTPATIENT)
Dept: ADMINISTRATIVE | Facility: CLINIC | Age: 60
End: 2020-01-19

## 2020-01-19 NOTE — TELEPHONE ENCOUNTER
"Spoke with pt:    States my dilaudid Rx needs to be faxed to pharmacy downstairs.(To [facility]). Dr Issa Kim. Please fax request to [facility] pharmacy. Out of dilaudid.       Instructed pt to go ED for pain assistance if runs out of pain med and pain is intolerable. Pt verbalizes understanding.     Reason for Disposition   Caller requesting a NON-URGENT new prescription or refill and triager unable to refill per unit policy    Additional Information   Negative: MORE THAN A DOUBLE DOSE of a prescription or over-the-counter (OTC) drug   Negative: [1] DOUBLE DOSE (an extra dose or lesser amount) of over-the-counter (OTC) drug AND [2] any symptoms (e.g., dizziness, nausea, pain, sleepiness)   Negative: [1] DOUBLE DOSE (an extra dose or lesser amount) of prescription drug AND [2] any symptoms (e.g., dizziness, nausea, pain, sleepiness)   Negative: Took another person's prescription drug   Negative: [1] DOUBLE DOSE (an extra dose or lesser amount) of prescription drug AND [2] NO symptoms (Exception: a double dose of antibiotics)   Negative: Diabetes drug error or overdose (e.g., insulin or extra dose)   Negative: [1] Request for URGENT new prescription or refill of "essential" medication (i.e., likelihood of harm to patient if not taken) AND [2] triager unable to fill per unit policy   Negative: [1] Prescription not at pharmacy AND [2] was prescribed today by PCP   Negative: Pharmacy calling with prescription questions and triager unable to answer question   Negative: Caller has urgent medication question about med that PCP prescribed and triager unable to answer question   Negative: Caller has NON-URGENT medication question about med that PCP prescribed and triager unable to answer question    Protocols used: MEDICATION QUESTION CALL-A-AH      "

## 2020-01-20 ENCOUNTER — TELEPHONE (OUTPATIENT)
Dept: HEMATOLOGY/ONCOLOGY | Facility: CLINIC | Age: 60
End: 2020-01-20

## 2020-01-20 DIAGNOSIS — G89.3 CANCER ASSOCIATED PAIN: ICD-10-CM

## 2020-01-20 RX ORDER — HYDROMORPHONE HYDROCHLORIDE 4 MG/1
4 TABLET ORAL 3 TIMES DAILY
Qty: 21 TABLET | Refills: 0 | Status: SHIPPED | OUTPATIENT
Start: 2020-01-20 | End: 2020-02-12

## 2020-01-20 NOTE — TELEPHONE ENCOUNTER
----- Message from Daniella Herrera sent at 1/20/2020  9:22 AM CST -----  Contact: pt  Reason; Refill request for HYDROmorphone (DILAUDID) 4 MG tablet    Communication: [phone]

## 2020-01-20 NOTE — TELEPHONE ENCOUNTER
----- Message from Nora Joshi sent at 1/20/2020  8:13 AM CST -----  Contact: PT  PT called regarding script refill needed (ran out of the 7 day script - needs the regular script filled)    HYDROmorphone (DILAUDID) 4 MG tablet  Take 1 tablet (4 mg total) by mouth 3 (three) times daily for 7 days - Oral  21 tablets    [facility] [address] [place] [address]  Phone: [phone] Fax: [phone]    Callback: [phone]

## 2020-01-22 ENCOUNTER — TELEPHONE (OUTPATIENT)
Dept: HEMATOLOGY/ONCOLOGY | Facility: CLINIC | Age: 60
End: 2020-01-22

## 2020-01-22 NOTE — TELEPHONE ENCOUNTER
----- Message from Sara Burris sent at 1/22/2020  4:04 PM CST -----  Contact: Isaiah    tel:  [phone]   Caller says he needs his medications . Needs Roxycodone, and  Flexoril.       Using Pharm[facility]  Pls call ref. This  And  returning your call about the appt. Change.

## 2020-01-22 NOTE — TELEPHONE ENCOUNTER
----- Message from Clemente Morillo RN sent at 1/22/2020  3:10 PM CST -----  Contact: Pt   Reschedule patient for next week. Appointment on the 5th will need to be moved back a week to the 12th  ----- Message -----  From: Elan Villalpando MD  Sent: 1/22/2020   3:06 PM CST  To: Clemente Morillo RN    Reschedule him for next Tuesday if possible, OV and LV prior   ----- Message -----  From: Clemente Morillo RN  Sent: 1/22/2020  12:33 PM CST  To: Daisy Emmanuel, MD Dr. Kwasi Gay,  Are you okay not seeing patient before next treatment since he missed his appointment today?   ----- Message -----  From: Daisy Emmanuel  Sent: 1/22/2020  12:29 PM CST  To: Kwasi Ansari Staff    Next available chemo is Friday. Is that okay? If PT can't come Friday, it will be Tuesday. Pls advise.   ----- Message -----  From: Heydi Aragon  Sent: 1/22/2020  12:14 PM CST  To: [facility]  Pool    Pt is requesting a call back to reschedule appt for Chemo   Pt woke up late and still wanted to come today if possible     Pt can be reached at [phone]

## 2020-01-22 NOTE — TELEPHONE ENCOUNTER
----- Message from Heydi Aragon sent at 1/22/2020 12:14 PM CST -----  Contact: Pt   Pt is requesting a call back to reschedule appt for Chemo   Pt woke up late and still wanted to come today if possible     Pt can be reached at [phone]

## 2020-01-22 NOTE — TELEPHONE ENCOUNTER
----- Message from John Le sent at 1/22/2020  1:46 PM CST -----  Contact: pt    PT missed a call and would the nurse to return their call        Pt can be reached at  [phone]

## 2020-01-22 NOTE — TELEPHONE ENCOUNTER
Left VM. No available chair today. Next available is Friday at 7 AM. Ask PT to return call to clinic.

## 2020-01-23 DIAGNOSIS — G89.3 CANCER ASSOCIATED PAIN: ICD-10-CM

## 2020-01-23 RX ORDER — OXYCODONE HYDROCHLORIDE 20 MG/1
40 TABLET ORAL EVERY 8 HOURS PRN
Qty: 60 TABLET | Refills: 0 | Status: SHIPPED | OUTPATIENT
Start: 2020-01-23 | End: 2020-01-28 | Stop reason: SDUPTHER

## 2020-01-23 RX ORDER — CYCLOBENZAPRINE HCL 5 MG
5 TABLET ORAL NIGHTLY
Qty: 10 TABLET | Refills: 0 | Status: SHIPPED | OUTPATIENT
Start: 2020-01-23 | End: 2020-01-28

## 2020-01-28 ENCOUNTER — INITIAL CONSULT (OUTPATIENT)
Dept: HEMATOLOGY/ONCOLOGY | Facility: CLINIC | Age: 60
End: 2020-01-28
Payer: MEDICAID

## 2020-01-28 VITALS
BODY MASS INDEX: 17.55 KG/M2 | RESPIRATION RATE: 18 BRPM | TEMPERATURE: 98 F | DIASTOLIC BLOOD PRESSURE: 80 MMHG | HEART RATE: 106 BPM | WEIGHT: 122.56 LBS | HEIGHT: 70 IN | SYSTOLIC BLOOD PRESSURE: 128 MMHG | OXYGEN SATURATION: 99 %

## 2020-01-28 DIAGNOSIS — E43 SEVERE PROTEIN-CALORIE MALNUTRITION: ICD-10-CM

## 2020-01-28 DIAGNOSIS — F11.90 METHADONE USE: Primary | ICD-10-CM

## 2020-01-28 DIAGNOSIS — Z51.5 PALLIATIVE CARE BY SPECIALIST: ICD-10-CM

## 2020-01-28 DIAGNOSIS — E44.1 MILD PROTEIN-CALORIE MALNUTRITION: ICD-10-CM

## 2020-01-28 DIAGNOSIS — M79.2 NEUROPATHIC PAIN: ICD-10-CM

## 2020-01-28 DIAGNOSIS — K59.00 CONSTIPATION, UNSPECIFIED CONSTIPATION TYPE: ICD-10-CM

## 2020-01-28 DIAGNOSIS — C16.9 MALIGNANT NEOPLASM OF STOMACH, UNSPECIFIED LOCATION: ICD-10-CM

## 2020-01-28 DIAGNOSIS — G89.3 CANCER ASSOCIATED PAIN: ICD-10-CM

## 2020-01-28 PROCEDURE — 99213 OFFICE O/P EST LOW 20 MIN: CPT | Mod: PBBFAC | Performed by: EMERGENCY MEDICINE

## 2020-01-28 PROCEDURE — 99999 PR PBB SHADOW E&M-EST. PATIENT-LVL III: ICD-10-PCS | Mod: PBBFAC,,, | Performed by: EMERGENCY MEDICINE

## 2020-01-28 PROCEDURE — 99999 PR PBB SHADOW E&M-EST. PATIENT-LVL III: CPT | Mod: PBBFAC,,, | Performed by: EMERGENCY MEDICINE

## 2020-01-28 PROCEDURE — 99205 PR OFFICE/OUTPT VISIT, NEW, LEVL V, 60-74 MIN: ICD-10-PCS | Mod: GT,S$PBB,, | Performed by: EMERGENCY MEDICINE

## 2020-01-28 PROCEDURE — 99205 OFFICE O/P NEW HI 60 MIN: CPT | Mod: GT,S$PBB,, | Performed by: EMERGENCY MEDICINE

## 2020-01-28 RX ORDER — SENNOSIDES 8.6 MG/1
1 TABLET ORAL 2 TIMES DAILY
Qty: 60 TABLET | Refills: 2 | Status: SHIPPED | OUTPATIENT
Start: 2020-01-28 | End: 2021-01-27

## 2020-01-28 RX ORDER — OXYCODONE HYDROCHLORIDE 20 MG/1
40 TABLET ORAL EVERY 6 HOURS PRN
Qty: 240 TABLET | Refills: 0 | Status: SHIPPED | OUTPATIENT
Start: 2020-01-28 | End: 2020-02-20

## 2020-01-28 RX ORDER — METHADONE HYDROCHLORIDE 5 MG/1
5 TABLET ORAL EVERY 12 HOURS
Qty: 20 TABLET | Refills: 0 | Status: SHIPPED | OUTPATIENT
Start: 2020-01-28 | End: 2020-02-20 | Stop reason: SDDI

## 2020-01-28 NOTE — PROGRESS NOTES
Consult Note  Palliative Care      Consult Requested By: Dr. Issa Kim  Reason for Consult: symptom management in the setting of gastric adenocarcinoma and abdominal pain      ASSESSMENT/PLAN:     Plan/Recommendations:  Isaiah was seen today for pallative care.    Diagnoses and all orders for this visit:    Methadone use  -     EKG 12-lead; Future;assess baseline QTc  - Start at 5 mg bid based on 180 OME    Cancer associated pain  -     oxyCODONE (ROXICODONE) 20 mg Tab immediate release tablet; Take 2 tablets (40 mg total) by mouth every 6 (six) hours as needed.  -     methadone (DOLOPHINE) 5 MG tablet; Take 1 tablet (5 mg total) by mouth every 12 (twelve) hours.  - Will follow up by phone and in clinic in 1 month    Malignant neoplasm of stomach, unspecified location  -     methadone (DOLOPHINE) 5 MG tablet; Take 1 tablet (5 mg total) by mouth every 12 (twelve) hours.    Neuropathic pain   -post surgical changes  Mild protein-calorie malnutrition  -supplemented with Ensure; nutrition appreciated; encouraged frequent yunier    Constipation, unspecified constipation type  -     senna (SENOKOT) 8.6 mg tablet; Take 1 tablet by mouth 2 (two) times daily.      Social work support appreciated.       Ethical / Legal Advance Care Planning      - surrogate decision maker: has two sisters; sig other is Cassie Moore; not sure who he wants to by primary HCPOA but also states he has had conversations with everyone   - Code Status: FC currently but would not want to receive treatments that are not beneficial to him.  If he has a terminal illness, he states he would want to die a natural death    - [place]: none   - other advance directive: given copies of advanced directives and explained the importance of having wishes documented         SUBJECTIVE:     History of Present Illness:  Patient is a 59 y.o. year old male presenting with chronic abdominal pain related to gastric adenocarcinoma and post surgical changes.  He has  been taking oxycodone 40 mg q 6 hours prn with good relief, but is sick o fhaving to take so many pills throughout the day.  States his pain is generalized and around the clock.  Worsens with exertion and cough.  Improved with pain meds.  No problems with constipation but relies on miralax and docusate.  Daily flatus.  Pain affects his sleep and flexeril wasn't helpful so he stopped.   Early satiety and thankful for recent delivery of Ensure and visits with nutritionist.     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     Past Surgical History:   Procedure Laterality Date    DIAGNOSTIC LAPAROSCOPY N/A 9/19/2019    Procedure: LAPAROSCOPY, DIAGNOSTIC;  Surgeon: Josse Saravia MD;  Location: [facility] OR [address] [place] [address];  Service: General;  Laterality: N/A;    ENDOSCOPIC ULTRASOUND OF UPPER GASTROINTESTINAL TRACT N/A 9/17/2019    Procedure: ULTRASOUND, UPPER GI TRACT, ENDOSCOPIC;  Surgeon: Nino Randhawa MD;  Location: [address]);  Service: Endoscopy;  Laterality: N/A;    ESOPHAGOGASTRODUODENOSCOPY N/A 9/10/2019    Procedure: EGD (ESOPHAGOGASTRODUODENOSCOPY);  Surgeon: Ok Diaz MD;  Location: [facility] [place];  Service: Endoscopy;  Laterality: N/A;    EYE  SURGERY      FACIAL RECONSTRUCTION SURGERY      GASTROJEJUNOSTOMY N/A 9/19/2019    Procedure: GASTROJEJUNOSTOMY, possible G-tube;  Surgeon: Josse Saravia MD;  Location: [facility] OR 2ND FLR;  Service: General;  Laterality: N/A;    INSERTION OF VENOUS ACCESS PORT Right 9/19/2019    Procedure: INSERTION, VENOUS ACCESS PORT;  Surgeon: Josse Saravia MD;  Location: [facility] OR 2ND FLR;  Service: General;  Laterality: Right;    LAPAROSCOPIC INSERTION OF JEJUNOSTOMY TUBE N/A 9/19/2019    Procedure: INSERTION, JEJUNOSTOMY TUBE, LAPAROSCOPIC, possible open;  Surgeon: Josse Saravia MD;  Location: [facility] OR 2ND FLR;  Service: General;  Laterality: N/A;    PERCUTANEOUS INSERTION OF GASTROSTOMY TUBE  9/30/2019    Procedure: INSERTION, GASTROSTOMY TUBE;  Surgeon: Josse Saravia MD;  [facility];  Service: General;;    PLACEMENT OF JEJUNOSTOMY TUBE N/A 9/30/2019    Procedure: INSERTION, JEJUNOSTOMY TUBE, revision;  Surgeon: Josse Saravia MD;  [facility];  Service: General;  Laterality: N/A;     Family History   Problem Relation Age of Onset    Cancer Father         colon     Review of patient's allergies indicates:  No Known Allergies    Medications:    Current Outpatient Medications:     food supplemt, lactose-reduced (NUTRITIONAL SHAKE PLUS) Liqd, Take 12 mLs by mouth 3 (three) times daily. (Patient not taking: Reported on 1/8/2020), Disp: 90 Bottle, Rfl:     methadone (DOLOPHINE) 5 MG tablet, Take 1 tablet (5 mg total) by mouth every 12 (twelve) hours., Disp: 20 tablet, Rfl: 0    omeprazole (PRILOSEC) 20 MG capsule, Take 20 mg by mouth once daily., Disp: , Rfl:     ondansetron (ZOFRAN) 8 MG tablet, Take 1 tablet (8 mg total) by mouth every 6 (six) hours as needed for Nausea., Disp: 120 tablet, Rfl: 2    oxyCODONE (ROXICODONE) 20 mg Tab immediate release tablet, Take 2 tablets (40 mg total) by mouth every 6 (six) hours as needed., Disp: 240 tablet, Rfl: 0    pantoprazole  (PROTONIX) 40 MG tablet, Take 1 tablet (40 mg total) by mouth 2 (two) times daily., Disp: 60 tablet, Rfl: 1    promethazine (PHENERGAN) 12.5 MG Tab, Take 2 tablets (25 mg total) by mouth every 4 (four) hours., Disp: 60 tablet, Rfl: 0    senna (SENOKOT) 8.6 mg tablet, Take 1 tablet by mouth 2 (two) times daily. (Patient not taking: Reported on 1/8/2020), Disp: 60 tablet, Rfl: 2    senna (SENOKOT) 8.6 mg tablet, Take 1 tablet by mouth 2 (two) times daily., Disp: 60 tablet, Rfl: 2      24h Oral Morphine Equivalents (OME):     OBJECTIVE:     Symptom Assessment (ESAS 0-10 scale)    ESAS 0 1 2 3 4 5 6 7 8 9 10   Pain []  []  []  []  []  []  [x]  []  []  []  []    Dyspnea [x]  []  []  []  []  []  []  []  []  []  []    Anxiety [x]  []  []  []  []  []  []  []  []  []  []    Nausea []  []  []  [x]  []  []  []  []  []  []  []    Depression  [x]  []  []  []  []  []  []  []  []  []  []    Anorexia []  []  []  [x]  []  []  []  []  []  []  []    Fatigue []  []  [x]  []  []  []  []  []  []  []  []    Insomnia []  []  [x]  []  []  []  []  []  []  []  []    Restlessness  [x]  []  []  []  []  []  []  []  []  []  []    Agitation [x]  []  []  []  []  []  []  []  []  []  []        Constipation    yes  Bowel Management Plan (BMP): yes  Diarrhea        no  Comments:   Perfromance Status: PPS Score 90  ROS:  Review of Systems  Constitutional: Positive for activity change, appetite change, fatigue and unexpected weight change.   HENT: Positive for dental problem and trouble swallowing. Negative for nosebleeds and voice change.    Eyes: Negative for photophobia, redness and visual disturbance.   Respiratory: Positive for choking. Negative for chest tightness, shortness of breath and wheezing.    Cardiovascular: Negative for chest pain, palpitations and leg swelling.   Gastrointestinal: Positive for abdominal distention, abdominal pain, constipation, nausea and vomiting. Negative for anal bleeding, blood in stool and diarrhea.    Genitourinary: Negative for decreased urine volume, difficulty urinating and hematuria.   Musculoskeletal: Positive for arthralgias and back pain. Negative for myalgias and neck stiffness.   Skin: Positive for pallor. Negative for rash and wound.   Neurological: Negative for dizziness, facial asymmetry and headaches.   Hematological: Negative for adenopathy. Does not bruise/bleed easily.   Psychiatric/Behavioral: Negative for agitation and behavioral problems.     Physical Exam:  Vitals: Temp: 98.1 °F (36.7 °C) (01/28/20 1316)  Pulse: 106 (01/28/20 1316)  Resp: 18 (01/28/20 1316)  BP: 128/80 (01/28/20 1316)  SpO2: 99 % (01/28/20 1316)  Physical Exam  Constitutional: He is oriented to person, place, and time.   Cachectic male, sitting in the chair, mild distress   HENT:   Temporal wasting noted, left false eye   Eyes: No scleral icterus.   Neck: No JVD present.   Thin anatomy   Cardiovascular: Normal rate and regular rhythm.   Pulmonary/Chest: Effort normal.   Diffuse rhonchi   Abdominal: He exhibits no distension and no mass. There is mild generalized tenderness. There is no guarding or rebounding.   Multiple scars, healing appropriately without drainage or erythema   Musculoskeletal: He exhibits no edema or deformity.   Thin anatomy   Lymphadenopathy:     He has no cervical adenopathy.   Neurological: He is alert and oriented to person, place, and time. No sensory deficit.   Skin: Skin is dry.    Vitals reviewed.    Labs:  CBC:   WBC   Date Value Ref Range Status   01/08/2020 17.73 (H) 3.90 - 12.70 K/uL Final     Hemoglobin   Date Value Ref Range Status   01/08/2020 11.2 (L) 14.0 - 18.0 g/dL Final     Hematocrit   Date Value Ref Range Status   01/08/2020 37.7 (L) 40.0 - 54.0 % Final     Mean Corpuscular Volume   Date Value Ref Range Status   01/08/2020 92 82 - 98 fL Final     Platelets   Date Value Ref Range Status   01/08/2020 294 150 - 350 K/uL Final           LFT:   Lab Results   Component Value Date    AST 18  01/08/2020    ALKPHOS 84 01/08/2020    BILITOT 0.2 01/08/2020       Albumin:   Albumin   Date Value Ref Range Status   01/08/2020 3.2 (L) 3.5 - 5.2 g/dL Final     Protein:   Total Protein   Date Value Ref Range Status   01/08/2020 6.6 6.0 - 8.4 g/dL Final       Radiology:I have reviewed all pertinent imaging results/findings within the past 24 hours.   12/2019  CT:  Postoperative changes of gastrojejunostomy with distension of both the duodenum as well as the proximal jejunum extending just distal to the gastrojejunal anastomosis.  There appears to be an area of bowel wall thickening and luminal narrowing within the mid jejunum, (axial series 2, images 41 and 43), which may represent a transition point.  Neoplasm recurrence is not excluded as potential obstruction etiology.  Recommend surgical consultation.    Large volume stool throughout the colon.    Extensive coronary artery and aortic atherosclerotic calcification.  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  Marked luminal narrowing of the left renal vein near the insertion into the IVC.    Centrilobular emphysema.    Psychosocial/Cultural: lives wiith partner of 16 years Cassie Moore; [place] with electricity and water; lives off of his sig other social security check of $765.  $400 goes to rent for the month.  Has 2 sisters he also leans on for support    Spiritual: did not discuss today      > 50% of 75 min visit spent in chart review, face to face discussion of goals of care,  symptom assessment, coordination of care and emotional support.      Signature: Moy Grant MD

## 2020-01-28 NOTE — LETTER
January 28, 2020      Issa Kim MD  [address]  [facility] [address]           [facility]  [address]  [facility] [address]  Phone: [phone]          Patient: Isaiah Greene   MR Number: [id number]   YOB: 1960   Date of Visit: 1/28/2020       Dear Dr. Issa Kim:    Thank you for referring Isaiah Greene to me for evaluation. Attached you will find relevant portions of my assessment and plan of care.    If you have questions, please do not hesitate to call me. I look forward to following Isaiah Greene along with you.    Sincerely,    Moy Grant MD    Enclosure  CC:  No Recipients    If you would like to receive this communication electronically, please contact [email or web address] or [phone] to request more information on [facility] access.    For providers and/or their staff who would like to refer a patient to [facility], please contact us through our one-stop-shop provider referral line, [facility], at [phone].    If you feel you have received this communication in error or would no longer like to receive these types of communications, please e-mail [email or web address]

## 2020-01-29 ENCOUNTER — TELEPHONE (OUTPATIENT)
Dept: HEMATOLOGY/ONCOLOGY | Facility: CLINIC | Age: 60
End: 2020-01-29

## 2020-01-29 NOTE — TELEPHONE ENCOUNTER
----- Message from Juany Hanson sent at 1/29/2020  4:17 PM CST -----  Contact: Anonymous !!!!!!!!!!!  Received an anonymous call in regards to the patient Isaiah Greene. 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  The whole neighborhood knows it and it is out of control , he runs back to Dr to request more for pain, because he knows he will get a lot of pills  , and when he gets them he sells them for heroin      Caller states if you dont believe him before the Patient comes in for next appointment ask him to bring his medication with him and you will see how short he is or if he even has any,   Caller just wants this off the street.     Thanks

## 2020-01-31 ENCOUNTER — TELEPHONE (OUTPATIENT)
Dept: HEMATOLOGY/ONCOLOGY | Facility: CLINIC | Age: 60
End: 2020-01-31

## 2020-01-31 NOTE — TELEPHONE ENCOUNTER
Received message that patient arrived for apt today at 2pm rather than next week. Explained that I am seeing patients in radiation oncology on the first floor of main hospital building across from the cancer center and  would be happy to see him here. Patient added to schedule but never arrived for apt. Attempted to call patient and call sent to voicemail- left directions for patient of where to check in for apt if he would still like to be seen.     Attempted second call to patient and emergency contact (sister) at 3:55pm. No answer on patient's number and his sister is unsure as to his location and is not with the patient.

## 2020-02-05 ENCOUNTER — INFUSION (OUTPATIENT)
Dept: INFUSION THERAPY | Facility: HOSPITAL | Age: 60
End: 2020-02-05
Payer: MEDICAID

## 2020-02-05 ENCOUNTER — OFFICE VISIT (OUTPATIENT)
Dept: HEMATOLOGY/ONCOLOGY | Facility: CLINIC | Age: 60
End: 2020-02-05
Payer: MEDICAID

## 2020-02-05 VITALS
HEIGHT: 70 IN | TEMPERATURE: 98 F | BODY MASS INDEX: 17.71 KG/M2 | DIASTOLIC BLOOD PRESSURE: 77 MMHG | RESPIRATION RATE: 20 BRPM | WEIGHT: 123.69 LBS | HEART RATE: 122 BPM | OXYGEN SATURATION: 96 % | SYSTOLIC BLOOD PRESSURE: 138 MMHG

## 2020-02-05 VITALS
SYSTOLIC BLOOD PRESSURE: 106 MMHG | HEIGHT: 70 IN | BODY MASS INDEX: 17.71 KG/M2 | HEART RATE: 95 BPM | WEIGHT: 123.69 LBS | TEMPERATURE: 98 F | RESPIRATION RATE: 18 BRPM | DIASTOLIC BLOOD PRESSURE: 71 MMHG

## 2020-02-05 DIAGNOSIS — C80.1 SIGNET RING CELL ADENOCARCINOMA: Primary | ICD-10-CM

## 2020-02-05 DIAGNOSIS — G89.3 CANCER ASSOCIATED PAIN: ICD-10-CM

## 2020-02-05 PROCEDURE — 96416 CHEMO PROLONG INFUSE W/PUMP: CPT

## 2020-02-05 PROCEDURE — 99999 PR PBB SHADOW E&M-EST. PATIENT-LVL III: CPT | Mod: PBBFAC,,,

## 2020-02-05 PROCEDURE — 96413 CHEMO IV INFUSION 1 HR: CPT

## 2020-02-05 PROCEDURE — 99213 OFFICE O/P EST LOW 20 MIN: CPT | Mod: PBBFAC

## 2020-02-05 PROCEDURE — 96415 CHEMO IV INFUSION ADDL HR: CPT

## 2020-02-05 PROCEDURE — 99999 PR PBB SHADOW E&M-EST. PATIENT-LVL III: ICD-10-PCS | Mod: PBBFAC,,,

## 2020-02-05 PROCEDURE — 63600175 PHARM REV CODE 636 W HCPCS: Performed by: INTERNAL MEDICINE

## 2020-02-05 PROCEDURE — 99214 PR OFFICE/OUTPT VISIT, EST, LEVL IV, 30-39 MIN: ICD-10-PCS | Mod: S$PBB,,,

## 2020-02-05 PROCEDURE — 96366 THER/PROPH/DIAG IV INF ADDON: CPT

## 2020-02-05 PROCEDURE — 96368 THER/DIAG CONCURRENT INF: CPT

## 2020-02-05 PROCEDURE — 96367 TX/PROPH/DG ADDL SEQ IV INF: CPT

## 2020-02-05 PROCEDURE — 96417 CHEMO IV INFUS EACH ADDL SEQ: CPT

## 2020-02-05 PROCEDURE — 99214 OFFICE O/P EST MOD 30 MIN: CPT | Mod: S$PBB,,,

## 2020-02-05 RX ORDER — HEPARIN 100 UNIT/ML
500 SYRINGE INTRAVENOUS
Status: DISCONTINUED | OUTPATIENT
Start: 2020-02-05 | End: 2020-02-05 | Stop reason: HOSPADM

## 2020-02-05 RX ORDER — OMEPRAZOLE 40 MG/1
40 CAPSULE, DELAYED RELEASE ORAL DAILY
Qty: 30 CAPSULE | Refills: 2 | Status: SHIPPED | OUTPATIENT
Start: 2020-02-05

## 2020-02-05 RX ORDER — SODIUM CHLORIDE 0.9 % (FLUSH) 0.9 %
10 SYRINGE (ML) INJECTION
Status: CANCELLED | OUTPATIENT
Start: 2020-02-06

## 2020-02-05 RX ORDER — DIPHENHYDRAMINE HYDROCHLORIDE 50 MG/ML
50 INJECTION INTRAMUSCULAR; INTRAVENOUS ONCE AS NEEDED
Status: CANCELLED | OUTPATIENT
Start: 2020-02-05

## 2020-02-05 RX ORDER — PROMETHAZINE HYDROCHLORIDE 12.5 MG/1
25 TABLET ORAL EVERY 4 HOURS
Qty: 60 TABLET | Refills: 0 | Status: SHIPPED | OUTPATIENT
Start: 2020-02-05 | End: 2020-02-29

## 2020-02-05 RX ORDER — HEPARIN 100 UNIT/ML
500 SYRINGE INTRAVENOUS
Status: CANCELLED | OUTPATIENT
Start: 2020-02-06

## 2020-02-05 RX ORDER — SODIUM CHLORIDE 0.9 % (FLUSH) 0.9 %
10 SYRINGE (ML) INJECTION
Status: CANCELLED | OUTPATIENT
Start: 2020-02-05

## 2020-02-05 RX ORDER — EPINEPHRINE 0.3 MG/.3ML
0.3 INJECTION SUBCUTANEOUS ONCE AS NEEDED
Status: CANCELLED | OUTPATIENT
Start: 2020-02-05

## 2020-02-05 RX ORDER — DIPHENHYDRAMINE HYDROCHLORIDE 50 MG/ML
50 INJECTION INTRAMUSCULAR; INTRAVENOUS ONCE AS NEEDED
Status: DISCONTINUED | OUTPATIENT
Start: 2020-02-05 | End: 2020-02-05 | Stop reason: HOSPADM

## 2020-02-05 RX ORDER — HEPARIN 100 UNIT/ML
500 SYRINGE INTRAVENOUS
Status: CANCELLED | OUTPATIENT
Start: 2020-02-05

## 2020-02-05 RX ORDER — PANTOPRAZOLE SODIUM 40 MG/1
40 TABLET, DELAYED RELEASE ORAL 2 TIMES DAILY
Qty: 60 TABLET | Refills: 1 | Status: SHIPPED | OUTPATIENT
Start: 2020-02-05 | End: 2020-03-31

## 2020-02-05 RX ORDER — EPINEPHRINE 0.3 MG/.3ML
0.3 INJECTION SUBCUTANEOUS ONCE AS NEEDED
Status: DISCONTINUED | OUTPATIENT
Start: 2020-02-05 | End: 2020-02-05 | Stop reason: HOSPADM

## 2020-02-05 RX ORDER — SODIUM CHLORIDE 0.9 % (FLUSH) 0.9 %
10 SYRINGE (ML) INJECTION
Status: DISCONTINUED | OUTPATIENT
Start: 2020-02-05 | End: 2020-02-05 | Stop reason: HOSPADM

## 2020-02-05 RX ADMIN — DEXTROSE 35 MG: 50 INJECTION, SOLUTION INTRAVENOUS at 02:02

## 2020-02-05 RX ADMIN — OXALIPLATIN 140 MG: 100 INJECTION, SOLUTION, CONCENTRATE INTRAVENOUS at 02:02

## 2020-02-05 RX ADMIN — DEXTROSE: 50 INJECTION, SOLUTION INTRAVENOUS at 03:02

## 2020-02-05 RX ADMIN — FLUOROURACIL 4290 MG: 50 INJECTION, SOLUTION INTRAVENOUS at 05:02

## 2020-02-05 RX ADMIN — DEXAMETHASONE SODIUM PHOSPHATE: 4 INJECTION, SOLUTION INTRA-ARTICULAR; INTRALESIONAL; INTRAMUSCULAR; INTRAVENOUS; SOFT TISSUE at 12:02

## 2020-02-05 RX ADMIN — DOCETAXEL ANHYDROUS 85 MG: 10 INJECTION, SOLUTION INTRAVENOUS at 01:02

## 2020-02-05 RX ADMIN — SODIUM CHLORIDE: 0.9 INJECTION, SOLUTION INTRAVENOUS at 12:02

## 2020-02-05 NOTE — PLAN OF CARE
Patient tolerated Flot well today. CADD pump infusing at 4.2 ml/hr. Verified by 2 RNs. Plan to rtc tomorrow around 1700 for pump dc. NAD noted upon discharge. Declined avs. Discharged home, ambulated independently with caregiver by side.

## 2020-02-05 NOTE — PROGRESS NOTES
Laureen Pending sale to [facility]  Hematology/Medical Oncology Clinic      PATIENT: Isaiah Greene  MRN: [id number]  DATE: 2/5/2020    Diagnosis:   1. Signet ring cell adenocarcinoma of stomach    2. Cancer associated pain      Chief Complaint: Signet ring cell adenocarcinoma of stomach    Other MDs:  Primary Doctor No  Previous oncologist: Dr. Lee  Surg Onc: Dr. HENRI Saravia    Subjective:   Initial History: Mr. Greene is a 59 y.o. male who presents to oncology clinic for history of locally advanced, singlet ring cell gastric adenocarcinoma.    He appears well today with complaints of getting over a recent URI.     Nausea appears to be improved and pain decreased.     He needs refills for PPI and anti-emetics today.    He is here prior to C3 of FLOT neoadjuvant chemotherapy. He tolerated C1-2 relatively well. Has missed several appointments and cycles of chemo to date. Discussed the need for regularly scheduled follow up as his cancer is curable at this point and every time he delays treatment, this may not be possible.     There was a discussion after last visit where a random neighbor called the office to say that Mr. Greene was selling his Rxs out of his house. Today he denies that he is selling his prescriptions and that he takes them as prescribed, though does admit to also smoking marijuana.     He refilled his methadone though only took 3-4 doses as it makes him feel terrible and down, without pain relief.     He is amenable to any testing that we need to do today in regards to his drug complaint.     Past Medical History:   Diagnosis Date    Chronic gastritis     Diverticulosis     Positive H. 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     Past Surgical History:   Procedure Laterality Date    DIAGNOSTIC LAPAROSCOPY N/A 9/19/2019    Procedure: LAPAROSCOPY, DIAGNOSTIC;  Surgeon: Josse Saravia MD;  Location: [facility] OR [address] [place] [address];  Service:  General;  Laterality: N/A;    ENDOSCOPIC ULTRASOUND OF UPPER GASTROINTESTINAL TRACT N/A 9/17/2019    Procedure: ULTRASOUND, UPPER GI TRACT, ENDOSCOPIC;  Surgeon: Nino Randhawa MD;  Location: [address]);  Service: Endoscopy;  Laterality: N/A;    ESOPHAGOGASTRODUODENOSCOPY N/A 9/10/2019    Procedure: EGD (ESOPHAGOGASTRODUODENOSCOPY);  Surgeon: Ok Diaz MD;  Location: [facility];  Service: Endoscopy;  Laterality: N/A;    EYE SURGERY      FACIAL RECONSTRUCTION SURGERY      GASTROJEJUNOSTOMY N/A 9/19/2019    Procedure: GASTROJEJUNOSTOMY, possible G-tube;  Surgeon: Josse Saravia MD;  Location: [address];  Service: General;  Laterality: N/A;    INSERTION OF VENOUS ACCESS PORT Right 9/19/2019    Procedure: INSERTION, VENOUS ACCESS PORT;  Surgeon: Josse Saravia MD;  Location: [address];  Service: General;  Laterality: Right;    LAPAROSCOPIC INSERTION OF JEJUNOSTOMY TUBE N/A 9/19/2019    Procedure: INSERTION, JEJUNOSTOMY TUBE, LAPAROSCOPIC, possible open;  Surgeon: Josse Saravia MD;  Location: [address];  Service: General;  Laterality: N/A;    PERCUTANEOUS INSERTION OF GASTROSTOMY TUBE  9/30/2019    Procedure: INSERTION, GASTROSTOMY TUBE;  Surgeon: Josse Saravia MD;  Location: [address];  Service: General;;    PLACEMENT OF JEJUNOSTOMY TUBE N/A 9/30/2019    Procedure: INSERTION, JEJUNOSTOMY TUBE, revision;  Surgeon: Josse Saravia MD;  Location: [address];  Service: General;  Laterality: N/A;     Family History   Problem Relation Age of Onset    Cancer Father         colon      reports that he has been smoking cigarettes. He has a 20.00 pack-year smoking history. He has never used smokeless tobacco. He reports that he drank alcohol. He reports that he has current or past drug history. Drug: Marijuana.  Review of patient's allergies indicates:  No Known Allergies  Current Outpatient Medications   Medication Sig Dispense Refill    food supplemt,  lactose-reduced (NUTRITIONAL SHAKE PLUS) Liqd Take 12 mLs by mouth 3 (three) times daily. 90 Bottle     omeprazole (PRILOSEC) 20 MG capsule Take 20 mg by mouth once daily.      ondansetron (ZOFRAN) 8 MG tablet Take 1 tablet (8 mg total) by mouth every 6 (six) hours as needed for Nausea. 120 tablet 2    oxyCODONE (ROXICODONE) 20 mg Tab immediate release tablet Take 2 tablets (40 mg total) by mouth every 6 (six) hours as needed. 240 tablet 0    pantoprazole (PROTONIX) 40 MG tablet Take 1 tablet (40 mg total) by mouth 2 (two) times daily. 60 tablet 1    promethazine (PHENERGAN) 12.5 MG Tab Take 2 tablets (25 mg total) by mouth every 4 (four) hours. 60 tablet 0    methadone (DOLOPHINE) 5 MG tablet Take 1 tablet (5 mg total) by mouth every 12 (twelve) hours. (Patient not taking: Reported on 2/5/2020) 20 tablet 0    senna (SENOKOT) 8.6 mg tablet Take 1 tablet by mouth 2 (two) times daily. (Patient not taking: Reported on 1/8/2020) 60 tablet 2    senna (SENOKOT) 8.6 mg tablet Take 1 tablet by mouth 2 (two) times daily. (Patient not taking: Reported on 2/5/2020) 60 tablet 2     No current facility-administered medications for this visit.      Review of Systems   Constitutional: Positive for appetite change. Negative for activity change, fatigue and unexpected weight change.   HENT: Positive for dental problem. Negative for nosebleeds, trouble swallowing and voice change.    Eyes: Negative for photophobia, redness and visual disturbance.   Respiratory: Negative for choking, chest tightness, shortness of breath and wheezing.    Cardiovascular: Negative for chest pain, palpitations and leg swelling.   Gastrointestinal: Positive for constipation and nausea. Negative for abdominal distention, abdominal pain, anal bleeding, blood in stool, diarrhea and vomiting.   Genitourinary: Negative for decreased urine volume, difficulty urinating and hematuria.   Musculoskeletal: Positive for arthralgias and back pain. Negative for  "myalgias and neck stiffness.   Skin: Positive for pallor. Negative for rash and wound.   Neurological: Negative for dizziness, facial asymmetry and headaches.   Hematological: Negative for adenopathy. Does not bruise/bleed easily.   Psychiatric/Behavioral: Negative for agitation and behavioral problems.     ECOG Performance Status: 2    Objective:      Vitals:   Vitals:    02/05/20 1024   BP: 138/77   BP Location: Right arm   Patient Position: Sitting   BP Method: Medium (Automatic)   Pulse: (!) 122   Resp: 20   Temp: 98 °F (36.7 °C)   TempSrc: Oral   SpO2: 96%   Weight: 56.1 kg (123 lb 10.9 oz)   Height: 5' 10" (1.778 m)     BMI: Body mass index is 17.75 kg/m².    Physical Exam   Constitutional: He is oriented to person, place, and time.   Cachectic male, sitting up in chair, appears well today   HENT:   Temporal wasting noted, left false eye   Eyes: No scleral icterus.   Neck: No JVD present.   Thin anatomy   Cardiovascular: Normal rate and regular rhythm.   Pulmonary/Chest: Effort normal.   Diffuse rhonchi   Abdominal: He exhibits no distension and no mass. There is no tenderness. There is no guarding.   Multiple scars   Musculoskeletal: He exhibits no edema or deformity.   Thin anatomy   Lymphadenopathy:     He has no cervical adenopathy.   Neurological: He is alert and oriented to person, place, and time. No sensory deficit.   Skin: Skin is dry. There is pallor.   Vitals reviewed.    Laboratory Data:  Lab Visit on 02/05/2020   Component Date Value Ref Range Status    WBC 02/05/2020 14.41* 3.90 - 12.70 K/uL Final    RBC 02/05/2020 4.44* 4.60 - 6.20 M/uL Final    Hemoglobin 02/05/2020 12.3* 14.0 - 18.0 g/dL Final    Hematocrit 02/05/2020 41.4  40.0 - 54.0 % Final    Mean Corpuscular Volume 02/05/2020 93  82 - 98 fL Final    Mean Corpuscular Hemoglobin 02/05/2020 27.7  27.0 - 31.0 pg Final    Mean Corpuscular Hemoglobin Conc 02/05/2020 29.7* 32.0 - 36.0 g/dL Final    RDW 02/05/2020 16.7* 11.5 - 14.5 % " Final    Platelets 02/05/2020 395* 150 - 350 K/uL Final    MPV 02/05/2020 9.0* 9.2 - 12.9 fL Final    Immature Granulocytes 02/05/2020 CANCELED  0.0 - 0.5 % Final    Result canceled by the ancillary.    Immature Grans (Abs) 02/05/2020 CANCELED  0.00 - 0.04 K/uL Final    Comment: Mild elevation in immature granulocytes is non specific and   can be seen in a variety of conditions including stress response,   acute inflammation, trauma and pregnancy. Correlation with other   laboratory and clinical findings is essential.    Result canceled by the ancillary.      nRBC 02/05/2020 0  0 /100 WBC Final    Gran% 02/05/2020 39.0  38.0 - 73.0 % Final    Lymph% 02/05/2020 56.0* 18.0 - 48.0 % Final    Mono% 02/05/2020 2.0* 4.0 - 15.0 % Final    Eosinophil% 02/05/2020 2.0  0.0 - 8.0 % Final    Basophil% 02/05/2020 0.0  0.0 - 1.9 % Final    Bands 02/05/2020 1.0  % Final    Aniso 02/05/2020 Slight   Final    Poik 02/05/2020 Slight   Final    Poly 02/05/2020 Occasional   Final    Ovalocytes 02/05/2020 Occasional   Final    Differential Method 02/05/2020 Manual   Final    Sodium 02/05/2020 138  136 - 145 mmol/L Final    Potassium 02/05/2020 5.0  3.5 - 5.1 mmol/L Final    Chloride 02/05/2020 103  95 - 110 mmol/L Final    CO2 02/05/2020 27  23 - 29 mmol/L Final    Glucose 02/05/2020 110  70 - 110 mg/dL Final    BUN, Bld 02/05/2020 19  6 - 20 mg/dL Final    Creatinine 02/05/2020 0.7  0.5 - 1.4 mg/dL Final    Calcium 02/05/2020 9.9  8.7 - 10.5 mg/dL Final    Total Protein 02/05/2020 7.9  6.0 - 8.4 g/dL Final    Albumin 02/05/2020 3.4* 3.5 - 5.2 g/dL Final    Total Bilirubin 02/05/2020 0.3  0.1 - 1.0 mg/dL Final    Comment: For infants and newborns, interpretation of results should be based  on gestational age, weight and in agreement with clinical  observations.  Premature Infant recommended reference ranges:  Up to 24 hours.............<8.0 mg/dL  Up to 48 hours............<12.0 mg/dL  3-5  days..................<15.0 mg/dL  6-29 days.................<15.0 mg/dL      Alkaline Phosphatase 02/05/2020 97  55 - 135 U/L Final    AST 02/05/2020 31  10 - 40 U/L Final    ALT 02/05/2020 12  10 - 44 U/L Final    Anion Gap 02/05/2020 8  8 - 16 mmol/L Final    eGFR if African American 02/05/2020 >60.0  >60 mL/min/1.73 m^2 Final    eGFR if non African American 02/05/2020 >60.0  >60 mL/min/1.73 m^2 Final    Comment: Calculation used to obtain the estimated glomerular filtration  rate (eGFR) is the CKD-EPI equation.          Assessment:       1. Signet ring cell adenocarcinoma of stomach    2. Cancer associated pain        Oncologic History:      2019  September   9/10 EGD: Gastric signet ring cell adenocarcinoma   9/19 Port placement   9/30 G&J tube placement  October    10/5 AMA from [facility]   10/24 Surgical eval, referred to HO - tubes removed  November 11/5 [place] h/o eval with Dr. 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 Singlet ring gastric cancer  Mr. Greene returns for C3 of FLOT neoadjuvant chemotherapy, prior to surgical intervention. He was able to get assistance with a room for his chem. Labs look good to proceed.    -C3 FLOT this PM  -will need follow up in 2 weeks for C4 and labs prior to the visit  -treatment for H pylori completed  -refill anti emetics and PPI  -message out to discuss need for MMR/MSI and PD-L1 status on tissue done at [facility]  -will need restaging scans after cycle 4 and follow up with surg onc, Dr. Saravia    2. Cancer associated pain  Long disucssion today regarding utility of narcotics, dangers and illegality of selling and the fact that he would be unable to obtain more medications if he is selling. If very adamant that he is not and is open to a drug test today.  -drug test  -will recheck  later  -c/w Rx as prescribed this  time  -will discuss with Dr. Grant  -bowel regimen on baord    Discussed with Dr. Kim.    Elan Villalpando MD  Hematology/Medical Oncology Fellow  [phone] (pager)

## 2020-02-05 NOTE — PLAN OF CARE
Problem: Adult Inpatient Plan of Care  Goal: Optimal Comfort and Wellbeing  Intervention: Provide Person-Centered Care  Flowsheets (Taken 2/5/2020 1303)  Trust Relationship/Rapport: thoughts/feelings acknowledged; care explained; choices provided; emotional support provided; empathic listening provided; questions answered; questions encouraged; reassurance provided

## 2020-02-06 ENCOUNTER — INFUSION (OUTPATIENT)
Dept: INFUSION THERAPY | Facility: HOSPITAL | Age: 60
End: 2020-02-06
Payer: MEDICAID

## 2020-02-06 ENCOUNTER — TELEPHONE (OUTPATIENT)
Dept: HEMATOLOGY/ONCOLOGY | Facility: HOSPITAL | Age: 60
End: 2020-02-06

## 2020-02-06 VITALS
TEMPERATURE: 98 F | HEART RATE: 126 BPM | DIASTOLIC BLOOD PRESSURE: 80 MMHG | SYSTOLIC BLOOD PRESSURE: 135 MMHG | RESPIRATION RATE: 18 BRPM

## 2020-02-06 DIAGNOSIS — C80.1 SIGNET RING CELL ADENOCARCINOMA: Primary | ICD-10-CM

## 2020-02-06 PROCEDURE — 25000003 PHARM REV CODE 250: Performed by: INTERNAL MEDICINE

## 2020-02-06 PROCEDURE — 63600175 PHARM REV CODE 636 W HCPCS: Performed by: INTERNAL MEDICINE

## 2020-02-06 PROCEDURE — 96523 IRRIG DRUG DELIVERY DEVICE: CPT

## 2020-02-06 PROCEDURE — A4216 STERILE WATER/SALINE, 10 ML: HCPCS | Performed by: INTERNAL MEDICINE

## 2020-02-06 RX ORDER — SODIUM CHLORIDE 0.9 % (FLUSH) 0.9 %
10 SYRINGE (ML) INJECTION
Status: DISCONTINUED | OUTPATIENT
Start: 2020-02-06 | End: 2020-02-06 | Stop reason: HOSPADM

## 2020-02-06 RX ORDER — HEPARIN 100 UNIT/ML
500 SYRINGE INTRAVENOUS
Status: DISCONTINUED | OUTPATIENT
Start: 2020-02-06 | End: 2020-02-06 | Stop reason: HOSPADM

## 2020-02-06 RX ADMIN — Medication 10 ML: at 02:02

## 2020-02-06 RX ADMIN — HEPARIN 500 UNITS: 100 SYRINGE at 02:02

## 2020-02-06 NOTE — NURSING
"6924-Pt here for pump d/c at this time, came back to infusion unit without being called by nurse, appearing agitated, saying, "I am in a hurry. Will someone please take me off this pump now? I don't want it anymore. Life is meant to be lived and this isn't living. This medicine is making me sick. I'm in a hurry. Please take this off." There is approximately 2 more hours left of 5FU infusion in pump. Per pt request, stopped chemotherapy and removed CADD pump. Flushed right chest port with normal saline and heparin and de-accessed. Band-aid applied to site. VSS, pulse elevated but this is baseline for pt.  Pt left unit ambulating independently, refused AVS. Notified Dr. Araujo and Clemente Morillo RN, of earlier pump removal and what pt said.   "

## 2020-02-06 NOTE — TELEPHONE ENCOUNTER
Called patient via cell phone number listed in the chart.    Multiple reports and from the primary nurse, Della RDZ, that patient came up to the infusion station early, rushed to have his 5FU pump taken off early and was verbalizing that he did not want anymore treatment and didn't want to live this way. He was also complaining about medication side effects. Pump was disconeccted and before further evaluation by MD or RN, patient left the campus.     Multiple calls have failed to reach the patient and though I am not overly concerned that he is suicidal, I would like him to be at least checked on by enforcement, for nothing other his well being.     Nursing staff to reach out to patient to allow us to help if wanted and I am available throughout the day and night if needed to be reached (pager below).     Will require further discussion regarding treatment going forward as I would like to discuss barriers for treatment as he has a curable cancer at this point in time.    Discussed with treatment team and Dr. Kim.     Elan Villalpando MD  Hematology/Medical Oncology Fellow  [phone] (pager)

## 2020-02-07 ENCOUNTER — TELEPHONE (OUTPATIENT)
Dept: HEMATOLOGY/ONCOLOGY | Facility: CLINIC | Age: 60
End: 2020-02-07

## 2020-02-07 NOTE — TELEPHONE ENCOUNTER
"Late entry- 2/6/2020 1505  I received a message from the infusion nurse today... Isaiah Greene came up back to the infusion area and demanded to be taken off of his pump, even though he had 2 hours of chemo left to go. Also, he said that he doesn't want chemo anymore and that "this kind of life isn't worth living."     I asked if patient needed to be seen, patient already left.     Dr. Kim and I spoke to Dr. Marie and she said that a person who has a relationship with the patient should call the patient (Dr. Johnson called the patient, 2 times with no answer), if no answer can call local police office for a wellness check.         2/7/2020 0915    Patient lost cell phone. Called  in [facility]. She states patient came home last night and he was upset. He does not live with her, but lives next door to her. She states he can be very hard headed and stubborn. Let her know that we were concerned by comments that he made to the infusion nurse and we wanted to make sure he was okay. She states he does not appear to be awake yet, but she will check on him later and he or she will call if he needs something. Reviewed upcoming appointments.   "

## 2020-02-12 ENCOUNTER — OFFICE VISIT (OUTPATIENT)
Dept: HEMATOLOGY/ONCOLOGY | Facility: CLINIC | Age: 60
End: 2020-02-12
Payer: MEDICAID

## 2020-02-12 ENCOUNTER — CLINICAL SUPPORT (OUTPATIENT)
Dept: HEMATOLOGY/ONCOLOGY | Facility: CLINIC | Age: 60
End: 2020-02-12
Payer: MEDICAID

## 2020-02-12 VITALS
BODY MASS INDEX: 16.82 KG/M2 | HEART RATE: 107 BPM | OXYGEN SATURATION: 99 % | HEIGHT: 70 IN | RESPIRATION RATE: 20 BRPM | DIASTOLIC BLOOD PRESSURE: 75 MMHG | TEMPERATURE: 98 F | WEIGHT: 117.5 LBS | SYSTOLIC BLOOD PRESSURE: 121 MMHG

## 2020-02-12 VITALS — BODY MASS INDEX: 16.82 KG/M2 | HEIGHT: 70 IN | WEIGHT: 117.5 LBS

## 2020-02-12 DIAGNOSIS — G89.3 CANCER ASSOCIATED PAIN: ICD-10-CM

## 2020-02-12 DIAGNOSIS — C80.1 SIGNET RING CELL ADENOCARCINOMA: ICD-10-CM

## 2020-02-12 DIAGNOSIS — T45.1X5A CHEMOTHERAPY INDUCED NAUSEA AND VOMITING: ICD-10-CM

## 2020-02-12 DIAGNOSIS — R63.4 WEIGHT LOSS, UNINTENTIONAL: ICD-10-CM

## 2020-02-12 DIAGNOSIS — Z71.3 NUTRITIONAL COUNSELING: ICD-10-CM

## 2020-02-12 DIAGNOSIS — C16.9 MALIGNANT NEOPLASM OF STOMACH, UNSPECIFIED LOCATION: Primary | ICD-10-CM

## 2020-02-12 DIAGNOSIS — Z87.891 HISTORY OF TOBACCO USE: ICD-10-CM

## 2020-02-12 DIAGNOSIS — R11.2 CHEMOTHERAPY INDUCED NAUSEA AND VOMITING: ICD-10-CM

## 2020-02-12 DIAGNOSIS — E43 SEVERE PROTEIN-CALORIE MALNUTRITION: ICD-10-CM

## 2020-02-12 PROCEDURE — 99214 OFFICE O/P EST MOD 30 MIN: CPT | Mod: S$PBB,,,

## 2020-02-12 PROCEDURE — 97803 MED NUTRITION INDIV SUBSEQ: CPT | Mod: PBBFAC | Performed by: DIETITIAN, REGISTERED

## 2020-02-12 PROCEDURE — 99214 OFFICE O/P EST MOD 30 MIN: CPT | Mod: PBBFAC,27

## 2020-02-12 PROCEDURE — 99212 OFFICE O/P EST SF 10 MIN: CPT | Mod: PBBFAC | Performed by: DIETITIAN, REGISTERED

## 2020-02-12 PROCEDURE — 99999 PR PBB SHADOW E&M-EST. PATIENT-LVL IV: ICD-10-PCS | Mod: PBBFAC,,,

## 2020-02-12 PROCEDURE — 99214 PR OFFICE/OUTPT VISIT, EST, LEVL IV, 30-39 MIN: ICD-10-PCS | Mod: S$PBB,,,

## 2020-02-12 PROCEDURE — 99999 PR PBB SHADOW E&M-EST. PATIENT-LVL IV: CPT | Mod: PBBFAC,,,

## 2020-02-12 PROCEDURE — 99999 PR PBB SHADOW E&M-EST. PATIENT-LVL II: ICD-10-PCS | Mod: PBBFAC,,,

## 2020-02-12 PROCEDURE — 99999 PR PBB SHADOW E&M-EST. PATIENT-LVL II: CPT | Mod: PBBFAC,,,

## 2020-02-12 RX ORDER — PROCHLORPERAZINE MALEATE 10 MG
10 TABLET ORAL EVERY 6 HOURS PRN
Qty: 30 TABLET | Refills: 1 | Status: SHIPPED | OUTPATIENT
Start: 2020-02-12 | End: 2021-02-11

## 2020-02-12 RX ORDER — HYDROMORPHONE HYDROCHLORIDE 4 MG/1
4 TABLET ORAL 3 TIMES DAILY
Qty: 21 TABLET | Refills: 0 | Status: SHIPPED | OUTPATIENT
Start: 2020-02-12 | End: 2020-02-19

## 2020-02-12 NOTE — PROGRESS NOTES
"Oncology Nutrition Assessment for Medical Nutrition Therapy  Follow-up Visit    Isaiah Greene   1960    Referring Provider:  No ref. provider found      Reason for Visit: Pt in for education and nutrition counseling regarding weight loss    PMHx:   Past Medical History:   Diagnosis Date    Chronic gastritis     Diverticulosis     Positive H. 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       Nutrition Assessment    This is a 59 y.o.male here for a medical diagnosis of Signet ring cell adenocarcinoma of stomach.   Since last visit he now reports nausea and vomiting (2-3 times per day) intermittently. Reports taking Zofran on a schedule. Also reports early satiety, poor appetite, dysgeusia. Constipation controlled with Senokot. Taking 2-3 bottles of water and some pineapple juice daily. Very poor PO intake. Also reports having a recent cold or URI.   Majority of nutrition comes from Ensure Plus x6-8 cartons per day (receiving to house through Medicaid program).     Weight:53.3 kg (117 lb 8.1 oz)  Height:5' 10" (1.778 m)  BMI:Body mass index is 16.86 kg/m².   IBW: Ideal body weight: 73 kg (160 lb 15 oz)    Usual BW: 160lb  Weight Change: loss of 5lb from initial assessment (5 weeks ago)    Nutrition focused physical findings: cachectic     Allergies: Patient has no known allergies.    Current Medications:    Current Outpatient Medications:     food supplemt, lactose-reduced (NUTRITIONAL SHAKE PLUS) Liqd, Take 12 mLs by mouth 3 (three) times daily., Disp: 90 Bottle, Rfl:     methadone (DOLOPHINE) 5 MG tablet, Take 1 tablet (5 mg total) by mouth every 12 (twelve) hours. (Patient not taking: Reported on 2/5/2020), Disp: 20 tablet, Rfl: 0    omeprazole (PRILOSEC) 40 MG capsule, Take 1 capsule (40 mg total) by mouth once daily., Disp: 30 capsule, Rfl: 2    ondansetron (ZOFRAN) 8 MG tablet, Take 1 tablet (8 mg total) by mouth every 6 (six) hours as needed for Nausea., Disp: 120 " tablet, Rfl: 2    oxyCODONE (ROXICODONE) 20 mg Tab immediate release tablet, Take 2 tablets (40 mg total) by mouth every 6 (six) hours as needed., Disp: 240 tablet, Rfl: 0    pantoprazole (PROTONIX) 40 MG tablet, Take 1 tablet (40 mg total) by mouth 2 (two) times daily., Disp: 60 tablet, Rfl: 1    promethazine (PHENERGAN) 12.5 MG Tab, Take 2 tablets (25 mg total) by mouth every 4 (four) hours., Disp: 60 tablet, Rfl: 0    senna (SENOKOT) 8.6 mg tablet, Take 1 tablet by mouth 2 (two) times daily. (Patient not taking: Reported on 1/8/2020), Disp: 60 tablet, Rfl: 2    senna (SENOKOT) 8.6 mg tablet, Take 1 tablet by mouth 2 (two) times daily. (Patient not taking: Reported on 2/5/2020), Disp: 60 tablet, Rfl: 2    Food/medication interactions noted: none    Vitamins/Supplements: Ensure Plus     Labs: Reviewed most recent available- current labs not resulted at time of consult/note     Nutrition Diagnosis    Problem: inadequate protein/energy intake  Etiology (related to): appetite loss , dysgeusia, early satiety   Signs/Symptoms (as evidenced by): weight loss, cachexia     Nutrition Intervention    Nutrition Prescription   1850 Kcals (35kcal/kg)  80 g protein (1.5g/kg)   1850 mL fluid (35mL/kg)    Recommendations:   1.  Aim to eat a small meal or supplement every 2-3 hours   2.  Include more healthy fat sources (avocado, nuts/peanut butter, fatty fish, etc).   3.  Continue Ensure Plus 6 times daily  4. During meals eat foods highest in calories and protein first   5. Continue antiemetics on a schedule    Reviewed proper use of antiemetics with patient. Educated on high calorie, high protein foods. Recommended at least 3 bottles of water daily and continued use of Ensure Plus up to 6 times daily. Encouraged him to discuss symptoms and recent cold/URI further during his doctor's visit today.     Nutrition Monitoring and Evaluation    Monitor: weight, nutrition impact symptoms     Goals: weight gain    Motivation to  change/anticipated barriers: patient agreeable to recommendations; has limited financial resources and social support     Follow up In 3-4 weeks     Communication to referring provider/care team: note available in chart     Counseling time: 15 Minutes    Marcella Ackerman, MPH, RD, , LDN, FAND   [phone]

## 2020-02-12 NOTE — Clinical Note
Note done, set up for follow up next week and Dr. Grant is going to try to make it during my appointment and if not, later in the day or next day when he returns the pump

## 2020-02-12 NOTE — PROGRESS NOTES
[facility]  Hematology/Medical Oncology Clinic      PATIENT: Isaiah Greene  MRN: [id number]  DATE: 2/12/2020    Diagnosis:   1. Malignant neoplasm of stomach, unspecified location    2. Signet ring cell adenocarcinoma of stomach    3. Cancer associated pain    4. History of tobacco use      Chief Complaint: Signet ring cell adenocarcinoma of stomach    Other MDs:  Primary Doctor No  Previous oncologist: Dr. Lee  Surg Onc: Dr. HENRI Saravia    Subjective:   Initial History: Mr. Greene is a 59 y.o. male who presents to oncology clinic for history of locally advanced, singlet ring cell gastric adenocarcinoma.    Presents today, 1 week after C3 of pre-operative neoadj FLOT for gastric cancer.    On C3D2 he went up to the 5th floor and demanded that his infusion be taken off a couple hours early. He then stated that he was sick of treatment and wanted it all today end and potentially made some pseudo suicidal comments to staff, prior to running out of the infusion clinic.     We made multiple efforts to call family and staff was able to get hold of relative next morning and stated that he was okay.    He returns today for routine follow up visit. He complains of nausea and pain and is specifically asking for pain medication. He was prescribed 240 pills of 20 mg of oxycodone <30 days ago and states he is almost out.     Otherwise, he appears well. Smells of tobacco.     Past Medical History:   Diagnosis Date    Chronic gastritis     Diverticulosis     Positive H. 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     Past Surgical History:   Procedure Laterality Date    DIAGNOSTIC LAPAROSCOPY N/A 9/19/2019    Procedure: LAPAROSCOPY, DIAGNOSTIC;  Surgeon: Josse Saravia MD;  Location: [facility] OR [address] [place] [address];  Service: General;  Laterality: N/A;    ENDOSCOPIC ULTRASOUND OF UPPER GASTROINTESTINAL TRACT N/A 9/17/2019    Procedure: ULTRASOUND, UPPER  GI TRACT, ENDOSCOPIC;  Surgeon: Nino Randhawa MD;  Location: [facility] (2ND FLR);  Service: Endoscopy;  Laterality: N/A;    ESOPHAGOGASTRODUODENOSCOPY N/A 9/10/2019    Procedure: EGD (ESOPHAGOGASTRODUODENOSCOPY);  Surgeon: Ok Diaz MD;  Location: [facility] ENDO;  Service: Endoscopy;  Laterality: N/A;    EYE SURGERY      FACIAL RECONSTRUCTION SURGERY      GASTROJEJUNOSTOMY N/A 9/19/2019    Procedure: GASTROJEJUNOSTOMY, possible G-tube;  Surgeon: Josse Saravia MD;  [facility];  Service: General;  Laterality: N/A;    INSERTION OF VENOUS ACCESS PORT Right 9/19/2019    Procedure: INSERTION, VENOUS ACCESS PORT;  Surgeon: Josse Saravia MD;  [facility];  Service: General;  Laterality: Right;    LAPAROSCOPIC INSERTION OF JEJUNOSTOMY TUBE N/A 9/19/2019    Procedure: INSERTION, JEJUNOSTOMY TUBE, LAPAROSCOPIC, possible open;  Surgeon: Josse Saravia MD;  [address];  Service: General;  Laterality: N/A;    PERCUTANEOUS INSERTION OF GASTROSTOMY TUBE  9/30/2019    Procedure: INSERTION, GASTROSTOMY TUBE;  Surgeon: Josse Saravia MD;  Location: [address];  Service: General;;    PLACEMENT OF JEJUNOSTOMY TUBE N/A 9/30/2019    Procedure: INSERTION, JEJUNOSTOMY TUBE, revision;  Surgeon: Josse Saravia MD;  Location: [facility] OR [address] [place] [address];  Service: General;  Laterality: N/A;     Family History   Problem Relation Age of Onset    Cancer Father         colon      reports that he has been smoking cigarettes. He has a 20.00 pack-year smoking history. He has never used smokeless tobacco. He reports that he drank alcohol. He reports that he has current or past drug history. Drug: Marijuana.  Review of patient's allergies indicates:  No Known Allergies  Current Outpatient Medications   Medication Sig Dispense Refill    food supplemt, lactose-reduced (NUTRITIONAL SHAKE PLUS) Liqd Take 12 mLs by mouth 3 (three) times daily. 90 Bottle     omeprazole (PRILOSEC) 40 MG  capsule Take 1 capsule (40 mg total) by mouth once daily. 30 capsule 2    ondansetron (ZOFRAN) 8 MG tablet Take 1 tablet (8 mg total) by mouth every 6 (six) hours as needed for Nausea. 120 tablet 2    oxyCODONE (ROXICODONE) 20 mg Tab immediate release tablet Take 2 tablets (40 mg total) by mouth every 6 (six) hours as needed. 240 tablet 0    pantoprazole (PROTONIX) 40 MG tablet Take 1 tablet (40 mg total) by mouth 2 (two) times daily. 60 tablet 1    promethazine (PHENERGAN) 12.5 MG Tab Take 2 tablets (25 mg total) by mouth every 4 (four) hours. 60 tablet 0    senna (SENOKOT) 8.6 mg tablet Take 1 tablet by mouth 2 (two) times daily. 60 tablet 2    methadone (DOLOPHINE) 5 MG tablet Take 1 tablet (5 mg total) by mouth every 12 (twelve) hours. (Patient not taking: Reported on 2/5/2020) 20 tablet 0    prochlorperazine (COMPAZINE) 10 MG tablet Take 1 tablet (10 mg total) by mouth every 6 (six) hours as needed. 30 tablet 1    senna (SENOKOT) 8.6 mg tablet Take 1 tablet by mouth 2 (two) times daily. (Patient not taking: Reported on 2/5/2020) 60 tablet 2     No current facility-administered medications for this visit.      Review of Systems   Constitutional: Positive for appetite change. Negative for activity change, fatigue and unexpected weight change.   HENT: Positive for dental problem. Negative for nosebleeds, trouble swallowing and voice change.    Eyes: Negative for photophobia, redness and visual disturbance.   Respiratory: Negative for choking, chest tightness, shortness of breath and wheezing.    Cardiovascular: Negative for chest pain, palpitations and leg swelling.   Gastrointestinal: Positive for constipation and nausea. Negative for abdominal distention, abdominal pain, anal bleeding, blood in stool, diarrhea and vomiting.   Genitourinary: Negative for decreased urine volume, difficulty urinating and hematuria.   Musculoskeletal: Positive for arthralgias and back pain. Negative for myalgias and neck  "stiffness.   Skin: Positive for pallor. Negative for rash and wound.   Neurological: Negative for dizziness, facial asymmetry and headaches.   Hematological: Negative for adenopathy. Does not bruise/bleed easily.   Psychiatric/Behavioral: Negative for agitation and behavioral problems.     ECOG Performance Status: 2    Objective:      Vitals:   Vitals:    02/12/20 1054   BP: 121/75   Pulse: 107   Resp: 20   Temp: 97.7 °F (36.5 °C)   TempSrc: Oral   SpO2: 99%   Weight: 53.3 kg (117 lb 8.1 oz)   Height: 5' 10" (1.778 m)     BMI: Body mass index is 16.86 kg/m².    Physical Exam   Constitutional: He is oriented to person, place, and time.   Cachectic male, sitting up in chair, appears well today   HENT:   Temporal wasting noted, left false eye   Eyes: No scleral icterus.   Neck: No JVD present.   Thin anatomy   Cardiovascular: Normal rate and regular rhythm.   Pulmonary/Chest: Effort normal.   Diffuse rhonchi   Abdominal: He exhibits no distension and no mass. There is no tenderness. There is no guarding.   Multiple scars   Musculoskeletal: He exhibits no edema or deformity.   Thin anatomy   Lymphadenopathy:     He has no cervical adenopathy.   Neurological: He is alert and oriented to person, place, and time. No sensory deficit.   Skin: Skin is dry. There is pallor.   Vitals reviewed.    Laboratory Data:  No visits with results within 1 Week(s) from this visit.   Latest known visit with results is:   Lab Visit on 02/05/2020   Component Date Value Ref Range Status    WBC 02/05/2020 14.41* 3.90 - 12.70 K/uL Final    RBC 02/05/2020 4.44* 4.60 - 6.20 M/uL Final    Hemoglobin 02/05/2020 12.3* 14.0 - 18.0 g/dL Final    Hematocrit 02/05/2020 41.4  40.0 - 54.0 % Final    Mean Corpuscular Volume 02/05/2020 93  82 - 98 fL Final    Mean Corpuscular Hemoglobin 02/05/2020 27.7  27.0 - 31.0 pg Final    Mean Corpuscular Hemoglobin Conc 02/05/2020 29.7* 32.0 - 36.0 g/dL Final    RDW 02/05/2020 16.7* 11.5 - 14.5 % Final    " Platelets 02/05/2020 395* 150 - 350 K/uL Final    MPV 02/05/2020 9.0* 9.2 - 12.9 fL Final    Immature Granulocytes 02/05/2020 CANCELED  0.0 - 0.5 % Final    Result canceled by the ancillary.    Immature Grans (Abs) 02/05/2020 CANCELED  0.00 - 0.04 K/uL Final    Comment: Mild elevation in immature granulocytes is non specific and   can be seen in a variety of conditions including stress response,   acute inflammation, trauma and pregnancy. Correlation with other   laboratory and clinical findings is essential.    Result canceled by the ancillary.      nRBC 02/05/2020 0  0 /100 WBC Final    Gran% 02/05/2020 39.0  38.0 - 73.0 % Final    Lymph% 02/05/2020 56.0* 18.0 - 48.0 % Final    Mono% 02/05/2020 2.0* 4.0 - 15.0 % Final    Eosinophil% 02/05/2020 2.0  0.0 - 8.0 % Final    Basophil% 02/05/2020 0.0  0.0 - 1.9 % Final    Bands 02/05/2020 1.0  % Final    Aniso 02/05/2020 Slight   Final    Poik 02/05/2020 Slight   Final    Poly 02/05/2020 Occasional   Final    Ovalocytes 02/05/2020 Occasional   Final    Differential Method 02/05/2020 Manual   Final    Sodium 02/05/2020 138  136 - 145 mmol/L Final    Potassium 02/05/2020 5.0  3.5 - 5.1 mmol/L Final    Chloride 02/05/2020 103  95 - 110 mmol/L Final    CO2 02/05/2020 27  23 - 29 mmol/L Final    Glucose 02/05/2020 110  70 - 110 mg/dL Final    BUN, Bld 02/05/2020 19  6 - 20 mg/dL Final    Creatinine 02/05/2020 0.7  0.5 - 1.4 mg/dL Final    Calcium 02/05/2020 9.9  8.7 - 10.5 mg/dL Final    Total Protein 02/05/2020 7.9  6.0 - 8.4 g/dL Final    Albumin 02/05/2020 3.4* 3.5 - 5.2 g/dL Final    Total Bilirubin 02/05/2020 0.3  0.1 - 1.0 mg/dL Final    Comment: For infants and newborns, interpretation of results should be based  on gestational age, weight and in agreement with clinical  observations.  Premature Infant recommended reference ranges:  Up to 24 hours.............<8.0 mg/dL  Up to 48 hours............<12.0 mg/dL  3-5 days..................<15.0  mg/dL  6-29 days.................<15.0 mg/dL      Alkaline Phosphatase 02/05/2020 97  55 - 135 U/L Final    AST 02/05/2020 31  10 - 40 U/L Final    ALT 02/05/2020 12  10 - 44 U/L Final    Anion Gap 02/05/2020 8  8 - 16 mmol/L Final    eGFR if African American 02/05/2020 >60.0  >60 mL/min/1.73 m^2 Final    eGFR if non African American 02/05/2020 >60.0  >60 mL/min/1.73 m^2 Final    Comment: Calculation used to obtain the estimated glomerular filtration  rate (eGFR) is the CKD-EPI equation.          Assessment:       1. Malignant neoplasm of stomach, unspecified location    2. Signet ring cell adenocarcinoma of stomach    3. Cancer associated pain    4. History of tobacco use    5. Chemotherapy induced nausea and vomiting        Oncologic History:      2019  September   9/10 EGD: Gastric signet ring cell adenocarcinoma   9/19 Port placement   9/30 G&J tube placement  October    [place] [facility]   10/24 Surgical eval, referred to HO - tubes removed  November 11/5 [place] h/o eval with Dr. 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 Singlet ring gastric cancer  Mr. Greene returns s/p C3 of FLOT neoadjuvant chemotherapy, prior to surgical intervention.     -C4 FLOT next week  -will then set up post C4 scans and then refer back to Dr. Saravia  -message out to discuss need for MMR/MSI and PD-L1 status on tissue done at [facility]    3. Cancer associated pain  Again long discussion regarding pain medication abuse and message previously anonymously received. He states he has enough to get him through the week but would like more. Discussion with Dr. Grant about seeing the patient together next week and d/t concerns, only giving 7 day Rxs at a time.   -will drug test next week  -weekly   checks  -bowel regimen on baord  -will give 7 days of short acting to hold over    4. Hx of tobacco abuse  -smells of tobacco today  -discussed cessation  -does it because of anxiety, not interested in quitting    5. IZABELLA  -new Rx for compazine  -has phenergan and zofran  -will f/u next week    Discussed with Dr. Kim.    Elan Villalpando MD  Hematology/Medical Oncology Fellow  [phone] (pager)

## 2020-02-12 NOTE — Clinical Note
Can we get him scheduled for a f//u next week with cbc, cmp, mag and C4 FLOT chemoChepe also receives housing for his overnight chemoThanks

## 2020-02-12 NOTE — PATIENT INSTRUCTIONS
1.  Aim to eat a small meal or supplement every 2-3 hours   2.  Include more healthy fat sources (avocado, nuts/peanut butter, fatty fish, etc).   3.  Continue Ensure Plus 6 times daily  4. During meals eat foods highest in calories and protein first   5. Continue antiemetics on a schedule

## 2020-02-19 ENCOUNTER — TELEPHONE (OUTPATIENT)
Dept: HEMATOLOGY/ONCOLOGY | Facility: CLINIC | Age: 60
End: 2020-02-19

## 2020-02-19 ENCOUNTER — OFFICE VISIT (OUTPATIENT)
Dept: HEMATOLOGY/ONCOLOGY | Facility: CLINIC | Age: 60
End: 2020-02-19
Payer: MEDICAID

## 2020-02-19 ENCOUNTER — INFUSION (OUTPATIENT)
Dept: INFUSION THERAPY | Facility: HOSPITAL | Age: 60
End: 2020-02-19
Payer: MEDICAID

## 2020-02-19 VITALS
OXYGEN SATURATION: 98 % | RESPIRATION RATE: 20 BRPM | DIASTOLIC BLOOD PRESSURE: 79 MMHG | BODY MASS INDEX: 17.8 KG/M2 | TEMPERATURE: 98 F | HEIGHT: 70 IN | HEART RATE: 105 BPM | WEIGHT: 124.31 LBS | SYSTOLIC BLOOD PRESSURE: 134 MMHG

## 2020-02-19 VITALS
HEART RATE: 105 BPM | TEMPERATURE: 98 F | OXYGEN SATURATION: 99 % | RESPIRATION RATE: 19 BRPM | DIASTOLIC BLOOD PRESSURE: 80 MMHG | SYSTOLIC BLOOD PRESSURE: 117 MMHG

## 2020-02-19 DIAGNOSIS — Z87.891 HISTORY OF TOBACCO USE: ICD-10-CM

## 2020-02-19 DIAGNOSIS — C80.1 SIGNET RING CELL ADENOCARCINOMA: Primary | ICD-10-CM

## 2020-02-19 DIAGNOSIS — C16.9 MALIGNANT NEOPLASM OF STOMACH, UNSPECIFIED LOCATION: Primary | ICD-10-CM

## 2020-02-19 DIAGNOSIS — G89.3 CANCER ASSOCIATED PAIN: ICD-10-CM

## 2020-02-19 DIAGNOSIS — T45.1X5A CHEMOTHERAPY INDUCED NAUSEA AND VOMITING: ICD-10-CM

## 2020-02-19 DIAGNOSIS — R11.2 CHEMOTHERAPY INDUCED NAUSEA AND VOMITING: ICD-10-CM

## 2020-02-19 PROCEDURE — 99999 PR PBB SHADOW E&M-EST. PATIENT-LVL IV: CPT | Mod: PBBFAC,,,

## 2020-02-19 PROCEDURE — 96413 CHEMO IV INFUSION 1 HR: CPT

## 2020-02-19 PROCEDURE — 96416 CHEMO PROLONG INFUSE W/PUMP: CPT

## 2020-02-19 PROCEDURE — 96368 THER/DIAG CONCURRENT INF: CPT

## 2020-02-19 PROCEDURE — 96415 CHEMO IV INFUSION ADDL HR: CPT

## 2020-02-19 PROCEDURE — 99214 OFFICE O/P EST MOD 30 MIN: CPT | Mod: PBBFAC,25

## 2020-02-19 PROCEDURE — 99214 PR OFFICE/OUTPT VISIT, EST, LEVL IV, 30-39 MIN: ICD-10-PCS | Mod: S$PBB,,,

## 2020-02-19 PROCEDURE — 99214 OFFICE O/P EST MOD 30 MIN: CPT | Mod: S$PBB,,,

## 2020-02-19 PROCEDURE — 99999 PR PBB SHADOW E&M-EST. PATIENT-LVL IV: ICD-10-PCS | Mod: PBBFAC,,,

## 2020-02-19 PROCEDURE — 63600175 PHARM REV CODE 636 W HCPCS: Performed by: INTERNAL MEDICINE

## 2020-02-19 PROCEDURE — 96417 CHEMO IV INFUS EACH ADDL SEQ: CPT

## 2020-02-19 PROCEDURE — 96367 TX/PROPH/DG ADDL SEQ IV INF: CPT

## 2020-02-19 RX ORDER — DIPHENHYDRAMINE HYDROCHLORIDE 50 MG/ML
50 INJECTION INTRAMUSCULAR; INTRAVENOUS ONCE AS NEEDED
Status: DISCONTINUED | OUTPATIENT
Start: 2020-02-19 | End: 2020-02-19 | Stop reason: HOSPADM

## 2020-02-19 RX ORDER — EPINEPHRINE 0.3 MG/.3ML
0.3 INJECTION SUBCUTANEOUS ONCE AS NEEDED
Status: CANCELLED | OUTPATIENT
Start: 2020-02-19

## 2020-02-19 RX ORDER — HYDROMORPHONE HYDROCHLORIDE 4 MG/1
4 TABLET ORAL 3 TIMES DAILY
Qty: 21 TABLET | Refills: 0 | Status: CANCELLED | OUTPATIENT
Start: 2020-02-19 | End: 2020-02-26

## 2020-02-19 RX ORDER — SODIUM CHLORIDE 0.9 % (FLUSH) 0.9 %
10 SYRINGE (ML) INJECTION
Status: CANCELLED | OUTPATIENT
Start: 2020-02-20

## 2020-02-19 RX ORDER — HEPARIN 100 UNIT/ML
500 SYRINGE INTRAVENOUS
Status: CANCELLED | OUTPATIENT
Start: 2020-02-20

## 2020-02-19 RX ORDER — OXYCODONE HYDROCHLORIDE 20 MG/1
40 TABLET ORAL EVERY 6 HOURS PRN
Qty: 240 TABLET | Refills: 0 | Status: CANCELLED | OUTPATIENT
Start: 2020-02-19 | End: 2021-02-18

## 2020-02-19 RX ORDER — SODIUM CHLORIDE 0.9 % (FLUSH) 0.9 %
10 SYRINGE (ML) INJECTION
Status: DISCONTINUED | OUTPATIENT
Start: 2020-02-19 | End: 2020-02-19 | Stop reason: HOSPADM

## 2020-02-19 RX ORDER — HEPARIN 100 UNIT/ML
500 SYRINGE INTRAVENOUS
Status: DISCONTINUED | OUTPATIENT
Start: 2020-02-19 | End: 2020-02-19 | Stop reason: HOSPADM

## 2020-02-19 RX ORDER — HEPARIN 100 UNIT/ML
500 SYRINGE INTRAVENOUS
Status: CANCELLED | OUTPATIENT
Start: 2020-02-19

## 2020-02-19 RX ORDER — SODIUM CHLORIDE 0.9 % (FLUSH) 0.9 %
10 SYRINGE (ML) INJECTION
Status: CANCELLED | OUTPATIENT
Start: 2020-02-19

## 2020-02-19 RX ORDER — EPINEPHRINE 0.3 MG/.3ML
0.3 INJECTION SUBCUTANEOUS ONCE AS NEEDED
Status: DISCONTINUED | OUTPATIENT
Start: 2020-02-19 | End: 2020-02-19 | Stop reason: HOSPADM

## 2020-02-19 RX ORDER — DIPHENHYDRAMINE HYDROCHLORIDE 50 MG/ML
50 INJECTION INTRAMUSCULAR; INTRAVENOUS ONCE AS NEEDED
Status: CANCELLED | OUTPATIENT
Start: 2020-02-19

## 2020-02-19 RX ADMIN — DEXTROSE: 50 INJECTION, SOLUTION INTRAVENOUS at 12:02

## 2020-02-19 RX ADMIN — DOCETAXEL ANHYDROUS 85 MG: 10 INJECTION, SOLUTION INTRAVENOUS at 02:02

## 2020-02-19 RX ADMIN — OXALIPLATIN 140 MG: 5 INJECTION, SOLUTION, CONCENTRATE INTRAVENOUS at 03:02

## 2020-02-19 RX ADMIN — PALONOSETRON HYDROCHLORIDE: 0.25 INJECTION INTRAVENOUS at 12:02

## 2020-02-19 RX ADMIN — LEUCOVORIN CALCIUM 330 MG: 350 INJECTION, POWDER, LYOPHILIZED, FOR SOLUTION INTRAMUSCULAR; INTRAVENOUS at 03:02

## 2020-02-19 RX ADMIN — FLUOROURACIL 4290 MG: 50 INJECTION, SOLUTION INTRAVENOUS at 06:02

## 2020-02-19 RX ADMIN — SODIUM CHLORIDE: 9 INJECTION, SOLUTION INTRAVENOUS at 12:02

## 2020-02-19 NOTE — Clinical Note
Chepey Dr. Tomas if you remember this sathish but he is getting his 4th cycle of FLOT and is doing well. Scheduling him for f/u CT CAP in 2 weeks and was wondering if you wanted to get him back in the office/present next week or in the upcoming weeks at .Let me know if you have any questions - message here or [phone]

## 2020-02-19 NOTE — TELEPHONE ENCOUNTER
Contacted patient to discuss his current whereabouts, and to request he returns back to 5th floor infusion suite.  Notified by KAJAL Montiel in infusion suite that patient requested break in between chemo but never returned.  Port still accessed.    Patient did not answer phone to call.   Overhead message for patient to return. Awaiting return  Back to infusion suite at this time.

## 2020-02-19 NOTE — PROGRESS NOTES
[facility]  Hematology/Medical Oncology Clinic      PATIENT: Isaiah Greene  MRN: [id number]  DATE: 2/19/2020    Diagnosis:   1. Malignant neoplasm of stomach, unspecified location    2. Cancer associated pain      Chief Complaint: Malignant neoplasm of stomach, unspecified location    Other MDs:  Primary Doctor No  Previous oncologist: Dr. Lee  Surg Onc: Dr. HENRI Saravia    Subjective:   Initial History: Mr. Greene is a 59 y.o. male who presents to oncology clinic for history of locally advanced, singlet ring cell gastric adenocarcinoma.    Presents today for C4/4 of pre-operative neoadj FLOT for gastric cancer.    Appears well today.    Still dealing with the the medication issues surrounding his chronic pain and opioid usage.     States abdominal pain is controlled with his diluadid and oxy, and does not want to take his methadone anymore.     Denies N/V.     Did not go to his lab visit this morning so will need to get that done prior to C4 later today.    Past Medical History:   Diagnosis Date    Chronic gastritis     Diverticulosis     Positive H. 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     Past Surgical History:   Procedure Laterality Date    DIAGNOSTIC LAPAROSCOPY N/A 9/19/2019    Procedure: LAPAROSCOPY, DIAGNOSTIC;  Surgeon: Josse Saravia MD;  Location: [facility] OR [address] [place] [address];  Service: General;  Laterality: N/A;    ENDOSCOPIC ULTRASOUND OF UPPER GASTROINTESTINAL TRACT N/A 9/17/2019    Procedure: ULTRASOUND, UPPER GI TRACT, ENDOSCOPIC;  Surgeon: Nino Randhawa MD;  Location: [address]);  Service: Endoscopy;  Laterality: N/A;    ESOPHAGOGASTRODUODENOSCOPY N/A 9/10/2019    Procedure: EGD (ESOPHAGOGASTRODUODENOSCOPY);  Surgeon: Ok Diaz MD;  Location: [facility];  Service: Endoscopy;  Laterality: N/A;    EYE SURGERY      FACIAL RECONSTRUCTION SURGERY      GASTROJEJUNOSTOMY N/A 9/19/2019     Procedure: GASTROJEJUNOSTOMY, possible G-tube;  Surgeon: Josse Saravia MD;  [facility] OR [facility];  Service: General;  Laterality: N/A;    INSERTION OF VENOUS ACCESS PORT Right 9/19/2019    Procedure: INSERTION, VENOUS ACCESS PORT;  Surgeon: Josse Saravia MD;  Location: [facility] OR [facility];  Service: General;  Laterality: Right;    LAPAROSCOPIC INSERTION OF JEJUNOSTOMY TUBE N/A 9/19/2019    Procedure: INSERTION, JEJUNOSTOMY TUBE, LAPAROSCOPIC, possible open;  Surgeon: Josse Saravia MD;  [facility];  Service: General;  Laterality: N/A;    PERCUTANEOUS INSERTION OF GASTROSTOMY TUBE  9/30/2019    Procedure: INSERTION, GASTROSTOMY TUBE;  Surgeon: Josse Saravia MD;  [facility];  Service: General;;    PLACEMENT OF JEJUNOSTOMY TUBE N/A 9/30/2019    Procedure: INSERTION, JEJUNOSTOMY TUBE, revision;  Surgeon: Josse Saravia MD;  [facility];  Service: General;  Laterality: N/A;     Family History   Problem Relation Age of Onset    Cancer Father         colon      reports that he has been smoking cigarettes. He has a 20.00 pack-year smoking history. He has never used smokeless tobacco. He reports that he drank alcohol. He reports that he has current or past drug history. Drug: Marijuana.  Review of patient's allergies indicates:  No Known Allergies  Current Outpatient Medications   Medication Sig Dispense Refill    food supplemt, lactose-reduced (NUTRITIONAL SHAKE PLUS) Liqd Take 12 mLs by mouth 3 (three) times daily. 90 Bottle     HYDROmorphone (DILAUDID) 4 MG tablet Take 1 tablet (4 mg total) by mouth 3 (three) times daily for 7 days 21 tablet 0    omeprazole (PRILOSEC) 40 MG capsule Take 1 capsule (40 mg total) by mouth once daily. 30 capsule 2    oxyCODONE (ROXICODONE) 20 mg Tab immediate release tablet Take 2 tablets (40 mg total) by mouth every 6 (six) hours as needed. 240 tablet 0    pantoprazole (PROTONIX) 40 MG tablet Take 1 tablet (40 mg total)  by mouth 2 (two) times daily. 60 tablet 1    prochlorperazine (COMPAZINE) 10 MG tablet Take 1 tablet (10 mg total) by mouth every 6 (six) hours as needed. 30 tablet 1    promethazine (PHENERGAN) 12.5 MG Tab Take 2 tablets (25 mg total) by mouth every 4 (four) hours. 60 tablet 0    senna (SENOKOT) 8.6 mg tablet Take 1 tablet by mouth 2 (two) times daily. 60 tablet 2    methadone (DOLOPHINE) 5 MG tablet Take 1 tablet (5 mg total) by mouth every 12 (twelve) hours. (Patient not taking: Reported on 2/5/2020) 20 tablet 0    senna (SENOKOT) 8.6 mg tablet Take 1 tablet by mouth 2 (two) times daily. (Patient not taking: Reported on 2/5/2020) 60 tablet 2     No current facility-administered medications for this visit.      Review of Systems   Constitutional: Negative for activity change, appetite change, fatigue and unexpected weight change.   HENT: Positive for dental problem. Negative for nosebleeds, trouble swallowing and voice change.    Eyes: Negative for photophobia, redness and visual disturbance.   Respiratory: Negative for choking, chest tightness, shortness of breath and wheezing.    Cardiovascular: Negative for chest pain, palpitations and leg swelling.   Gastrointestinal: Positive for abdominal pain. Negative for abdominal distention, anal bleeding, blood in stool, constipation, diarrhea, nausea and vomiting.   Genitourinary: Negative for decreased urine volume, difficulty urinating and hematuria.   Musculoskeletal: Positive for arthralgias and back pain. Negative for myalgias and neck stiffness.   Skin: Positive for pallor. Negative for rash and wound.   Neurological: Negative for dizziness, facial asymmetry and headaches.   Hematological: Negative for adenopathy. Does not bruise/bleed easily.   Psychiatric/Behavioral: Negative for agitation and behavioral problems.     ECOG Performance Status: 2    Objective:      Vitals:   Vitals:    02/19/20 0959   BP: 134/79   BP Location: Left arm   Patient Position:  "Sitting   BP Method: Medium (Automatic)   Pulse: 105   Resp: 20   Temp: 98 °F (36.7 °C)   TempSrc: Oral   SpO2: 98%   Weight: 56.4 kg (124 lb 5.4 oz)   Height: 5' 10" (1.778 m)     BMI: Body mass index is 17.84 kg/m².    Physical Exam   Constitutional: He is oriented to person, place, and time.   Cachectic male, sitting up in chair, appears well today   HENT:   Temporal wasting noted, left false eye   Eyes: No scleral icterus.   Neck: No JVD present.   Thin anatomy   Cardiovascular: Normal rate and regular rhythm.   Pulmonary/Chest: Effort normal.   Diffuse rhonchi   Abdominal: He exhibits no distension and no mass. There is no tenderness. There is no guarding.   Multiple scars   Musculoskeletal: He exhibits no edema or deformity.   Thin anatomy   Lymphadenopathy:     He has no cervical adenopathy.   Neurological: He is alert and oriented to person, place, and time. No sensory deficit.   Skin: Skin is dry. There is pallor.   Vitals reviewed.    Laboratory Data:  Lab Visit on 02/19/2020   Component Date Value Ref Range Status    WBC 02/19/2020 14.29* 3.90 - 12.70 K/uL Final    RBC 02/19/2020 4.02* 4.60 - 6.20 M/uL Final    Hemoglobin 02/19/2020 11.3* 14.0 - 18.0 g/dL Final    Hematocrit 02/19/2020 37.7* 40.0 - 54.0 % Final    Mean Corpuscular Volume 02/19/2020 94  82 - 98 fL Final    Mean Corpuscular Hemoglobin 02/19/2020 28.1  27.0 - 31.0 pg Final    Mean Corpuscular Hemoglobin Conc 02/19/2020 30.0* 32.0 - 36.0 g/dL Final    RDW 02/19/2020 17.5* 11.5 - 14.5 % Final    Platelets 02/19/2020 311  150 - 350 K/uL Final    MPV 02/19/2020 9.3  9.2 - 12.9 fL Final       Assessment:       1. Malignant neoplasm of stomach, unspecified location    2. Cancer associated pain    3. History of tobacco use    4. Chemotherapy induced nausea and vomiting      Oncologic History:      2019  September   9/10 EGD: Gastric signet ring cell adenocarcinoma   9/19 Port placement   9/30 G&J tube placement  October    [place] [facility] [place] Surgical eval, referred to HO - tubes removed  November 11/5 [place] h/o eval with Dr. 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 Singlet ring gastric cancer  Mr. Greene returns for C4/4 of FLOT neoadjuvant chemotherapy, prior to surgical intervention.   -C4 FLOT today, needs labs prior  -will then set up scans and then refer back to Dr. Saravia  -message out to discuss need for MMR/MSI and PD-L1 status on tissue done at [facility] (still no word x4 weeks now)  -will discuss with Dr. Saravia  -f/u pending Dr. Saravia disease, consider presenting at  next week    2. Cancer associated pain  This is the major ongoing issue and potentially interfering with his treatment. We are attempting to work with him and pc regarding apropriate management though I do not feel like his pain is that severe. We discussed today that we will NOT be Rx him any more narcotics going forward.   -weekly  checks  -bowel regimen on baord    3. Hx of tobacco abuse  -smells of tobacco today  -discussed cessation  -does it because of anxiety, not interested in quitting    4. IZABELLA  -controlled with combination therapy  -has phenergan and zofran    Discussed with Dr. Kim.    Elan Villalpando MD  Hematology/Medical Oncology Fellow  [phone] (pager)

## 2020-02-19 NOTE — NURSING
"So post taxotere infusion pt became agitated and stated "I have to take a walk" pt disconnected from pump and ambulatory from unit with wife in NAD.  "

## 2020-02-20 ENCOUNTER — INFUSION (OUTPATIENT)
Dept: INFUSION THERAPY | Facility: HOSPITAL | Age: 60
End: 2020-02-20
Payer: MEDICAID

## 2020-02-20 DIAGNOSIS — C80.1 SIGNET RING CELL ADENOCARCINOMA: Primary | ICD-10-CM

## 2020-02-20 PROCEDURE — 25000003 PHARM REV CODE 250: Performed by: INTERNAL MEDICINE

## 2020-02-20 PROCEDURE — 96523 IRRIG DRUG DELIVERY DEVICE: CPT

## 2020-02-20 PROCEDURE — 63600175 PHARM REV CODE 636 W HCPCS: Performed by: INTERNAL MEDICINE

## 2020-02-20 PROCEDURE — A4216 STERILE WATER/SALINE, 10 ML: HCPCS | Performed by: INTERNAL MEDICINE

## 2020-02-20 RX ORDER — OXYCODONE HYDROCHLORIDE 20 MG/1
40 TABLET ORAL EVERY 4 HOURS PRN
Qty: 168 TABLET | Refills: 0 | Status: SHIPPED | OUTPATIENT
Start: 2020-02-20 | End: 2020-03-04 | Stop reason: SDUPTHER

## 2020-02-20 RX ORDER — SODIUM CHLORIDE 0.9 % (FLUSH) 0.9 %
10 SYRINGE (ML) INJECTION
Status: DISCONTINUED | OUTPATIENT
Start: 2020-02-20 | End: 2020-02-20 | Stop reason: HOSPADM

## 2020-02-20 RX ORDER — HEPARIN 100 UNIT/ML
500 SYRINGE INTRAVENOUS
Status: DISCONTINUED | OUTPATIENT
Start: 2020-02-20 | End: 2020-02-20 | Stop reason: HOSPADM

## 2020-02-20 RX ADMIN — Medication 10 ML: at 03:02

## 2020-02-20 RX ADMIN — HEPARIN 500 UNITS: 100 SYRINGE at 03:02

## 2020-02-20 NOTE — PLAN OF CARE
Pt returned to clinic for completion of infusion. Tolerated infusions well. Attached to pump to be infused over 24hours at a rate of 4.5cc/hr. Pt to return for removal tomorrow. In NAD on discharge.

## 2020-02-20 NOTE — PLAN OF CARE
5-FU pump d/c'd.  Pt insisted pump gets disconnected, 7.9cc left bag, Dr. Cao office notified.  No questions or concerns at this time.  Ambulated off unit in [facility].

## 2020-02-24 ENCOUNTER — TELEPHONE (OUTPATIENT)
Dept: SURGERY | Facility: CLINIC | Age: 60
End: 2020-02-24

## 2020-02-29 ENCOUNTER — NURSE TRIAGE (OUTPATIENT)
Dept: ADMINISTRATIVE | Facility: CLINIC | Age: 60
End: 2020-02-29

## 2020-02-29 DIAGNOSIS — G89.3 CANCER ASSOCIATED PAIN: ICD-10-CM

## 2020-02-29 DIAGNOSIS — C80.1 SIGNET RING CELL ADENOCARCINOMA: ICD-10-CM

## 2020-02-29 NOTE — TELEPHONE ENCOUNTER
Reason for Disposition   Message left on identified voice mail    Additional Information   Negative: Caller is angry or rude (e.g., hangs up, verbally abusive, yelling)   Negative: Caller hangs up   Negative: Caller has already spoken with the PCP and has no further questions.   Negative: Caller has already spoken with another triager and has no further questions.   Negative: Caller has already spoken with another triager or PCP AND has further questions AND triager able to answer questions.   Negative: No answer.  First attempt to contact caller.  Follow-up call scheduled within 15 minutes.   Negative: Busy signal.  First attempt to contact caller.  Follow-up call scheduled within 15 minutes.    Protocols used: NO CONTACT OR DUPLICATE CONTACT CALL-[facility] x2 at 333pm at [phone]

## 2020-03-02 RX ORDER — PROMETHAZINE HYDROCHLORIDE 12.5 MG/1
25 TABLET ORAL EVERY 4 HOURS
Qty: 60 TABLET | Refills: 0 | Status: SHIPPED | OUTPATIENT
Start: 2020-03-02 | End: 2020-03-14

## 2020-03-02 RX ORDER — HYDROMORPHONE HYDROCHLORIDE 4 MG/1
4 TABLET ORAL 3 TIMES DAILY
Qty: 21 TABLET | Refills: 0 | OUTPATIENT
Start: 2020-03-02 | End: 2020-03-09

## 2020-03-04 ENCOUNTER — TELEPHONE (OUTPATIENT)
Dept: HEMATOLOGY/ONCOLOGY | Facility: CLINIC | Age: 60
End: 2020-03-04

## 2020-03-04 DIAGNOSIS — G89.3 CANCER ASSOCIATED PAIN: ICD-10-CM

## 2020-03-04 DIAGNOSIS — C16.9 MALIGNANT NEOPLASM OF STOMACH, UNSPECIFIED LOCATION: ICD-10-CM

## 2020-03-04 RX ORDER — OXYCODONE HYDROCHLORIDE 20 MG/1
40 TABLET ORAL EVERY 4 HOURS PRN
Qty: 168 TABLET | Refills: 0 | Status: SHIPPED | OUTPATIENT
Start: 2020-03-04 | End: 2020-03-18 | Stop reason: SDUPTHER

## 2020-03-04 RX ORDER — OXYCODONE HYDROCHLORIDE 20 MG/1
40 TABLET ORAL EVERY 4 HOURS PRN
Qty: 168 TABLET | Refills: 0 | OUTPATIENT
Start: 2020-03-04 | End: 2020-03-18

## 2020-03-04 NOTE — TELEPHONE ENCOUNTER
Received a call from an anonymous caller. Caller reporting that patient is selling oxycodone for heroin and crystal meth. Caller states that he is aware that patient has cancer but feels patient is abusing the system. Caller states he is aware that patient receives 160 oxycodone at a time because he lives in the same neighborhood and patient is trying to sell to anyone and everyone. Caller also aware that patient received prescription today because he threatened to end his life if he did not receive the medication. I thanked caller for letting us know, we are taking this seriously, he also needs to report his concerns to local authorities.

## 2020-03-04 NOTE — TELEPHONE ENCOUNTER
----- Message from Ryann Rivera sent at 3/4/2020  2:03 PM CST -----  Contact: PT   PT called to get a refill of prescription. Said he is hurting a lot and if he can't get a refill, he doesn't think he'll make it to his chemo appointment.      oxyCODONE (ROXICODONE) 20 mg Tab immediate release tablet  Take 2 tablets (40 mg total) by mouth every 4 (four) hours as needed. - Oral  168 tablet     [facility]     Callback: [phone]

## 2020-03-04 NOTE — TELEPHONE ENCOUNTER
----- Message from Kathya Smith sent at 3/4/2020 11:25 AM CST -----  Contact: pt: [phone]  Pt called to get a refill on oxyCODONE (ROXICODONE) 20 mg & Dilaudid         Please send to [facility] [phone] (Phone)  [phone] (Fax)      pt: [phone]

## 2020-03-05 DIAGNOSIS — C80.1 SIGNET RING CELL ADENOCARCINOMA: Primary | ICD-10-CM

## 2020-03-09 ENCOUNTER — TELEPHONE (OUTPATIENT)
Dept: SURGERY | Facility: CLINIC | Age: 60
End: 2020-03-09

## 2020-03-09 ENCOUNTER — TELEPHONE (OUTPATIENT)
Dept: HEMATOLOGY/ONCOLOGY | Facility: CLINIC | Age: 60
End: 2020-03-09

## 2020-03-09 NOTE — TELEPHONE ENCOUNTER
----- Message from Massimo Boyle sent at 3/9/2020  2:01 PM CDT -----  Contact: Pt      The Pt states that he needs to reschedule his appts.  Please contact the Pt.    Phone # [phone]

## 2020-03-09 NOTE — TELEPHONE ENCOUNTER
Called and spoke with patient. He would like to coordinate appointments due to long travel distance. Reached out to surgery clinic to try to coordinate appointments. We will reach out to patient again when scheduled. He verbalized understanding/appreciation.     ----- Message from Massimo Boyle sent at 3/9/2020  2:03 PM CDT -----  Contact: Pt      The Pt would like a call back to reschedule his missed appts.    Phone # [phone]

## 2020-03-13 ENCOUNTER — TELEPHONE (OUTPATIENT)
Dept: HEMATOLOGY/ONCOLOGY | Facility: CLINIC | Age: 60
End: 2020-03-13

## 2020-03-13 DIAGNOSIS — G89.3 CANCER ASSOCIATED PAIN: ICD-10-CM

## 2020-03-13 DIAGNOSIS — C80.1 SIGNET RING CELL ADENOCARCINOMA: ICD-10-CM

## 2020-03-13 RX ORDER — OMEPRAZOLE 40 MG/1
40 CAPSULE, DELAYED RELEASE ORAL DAILY
Qty: 30 CAPSULE | Refills: 2 | OUTPATIENT
Start: 2020-03-13

## 2020-03-13 RX ORDER — PANTOPRAZOLE SODIUM 40 MG/1
40 TABLET, DELAYED RELEASE ORAL 2 TIMES DAILY
Qty: 60 TABLET | Refills: 1 | OUTPATIENT
Start: 2020-03-13 | End: 2021-03-13

## 2020-03-13 NOTE — TELEPHONE ENCOUNTER
----- Message from Chin David sent at 3/13/2020  1:50 PM CDT -----  Contact: Pt  Type: Rx REfill Request     Who Called: PAtient  REfill or New Rx: Refill  Rx Name and Strength:omeprazole (PRILOSEC) 40 MG capsule, promethazine (PHENERGAN) 12.5 MG Tab, pantoprazole (PROTONIX) 40 MG tablet, Rx Amoxicillin 500mg,Dilauddid Hydromorthone 4mg, Bowel movement medication.      How is the patient currently taking it? (ex.1xDay):n/a  Is this a 30 day or 90 day RX:n/a  Preferred Pharmacy with phone number: [facility] [phone]    Local or Mail Order: Local  Ordering Provider: Elan Villalpando MD    Would the patient rather a call back or a response via [facility]?call back  Best Call Back:[phone]  Additional Information : Pt would to have asap refill traveling a hour away coming [place]    Thank You.

## 2020-03-13 NOTE — TELEPHONE ENCOUNTER
Patient called, call transferred. Spoke to patient, he is asking if he can have prescriptions filled. He states he needs amoxicillin, when I asked why he says he is running fever. Asked what temp was, he states he does not know he woke up sweating. He denies having a cough and states that he is no more short of  He states that he is having terrible reflux, has lost weight and vomits after eating. He has not had a bowel movement three days and is not passing gas. Told him he needs to go to the ED. He states he does not have transportation and no one to bring him. Asked him to have EMS bring him. Patient states that he needs his medication. Let him know that he needs to go to the ED since he may be obstructed and has been running fever. Patient insisted he needs his meds and hung up.

## 2020-03-14 DIAGNOSIS — C80.1 SIGNET RING CELL ADENOCARCINOMA: ICD-10-CM

## 2020-03-14 DIAGNOSIS — G89.3 CANCER ASSOCIATED PAIN: ICD-10-CM

## 2020-03-16 ENCOUNTER — TELEPHONE (OUTPATIENT)
Dept: HEMATOLOGY/ONCOLOGY | Facility: CLINIC | Age: 60
End: 2020-03-16

## 2020-03-16 NOTE — TELEPHONE ENCOUNTER
----- Message from Seda Serrano MA sent at 3/16/2020 12:33 PM CDT -----  Contact: self/[phone]  Pt is calling to follow up on his refills. Pain medication   Please call back to discuss.       oxyCODONE (ROXICODONE) 20 mg Tab immediate release tablet

## 2020-03-17 ENCOUNTER — TELEPHONE (OUTPATIENT)
Dept: HEMATOLOGY/ONCOLOGY | Facility: CLINIC | Age: 60
End: 2020-03-17

## 2020-03-17 RX ORDER — PROMETHAZINE HYDROCHLORIDE 12.5 MG/1
25 TABLET ORAL EVERY 4 HOURS
Qty: 60 TABLET | Refills: 0 | Status: SHIPPED | OUTPATIENT
Start: 2020-03-17

## 2020-03-17 NOTE — TELEPHONE ENCOUNTER
"----- Message from Niharika Kim sent at 3/17/2020 10:06 AM CDT -----  Contact: Isaiah   Consult/Advisory:    Name Of Caller: Isaiah   Provider Name: Elan Villalpando MD   Does patient feel the need to be seen today?  Relationship to the Pt?: Self   Contact Preference?: [phone]  What is the nature of the call?:  Patient request a callback to discuss medications dilaudid & oxycodone  at [phone]    Additional Notes:  "Thank you for all that you do for our patients'"      "

## 2020-03-17 NOTE — TELEPHONE ENCOUNTER
----- Message from Kelley Norwood sent at 3/17/2020  4:45 PM CDT -----  Contact: pt  Pt Is Calling for Refills On The Following Medications     oxyCODONE (ROXICODONE) 20 mg Tab immediate release tablet 168 tablet 0 3/4/2020 3/18/2020 No  Sig - Route: Take 2 tablets (40 mg total) by mouth every 4 (four) hours as needed. - Oral  Sent to pharmacy as: oxyCODONE (ROXICODONE) 20 mg Tab immediate release tablet  Class: Normal  Earliest Fill Date: 3/4/2020    Pt called needs to speak to someone about another medication       Pt can be reached at [phone]

## 2020-03-17 NOTE — TELEPHONE ENCOUNTER
Spoke to patient, let him know that we can no longer fill his pain medication and we are referring him to pain mgmt. He would like to see them ASAP. Patient asking for one more refill, informed him we can give no more refills, the doctor is not comfortable prescribing the quantity he is requiring and he will need to see pain mgmt to assist him with his pain needs.

## 2020-03-17 NOTE — TELEPHONE ENCOUNTER
----- Message from Anai Mosqueda sent at 3/17/2020 11:59 AM CDT -----  Contact: Pt  Reason: Pt returning Clemente call from yesterday     [phone]

## 2020-03-18 ENCOUNTER — OFFICE VISIT (OUTPATIENT)
Dept: PAIN MEDICINE | Facility: CLINIC | Age: 60
End: 2020-03-18
Payer: MEDICAID

## 2020-03-18 VITALS
RESPIRATION RATE: 18 BRPM | HEART RATE: 93 BPM | BODY MASS INDEX: 17.52 KG/M2 | DIASTOLIC BLOOD PRESSURE: 84 MMHG | WEIGHT: 122.38 LBS | OXYGEN SATURATION: 100 % | HEIGHT: 70 IN | SYSTOLIC BLOOD PRESSURE: 128 MMHG | TEMPERATURE: 97 F

## 2020-03-18 DIAGNOSIS — R10.84 INTRACTABLE GENERALIZED ABDOMINAL PAIN: ICD-10-CM

## 2020-03-18 DIAGNOSIS — R63.4 WEIGHT LOSS, UNINTENTIONAL: ICD-10-CM

## 2020-03-18 DIAGNOSIS — E43 SEVERE PROTEIN-CALORIE MALNUTRITION: ICD-10-CM

## 2020-03-18 DIAGNOSIS — G89.3 CANCER ASSOCIATED PAIN: ICD-10-CM

## 2020-03-18 DIAGNOSIS — C16.9 MALIGNANT NEOPLASM OF STOMACH, UNSPECIFIED LOCATION: ICD-10-CM

## 2020-03-18 DIAGNOSIS — Z79.891 ENCOUNTER FOR LONG-TERM OPIATE ANALGESIC USE: ICD-10-CM

## 2020-03-18 DIAGNOSIS — G89.4 CHRONIC PAIN DISORDER: Primary | ICD-10-CM

## 2020-03-18 PROCEDURE — 99204 PR OFFICE/OUTPT VISIT, NEW, LEVL IV, 45-59 MIN: ICD-10-PCS | Mod: S$PBB,,, | Performed by: NURSE PRACTITIONER

## 2020-03-18 PROCEDURE — 99204 OFFICE O/P NEW MOD 45 MIN: CPT | Mod: S$PBB,,, | Performed by: NURSE PRACTITIONER

## 2020-03-18 PROCEDURE — 80307 DRUG TEST PRSMV CHEM ANLYZR: CPT

## 2020-03-18 PROCEDURE — 99999 PR PBB SHADOW E&M-EST. PATIENT-LVL III: ICD-10-PCS | Mod: PBBFAC,,, | Performed by: NURSE PRACTITIONER

## 2020-03-18 PROCEDURE — 99213 OFFICE O/P EST LOW 20 MIN: CPT | Mod: PBBFAC | Performed by: NURSE PRACTITIONER

## 2020-03-18 PROCEDURE — 99999 PR PBB SHADOW E&M-EST. PATIENT-LVL III: CPT | Mod: PBBFAC,,, | Performed by: NURSE PRACTITIONER

## 2020-03-18 RX ORDER — OXYCODONE HYDROCHLORIDE 20 MG/1
40 TABLET ORAL EVERY 4 HOURS PRN
Qty: 168 TABLET | Refills: 0 | Status: SHIPPED | OUTPATIENT
Start: 2020-03-18 | End: 2020-03-31 | Stop reason: SDUPTHER

## 2020-03-18 NOTE — PROGRESS NOTES
Chronic Pain - New Consult    Referring Physician: Nino Paez MD    Chief Complaint: No chief complaint on file.       SUBJECTIVE: Disclaimer: This note has been generated using voice-recognition software. There may be typographical errors that have been missed during proof-reading    Initial encounter:    Isaiah Greene presents to the clinic for the evaluation of abdominal pain.  He has ring cell gastric adenocarcinoma and is currently treated by ramirez Wynne/onc.  We received a request from Dr. Ada Sidhu recently to see the patient in order to ensure that he is compliant with opioid medications.  There has been some concern regarding non-compliance and divergence.  He has been taking oxycodone 20 mg 2 tabs Q4h PRN pain.  Prior to this, he was taking Dilaudid and Methadone.  He finds that the medications does help with his pain.  He says that he takes the medication and does not share with others or sell.  He does say that he sometimes takes more than prescribed and runs out early.  He is currently undergoing chemotherapy.    Brief history:    Pain Description:    The pain is located in the abdominal area.      At BEST  7/10     At WORST  10/10 on the WORST day.      On average pain is rated as 8/10.     Today the pain is rated as 8/10    The pain is described as dull and stabbing      Symptoms interfere with daily activity and sleeping.     Exacerbating factors: Sitting and Standing.      Mitigating factors medications.     Patient denies night fever/night sweats.    Pain Medications:  Current:  Oxycodone 20 mg 2 tabs Q4h PRN pain    Tried in Past:  NSAIDs - yes  TCA -Never  SNRI -Never  Anti-convulsants -Never  Muscle Relaxants - yes  Opioids- Dilaudid, Methadone, Tramadol, Morphine    Physical Therapy/Home Exercise: no       report:  Not applicable    Pain Procedures:   None    Chiropractor -never  Acupuncture - never  TENS unit -never  Spinal decompression -never  Joint  replacement -never    Imaging:     Narrative     EXAMINATION:  CT ABDOMEN PELVIS WITH CONTRAST    CLINICAL HISTORY:  Bowel obstruction, high-grade;    TECHNIQUE:  Low dose axial images, sagittal and coronal reformations were obtained from the lung bases to the pubic symphysis following the IV administration of 75 mL of Omnipaque 350.  Oral contrast was not administered.    COMPARISON:  CT chest abdomen pelvis 11/29/2019    FINDINGS:  Heart: Normal size. No effusion.  Coronary artery atherosclerotic calcification.    Lung Bases: Symmetrically expanded.  There is centrilobular emphysema.  There is bibasilar atelectasis.  Additionally there is a cluster of calcifications within the right middle lobe, (axial series 2, image 6), which may represent granulomatous change.  No pneumothorax or pleural effusion.    Liver: Normal size and attenuation. No focal lesions.    Gallbladder: No evidence of cholelithiasis.  No pericholecystic fluid or wall thickening to suggest acute cholecystitis.    Bile Ducts: No significant intra or extrahepatic ductal dilatation.    Pancreas: No mass. No peripancreatic fat stranding.    Spleen: Unremarkable.    Adrenals: Unremarkable    Kidneys/Ureters: Normal in size and location.  Normal enhancement.  There is a subcentimeter hypodensity in the left kidney, too small to characterize but probable cyst.  No nephrolithiasis.  Ureters are normal in course and caliber.  No hydroureteronephrosis.    Bladder: Nondistended, limiting evaluation..    Reproductive organs: Normal.    GI Tract/Mesentery: The stomach is distended with fluid and air.  Postoperative changes of gastrojejunostomy there is distension of both the duodenum as well as the proximal jejunum just distal to the anastomosis, which appears a patent.  The distension measures up to 4.5 cm.  There appears to be an area of bowel with wall thickening and luminal narrowing distal to this area within the jejunum, best seen on axial series 2,  images 41 and 43 and coronal series 4, image 15).  Distal to this location there appears to be decompressed loops of bowel with the ileum appearing unremarkable.  There is large volume stool throughout the colon.  No evidence of wall thickening or inflammation.    Peritoneal Space: No ascites or free air.    Retroperitoneum: No significant adenopathy.    Abdominal wall: Normal.    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  Extensive atherosclerotic calcification throughout the abdominal aorta and branch vessels.  There is marked luminal narrowing of the left renal vein near its insertion the IVC.  no aneurysm.    Bones: Degenerative changes.  No acute fracture. No suspicious lytic or sclerotic lesions.      Impression       Postoperative changes of gastrojejunostomy with distension of both the duodenum as well as the proximal jejunum extending just distal to the gastrojejunal anastomosis.  There appears to be an area of bowel wall thickening and luminal narrowing within the mid jejunum, (axial series 2, images 41 and 43), which may represent a transition point.  Neoplasm recurrence is not excluded as potential obstruction etiology.  Recommend surgical consultation.    Large volume stool throughout the colon.    Extensive coronary artery and aortic atherosclerotic calcification.  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  Marked luminal narrowing of the left renal vein near the insertion into the IVC.    Centrilobular emphysema.     CMP  Sodium   Date Value Ref Range Status   02/19/2020 140 136 - 145 mmol/L Final     Potassium   Date Value Ref Range Status   02/19/2020 3.9 3.5 - 5.1 mmol/L Final     Chloride   Date Value Ref Range Status   02/19/2020 105 95 - 110 mmol/L Final     CO2   Date Value Ref Range Status   02/19/2020 26 23 - 29 mmol/L Final      Glucose   Date Value Ref Range Status   02/19/2020 97 70 - 110 mg/dL Final     BUN, Bld   Date Value Ref Range Status   02/19/2020 11 6 - 20 mg/dL Final     Creatinine   Date Value Ref Range Status   02/19/2020 0.7 0.5 - 1.4 mg/dL Final     Calcium   Date Value Ref Range Status   02/19/2020 8.6 (L) 8.7 - 10.5 mg/dL Final     Total Protein   Date Value Ref Range Status   02/19/2020 7.2 6.0 - 8.4 g/dL Final     Albumin   Date Value Ref Range Status   02/19/2020 3.3 (L) 3.5 - 5.2 g/dL Final     Total Bilirubin   Date Value Ref Range Status   02/19/2020 0.2 0.1 - 1.0 mg/dL Final     Comment:     For infants and newborns, interpretation of results should be based  on gestational age, weight and in agreement with clinical  observations.  Premature Infant recommended reference ranges:  Up to 24 hours.............<8.0 mg/dL  Up to 48 hours............<12.0 mg/dL  3-5 days..................<15.0 mg/dL  6-29 days.................<15.0 mg/dL       Alkaline Phosphatase   Date Value Ref Range Status   02/19/2020 93 55 - 135 U/L Final     AST   Date Value Ref Range Status   02/19/2020 24 10 - 40 U/L Final     ALT   Date Value Ref Range Status   02/19/2020 16 10 - 44 U/L Final     Anion Gap   Date Value Ref Range Status   02/19/2020 9 8 - 16 mmol/L Final     eGFR if    Date Value Ref Range Status   02/19/2020 >60.0 >60 mL/min/1.73 m^2 Final     eGFR if non    Date Value Ref Range Status   02/19/2020 >60.0 >60 mL/min/1.73 m^2 Final     Comment:     Calculation used to obtain the estimated glomerular filtration  rate (eGFR) is the CKD-EPI equation.            Past Medical History:   Diagnosis Date    Chronic gastritis     Diverticulosis     Positive H. 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     Past Surgical History:   Procedure Laterality Date    DIAGNOSTIC LAPAROSCOPY N/A 9/19/2019    Procedure: LAPAROSCOPY, DIAGNOSTIC;  Surgeon: Josse Saravia MD;   Location: [facility] OR 2ND FLR;  Service: General;  Laterality: N/A;    ENDOSCOPIC ULTRASOUND OF UPPER GASTROINTESTINAL TRACT N/A 9/17/2019    Procedure: ULTRASOUND, UPPER GI TRACT, ENDOSCOPIC;  Surgeon: Nino Randhawa MD;  Location: [facility] ENDO (2ND FLR);  Service: Endoscopy;  Laterality: N/A;    ESOPHAGOGASTRODUODENOSCOPY N/A 9/10/2019    Procedure: EGD (ESOPHAGOGASTRODUODENOSCOPY);  Surgeon: Ok Diaz MD;  [facility] ENDO;  Service: Endoscopy;  Laterality: N/A;    EYE SURGERY      FACIAL RECONSTRUCTION SURGERY      GASTROJEJUNOSTOMY N/A 9/19/2019    Procedure: GASTROJEJUNOSTOMY, possible G-tube;  Surgeon: Josse Saravia MD;  [facility];  Service: General;  Laterality: N/A;    INSERTION OF VENOUS ACCESS PORT Right 9/19/2019    Procedure: INSERTION, VENOUS ACCESS PORT;  Surgeon: Josse Saravia MD;  [facility];  Service: General;  Laterality: Right;    LAPAROSCOPIC INSERTION OF JEJUNOSTOMY TUBE N/A 9/19/2019    Procedure: INSERTION, JEJUNOSTOMY TUBE, LAPAROSCOPIC, possible open;  Surgeon: Josse Saravia MD;  [facility];  Service: General;  Laterality: N/A;    PERCUTANEOUS INSERTION OF GASTROSTOMY TUBE  9/30/2019    Procedure: INSERTION, GASTROSTOMY TUBE;  Surgeon: Josse Saravia MD;  [facility];  Service: General;;    PLACEMENT OF JEJUNOSTOMY TUBE N/A 9/30/2019    Procedure: INSERTION, JEJUNOSTOMY TUBE, revision;  Surgeon: Josse Saravia MD;  [facility];  Service: General;  Laterality: N/A;     Social History     Socioeconomic History    Marital status: Single     Spouse name: Not on file    Number of children: 0    Years of education: Not on file    Highest education level: Not on file   Occupational History    Not on file   Social Needs    Financial resource strain: Not on file    Food insecurity:     Worry: Not on file     Inability: Not on file    Transportation needs:     Medical: Not on file      Non-medical: Not on file   Tobacco Use    Smoking status: Current Every Day Smoker     Packs/day: 0.50     Years: 40.00     Pack years: 20.00     Types: Cigarettes    Smokeless tobacco: Never Used   Substance and Sexual Activity    Alcohol use: Not Currently     Comment: quit a year ago    Drug use: Yes     Types: Marijuana    Sexual activity: Not Currently   Lifestyle    Physical activity:     Days per week: Not on file     Minutes per session: Not on file    Stress: Not on file   Relationships    Social connections:     Talks on phone: Not on file     Gets together: Not on file     Attends [employer] service: Not on file     Active member of club or organization: Not on file     Attends meetings of clubs or organizations: Not on file     Relationship status: Not on file   Other Topics Concern    Not on file   Social History Narrative    Not on file     Family History   Problem Relation Age of Onset    Cancer Father         colon       Review of patient's allergies indicates:  No Known Allergies    Current Outpatient Medications   Medication Sig    food supplemt, lactose-reduced (NUTRITIONAL SHAKE PLUS) Liqd Take 12 mLs by mouth 3 (three) times daily.    omeprazole (PRILOSEC) 40 MG capsule Take 1 capsule (40 mg total) by mouth once daily.    ondansetron (ZOFRAN) 8 MG tablet Take 1 tablet (8 mg total) by mouth every 6 (six) hours as needed for Nausea.    pantoprazole (PROTONIX) 40 MG tablet Take 1 tablet (40 mg total) by mouth 2 (two) times daily.    prochlorperazine (COMPAZINE) 10 MG tablet Take 1 tablet (10 mg total) by mouth every 6 (six) hours as needed.    promethazine (PHENERGAN) 12.5 MG Tab Take 2 tablets (25 mg total) by mouth every 4 (four) hours.    senna (SENOKOT) 8.6 mg tablet Take 1 tablet by mouth 2 (two) times daily.    senna (SENOKOT) 8.6 mg tablet Take 1 tablet by mouth 2 (two) times daily.    oxyCODONE (ROXICODONE) 20 mg Tab immediate release tablet Take 2 tablets (40 mg total)  "by mouth every 4 (four) hours as needed.     No current facility-administered medications for this visit.        REVIEW OF SYSTEMS:    GENERAL:  Malnourished.  HEENT:   No recent changes in vision or hearing  NECK:  Negative for lumps, no difficulty with swallowing.  RESPIRATORY:  Negative for cough, wheezing or shortness of breath, patient denies any recent URI.  CARDIOVASCULAR:  Negative for chest pain, leg swelling or palpitations.  GI:  Abdominal pain.  MUSCULOSKELETAL:  See HPI.  SKIN:  Negative for lesions, rash, and itching.  PSYCH:  No mood disorder or recent psychosocial stressors.  Patients sleep is not disturbed secondary to pain.  HEMATOLOGY/LYMPHOLOGY:  Negative for prolonged bleeding, bruising easily or swollen nodes.  Patient is not currently taking any anti-coagulants  ENDO: No history of diabetes or thyroid dysfunction  NEURO:   No history of headaches, syncope, paralysis, seizures or tremors.  All other reviewed and negative other than HPI.    OBJECTIVE:    /84   Pulse 93   Temp 97.1 °F (36.2 °C)   Resp 18   Ht 5' 10" (1.778 m)   Wt 55.5 kg (122 lb 5.7 oz)   SpO2 100%   BMI 17.56 kg/m²     PHYSICAL EXAMINATION:    GENERAL: Well appearing, in no acute distress, alert and oriented x3.  PSYCH:  Mood and affect appropriate.  SKIN: Skin color, texture, turgor normal, no rashes or lesions.  HEAD/FACE:  Normocephalic, atraumatic. Cranial nerves grossly intact.  CV: RRR with palpation of the radial artery.  PULM: No evidence of respiratory difficulty, symmetric chest rise.  GI:  Pain with abdominal palpation.  NEURO: Bilateral upper and lower extremity coordination and muscle stretch reflexes are physiologic and symmetric. noted.  GAIT: Normal.    ASSESSMENT: 59 y.o. year old male with pain, consistent with     Encounter Diagnoses   Name Primary?    Chronic pain disorder Yes    Intractable generalized abdominal pain     Severe protein-calorie malnutrition     Weight loss, unintentional  "    Cancer associated pain     Malignant neoplasm of stomach, unspecified location     Encounter for long-term opiate analgesic use        PLAN:     - Previous imaging was reviewed and discussed with the patient today.    - Will obtain UDS today.    - Will provide refill of Oxycodone 20 mg 2 tabs Q4h PRN, #168, 2 week supply.    - We had a long discussion regarding the importance of not taking more medication than prescribed.  He signed a pain contract that states that he will safeguard his medications, he will not sell them or share with others. He is aware not to fill medications from other providers.    - He will return 2 clinic in 2 weeks.  In violation of his pain contract will result in discharge form opioid medications.    - Dr. Chaudhry wevaluated the patient and agrees with this plan.      The above plan and management options were discussed at length with patient. Patient is in agreement with the above and verbalized understanding.    Liliana Wakefield  03/18/2020

## 2020-03-18 NOTE — LETTER
March 18, 2020      Nino Paez MD  [address]  [facility] [address]           [facility] [address]  [facility] [address]  Phone: [phone]  Fax: [phone]          Patient: Isaiah Greene   MR Number: [id number]   YOB: 1960   Date of Visit: 3/18/2020       Dear Dr. Nino Paez:    Thank you for referring Isaiah Greene to me for evaluation. Attached you will find relevant portions of my assessment and plan of care.    If you have questions, please do not hesitate to call me. I look forward to following Isaiah Greene along with you.    Sincerely,    Liliana Wakefield, [facility]    Enclosure  CC:  No Recipients    If you would like to receive this communication electronically, please contact [email or web address] or [phone] to request more information on [employer] Link access.    For providers and/or their staff who would like to refer a patient to [facility], please contact us through our one-stop-shop provider referral line, [facility], at [phone].    If you feel you have received this communication in error or would no longer like to receive these types of communications, please e-mail [email or web address]

## 2020-03-21 LAB
6MAM UR QL: NOT DETECTED
7AMINOCLONAZEPAM UR QL: NOT DETECTED
A-OH ALPRAZ UR QL: NOT DETECTED
ALPRAZ UR QL: NOT DETECTED
AMPHET UR QL SCN: PRESENT
ANNOTATION COMMENT IMP: NORMAL
ANNOTATION COMMENT IMP: NORMAL
BARBITURATES UR QL: NOT DETECTED
BUPRENORPHINE UR QL: NOT DETECTED
BZE UR QL: NOT DETECTED
CARBOXYTHC UR QL: PRESENT
CARISOPRODOL UR QL: NOT DETECTED
CLONAZEPAM UR QL: NOT DETECTED
CODEINE UR QL: NOT DETECTED
CREAT UR-MCNC: 135.8 MG/DL (ref 20–400)
DIAZEPAM UR QL: NOT DETECTED
ETHYL GLUCURONIDE UR QL: PRESENT
FENTANYL UR QL: NOT DETECTED
HYDROCODONE UR QL: NOT DETECTED
HYDROMORPHONE UR QL: PRESENT
LORAZEPAM UR QL: NOT DETECTED
MDA UR QL: NOT DETECTED
MDEA UR QL: NOT DETECTED
MDMA UR QL: NOT DETECTED
ME-PHENIDATE UR QL: NOT DETECTED
MEPERIDINE UR QL: NOT DETECTED
METHADONE UR QL: NOT DETECTED
METHAMPHET UR QL: PRESENT
MIDAZOLAM UR QL SCN: NOT DETECTED
MORPHINE UR QL: PRESENT
NORBUPRENORPHINE UR QL CFM: NOT DETECTED
NORDIAZEPAM UR QL: NOT DETECTED
NORFENTANYL UR QL: NOT DETECTED
NORHYDROCODONE UR QL CFM: NOT DETECTED
NOROXYCODONE UR QL CFM: NOT DETECTED
NOROXYMORPHONE: NOT DETECTED
OXAZEPAM UR QL: NOT DETECTED
OXYCODONE UR QL: NOT DETECTED
OXYMORPHONE UR QL: NOT DETECTED
PATHOLOGY STUDY: NORMAL
PCP UR QL: NOT DETECTED
PHENTERMINE UR QL: NOT DETECTED
PROPOXYPH UR QL: NOT DETECTED
SERVICE CMNT-IMP: NORMAL
TAPENTADOL UR QL SCN: NOT DETECTED
TAPENTADOL-O-SULF: NOT DETECTED
TEMAZEPAM UR QL: NOT DETECTED
TRAMADOL UR QL: NOT DETECTED
ZOLPIDEM UR QL: NOT DETECTED

## 2020-03-24 ENCOUNTER — TELEPHONE (OUTPATIENT)
Dept: HEMATOLOGY/ONCOLOGY | Facility: CLINIC | Age: 60
End: 2020-03-24

## 2020-03-24 ENCOUNTER — DOCUMENTATION ONLY (OUTPATIENT)
Dept: HEMATOLOGY/ONCOLOGY | Facility: CLINIC | Age: 60
End: 2020-03-24

## 2020-03-24 NOTE — PROGRESS NOTES
2:13pm Received message from the clinic to inquire if patient is having any issues such as transportation that is preventing him from attending md appointment and scans. Have attempted to reach out to the patient but was unsuccessful. Attempted to leave voice mail but patient currently does not have a voice mail set up.

## 2020-03-24 NOTE — TELEPHONE ENCOUNTER
Team,    Have we made contact with the patient to check in and ssure there are no active issues.  Are his no shows related to limited transportation that potentially our social work / navigation team could assist with .  I will include the surgery navigator on this .   Lets insure we are not overlooking issues that we could assits with     Dr Villalpando,,  Have you attempted to reach /contact patient?

## 2020-03-24 NOTE — TELEPHONE ENCOUNTER
Reaching out to Dr Josse rich's team for assistance with coordinating patient back for follow up per Dr Villalpando , post chemotherapy .    Clemente,  In terms of the scans, we will need to contact the tech or get guidance from Dr Milian as post tx restaging scans should qualify for patients at this time.  I know there are restrictions at this time.  Lets get clear if we can advocate for his post tx scan for next steps.  Unless, provider feels can be delayed?  Can we please get guidance from attending on thi case  s

## 2020-03-24 NOTE — TELEPHONE ENCOUNTER
"----- Message from Clemente Morillo RN sent at 3/24/2020  8:38 AM CDT -----  Contact: Isaiah      ----- Message -----  From: Elan Villalpando MD  Sent: 3/24/2020   8:33 AM CDT  To: Clemente Morillo RN    He does not need to be seen in the clinic.     He no showed for his restaging CT chest abd pel which needs to be rescheduled and he no showed for his follow up appointment for possible surgery with Dr. Josse Saravia, which this needs to be rescheduled.     I would try to make both of those happen prior to any visit with oncology.     Please let me know if there any questions.    Low  ----- Message -----  From: Clemente Morillo RN  Sent: 3/23/2020   4:16 PM CDT  To: Priti Fernandez RN, Elan Villalpando MD, #    He may need to come to clinic, he does not always have access to a phone or his service may not always be on. His sister may be able to help, she supposedly lives next door to him and maybe she could help facilitate a virtual visit   ----- Message -----  From: Mckenzie Sanket  Sent: 3/23/2020   4:14 PM CDT  To: Priti Fernandez RN, Elan Villalpando MD, #    What should we do with this? Schedule as usual?        ----- Message -----  From: Niharika Kim  Sent: 3/23/2020   4:05 PM CDT  To: [facility]  [facility]    Scheduling Request    Patient Status: Established   Scheduling Appt : Isaiah   Time/Date Preference: Prefer afternoons   MyChart Active User?: No   Relationship to Patient?: Self   Contact Preference?: [phone]  Treating Provider: Dr. Villalpando   Do you feel you need to be seen today? No     Additional Notes:  "Thank you for all that you do for our patients'"            "

## 2020-03-30 ENCOUNTER — TELEPHONE (OUTPATIENT)
Dept: SURGERY | Facility: CLINIC | Age: 60
End: 2020-03-30

## 2020-03-31 ENCOUNTER — TELEPHONE (OUTPATIENT)
Dept: PAIN MEDICINE | Facility: CLINIC | Age: 60
End: 2020-03-31

## 2020-03-31 DIAGNOSIS — G89.3 CANCER ASSOCIATED PAIN: ICD-10-CM

## 2020-03-31 DIAGNOSIS — C80.1 SIGNET RING CELL ADENOCARCINOMA: Primary | ICD-10-CM

## 2020-03-31 DIAGNOSIS — C80.1 SIGNET RING CELL ADENOCARCINOMA: ICD-10-CM

## 2020-03-31 DIAGNOSIS — C16.9 MALIGNANT NEOPLASM OF STOMACH, UNSPECIFIED LOCATION: ICD-10-CM

## 2020-03-31 RX ORDER — PANTOPRAZOLE SODIUM 40 MG/1
40 TABLET, DELAYED RELEASE ORAL 2 TIMES DAILY
Qty: 60 TABLET | Refills: 1 | Status: SHIPPED | OUTPATIENT
Start: 2020-03-31 | End: 2021-03-31

## 2020-03-31 RX ORDER — OXYCODONE HYDROCHLORIDE 20 MG/1
40 TABLET ORAL EVERY 4 HOURS PRN
Qty: 168 TABLET | Refills: 0 | Status: SHIPPED | OUTPATIENT
Start: 2020-03-31 | End: 2020-04-14

## 2020-03-31 RX ORDER — PROMETHAZINE HYDROCHLORIDE 12.5 MG/1
25 TABLET ORAL EVERY 4 HOURS
Qty: 60 TABLET | Refills: 0 | Status: CANCELLED | OUTPATIENT
Start: 2020-03-31

## 2020-03-31 NOTE — TELEPHONE ENCOUNTER
----- Message from Elan Villalpando MD sent at 3/31/2020  2:36 PM CDT -----  I'm sorry .... This has been difficult but can we please get this sathish to his outpatient CT scan and follow up with surg onc (Dr. Saravia) he potentially has a curable cancer needing resection and has completed chemotherapy  ----- Message -----  From: Russel Chaudhry MD  Sent: 3/25/2020   7:47 AM CDT  To: Nino Saravia MD, Ada Sidhu MD, #      I am refilling the medication.  Fourteen days of prescription.  UDS was positive for marijuana and amphetamines he denied using any medications/drugs other than prescribed and marijuana for appetite stimulation.  I advised him he will get month prescription total from me but no refills unless he follows up with Oncology.  Please make sure that he is seen in oncology and in surgery during 30 day period.  Please provide us with prognosis and whether you think his condition is causing such pain.   ----- Message -----  From: Ada Sidhu MD  Sent: 3/24/2020   2:14 PM CDT  To: Russel Chaudhry MD, Issa Kim MD, #    Please note below correspondence  Patient was offered appropriate assistance from pain clinic and he violated the pain contract with UDS     None of us should write narcotics for him unless dictated by future Hospice need  Dr. Kim- please have this documented in his chart  Alma- any thoughts on how to have this clear in his chart?    Thanks  Ada    ----- Message -----  From: Russel Chaudhry MD  Sent: 3/24/2020   2:07 PM CDT  To: Ada Sidhu MD, Issa Kim MD, #    Given this info about his lack of compliance I will have to discontinue his scheduled medication. He has non prescribed scheduled drugs positive in his drug screen. Thank you  ----- Message -----  From: Issa Kim MD  Sent: 3/24/2020   1:24 PM CDT  To: Russel Chaudhry MD, Ada Sidhu MD, #    Unfortunately this difficult to answer as the patient has missed all of his imaging appointments.  He was  initially receiving neoadjuvant chemotherapy and was a surgical candidate, so treatment was curative intent.  He has had compliance issues and delays in treatment, so not sure what his cancer status is until he get scans.    Hardik    ----- Message -----  From: Ada Sidhu MD  Sent: 3/24/2020  12:52 PM CDT  To: Issa Kim MD    Can you help with below questions?  Thanks  Ada  ----- Message -----  From: Russel Chaudhry MD  Sent: 3/24/2020  11:42 AM CDT  To: Ada Sidhu MD, YORDAN Harris;  Is he terminal?  If he is terminal I do not necessarily feel we do not have to be extremely strict . If he has years to live it will be very risky legally speaking to continue writing his scripts.  Thank you  Russel  ----- Message -----  From: YORDAN Harris  Sent: 3/24/2020  10:05 AM CDT  To: Russel Chaudhry MD    Not exactly sure, I think it is most likely terminal.  He has a f/u next week with you if you want to address it then.  I would probably give him one warning.

## 2020-03-31 NOTE — TELEPHONE ENCOUNTER
Pain Medicine Clinic Telephone Note:     **This encounter was carried out via telephone to avoid COVID-19 exposure. Patient opted for a telephone call per his/her preference. 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 We last saw patient 2 weeks ago for the first time and continued him on oxycodone 20mg q4hrs PRN. We checked a drug screen, which was grossly abnormal and positive for other elicit substances. Pt endorses occasional marijuana use but adamantly denies amphetamine use and states he only take allergy medications.  He reports his pain is excruciating. It prompted him to go to the ED 2 days ago. He reports he has F/U with oncologists shortly and is scheduled for a CT scan.    A/P: Pt is a 59 year old with chronic abdominal pain 2/2 ring cell gastric adenocarcinoma s/p surgery  - Patient has violated opioid contract with an abnormal UDS, although he denies elicit drug use. Thus, I will only refill his medication for 1 month.  I have told the patient that I will not refill his medication after this, and he must seek alternative avenues for pain control.  - I will reach out to his oncologist to notify and have instructed that the patient must F/U with oncology  - RTC PRN      I spoke with patient and agree with above, he was in the ER with pain.  We want to avoid bringing him to the emergency room during the viral outbreak.  Also, we want to make sure that he followsups with Oncology for staging and further management to if any.  Also, to consider hospice care if applicable in his case.  We also think that he should follow-up with surgery evaluate his abdominal pain.

## 2020-04-01 ENCOUNTER — TELEPHONE (OUTPATIENT)
Dept: SURGERY | Facility: CLINIC | Age: 60
End: 2020-04-01

## 2020-04-01 DIAGNOSIS — G89.3 CANCER ASSOCIATED PAIN: ICD-10-CM

## 2020-04-01 DIAGNOSIS — C80.1 SIGNET RING CELL ADENOCARCINOMA: ICD-10-CM

## 2020-04-01 RX ORDER — PROMETHAZINE HYDROCHLORIDE 12.5 MG/1
25 TABLET ORAL EVERY 4 HOURS
Qty: 60 TABLET | Refills: 0 | OUTPATIENT
Start: 2020-04-01

## 2020-04-06 ENCOUNTER — TELEPHONE (OUTPATIENT)
Dept: SURGERY | Facility: CLINIC | Age: 60
End: 2020-04-06

## 2020-04-08 PROBLEM — Z20.822 SUSPECTED COVID-19 VIRUS INFECTION: Status: ACTIVE | Noted: 2020-04-08

## 2020-04-08 PROBLEM — E87.1 HYPONATREMIA: Status: ACTIVE | Noted: 2020-04-08

## 2020-04-08 PROBLEM — J18.9 BILATERAL PNEUMONIA: Status: ACTIVE | Noted: 2020-04-08

## 2020-04-08 PROBLEM — N17.9 AKI (ACUTE KIDNEY INJURY): Status: ACTIVE | Noted: 2020-04-08

## 2020-04-09 ENCOUNTER — TELEPHONE (OUTPATIENT)
Dept: SURGERY | Facility: CLINIC | Age: 60
End: 2020-04-09

## 2020-04-15 PROBLEM — E87.6 HYPOKALEMIA: Status: ACTIVE | Noted: 2020-04-15

## 2020-04-18 ENCOUNTER — NURSE TRIAGE (OUTPATIENT)
Dept: ADMINISTRATIVE | Facility: CLINIC | Age: 60
End: 2020-04-18

## 2020-04-18 NOTE — TELEPHONE ENCOUNTER
Patient's friend called in and c/o patient in pain and not having pain meds. Patient's friend was advised that patient was ordered to take pain meds upon discharge from [facility]. Patient's friend stated that patient might be delierious and abuptly hung up the phone. I attempted to contact the patient again and received no answer.     Reason for Disposition   General information question, no triage required and triager able to answer question    Additional Information   Negative: [1] Caller is not with the adult (patient) AND [2] reporting urgent symptoms   Negative: Lab result questions   Negative: Medication questions   Negative: Caller can't be reached by phone   Negative: Caller has already spoken to PCP or another triager   Negative: RN needs further essential information from caller in order to complete triage   Negative: Requesting regular office appointment   Negative: [1] Caller requesting NON-URGENT health information AND [2] PCP's office is the best resource   Negative: Health Information question, no triage required and triager able to answer question    Protocols used: INFORMATION ONLY CALL-A-

## 2021-06-14 NOTE — NURSING TRANSFER
Nursing Transfer Note      9/30/2019     Transfer To: 1001    Transfer via stretcher    Transfer with n/a    Transported by transport    Medicines sent: PCA dilaudid, IVF    Chart send with patient: Yes    Notified: no family here    Patient reassessed at: 9/30/19    Upon arrival to floor:    Discussed lid hygiene, warm compress and eyelid wash.

## 2022-09-02 NOTE — CARE UPDATE
Rapid Response Nurse Chart Check     Chart check completed, abnormal VS noted. Primary team Jos CONNOLLY contacted, plan of care discussed, lab values discussed, primary team not concerned with lab rafa, no infectious workup at this time, instructed to call [phone] for further concerns or assistance.           Former smoker

## 2024-05-06 NOTE — PROGRESS NOTES
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    Hospitalized 9/9-12 with abd pain and weight loss as well as history of nausea/vomiting about a month ago. Associated 25 pound weight loss over the past three months.  Claims he has largely been able to tolerate foods, but has had minimal appetite. CT 9/9 with marked gastric distention and proximal duodenal wall thickening, concerning for GOO but tolerating clears/ soft diet. EGD 9/10/19 with Grade D reflux esophagitis, gastric ulcer biopsied, stenosis at pylorus and normal duodenum.     Readmitted 9/14 with severe chest and epigastric pain with intolerance of diet. AES consulted for staging, plan for EUS 9/18.     New ascites on CT chest 9/14 concerning. Also severe emphysema.    Repeat CT 9/17 with improvement of gastric distention as well as small vol ascites.     (+) prior H.pylori infection, stains from EGD 9/10 (+)    Surg Onc consulted to assess oncologic and/or palliative surgical candidacy.         Post-Op Info:  Procedure(s) (LRB):  INSERTION, VENOUS ACCESS PORT (Right)  LAPAROSCOPY, DIAGNOSTIC (N/A)  INSERTION, JEJUNOSTOMY TUBE, LAPAROSCOPIC, possible open (N/A)  GASTROJEJUNOSTOMY, possible G-tube (N/A)   2 Days Post-Op     Interval History: NAEON. Pain well controlled. Ambulating without difficulty. UOP good. No other concerns.    Medications:  Continuous Infusions:   hydromorphone in 0.9 % NaCl 6 mg/30 ml      lactated ringers 125 mL/hr at 09/19/19 4169     Scheduled Meds:   enoxaparin  40 mg Subcutaneous Daily    ketorolac  15 mg Intravenous Q8H    levalbuterol  0.63 mg Nebulization Q6H WAKE    nicotine  1 patch Transdermal Daily    pantoprazole  40 mg Intravenous BID    sodium phosphate IVPB  20.01 mmol Intravenous Once     PRN Meds:sodium chloride,  Dextrose 10% Bolus, Dextrose 10% Bolus, glucagon (human recombinant), glucose, glucose, iohexol, naloxone, ondansetron, promethazine (PHENERGAN) IVPB, sodium chloride 0.9%, sodium chloride 0.9%     Review of patient's allergies indicates:  No Known Allergies     Objective:     Vital Signs (Most Recent):  Temp: 97.3 °F (36.3 °C) (09/21/19 0827)  Pulse: 90 (09/21/19 0827)  Resp: 16 (09/21/19 0827)  BP: (!) 141/80 (09/21/19 0827)  SpO2: 99 % (09/21/19 0827) Vital Signs (24h Range):  Temp:  [97.1 °F (36.2 °C)-99.5 °F (37.5 °C)] 97.3 °F (36.3 °C)  Pulse:  [78-96] 90  Resp:  [16-18] 16  SpO2:  [96 %-99 %] 99 %  BP: (119-168)/(73-92) 141/80     Weight: 62.6 kg (138 lb)  Body mass index is 19.8 kg/m².    Intake/Output - Last 3 Shifts       09/19 0700 - 09/20 0659 09/20 0700 - 09/21 0659 09/21 0700 - 09/22 0659    P.O.       I.V. (mL/kg) 1288 (20.6)      Total Intake(mL/kg) 1288 (20.6)      Urine (mL/kg/hr) 2400 (1.6) 900 (0.6)     Drains 5      Total Output 2405 900     Net -1117 -900                Physical Exam   Constitutional: He is oriented to person, place, and time. He appears cachectic. No distress.   HENT:   Head: Normocephalic and atraumatic.   Eyes: No scleral icterus.   Cardiovascular: Normal rate and intact distal pulses.   Pulmonary/Chest: Effort normal. No respiratory distress.   Abdominal: Soft. He exhibits no distension.   J-tube in place.   Musculoskeletal: He exhibits no edema.   Neurological: He is alert and oriented to person, place, and time.   Skin: Skin is warm. Capillary refill takes less than 2 seconds.     Significant Labs:  CBC:   Recent Labs   Lab 09/21/19  0337   WBC 14.19*   RBC 3.46*   HGB 9.7*   HCT 32.1*   *   MCV 93   MCH 28.0   MCHC 30.2*     CMP:   Recent Labs   Lab 09/17/19  0658  09/21/19 0337   GLU 96   < > 110   CALCIUM 7.8*   < > 7.9*   ALBUMIN 2.3*  --   --    PROT 5.8*  --   --       < > 141   K 3.4*   < > 4.6   CO2 24   < > 29      < > 104   BUN 2*   < > 5*    CREATININE 0.6   < > 0.6   ALKPHOS 75  --   --    ALT 7*  --   --    AST 14  --   --    BILITOT 0.4  --   --     < > = values in this interval not displayed.     Coagulation: No results for input(s): LABPROT, INR, APTT in the last 168 hours.    Significant Diagnostics:  I have reviewed all pertinent imaging results/findings within the past 24 hours.    Assessment/Plan:     * Gastric outlet obstruction  58 y/o male with locally advanced gastric cancer s/p right IJ port placement; diagnostic laparoscopy converted to open gastrojejunostomy bypass; feeding jejunostomy placement 9/19.     - Pain: toradol, PCA  - CLD and TF today  - encourage ambulation, IS  - daily labs  - DC harrison    Dispo: Possibly Monday    Signet ring cell adenocarcinoma of stomach  58 yo M with newly diagnosed signet ring cell CA of the distal stomach associated with significant weight loss, small volume ascites, and partial gastric outlet obstruction.     - will require neoadjuvant vs definitive chemo  - no evidence of metastatic disease on CT chest and dx laparoscopy          Ace Bazan MD  General Surgery  [facility]   Depressed/Anxious

## (undated) DEVICE — ADHESIVE DERMABOND ADVANCED

## (undated) DEVICE — TRAY FOLEY 16FR INFECTION CONT

## (undated) DEVICE — ADHESIVE MASTISOL VIAL 48/BX

## (undated) DEVICE — DRAPE ABDOMINAL TIBURON 14X11

## (undated) DEVICE — CONTAINER SPECIMEN STRL 4OZ

## (undated) DEVICE — SUT SILK 2-0 STRANDS 30IN

## (undated) DEVICE — CATH URETHRAL RED RUBBER 12FR

## (undated) DEVICE — NDL 18GA X1 1/2 REG BEVEL

## (undated) DEVICE — ELECTRODE REM PLYHSV RETURN 9

## (undated) DEVICE — STAPLER ENDO GIA 60MM MED THCK

## (undated) DEVICE — SUT SILK 3-0 STRANDS 30IN

## (undated) DEVICE — TROCAR ENDOPATH XCEL 5MM 7.5CM

## (undated) DEVICE — SUT MCRYL PLUS 4-0 PS2 27IN

## (undated) DEVICE — GOWN SURGICAL X-LARGE

## (undated) DEVICE — SEE MEDLINE ITEM 146313

## (undated) DEVICE — SET DECANTER MEDICHOICE

## (undated) DEVICE — TROCAR ENDOPATH EXCEL

## (undated) DEVICE — SEE MEDLINE ITEM 156902

## (undated) DEVICE — SUT ETHILON 2-0 PSLX 30IN

## (undated) DEVICE — SUT 0 VICRYL / UR6 (J603)

## (undated) DEVICE — SEE MEDLINE ITEM 157117

## (undated) DEVICE — GOWN SMART IMP BREATHABLE XXLG

## (undated) DEVICE — SUT 2/0 30IN SILK BLK BRAI

## (undated) DEVICE — SUT SILK 2-0 SH 18IN BLACK

## (undated) DEVICE — WARMER DRAPE STERILE LF

## (undated) DEVICE — TRAY MINOR GEN SURG

## (undated) DEVICE — SEE MEDLINE ITEM 157131

## (undated) DEVICE — BLADE SURG CARBON STEEL SZ11

## (undated) DEVICE — PLUG CATHETER STERILE FOLEY

## (undated) DEVICE — SEE MEDLINE ITEM 157148

## (undated) DEVICE — SUT ENDOLOOP PDSII 18 LIGA

## (undated) DEVICE — SUT MONOCRYL 4-0 PS-2

## (undated) DEVICE — SYR 50ML CATH TIP

## (undated) DEVICE — SEE MEDLINE ITEM 146417

## (undated) DEVICE — SUT 3-0 12-18IN SILK

## (undated) DEVICE — DRAPE THYROID WITH ARMBOARD

## (undated) DEVICE — SOL NACL 0.9% INJ PF/50151

## (undated) DEVICE — NDL HYPO REG 25G X 1 1/2

## (undated) DEVICE — SOL NS 1000CC

## (undated) DEVICE — KIT PROCEDURE STER INLET CLOSU

## (undated) DEVICE — TROCAR ENDOPATH EXCEL DILATING

## (undated) DEVICE — SUT VICRYL PLUS 4-0 P3 18IN

## (undated) DEVICE — STAPLER SKIN PROXIMATE WIDE

## (undated) DEVICE — SCISSOR 5MMX35CM DIRECT DRIVE

## (undated) DEVICE — APPLICATOR CHLORAPREP ORN 26ML

## (undated) DEVICE — KIT SAHARA DRAPE DRAW/LIFT

## (undated) DEVICE — SUT 2-0 12-18IN SILK

## (undated) DEVICE — PAD K-THERMIA 24IN X 60IN

## (undated) DEVICE — CATH MALECOT 18FR

## (undated) DEVICE — DRAPE INCISE IOBAN 2 23X17IN

## (undated) DEVICE — NDL INSUF ULTRA VERESS 120MM

## (undated) DEVICE — STAPLER HANDLE XL 26 CM

## (undated) DEVICE — SYR 30CC LUER LOCK

## (undated) DEVICE — DRAPE STERI INSTRUMENT 1018

## (undated) DEVICE — DRESSING ABSRBNT ISLAND 3.6X8

## (undated) DEVICE — TRAY CATH RED RUBBER CATH 14FR

## (undated) DEVICE — SUT PDS II 0 CT-1 VIL MONO

## (undated) DEVICE — SUT ETHILON 3/0 18IN PS-1

## (undated) DEVICE — TUBING HF INSUFFLATION W/ FLTR

## (undated) DEVICE — SOL CLEARIFY VISUALIZATION LAP

## (undated) DEVICE — SEE MEDLINE ITEM 152622

## (undated) DEVICE — DRAPE INCISE IOBAN 2 23X33IN

## (undated) DEVICE — DRAPE C ARM 42 X 120 10/BX

## (undated) DEVICE — SUT 1 48IN PDS II VIO MONO

## (undated) DEVICE — BLADE 4 INCH EDGE UN-INS

## (undated) DEVICE — KIT ANTIFOG

## (undated) DEVICE — SUT SILK 3-0 SH 18IN BLACK